# Patient Record
Sex: MALE | Race: WHITE | HISPANIC OR LATINO | Employment: OTHER | URBAN - METROPOLITAN AREA
[De-identification: names, ages, dates, MRNs, and addresses within clinical notes are randomized per-mention and may not be internally consistent; named-entity substitution may affect disease eponyms.]

---

## 2017-01-05 ENCOUNTER — ALLSCRIPTS OFFICE VISIT (OUTPATIENT)
Dept: OTHER | Facility: OTHER | Age: 52
End: 2017-01-05

## 2017-01-05 DIAGNOSIS — R53.83 OTHER FATIGUE: ICD-10-CM

## 2017-01-05 DIAGNOSIS — R10.9 ABDOMINAL PAIN: ICD-10-CM

## 2017-01-06 ENCOUNTER — HOSPITAL ENCOUNTER (EMERGENCY)
Facility: HOSPITAL | Age: 52
Discharge: HOME | End: 2017-01-06
Attending: EMERGENCY MEDICINE | Admitting: EMERGENCY MEDICINE
Payer: COMMERCIAL

## 2017-01-06 ENCOUNTER — APPOINTMENT (OUTPATIENT)
Dept: LAB | Facility: HOSPITAL | Age: 52
End: 2017-01-06
Attending: FAMILY MEDICINE
Payer: COMMERCIAL

## 2017-01-06 ENCOUNTER — GENERIC CONVERSION - ENCOUNTER (OUTPATIENT)
Dept: OTHER | Facility: OTHER | Age: 52
End: 2017-01-06

## 2017-01-06 VITALS
OXYGEN SATURATION: 94 % | DIASTOLIC BLOOD PRESSURE: 84 MMHG | TEMPERATURE: 98.6 F | RESPIRATION RATE: 18 BRPM | SYSTOLIC BLOOD PRESSURE: 139 MMHG | HEART RATE: 102 BPM

## 2017-01-06 DIAGNOSIS — N50.812 TESTICULAR PAIN, LEFT: Primary | ICD-10-CM

## 2017-01-06 DIAGNOSIS — R10.9 ABDOMINAL PAIN: ICD-10-CM

## 2017-01-06 DIAGNOSIS — R53.83 OTHER FATIGUE: ICD-10-CM

## 2017-01-06 LAB
ALBUMIN SERPL BCP-MCNC: 3.6 G/DL (ref 3.5–5)
ALP SERPL-CCNC: 67 U/L (ref 46–116)
ALT SERPL W P-5'-P-CCNC: 156 U/L (ref 12–78)
ANION GAP SERPL CALCULATED.3IONS-SCNC: 6 MMOL/L (ref 4–13)
AST SERPL W P-5'-P-CCNC: 114 U/L (ref 5–45)
BASOPHILS # BLD MANUAL: 0.07 THOUSAND/UL (ref 0–0.1)
BASOPHILS NFR MAR MANUAL: 1 % (ref 0–1)
BILIRUB SERPL-MCNC: 0.35 MG/DL (ref 0.2–1)
BUN SERPL-MCNC: 10 MG/DL (ref 5–25)
CALCIUM SERPL-MCNC: 8.6 MG/DL (ref 8.3–10.1)
CHLORIDE SERPL-SCNC: 104 MMOL/L (ref 100–108)
CO2 SERPL-SCNC: 29 MMOL/L (ref 21–32)
CREAT SERPL-MCNC: 0.92 MG/DL (ref 0.6–1.3)
EOSINOPHIL # BLD MANUAL: 0.15 THOUSAND/UL (ref 0–0.4)
EOSINOPHIL NFR BLD MANUAL: 2 % (ref 0–6)
ERYTHROCYTE [DISTWIDTH] IN BLOOD BY AUTOMATED COUNT: 12.5 % (ref 11.6–15.1)
GFR SERPL CREATININE-BSD FRML MDRD: >60 ML/MIN/1.73SQ M
GLUCOSE SERPL-MCNC: 96 MG/DL (ref 65–140)
HCT VFR BLD AUTO: 45 % (ref 36.5–49.3)
HGB BLD-MCNC: 15.6 G/DL (ref 12–17)
LIPASE SERPL-CCNC: 359 U/L (ref 73–393)
LYMPHOCYTES # BLD AUTO: 2.92 THOUSAND/UL (ref 0.6–4.47)
LYMPHOCYTES # BLD AUTO: 40 % (ref 14–44)
MCH RBC QN AUTO: 31.1 PG (ref 26.8–34.3)
MCHC RBC AUTO-ENTMCNC: 34.7 G/DL (ref 31.4–37.4)
MCV RBC AUTO: 90 FL (ref 82–98)
MONOCYTES # BLD AUTO: 0.44 THOUSAND/UL (ref 0–1.22)
MONOCYTES NFR BLD: 6 % (ref 4–12)
NEUTROPHILS # BLD MANUAL: 3.72 THOUSAND/UL (ref 1.85–7.62)
NEUTS SEG NFR BLD AUTO: 51 % (ref 43–75)
NRBC BLD AUTO-RTO: 0 /100 WBCS
PLATELET # BLD AUTO: 246 THOUSANDS/UL (ref 149–390)
PLATELET BLD QL SMEAR: ADEQUATE
PMV BLD AUTO: 10.5 FL (ref 8.9–12.7)
POTASSIUM SERPL-SCNC: 4 MMOL/L (ref 3.5–5.3)
PROT SERPL-MCNC: 8.6 G/DL (ref 6.4–8.2)
RBC # BLD AUTO: 5.01 MILLION/UL (ref 3.88–5.62)
RBC MORPH BLD: NORMAL
SODIUM SERPL-SCNC: 139 MMOL/L (ref 136–145)
WBC # BLD AUTO: 7.3 THOUSAND/UL (ref 4.31–10.16)

## 2017-01-06 PROCEDURE — 83690 ASSAY OF LIPASE: CPT

## 2017-01-06 PROCEDURE — 85007 BL SMEAR W/DIFF WBC COUNT: CPT

## 2017-01-06 PROCEDURE — 36415 COLL VENOUS BLD VENIPUNCTURE: CPT

## 2017-01-06 PROCEDURE — 80053 COMPREHEN METABOLIC PANEL: CPT

## 2017-01-06 PROCEDURE — 85027 COMPLETE CBC AUTOMATED: CPT

## 2017-01-06 PROCEDURE — 99284 EMERGENCY DEPT VISIT MOD MDM: CPT

## 2017-01-06 RX ORDER — NAPROXEN 500 MG/1
500 TABLET ORAL ONCE
Status: COMPLETED | OUTPATIENT
Start: 2017-01-06 | End: 2017-01-06

## 2017-01-06 RX ADMIN — NAPROXEN 500 MG: 500 TABLET ORAL at 14:36

## 2017-01-09 ENCOUNTER — LAB (OUTPATIENT)
Dept: LAB | Facility: CLINIC | Age: 52
End: 2017-01-09
Payer: COMMERCIAL

## 2017-01-09 DIAGNOSIS — R53.83 OTHER MALAISE AND FATIGUE: Primary | ICD-10-CM

## 2017-01-09 DIAGNOSIS — R53.81 OTHER MALAISE AND FATIGUE: Primary | ICD-10-CM

## 2017-01-09 DIAGNOSIS — R74.8 OTHER NONSPECIFIC ABNORMAL SERUM ENZYME LEVELS: ICD-10-CM

## 2017-01-09 LAB
ALBUMIN SERPL BCP-MCNC: 3.6 G/DL (ref 3.5–5)
ALP SERPL-CCNC: 70 U/L (ref 46–116)
ALT SERPL W P-5'-P-CCNC: 154 U/L (ref 12–78)
ANION GAP SERPL CALCULATED.3IONS-SCNC: 5 MMOL/L (ref 4–13)
AST SERPL W P-5'-P-CCNC: 101 U/L (ref 5–45)
BASOPHILS # BLD AUTO: 0.01 THOUSANDS/ΜL (ref 0–0.1)
BASOPHILS NFR BLD AUTO: 0 % (ref 0–1)
BILIRUB SERPL-MCNC: 0.35 MG/DL (ref 0.2–1)
BUN SERPL-MCNC: 10 MG/DL (ref 5–25)
CALCIUM SERPL-MCNC: 8.7 MG/DL (ref 8.3–10.1)
CHLORIDE SERPL-SCNC: 104 MMOL/L (ref 100–108)
CO2 SERPL-SCNC: 28 MMOL/L (ref 21–32)
CREAT SERPL-MCNC: 0.85 MG/DL (ref 0.6–1.3)
EOSINOPHIL # BLD AUTO: 0.13 THOUSAND/ΜL (ref 0–0.61)
EOSINOPHIL NFR BLD AUTO: 2 % (ref 0–6)
ERYTHROCYTE [DISTWIDTH] IN BLOOD BY AUTOMATED COUNT: 12.6 % (ref 11.6–15.1)
GFR SERPL CREATININE-BSD FRML MDRD: >60 ML/MIN/1.73SQ M
GLUCOSE SERPL-MCNC: 102 MG/DL (ref 65–140)
HCT VFR BLD AUTO: 46.3 % (ref 36.5–49.3)
HGB BLD-MCNC: 15.4 G/DL (ref 12–17)
LIPASE SERPL-CCNC: 321 U/L (ref 73–393)
LYMPHOCYTES # BLD AUTO: 2.7 THOUSANDS/ΜL (ref 0.6–4.47)
LYMPHOCYTES NFR BLD AUTO: 37 % (ref 14–44)
MCH RBC QN AUTO: 30.4 PG (ref 26.8–34.3)
MCHC RBC AUTO-ENTMCNC: 33.3 G/DL (ref 31.4–37.4)
MCV RBC AUTO: 91 FL (ref 82–98)
MONOCYTES # BLD AUTO: 0.71 THOUSAND/ΜL (ref 0.17–1.22)
MONOCYTES NFR BLD AUTO: 10 % (ref 4–12)
NEUTROPHILS # BLD AUTO: 3.71 THOUSANDS/ΜL (ref 1.85–7.62)
NEUTS SEG NFR BLD AUTO: 51 % (ref 43–75)
NRBC BLD AUTO-RTO: 0 /100 WBCS
PLATELET # BLD AUTO: 260 THOUSANDS/UL (ref 149–390)
PMV BLD AUTO: 10.7 FL (ref 8.9–12.7)
POTASSIUM SERPL-SCNC: 4.1 MMOL/L (ref 3.5–5.3)
PROT SERPL-MCNC: 8.6 G/DL (ref 6.4–8.2)
RBC # BLD AUTO: 5.07 MILLION/UL (ref 3.88–5.62)
SODIUM SERPL-SCNC: 137 MMOL/L (ref 136–145)
WBC # BLD AUTO: 7.28 THOUSAND/UL (ref 4.31–10.16)

## 2017-01-09 PROCEDURE — 85025 COMPLETE CBC W/AUTO DIFF WBC: CPT

## 2017-01-09 PROCEDURE — 80053 COMPREHEN METABOLIC PANEL: CPT

## 2017-01-09 PROCEDURE — 83690 ASSAY OF LIPASE: CPT

## 2017-01-09 PROCEDURE — 36415 COLL VENOUS BLD VENIPUNCTURE: CPT

## 2017-01-10 ENCOUNTER — GENERIC CONVERSION - ENCOUNTER (OUTPATIENT)
Dept: OTHER | Facility: OTHER | Age: 52
End: 2017-01-10

## 2017-01-21 ENCOUNTER — GENERIC CONVERSION - ENCOUNTER (OUTPATIENT)
Dept: OTHER | Facility: OTHER | Age: 52
End: 2017-01-21

## 2017-01-26 ENCOUNTER — ALLSCRIPTS OFFICE VISIT (OUTPATIENT)
Dept: OTHER | Facility: OTHER | Age: 52
End: 2017-01-26

## 2017-01-31 ENCOUNTER — GENERIC CONVERSION - ENCOUNTER (OUTPATIENT)
Dept: OTHER | Facility: OTHER | Age: 52
End: 2017-01-31

## 2017-02-10 RX ORDER — IBUPROFEN 600 MG/1
600 TABLET ORAL EVERY 6 HOURS PRN
COMMUNITY
End: 2021-08-12

## 2017-02-10 RX ORDER — SENNA AND DOCUSATE SODIUM 50; 8.6 MG/1; MG/1
2 TABLET, FILM COATED ORAL DAILY
COMMUNITY
End: 2022-07-10

## 2017-02-10 RX ORDER — MELATONIN 10 MG
10 CAPSULE ORAL
COMMUNITY
End: 2022-07-10

## 2017-02-12 ENCOUNTER — ANESTHESIA EVENT (OUTPATIENT)
Dept: GASTROENTEROLOGY | Facility: MEDICAL CENTER | Age: 52
End: 2017-02-12
Payer: COMMERCIAL

## 2017-02-13 ENCOUNTER — GENERIC CONVERSION - ENCOUNTER (OUTPATIENT)
Dept: GASTROENTEROLOGY | Facility: MEDICAL CENTER | Age: 52
End: 2017-02-13

## 2017-02-13 ENCOUNTER — HOSPITAL ENCOUNTER (OUTPATIENT)
Facility: MEDICAL CENTER | Age: 52
Setting detail: OUTPATIENT SURGERY
Discharge: HOME | End: 2017-02-13
Attending: INTERNAL MEDICINE | Admitting: INTERNAL MEDICINE
Payer: COMMERCIAL

## 2017-02-13 ENCOUNTER — ANESTHESIA (OUTPATIENT)
Dept: GASTROENTEROLOGY | Facility: MEDICAL CENTER | Age: 52
End: 2017-02-13
Payer: COMMERCIAL

## 2017-02-13 VITALS
BODY MASS INDEX: 27.32 KG/M2 | DIASTOLIC BLOOD PRESSURE: 64 MMHG | SYSTOLIC BLOOD PRESSURE: 138 MMHG | HEART RATE: 59 BPM | HEIGHT: 66 IN | WEIGHT: 170 LBS | OXYGEN SATURATION: 95 % | TEMPERATURE: 97 F | RESPIRATION RATE: 16 BRPM

## 2017-02-13 DIAGNOSIS — R10.9 ABDOMINAL PAIN: ICD-10-CM

## 2017-02-13 DIAGNOSIS — K62.5 HEMORRHAGE OF ANUS AND RECTUM: ICD-10-CM

## 2017-02-13 PROCEDURE — 88342 IMHCHEM/IMCYTCHM 1ST ANTB: CPT | Performed by: INTERNAL MEDICINE

## 2017-02-13 PROCEDURE — 88305 TISSUE EXAM BY PATHOLOGIST: CPT | Performed by: INTERNAL MEDICINE

## 2017-02-13 RX ORDER — PROPOFOL 10 MG/ML
INJECTION, EMULSION INTRAVENOUS AS NEEDED
Status: DISCONTINUED | OUTPATIENT
Start: 2017-02-13 | End: 2017-02-13 | Stop reason: SURG

## 2017-02-13 RX ORDER — SODIUM CHLORIDE 9 MG/ML
125 INJECTION, SOLUTION INTRAVENOUS CONTINUOUS
Status: DISCONTINUED | OUTPATIENT
Start: 2017-02-13 | End: 2017-02-13 | Stop reason: HOSPADM

## 2017-02-13 RX ADMIN — PROPOFOL 50 MG: 10 INJECTION, EMULSION INTRAVENOUS at 10:15

## 2017-02-13 RX ADMIN — PROPOFOL 125 MG: 10 INJECTION, EMULSION INTRAVENOUS at 10:02

## 2017-02-13 RX ADMIN — PROPOFOL 50 MG: 10 INJECTION, EMULSION INTRAVENOUS at 09:58

## 2017-02-13 RX ADMIN — PROPOFOL 100 MG: 10 INJECTION, EMULSION INTRAVENOUS at 09:47

## 2017-02-13 RX ADMIN — PROPOFOL 50 MG: 10 INJECTION, EMULSION INTRAVENOUS at 09:52

## 2017-02-13 RX ADMIN — LIDOCAINE HYDROCHLORIDE 50 MG: 20 INJECTION, SOLUTION INTRAVENOUS at 09:47

## 2017-02-13 RX ADMIN — SODIUM CHLORIDE: 0.9 INJECTION, SOLUTION INTRAVENOUS at 09:30

## 2017-02-13 RX ADMIN — PROPOFOL 50 MG: 10 INJECTION, EMULSION INTRAVENOUS at 10:04

## 2017-02-13 RX ADMIN — PROPOFOL 50 MG: 10 INJECTION, EMULSION INTRAVENOUS at 10:07

## 2017-02-13 RX ADMIN — PROPOFOL 50 MG: 10 INJECTION, EMULSION INTRAVENOUS at 09:55

## 2017-02-13 RX ADMIN — PROPOFOL 50 MG: 10 INJECTION, EMULSION INTRAVENOUS at 10:18

## 2017-02-13 RX ADMIN — PROPOFOL 50 MG: 10 INJECTION, EMULSION INTRAVENOUS at 10:10

## 2017-02-13 RX ADMIN — PROPOFOL 50 MG: 10 INJECTION, EMULSION INTRAVENOUS at 10:13

## 2017-02-13 RX ADMIN — PROPOFOL 25 MG: 10 INJECTION, EMULSION INTRAVENOUS at 09:50

## 2017-02-13 RX ADMIN — PROPOFOL 25 MG: 10 INJECTION, EMULSION INTRAVENOUS at 10:24

## 2017-02-15 ENCOUNTER — GENERIC CONVERSION - ENCOUNTER (OUTPATIENT)
Dept: OTHER | Facility: OTHER | Age: 52
End: 2017-02-15

## 2017-02-22 ENCOUNTER — HOSPITAL ENCOUNTER (OUTPATIENT)
Dept: RADIOLOGY | Facility: HOSPITAL | Age: 52
Discharge: HOME | End: 2017-02-22
Attending: FAMILY MEDICINE
Payer: COMMERCIAL

## 2017-02-22 ENCOUNTER — HOSPITAL ENCOUNTER (OUTPATIENT)
Dept: RADIOLOGY | Facility: HOSPITAL | Age: 52
Discharge: HOME | End: 2017-02-22
Attending: UROLOGY
Payer: COMMERCIAL

## 2017-02-22 DIAGNOSIS — R10.31 ABDOMINAL PAIN, RIGHT LOWER QUADRANT: ICD-10-CM

## 2017-02-22 DIAGNOSIS — Z86.19 PERSONAL HISTORY OF UNSPECIFIED INFECTIOUS AND PARASITIC DISEASE: ICD-10-CM

## 2017-02-22 DIAGNOSIS — R74.8 OTHER NONSPECIFIC ABNORMAL SERUM ENZYME LEVELS: ICD-10-CM

## 2017-02-22 DIAGNOSIS — R10.2 ADNEXAL TENDERNESS, RIGHT: ICD-10-CM

## 2017-02-22 PROCEDURE — 76700 US EXAM ABDOM COMPLETE: CPT

## 2017-02-25 ENCOUNTER — GENERIC CONVERSION - ENCOUNTER (OUTPATIENT)
Dept: OTHER | Facility: OTHER | Age: 52
End: 2017-02-25

## 2017-03-01 ENCOUNTER — TRANSCRIBE ORDERS (OUTPATIENT)
Dept: LAB | Facility: HOSPITAL | Age: 52
End: 2017-03-01

## 2017-03-01 ENCOUNTER — GENERIC CONVERSION - ENCOUNTER (OUTPATIENT)
Dept: OTHER | Facility: OTHER | Age: 52
End: 2017-03-01

## 2017-03-01 ENCOUNTER — ALLSCRIPTS OFFICE VISIT (OUTPATIENT)
Dept: OTHER | Facility: OTHER | Age: 52
End: 2017-03-01

## 2017-03-01 ENCOUNTER — APPOINTMENT (OUTPATIENT)
Dept: LAB | Facility: HOSPITAL | Age: 52
End: 2017-03-01
Payer: COMMERCIAL

## 2017-03-01 DIAGNOSIS — R94.5 NONSPECIFIC ABNORMAL RESULTS OF LIVER FUNCTION STUDY: Primary | ICD-10-CM

## 2017-03-01 DIAGNOSIS — R94.5 ABNORMAL RESULTS OF LIVER FUNCTION STUDIES: ICD-10-CM

## 2017-03-01 DIAGNOSIS — R94.5 NONSPECIFIC ABNORMAL RESULTS OF LIVER FUNCTION STUDY: ICD-10-CM

## 2017-03-01 DIAGNOSIS — R74.8 ABNORMAL LEVELS OF OTHER SERUM ENZYMES: ICD-10-CM

## 2017-03-01 DIAGNOSIS — A04.8 OTHER SPECIFIED BACTERIAL INTESTINAL INFECTIONS: ICD-10-CM

## 2017-03-01 LAB
ALBUMIN SERPL BCP-MCNC: 3.5 G/DL (ref 3.5–5)
ALP SERPL-CCNC: 69 U/L (ref 46–116)
ALT SERPL W P-5'-P-CCNC: 118 U/L (ref 12–78)
ANION GAP SERPL CALCULATED.3IONS-SCNC: 6 MMOL/L (ref 4–13)
AST SERPL W P-5'-P-CCNC: 125 U/L (ref 5–45)
BILIRUB SERPL-MCNC: 0.35 MG/DL (ref 0.2–1)
BUN SERPL-MCNC: 9 MG/DL (ref 5–25)
CALCIUM SERPL-MCNC: 8.7 MG/DL (ref 8.3–10.1)
CHLORIDE SERPL-SCNC: 105 MMOL/L (ref 100–108)
CO2 SERPL-SCNC: 27 MMOL/L (ref 21–32)
CREAT SERPL-MCNC: 0.9 MG/DL (ref 0.6–1.3)
ERYTHROCYTE [DISTWIDTH] IN BLOOD BY AUTOMATED COUNT: 12.9 % (ref 11.6–15.1)
ETHANOL SERPL-MCNC: <3 MG/DL (ref 0–3)
GFR SERPL CREATININE-BSD FRML MDRD: >60 ML/MIN/1.73SQ M
GLUCOSE SERPL-MCNC: 125 MG/DL (ref 65–140)
HCT VFR BLD AUTO: 47.2 % (ref 36.5–49.3)
HGB BLD-MCNC: 16.2 G/DL (ref 12–17)
INR PPP: 1 (ref 0.86–1.16)
MCH RBC QN AUTO: 31 PG (ref 26.8–34.3)
MCHC RBC AUTO-ENTMCNC: 34.3 G/DL (ref 31.4–37.4)
MCV RBC AUTO: 90 FL (ref 82–98)
PLATELET # BLD AUTO: 252 THOUSANDS/UL (ref 149–390)
PMV BLD AUTO: 10.2 FL (ref 8.9–12.7)
POTASSIUM SERPL-SCNC: 4.3 MMOL/L (ref 3.5–5.3)
PROT SERPL-MCNC: 8.4 G/DL (ref 6.4–8.2)
PROTHROMBIN TIME: 13.3 SECONDS (ref 12–14.3)
RBC # BLD AUTO: 5.22 MILLION/UL (ref 3.88–5.62)
SODIUM SERPL-SCNC: 138 MMOL/L (ref 136–145)
WBC # BLD AUTO: 8.18 THOUSAND/UL (ref 4.31–10.16)

## 2017-03-01 PROCEDURE — 83883 ASSAY NEPHELOMETRY NOT SPEC: CPT

## 2017-03-01 PROCEDURE — 85027 COMPLETE CBC AUTOMATED: CPT

## 2017-03-01 PROCEDURE — 87902 NFCT AGT GNTYP ALYS HEP C: CPT

## 2017-03-01 PROCEDURE — 83010 ASSAY OF HAPTOGLOBIN QUANT: CPT

## 2017-03-01 PROCEDURE — 82977 ASSAY OF GGT: CPT

## 2017-03-01 PROCEDURE — 86705 HEP B CORE ANTIBODY IGM: CPT

## 2017-03-01 PROCEDURE — 85610 PROTHROMBIN TIME: CPT

## 2017-03-01 PROCEDURE — 86803 HEPATITIS C AB TEST: CPT

## 2017-03-01 PROCEDURE — 80307 DRUG TEST PRSMV CHEM ANLYZR: CPT

## 2017-03-01 PROCEDURE — 87522 HEPATITIS C REVRS TRNSCRPJ: CPT

## 2017-03-01 PROCEDURE — 80053 COMPREHEN METABOLIC PANEL: CPT

## 2017-03-01 PROCEDURE — 86704 HEP B CORE ANTIBODY TOTAL: CPT

## 2017-03-01 PROCEDURE — 87340 HEPATITIS B SURFACE AG IA: CPT

## 2017-03-01 PROCEDURE — 80320 DRUG SCREEN QUANTALCOHOLS: CPT

## 2017-03-01 PROCEDURE — 82247 BILIRUBIN TOTAL: CPT

## 2017-03-01 PROCEDURE — 82172 ASSAY OF APOLIPOPROTEIN: CPT

## 2017-03-01 PROCEDURE — 84460 ALANINE AMINO (ALT) (SGPT): CPT

## 2017-03-01 PROCEDURE — 87389 HIV-1 AG W/HIV-1&-2 AB AG IA: CPT

## 2017-03-01 PROCEDURE — 36415 COLL VENOUS BLD VENIPUNCTURE: CPT

## 2017-03-02 LAB
AMPHETAMINES UR QL SCN: NEGATIVE NG/ML
BARBITURATES UR QL SCN: NEGATIVE NG/ML
BENZODIAZ UR QL SCN: NEGATIVE NG/ML
BZE UR QL SCN: NEGATIVE NG/ML
CANNABINOIDS UR QL SCN: NEGATIVE NG/ML
HBV CORE AB SER QL: REACTIVE
HBV CORE IGM SER QL: ABNORMAL
HBV SURFACE AG SER QL: ABNORMAL
HCV AB SER QL: ABNORMAL
METHADONE UR QL SCN: NEGATIVE NG/ML
OPIATES UR QL: NEGATIVE NG/ML
PCP UR QL: NEGATIVE NG/ML
PROPOXYPH UR QL: NEGATIVE NG/ML

## 2017-03-03 ENCOUNTER — GENERIC CONVERSION - ENCOUNTER (OUTPATIENT)
Dept: OTHER | Facility: OTHER | Age: 52
End: 2017-03-03

## 2017-03-03 LAB
A2 MACROGLOB SERPL-MCNC: 279 MG/DL (ref 110–276)
ALT SERPL W P-5'-P-CCNC: 123 IU/L (ref 0–55)
APO A-I SERPL-MCNC: 120 MG/DL (ref 101–178)
BILIRUB SERPL-MCNC: 0.2 MG/DL (ref 0–1.2)
COMMENT: ABNORMAL
FIBROSIS SCORING:: ABNORMAL
FIBROSIS STAGE SERPL QL: ABNORMAL
GGT SERPL-CCNC: 259 IU/L (ref 0–65)
HAPTOGLOB SERPL-MCNC: 167 MG/DL (ref 34–200)
HCV RNA SERPL NAA+PROBE-ACNC: NORMAL IU/ML
HCV RNA SERPL NAA+PROBE-LOG IU: 6.43 LOG10 IU/ML
HIV 1+2 AB+HIV1 P24 AG SERPL QL IA: NORMAL
INTERPRETATIONS: ABNORMAL
LIVER FIBR SCORE SERPL CALC.FIBROSURE: 0.46 (ref 0–0.21)
NECROINFLAMM ACTIVITY SCORING:: ABNORMAL
NECROINFLAMMATORY ACT GRADE SERPL QL: ABNORMAL
NECROINFLAMMATORY ACT SCORE SERPL: 0.7 (ref 0–0.17)
SERVICE CMNT-IMP: ABNORMAL
TEST INFORMATION: NORMAL

## 2017-03-06 ENCOUNTER — GENERIC CONVERSION - ENCOUNTER (OUTPATIENT)
Dept: OTHER | Facility: OTHER | Age: 52
End: 2017-03-06

## 2017-03-06 LAB
HCV GENTYP SERPL NAA+PROBE: NORMAL
HCV PLEASE NOTE: NORMAL

## 2017-03-08 ENCOUNTER — GENERIC CONVERSION - ENCOUNTER (OUTPATIENT)
Dept: OTHER | Facility: OTHER | Age: 52
End: 2017-03-08

## 2017-04-10 ENCOUNTER — GENERIC CONVERSION - ENCOUNTER (OUTPATIENT)
Dept: OTHER | Facility: OTHER | Age: 52
End: 2017-04-10

## 2017-04-10 DIAGNOSIS — B18.2 CHRONIC VIRAL HEPATITIS C (HCC): ICD-10-CM

## 2018-01-10 NOTE — RESULT NOTES
Verified Results  (1) TISSUE EXAM [id number] 09:53AM [facility]     Test Name Result Flag Reference   LAB AP CASE REPORT (Report)     Surgical Pathology Report             Case: [id number]                   Authorizing Provider: Alycia Natarajan MD      Collected:      02/13/2017 0953        Ordering Location:   [facility]    Received:      02/13/2017 [address] Endoscopy                            Pathologist:      Gaurav Gaines MD                             Specimens:  A) - Duodenum, Duodenum biopsy (cold forceps)                             B) - Stomach, Gastritis (cold biopsy)                                 C) - Polyp, Colorectal, Cecal polyp (cold snare)                            D) - Polyp, Colorectal, Sigmoid polyp (cold forceps)                          E) - Polyp, Colorectal, Rectal polyp x 2 (cold forcelp and cold snare)   LAB AP FINAL DIAGNOSIS (Report)     A  Duodenum, biopsy:  - Benign duodenal mucosa  - No villous atrophy, intraepithelial lymphocytosis or crypt hyperplasia;   negative for features of malabsorptive enteropathy   - Negative for chronic or active duodenitis, dysplasia or malignancy  B  Stomach, biopsy:  - Helicobacter pylori gastritis with acute and chronic inflammation and   reactive changes  - Immunohistochemical stain for Helicobacter pylori is positive,   supporting the diagnosis  - Negative for dysplasia or carcinoma  - Small fragment of benign-appearing duodenal mucosal contaminant   present  C  Colon, cecal polyp, biopsy:  - Tubular adenoma, negative for high-grade dysplasia  D  Colon, sigmoid polyp, biopsy:  - Tubular adenoma, negative for high-grade dysplasia  E  Colon, rectal polyps, biopsies:  - 1 portion of tubular adenoma, negative for high-grade dysplasia  - 1 portion of hyperplastic polyp      Electronically signed by Gaurav Gaines MD on 2/14/2017 at 1:28 PM   LAB AP SURGICAL ADDITIONAL INFORMATION (Report) These tests were developed and their performance characteristics   determined by [facility] or [facility]  They may not be cleared or approved by the [place]  Food and   Drug Administration  The FDA has determined that such clearance or   approval is not necessary  These tests are used for clinical purposes  They should not be regarded as investigational or for research  This   laboratory has been approved by [place] [address], designated as a high-complexity   laboratory and is qualified to perform these tests  Interpretation performed at , [address]    LAB AP GROSS DESCRIPTION (Report)     A  The specimen is received in formalin, labeled with the patient's name   and hospital number, and is designated duodenum biopsy  The specimen   consists of 2 tan-pink soft tissue fragments measuring 0 3 cm and 0 4 cm   in greatest dimension  Entirely submitted  One cassette  B  The specimen is received in formalin, labeled with the patient's name   and hospital number, and is designated stomach biopsy  The specimen   consists of 2 tan-pink soft tissue fragments measuring less than 0 1 cm   and 0 3 cm in greatest dimension  Entirely submitted  One cassette  C  The specimen is received in formalin, labeled with the patient's name   and hospital number, and is designated cecal polyp  The specimen   consists of 4 tan-pink soft tissue fragments measuring 0 1 cm, 0 2 cm, 0 2   cm, and 0 4 cm in greatest dimension  Entirely submitted  One cassette  D  The specimen is received in formalin, labeled with the patient's name   and hospital number, and is designated sigmoid polyp  The specimen   consists of one tan-pink soft tissue fragment measuring 0 4 cm in greatest   dimension  Entirely submitted  One cassette  E  The specimen is received in formalin, labeled with the patient's name   and hospital number, and is designated rectal polyp ? ?2   The specimen   consists of 2 tan-pink soft tissue fragments measuring 0 3 cm and 0 4 cm   in greatest dimension  Entirely submitted  One cassette  Note: The estimated total formalin fixation time based upon information   provided by the submitting clinician and the standard processing schedule   is 18 25 hours    MAS   LAB AP CLINICAL INFORMATION R/o celiac disease     R/o celiac disease

## 2018-01-11 NOTE — MISCELLANEOUS
Provider Comments  Provider Comments:   PT NO SHOWED TODAYS APT      Signatures   Electronically signed by : Gary Arreaga, ; Dany 15 2016  3:44PM EST                       (Author)    Electronically signed by : Mansoor Paulson DO; Dany 15 2016  4:07PM EST                       (Author)    Electronically signed by : Padmini Fitch DO; Jun 24 2016  9:24AM EST                       (Author)

## 2018-01-11 NOTE — MISCELLANEOUS
History of Present Illness  TCM Communication [facility]: The patient is being contacted for follow-up after hospitalization  Hospital records were reviewed  He was hospitalized at [facility]  The date of admission: 06/18/2016, date of discharge: 06/22/2016  Diagnosis: Major Depressive Disorder, PTSD, Opiod dependence in remission  Communication performed and completed by Glo Fisher LPN   HPI: 0/22/57- 5:87MB- Tried calling patient  left message to call back  6/24/16 717pm left message for pt to call back   no call back from pt unable to complete CHUCHO process1        1 Amended By: Wendy Stephens; Jun 27 2016 8:27 AM EST    Active Problems   1  Depression (311) (F32 9)  2  Elevated liver enzymes (790 5) (R74 8)  3  Encounter for screening colonoscopy (V76 51) (Z12 11)  4  Homeless (V60 0) (Z59 0)  5  Pain of left shoulder region (719 41) (M25 512)    Past Medical History   1  History of Cancer of unknown origin (199 1) (C80 1)  2  History of colonic polyps (V12 72) (Z86 010)  3  History of hyperlipidemia (V12 29) (Z86 39)  4  History of Saliva abnormality (792 4) (R85 9)    Surgical History   1  History of Colonoscopy  2  History of ENT Surgical Result - Neck Mass Right  3  History of Eye Surgery    Family History  Mother   1  Family history of diabetes mellitus (V18 0) (Z83 3)  Brother   2  Family history of hypertension (V17 49) (Z82 49)    Social History    · Current every day smoker (305 1) (F17 200)   · Homeless (V60 0) (Z59 0)   · No alcohol use   · No drug use    Current Meds  1  Citalopram Hydrobromide 20 MG Oral Tablet; TAKE 1 TABLET DAILY AS DIRECTED; Therapy: 76BBD2505 to (Evaluate:14Jun2016); Last Rx:16Mar2016 Ordered    Allergies   1  No Known Environmental Allergies    Message   Recorded as Task   Date: 06/22/2016 12:43 PM, Created By: System   Task Name: Hospital [facility]   Assigned To: [facility] [place] triage,Team   Regarding Patient: Geoff Borjas, Status:  In Progress   Comment:   System - 22 Jun 2016 12:43 PM    Patient discharged from hospital   Patient Name: Lv Mccoy  Patient YOB: 1965  Discharge Date: 6/22/2016  Facility: [facility] - 23 Jun 2016 8:24 AM    Marguerite Lu - 23 Jun 2016 8:24 AM    TASK REASSIGNED: Previously Assigned To Ravi Hernandez - 23 Jun 2016 7:03 PM    TASK IN PROGRESS     Signatures   Electronically signed by : ALLI Hurtado ; Jun 26 2016  8:41PM EST                       (Author)    Electronically signed by : ALLI Hurtado ; Jun 27 2016 12:02PM EST                       (Author)

## 2018-01-11 NOTE — MISCELLANEOUS
Provider Comments  Provider Comments:   pt was a no show today      Signatures   Electronically signed by : Ivanna Gallegos, ; Feb 25 2016  5:29PM EST                       (Author)    Electronically signed by : Mohit Masters DO; Feb 25 2016  5:29PM EST                       (Author)    Electronically signed by : ALLI Johnson ; Mar  4 2016 12:11PM EST                       (Author)

## 2018-01-11 NOTE — RESULT NOTES
Verified Results  (1) PT WITH INR [id number] 10:13AM [facility] Order Number: [id number]     Test Name Result Flag Reference   INR 1 00  0 86-1 16   PT 13 3 seconds  12 0-14 3

## 2018-01-11 NOTE — MISCELLANEOUS
Reason For Visit  Reason For Visit Free Text Note Form: Pt  in need of outpatient mental health support  Case Management Documentation [facility]:   Information obtained from the patient and  present  Other needs and concerns include psych  Patient's financial status govt  support program  He is also dealing with additional issues such as lack of support, education/knowledge level, cultural, language/communication barrier, homelessness and chronic/terminal disease  Patient is participating in [place] and [address] [place] [address] programs  Action Plan: supportive counseling/advocacy and information provided  plan reviewed  Progress Note  Met with pt  and his friend who is acting as an  for pt  today  Pt  reports he is living at [facility]  He reports that he is interested in obtaining an ICM in the community as he is having difficulty managing his life including his medical needs as he has follow medical appointments to arrange  Call to [facility] and spoke with Kassidy Palmer  She reports pt  has an ICM through [email or web address]  She reports that he has filed a complaint against his ICM and therefore the services are "on hold"  She reports that pt  needs to contact [facility] and inform them if he would like to transfer to another ICM within the agency or if he would like to transfer to another provider such as [facility] or [facility]  Kassidy Palmer reports pt  can contact her at anytime and has provided her number along with numbers for the above ICM programs which have been provided to pt  today with instruction on how to proceed per Blank's recommendation  Pt  verbalizes understanding of information  Pt  has also been provided with information about [place] speaking support services in [place], [place]  and  has reviewed with him process for contacting MD offices to schedule follow up medical appointments and obtain x-rays   Pt  and  verbalize understanding of information  They deny further needs at this time  Supportive counseling provided  Will continue to be available to provide support  Active Problems   1  Allergic rhinitis (477 9) (J30 9)  2  Chronic pain (338 29) (G89 29)  3  Depression (311) (F32 9)  4  Elevated liver enzymes (790 5) (R74 8)  5  Encounter for screening colonoscopy (V76 51) (Z12 11)  6  History of testicular mass excision (V15 29) (Z98 89)  7  Homeless (V60 0) (Z59 0)  8  Influenza vaccine needed (V04 81) (Z23)  9  Insomnia (780 52) (G47 00)  10  Numbness and tingling of right upper extremity (782 0) (R20 0,R20 2)  11  Pain of left shoulder region (719 41) (M25 512)  12  PTSD (post-traumatic stress disorder) (309 81) (F43 10)    Current Meds  1  Citalopram Hydrobromide 20 MG Oral Tablet; TAKE 1 TABLET DAILY AS DIRECTED; Therapy: 75AQV6365 to (Evaluate:14Jun2016); Last Rx:16Mar2016 Ordered  2  Citalopram Hydrobromide 40 MG Oral Tablet; TAKE 1 TABLET DAILY; Therapy: (Recorded:31Mzn8049) to Recorded  3  Fluticasone Propionate 50 MCG/ACT Nasal Suspension; 1 SPRAY INTO EACH NOSTRIL   DAILY; Last Rx:10Fqw8216 Ordered  4  Ibuprofen 600 MG Oral Tablet; take 1 tablet by mouth every 6 hours as needed for mild   pain; Therapy: (Recorded:65Rel1131) to Recorded  5  Melatonin 10 MG Oral Capsule; TAKE 1 CAPSULE Bedtime; Therapy: 65ANB7958 to (Evaluate:74Pat6656); Last Rx:05Hya8898 Ordered  6  OxyCODONE HCl - 5 MG Oral Tablet; Take 1 tablet every 8 hours by mouth as needed for   pain; Therapy: (Recorded:86Cew7468) to Recorded  7  Pantoprazole Sodium 40 MG Oral Tablet Delayed Release; take 1 tablet by mouth daily in   the early morning; Therapy: (Recorded:63Bnv2705) to Recorded  8  Prazosin HCl - 1 MG Oral Capsule; take 3 capsules (3mg total) by mouth daily at   bedtime; Therapy: (Recorded:16Nbq8965) to Recorded  9  Senna 8 6-50 MG TABS; take 2 tablets by mouth daily; Therapy: (Recorded:50Ypb4878) to Recorded  10   SEROquel 100 MG Oral Tablet (QUEtiapine Fumarate); take 1 5 tablets (150mg total) by    mouth daily at bed time; Therapy: (Recorded:73Fwr0503) to Recorded    Allergies   1   No Known Environmental Allergies    Signatures   Electronically signed by : ISABELLA Olmstead; Jul 15 2016 12:33PM EST                       (Author)    Electronically signed by : ISABELLA Olmstead; Jul 15 2016 12:34PM EST                       (Author)

## 2018-01-12 NOTE — MISCELLANEOUS
Provider Comments  Provider Comments:   Dear Robin Blanchardo,    You missed your appointment today with Dr Camilo Poster at [address] [place] [address]  Please contact   our office to re-schedule your appointment      Thank You,      Signatures   Electronically signed by : Jamal Reyes, ; Sep  1 2016  4:18PM EST                       (Author)

## 2018-01-12 NOTE — MISCELLANEOUS
History of Present Illness  TCM Communication [facility]: The patient is being contacted for follow-up after hospitalization  Communication performed and completed by   HPI: Tried reaching patient for [facility] communication  Spoke with family member, was told that he was still in the hospital       Active Problems    1  Depression (311) (F32 9)   2  Elevated liver enzymes (790 5) (R74 8)   3  Encounter for screening colonoscopy (V76 51) (Z12 11)   4  Homeless (V60 0) (Z59 0)   5  Pain of left shoulder region (719 41) (M25 512)    Past Medical History    1  History of Cancer of unknown origin (199 1) (C80 1)   2  History of colonic polyps (V12 72) (Z86 010)   3  History of hyperlipidemia (V12 29) (Z86 39)   4  History of Saliva abnormality (792 4) (R85 9)    Surgical History    1  History of Colonoscopy   2  History of ENT Surgical Result - Neck Mass Right   3  History of Eye Surgery    Family History  Mother    1  Family history of diabetes mellitus (V18 0) (Z83 3)  Brother    2  Family history of hypertension (V17 49) (Z82 49)    Social History    · Current every day smoker (305 1) (F17 200)   · Homeless (V60 0) (Z59 0)   · No alcohol use   · No drug use    Current Meds   1  Citalopram Hydrobromide 20 MG Oral Tablet; TAKE 1 TABLET DAILY AS DIRECTED; Therapy: 09FFP4897 to (Evaluate:14Jun2016); Last Rx:16Mar2016 Ordered    Allergies    1  No Known Environmental Allergies    Message   Recorded as Task   Date: 05/24/2016 01:29 PM, Created By: Cirilo Malave   Task Name: Follow Up   Assigned To: [facility] [place] triage,Team   Regarding Patient: Chiquis Flores, Status: In Progress   Comment:    Shelby Jaeger - 24 May 2016 1:29 PM     TASK CREATED  Patient was admitted from 5/16/16 to 5/23/16 for numerous complaints: suicidal ideations (without plan), testicular mass, abdominal pain, and migraines   Patient was worked up for his testicular mass including biopsy (benign), abdominal pain, and migraines including head CT (which was normal)  He was found to have elevated LFTs and hep B and C  He was transferred to inpatient psychiatry in [facility] once he was medically stable  Please arrange follow up within 1-2 weeks of discharge from inpatient pyschiatry once released  Patient is Yemeni speaking only, and preferred to speak with a Yemeni speaking physician        Thank you,  Leonel Anders - 24 May 2016 1:49 PM     TASK REASSIGNED: Previously Assigned To [facility]   Rubia Paniagua - 02 Jun 2016 9:19 AM     TASK REASSIGNED: Previously Assigned To Leroy Jasmine - 03 Jun 2016 4:08 PM     TASK IN PROGRESS     Future Appointments    Date/Time Provider Specialty Site   06/15/2016 02:50 PM [facility], [facility] [address] [facility]     Signatures   Electronically signed by : Tiffany Mullins DO; Dany  3 2016  7:35PM EST                       (Author)    Electronically signed by : ALLI Milner ; Jun 6 2016 11:44AM EST                       (Author)

## 2018-01-12 NOTE — MISCELLANEOUS
Signatures   Electronically signed by : Leila Kline MD; Oct  4 2016  5:33PM EST                       (Author)

## 2018-01-12 NOTE — MISCELLANEOUS
April 10, 2017    Dear Madyson Whitehead,    My name is Wendy Fleming and I am the nurse who will be managing your care of Hepatitis C treatment  Enclosed are the lab scripts for additional blood  work required by [employer] for approval of you Hep C treatment  Please get these completed as promptly as possible that  I may submit for treatment to your insurance  You may bring these scripts to any lab, however, we prefer that you get  them completed at a [facility]'s lab as they will go directly into our system that way  Some of these labs get sent to a specialty pharmacy, [facility], so ensure that these labels are printed out correctly upon registration  If these labs are missed, you  will unfortunately have to go back to the lab to have them drawn again, as they are required for approval       I am also including a Hepatitis C - Care Management Agreement which is required by [facility]  Please sign it and return  it in the envelope provided      Please call our office once you have had your blood tests completed, so I may look for your results and submit to your insurance company for approval     Thank you, and please do not hesitate to contact me with  any questions at [phone] (Monday - Friday 8:00am-4:30pm)    Sincerely,    Bambi Mathew RN        Electronically signed by:Lilo [facility]   Apr 10 2017  5:58PM EST Author

## 2018-01-12 NOTE — MISCELLANEOUS
Provider Comments  Provider Comments:   Dear Tamika Butler,    You have missed your appointment with Dr Shahram Landrum on 05/16/2016  Please call our office at [phone] to reschedule      Thank you,  [facility]      Signatures   Electronically signed by : Sarah Higgins, ; May 16 2016  4:01PM EST                       (Author)    Electronically signed by : Sulma Meza DO; May 16 2016  4:04PM EST                       (Author)

## 2018-01-13 VITALS
SYSTOLIC BLOOD PRESSURE: 122 MMHG | TEMPERATURE: 98 F | RESPIRATION RATE: 16 BRPM | BODY MASS INDEX: 27.54 KG/M2 | DIASTOLIC BLOOD PRESSURE: 90 MMHG | HEIGHT: 66 IN | WEIGHT: 171.38 LBS | HEART RATE: 82 BPM

## 2018-01-13 VITALS
DIASTOLIC BLOOD PRESSURE: 78 MMHG | HEART RATE: 104 BPM | TEMPERATURE: 94.7 F | BODY MASS INDEX: 27.66 KG/M2 | SYSTOLIC BLOOD PRESSURE: 124 MMHG | OXYGEN SATURATION: 96 % | WEIGHT: 171.38 LBS

## 2018-01-13 NOTE — RESULT NOTES
Verified Results  (1) LIPID PANEL, FASTING [id number] 01:35PM Cal Munoz Order Number: [id number]      Triglyceride:         Normal              <150 mg/dl       Borderline High    150-199 mg/dl       High               200-499 mg/dl       Very High          >499 mg/dl  Cholesterol:         Desirable        <200 mg/dl      Borderline High  200-239 mg/dl      High             >239 mg/dl  HDL Cholesterol:        High    >59 mg/dL      Low     <41 mg/dL  LDL CALCULATED:    This screening LDL is a calculated result  It does not have the accuracy of the Direct Measured LDL in the monitoring of patients with hyperlipidemia and/or statin therapy  Direct Measure LDL (YMD459) must be ordered separately in these patients       Test Name Result Flag Reference   CHOLESTEROL 156 mg/dL     HDL,DIRECT 45 mg/dL  40-60   LDL CHOLESTEROL CALCULATED 79 mg/dL  0-100   TRIGLYCERIDES 162 mg/dL H <=150     (1) CBC/PLT/DIFF [id number] 01:35PM Torin Andrews    Order Number: [id number]     Order Number: [id number]     Test Name Result Flag Reference   WBC COUNT 10 03 Thousand/uL  4 31-10 16   RBC COUNT 4 95 Million/uL  3 88-5 62   HEMOGLOBIN 15 8 g/dL  12 0-17 0   HEMATOCRIT 45 8 %  36 5-49 3   MCV 93 fL  82-98   MCH 31 9 pg  26 8-34 3   MCHC 34 5 g/dL  31 4-37 4   RDW 12 8 %  11 6-15 1   MPV 9 9 fL  8 9-12 7   PLATELET COUNT 580 Thousands/uL  149-390   nRBC AUTOMATED 0 /100 WBCs     NEUTROPHILS RELATIVE PERCENT 64 %  43-75   LYMPHOCYTES RELATIVE PERCENT 28 %  14-44   MONOCYTES RELATIVE PERCENT 7 %  4-12   EOSINOPHILS RELATIVE PERCENT 1 %  0-6   BASOPHILS RELATIVE PERCENT 0 %  0-1   NEUTROPHILS ABSOLUTE COUNT 6 34 Thousands/µL  1 85-7 62   LYMPHOCYTES ABSOLUTE COUNT 2 85 Thousands/µL  0 60-4 47   MONOCYTES ABSOLUTE COUNT 0 68 Thousand/µL  0 17-1 22   EOSINOPHILS ABSOLUTE COUNT 0 11 Thousand/µL  0 00-0 61   BASOPHILS ABSOLUTE COUNT 0 02 Thousands/µL  0 00-0 10     (1) COMPREHENSIVE METABOLIC PANEL [id number] 01: [address] [place] [address] Order Number: [id number]      National Kidney Disease Education Program recommendations are as follows:  GFR calculation is accurate only with a steady state creatinine  Chronic Kidney disease less than 60 ml/min/1 73 sq  meters  Kidney failure less than 15 ml/min/1 73 sq  meters  Test Name Result Flag Reference   GLUCOSE,RANDM 93 mg/dL     If the patient is fasting, the ADA then defines impaired fasting glucose as > 100 mg/dL and diabetes as > or equal to 123 mg/dL     SODIUM 139 mmol/L  136-145   POTASSIUM 4 6 mmol/L  3 5-5 3   CHLORIDE 103 mmol/L  100-108   CARBON DIOXIDE 30 mmol/L  21-32   ANION GAP (CALC) 6 mmol/L  4-13   BLOOD UREA NITROGEN 12 mg/dL  5-25   CREATININE 0 86 mg/dL  0 60-1 30   Standardized to IDMS reference method   CALCIUM 8 6 mg/dL  8 3-10 1   BILI, TOTAL 0 45 mg/dL  0 20-1 00   ALK PHOSPHATAS 53 U/L     ALT (SGPT) 106 U/L H 12-78   AST(SGOT) 61 U/L H 5-45   ALBUMIN 3 8 g/dL  3 5-5 0   TOTAL PROTEIN 7 8 g/dL  6 4-8 2   eGFR Non-African American      >60 0 ml/min/1 73sq m

## 2018-01-13 NOTE — RESULT NOTES
Verified Results  [address] [facility] [id number] 09:41AM Chevy Jeffrey     Test Name Result Flag Reference   US SCROTUM AND TESTICLES (Report)     SCROTAL ULTRASOUND     INDICATION: Right testicular pain  COMPARISON: 9/30/2016  TECHNIQUE:  Ultrasound the scrotal contents was performed with a high frequency linear transducer utilizing volumetric sweep imaging as well as standard still image techniques  Imaging performed in longitudinal and transverse orientation  Color and    spectral Doppler evaluation also performed bilaterally  FINDINGS:     TESTES:    Testes are symmetric and normal in size  RIGHT testis = 5 0 x 2 5 x 3 1 cm    Normal contour with homogeneous smooth echotexture  No intratesticular mass lesion or calcifications  LEFT testis = 5 0 x 2 5 x 3 2 cm   Normal contour with homogeneous smooth echotexture  No intratesticular mass lesion or calcifications  Doppler flow within both testes is present and symmetric  EPIDIDYMIDES:    Normal Size  Doppler ultrasound demonstrates normal blood flow  Small epididymal cysts are again seen  HYDROCELE: No significant fluid present  VARICOCELE: None present  SCROTUM: Scrotal thickness and appearance within normal limits  No evidence for extratesticular mass or hernia demonstrated  IMPRESSION:      No significant abnormality identified  Workstation performed: [id number]     Signed by:    Mary Ann Driscoll MD   12/28/16

## 2018-01-14 VITALS
BODY MASS INDEX: 28.28 KG/M2 | DIASTOLIC BLOOD PRESSURE: 70 MMHG | TEMPERATURE: 97.2 F | HEIGHT: 66 IN | WEIGHT: 176 LBS | HEART RATE: 80 BPM | SYSTOLIC BLOOD PRESSURE: 124 MMHG | RESPIRATION RATE: 16 BRPM

## 2018-01-15 NOTE — MISCELLANEOUS
Reason For Visit  Reason For Visit Free Text Note Form: Received request to investigate why pts  insurance will not cover office visit with Dr Kasia Knight, Urologist  Call to Dr Austin Yi office and they are not in network with pts  [place] insurance and therefore treatment will not be covered  Search conducted of local Urologists in network with [place] and [address] [facility] [address], Urology is in network  Update provided to MD  will continue to be available to provide support  Active Problems    1  Abdominal pain (789 00) (R10 9)   2  Allergic rhinitis (477 9) (J30 9)   3  Chronic constipation (564 00) (K59 09)   4  Chronic pain (338 29) (G89 29)   5  Chronic post-operative pain (338 28) (G89 28)   6  Depression (311) (F32 9)   7  Elevated liver enzymes (790 5) (R74 8)   8  Encounter for screening colonoscopy (V76 51) (Z12 11)   9  Encounter for smoking cessation counseling (V65 42,305 1) (Z71 6,Z72 0)   10  Fatigue (780 79) (R53 83)   11  History of testicular mass excision (V15 29) (Z98 890,Z87 438)   12  Homeless (V60 0) (Z59 0)   13  Influenza vaccine needed (V04 81) (Z23)   14  Insomnia (780 52) (G47 00)   15  Internal inguinal hernia (550 90) (K40 90)   16  Loss of appetite (783 0) (R63 0)   17  Numbness and tingling of right upper extremity (782 0) (R20 0,R20 2)   18  Pain of left shoulder region (719 41) (M25 512)   19  Palpitations (785 1) (R00 2)   20  PTSD (post-traumatic stress disorder) (309 81) (F43 10)   21  Rectal bleeding (569 3) (K62 5)   22  Scrotal pain (608 9) (N50 82)   23  Shortness of breath (786 05) (R06 02)   24  Trouble swallowing (787 20) (R13 10)    Current Meds   1  Citalopram Hydrobromide 40 MG Oral Tablet; TAKE 1 TABLET DAILY; Therapy: (Recorded:84Uot5613) to Recorded   2  Colace 100 MG Oral Capsule; TAKE 1 CAPSULE TWICE DAILY; Therapy: 66VUE1027 to (Brandon Antuenz)  Requested for: 68TFC0998; Last   Rx:37Fhi7461 Ordered   3   Ensure Oral Liquid; DRINK ONE TO TWO CANS DAILY; Therapy: 47RPD9494 to (Last Rx:05Jan2017)  Requested for: 14FPS6176 Ordered   4  Flonase Allergy Relief 50 MCG/ACT Nasal Suspension (Fluticasone Propionate); Therapy: 46MJL5763 to Recorded   5  Fluticasone Propionate 50 MCG/ACT Nasal Suspension; 1 SPRAY INTO EACH NOSTRIL   DAILY  Requested for: 11DKC1436; Last Rx:05Jan2017 Ordered   6  Ibuprofen 600 MG Oral Tablet; take 1 tablet by mouth every 6 hours as needed for mild   pain; Therapy: (Recorded:13Wrx3274) to Recorded   7  Meclizine HCl - 25 MG Oral Tablet; Therapy: (Recorded:46Exc5787) to Recorded   8  Naproxen 500 MG Oral Tablet; TAKE 1 TABLET EVERY 12 HOURS WITH FOOD AS   NEEDED; Therapy: 01ZFT5591 to (Marine Cypher)  Requested for: 34PEI4624; Last   Rx:14Nov2016 Ordered   9  Nicotine 21 MG/24HR Transdermal Patch 24 Hour; APPLY 1 PATCH Daily; Therapy: 22UEG2988 to (Last Rx:14Nov2016)  Requested for: 33GHX3158 Ordered   10  Pantoprazole Sodium 40 MG Oral Tablet Delayed Release; take 1 tablet by mouth daily in    the early morning; Therapy: (Recorded:84Wkl4296) to Recorded   11  Prazosin HCl - 2 MG Oral Capsule; Therapy: (Recorded:16Wfz5614) to Recorded   12  QUEtiapine Fumarate 100 MG Oral Tablet; Therapy: (Recorded:20Atz1732) to Recorded   13  Senna 8 6 MG Oral Tablet; Take 1 tablet twice daily; Therapy: 90SYE0746 to (Ana Passe)  Requested for: 22AGR6335; Last    Rx:09Dec2016 Ordered   14  Sertraline HCl - 50 MG Oral Tablet; Take 1 tablet daily; Therapy: 82PEP7947 to (Evaluate:08Jan2017) Recorded   15  TraMADol HCl - 50 MG Oral Tablet; TAKE 1 TABLET EVERY 4 TO 6 HOURS AS NEEDED; Therapy: 01KEJ2618 to (Evaluate:13Jan2017); Last Rx:14Nov2016 Ordered   16  TraZODone HCl - 100 MG Oral Tablet; TAKE 1 TABLET Bedtime; Therapy: 80SHQ6324 to (Evaluate:35Vgk1484) Recorded    Allergies    1   No Known Environmental Allergies    Signatures   Electronically signed by : ISABELLA Lockhart; Jan 6 2017  2:52PM EST                       (Author)

## 2018-01-15 NOTE — RESULT NOTES
Verified Results  (1) HIV AG/AB Miriam Hernandezdeepak MOREJON 05OGB7996 10:13AM [place]    Order Number: [id number]     Test Name Result Flag Reference   HIV 1/2 AND P24 Non-Reactive  Non-Reactive   This test detects HIV 1, HIV2 and p24 Antigen

## 2018-01-15 NOTE — MISCELLANEOUS
Reason For Visit  Reason For Visit Free Text Note Form: Received request to contact pt  or his , regarding obtaining Ensure and addressing transportation concerns  Call to [facility] [phone]  Spoke with Claude Hameed,  and explained that pt  can obtain Ensure from pharmacy such as [facility]  Reviewed transportation concerns for pt  She feels pt  is capable of using public transportation and is aware of [place] [address] service for Disabled people, however she reports pt  does not have a disability at this time  She reports their agency can also assist with transportation  She will review information discussed with , Lisa Newsome  Will continue to be available to provide support  Active Problems    1  Abdominal pain (789 00) (R10 9)   2  Acute right lower quadrant pain (789 03,338 19) (R10 31)   3  Allergic rhinitis (477 9) (J30 9)   4  Chronic constipation (564 00) (K59 09)   5  Chronic pain (338 29) (G89 29)   6  Chronic post-operative pain (338 28) (G89 28)   7  Depression (311) (F32 9)   8  Elevated liver enzymes (790 5) (R74 8)   9  Encounter for screening colonoscopy (V76 51) (Z12 11)   10  Encounter for smoking cessation counseling (V65 42,305 1) (Z71 6,Z72 0)   11  Fatigue (780 79) (R53 83)   12  History of hepatitis (V12 09) (Z86 19)   13  History of testicular mass excision (V15 29) (Z98 890,Z87 438)   14  Homeless (V60 0) (Z59 0)   15  Influenza vaccine needed (V04 81) (Z23)   16  Insomnia (780 52) (G47 00)   17  Internal inguinal hernia (550 90) (K40 90)   18  Loss of appetite (783 0) (R63 0)   19  Numbness and tingling of right upper extremity (782 0) (R20 0,R20 2)   20  Pain of left shoulder region (719 41) (M25 512)   21  Palpitations (785 1) (R00 2)   22  PTSD (post-traumatic stress disorder) (309 81) (F43 10)   23  Rectal bleeding (569 3) (K62 5)   24  Scrotal pain (608 9) (N50 82)   25   Shortness of breath (786 05) (R06 02) 26  Trouble swallowing (787 20) (R13 10)    Current Meds   1  Citalopram Hydrobromide 40 MG Oral Tablet; TAKE 1 TABLET DAILY; Therapy: (Recorded:37Ljg4418) to Recorded   2  Colace 100 MG Oral Capsule; TAKE 1 CAPSULE TWICE DAILY; Therapy: 33FPQ7732 to (Maru Granger)  Requested for: 38SEN8576; Last   Rx:09Dec2016 Ordered   3  Ensure Oral Liquid; DRINK ONE TO TWO CANS DAILY; Therapy: 69WIU6241 to (Last Rx:05Jan2017)  Requested for: 97WHT7395 Ordered   4  Flonase Allergy Relief 50 MCG/ACT Nasal Suspension (Fluticasone Propionate); Therapy: 70BJO7657 to Recorded   5  Fluticasone Propionate 50 MCG/ACT Nasal Suspension; 1 SPRAY INTO EACH NOSTRIL   DAILY  Requested for: 63TJW7728; Last Rx:05Jan2017 Ordered   6  Ibuprofen 600 MG Oral Tablet; take 1 tablet by mouth every 6 hours as needed for mild   pain; Therapy: (Recorded:88Jpk7265) to Recorded   7  Meclizine HCl - 25 MG Oral Tablet; Therapy: (Recorded:09Dec2016) to Recorded   8  Naproxen 500 MG Oral Tablet; TAKE 1 TABLET EVERY 12 HOURS WITH FOOD AS   NEEDED; Therapy: 21FMW8931 to (Kimmie Cal)  Requested for: 83BIX5175; Last   Rx:14Nov2016 Ordered   9  Nicotine 21 MG/24HR Transdermal Patch 24 Hour; APPLY 1 PATCH Daily; Therapy: 63TPG9689 to (Last Rx:14Nov2016)  Requested for: 44QEQ8797 Ordered   10  Pantoprazole Sodium 40 MG Oral Tablet Delayed Release; take 1 tablet by mouth daily in    the early morning; Therapy: (Recorded:97Egv7879) to Recorded   11  Prazosin HCl - 2 MG Oral Capsule; Therapy: (Recorded:26Rdf6538) to Recorded   12  QUEtiapine Fumarate 100 MG Oral Tablet; Therapy: (Recorded:16Cky0182) to Recorded   13  Senna 8 6 MG Oral Tablet; Take 1 tablet twice daily; Therapy: 32IZY7666 to (Maru Granger)  Requested for: 19TDA0719; Last    Rx:09Dec2016 Ordered   14  Sertraline HCl - 50 MG Oral Tablet; Take 1 tablet daily; Therapy: 69HKA2634 to (Evaluate:48Pug3633) Recorded   15   TraMADol HCl - 50 MG Oral Tablet; TAKE 1 TABLET EVERY 4 TO 6 HOURS AS NEEDED; Therapy: 93WDY4907 to (Evaluate:13Jan2017); Last Rx:14Nov2016 Ordered   16  TraZODone HCl - 100 MG Oral Tablet; TAKE 1 TABLET Bedtime; Therapy: 32CHL7141 to (Evaluate:38Tqk8666) Recorded    Allergies    1   No Known Environmental Allergies    Signatures   Electronically signed by : ISABELLA Rojas; Jan 31 2017 10:25AM EST                       (Author)

## 2018-01-15 NOTE — MISCELLANEOUS
Provider Comments  Provider Comments:   pt was a no show todeay      Signatures   Electronically signed by : Momo Motley, ; Apr 13 2016  3:58PM EST                       (Author)    Electronically signed by : Dee Burks; Apr 14 2016  7:10AM EST                       (Author)    Electronically signed by : Anahi Menjivar DO;  Apr 15 2016  1:29PM EST                       (Author)

## 2018-01-16 NOTE — RESULT NOTES
Verified Results  * US ABDOMEN COMPLETE 97Ebq5947 12:31PM Chichirayne Huynh     Test Name Result Flag Reference   US ABDOMEN COMPLETE (Report)     ABDOMEN ULTRASOUND, COMPLETE     INDICATION: Upper abdominal pain  Abnormal LFTs  History of infectious parasitic disease  COMPARISON: None  TECHNIQUE:  Real-time ultrasound of the abdomen was performed with a curvilinear transducer with both volumetric sweeps and still imaging techniques  FINDINGS:     PANCREAS: Visualized portions of the pancreas are within normal limits  AORTA AND IVC: Visualized portions are normal for patient age  LIVER:   Size: Mildly enlarged  The liver measures 17 cm in the midclavicular line  Contour: Surface contour is subtly irregular suggesting possibility of micronodular changes  Parenchyma: Echogenicity is diffusely increased, with mild coarsened pattern  While this could represent a component of steatosis, correlate with clinical findings for underlying early cirrhotic changes  No evidence of suspicious mass  The main portal vein is patent and hepatopetal       BILIARY:   The gallbladder is normal in caliber  No wall thickening or pericholecystic fluid  No stones or sludge identified  Sonographic Lindle Edelson sign is negative  No intrahepatic biliary dilatation  CBD measures 5 mm  No choledocholithiasis  KIDNEY:    Right kidney measures 10 4 x 4 4 cm  Within normal limits  Left kidney measures 10 2 x 5 6 cm  Within normal limits  SPLEEN:    Measures 10 3 x 10 7 x 4 6 cm  Within normal limits  ASCITES: None  IMPRESSION:     No acute abnormality evident  Mild hepatomegaly and mild abnormal echogenicity, echotexture and surface contour suggesting possibility of underlying fibrotic liver disease         Workstation performed: [id number]     Signed by:   Liang Crandall MD   2/23/17

## 2018-01-17 NOTE — RESULT NOTES
Verified Results  (1) HCV FIBROSURE 10RGO1583 10:13AM Marbin Points     Test Name Result Flag Reference   Fibrosis Score 0 46 H 0 00 - 0 21   Fibrosis Stage F1-F2     Necroinflammat Activity Score 0 70 H 0 00 - 0 17   Necroinflammat Activity Grade A3-Severe activity     Alpha 2-Macroglobulins, Qn 279 mg/dL H 110 - 276   ALT (SGPT) P5P 123 IU/L H 0 - 55   Interpretations: Comment     Quantitative results of 6 biochemical tests are analyzed using  a computational algorithm to provide a quantitative surrogate  marker (0 0-1 0) for liver fibrosis (METAVIR F0-F4) and for  necroinflammatory activity (METAVIR A0-A3)  Fibrosis Scoring: Comment     <0 21 = Stage F0 - No fibrosis  0 21 - 0 27 = Stage F0 - F1  0 27 - 0 31 = Stage F1 - Portal fibrosis  0 31 - 0 48 = Stage F1 - F2  0 48 - 0 58 = Stage F2 - Bridging fibrosis with few septa  0 58 - 0 72 = Stage F3 - Bridging fibrosis with many septa  0 72 - 0 74 = Stage F3 - F4        >0 74 = Stage F4 - Cirrhosis   Necroinflamm Activity Scoring: Comment     <0 17 = Grade A0 - No Activity  0 17 - 0 29 = Grade A0 - A1  0 29 - 0 36 = Grade A1 - Minimal activity  0 36 - 0 52 = Grade A1 - A2  0 52 - 0 60 = Grade A2 - Moderate activity  0 60 - 0 62 = Grade A2 - A3        >0 62 = Grade A3 - Severe activity   Limitations: Comment     The negative predictive value of a Fibrotest score <0 31 (absence of  clinically significant fibrosis) was 85% when compared to liver biopsy  in 1,270 HCV infected patients with a 38% prevalence of significant  liver fibrosis (F2, 3 or 4)  The positive predictive value of a Fibro-  test score >0 48 (F2, 3, 4) was 61% in that same patient cohort  HCV  FibroSURE is not recommended in patients with Carmen Stephani Disease, acute  hemolysis (e g  HCV ribavirin therapy mediated hemolysis) acute hepa-  titis of the liver, extra-hepatic cholestasis, transplant patients,  and/or renal insufficiency patients    Any of these clinical situations  may lead to inaccurate quantitative predictions of fibrosis and  necroinflammatory activity in the liver  Comment: Comment     This test was developed and its performance characteristics determined  by [facility]   It has not been cleared or approved by the Food and Drug  Administration  The FDA has determined that such clearance or  approval is not necessary  For questions regarding this report please contact customer service  at [phone]  Bilirubin, Total 0 2 mg/dL  0 0 - 1 2    IU/L H 0 - 65   Performed at:  [address]  [id number]  : Ulices Welch MD, Phone:  [phone]   HAPTOGLOBIN 167 mg/dL  34 - 200   APOA 120 mg/dL  101 - 178     (1) HEP C RNA PCR, QUANTITATIVE 81MRP5409 10:13AM Liset Flores     Test Name Result Flag Reference   HCV PCR QUANTITATIVE [id number] IU/mL     HCV QUANTITATIVE LOG 6 428 log10 IU/mL     Performed at:  [facility] [address]  [id number]  : Davian Dwyer MD, Phone:  [phone]   TEST INFORMATION Comment     The quantitative range of this assay is 15 IU/mL to 100 million IU/mL

## 2018-01-17 NOTE — RESULT NOTES
Verified Results  (1) HEPATITIS C GENOTYPING 59HCD7237 10:13AM Mar[facility] Points     Test Name Result Flag Reference   HEPATITIS C GENOTYPING 1a     HEP C GENOTYPE NOTE Comment     This test was developed and its performance characteristics determined  by [facility]   It has not been cleared or approved by the [place]  Food and  Drug Administration  The FDA has determined that such clearance or approval is not  necessary  This test is used for clinical purposes  It should not be  regarded as investigational or for research    Performed at:  [address]  [id number]  : Zack Peña MD, Phone:  [phone]

## 2018-01-17 NOTE — MISCELLANEOUS
Reason For Visit  Reason For Visit Free Text Note Form: Received request to locate Urology and GI Providers for pt  who accept his [employer]  Call placed to pt  however he is not available  Letter mailed to pt  today including contact information for [facility], [facility] and  GI Specialists as they are both in network with his [employer] contact information included with letter  Letter scanned to EMR  Will continue to be available to provide support  Active Problems    1  Abdominal pain (789 00) (R10 9)   2  Allergic rhinitis (477 9) (J30 9)   3  Chronic constipation (564 00) (K59 09)   4  Chronic pain (338 29) (G89 29)   5  Chronic post-operative pain (338 28) (G89 28)   6  Depression (311) (F32 9)   7  Elevated liver enzymes (790 5) (R74 8)   8  Encounter for screening colonoscopy (V76 51) (Z12 11)   9  Encounter for smoking cessation counseling (V65 42,305 1) (Z71 6,Z72 0)   10  Fatigue (780 79) (R53 83)   11  History of testicular mass excision (V15 29) (Z98 890,Z87 438)   12  Homeless (V60 0) (Z59 0)   13  Influenza vaccine needed (V04 81) (Z23)   14  Insomnia (780 52) (G47 00)   15  Internal inguinal hernia (550 90) (K40 90)   16  Loss of appetite (783 0) (R63 0)   17  Numbness and tingling of right upper extremity (782 0) (R20 0,R20 2)   18  Pain of left shoulder region (719 41) (M25 512)   19  Palpitations (785 1) (R00 2)   20  PTSD (post-traumatic stress disorder) (309 81) (F43 10)   21  Rectal bleeding (569 3) (K62 5)   22  Scrotal pain (608 9) (N50 82)   23  Shortness of breath (786 05) (R06 02)   24  Trouble swallowing (787 20) (R13 10)    Current Meds   1  Citalopram Hydrobromide 40 MG Oral Tablet; TAKE 1 TABLET DAILY; Therapy: (Recorded:04Qia7413) to Recorded   2  Colace 100 MG Oral Capsule; TAKE 1 CAPSULE TWICE DAILY; Therapy: 91CXB7814 to (Sheri Prince)  Requested for: 94MYG8073; Last   Rx:61Oew3280 Ordered   3   Ensure Oral Liquid; DRINK ONE TO TWO CANS DAILY; Therapy: 15IJS6133 to (Last Rx:05Jan2017)  Requested for: 95NAL3965 Ordered   4  Flonase Allergy Relief 50 MCG/ACT Nasal Suspension (Fluticasone Propionate); Therapy: 18YAW7633 to Recorded   5  Fluticasone Propionate 50 MCG/ACT Nasal Suspension; 1 SPRAY INTO EACH NOSTRIL   DAILY  Requested for: 78KPY0737; Last Rx:05Jan2017 Ordered   6  Ibuprofen 600 MG Oral Tablet; take 1 tablet by mouth every 6 hours as needed for mild   pain; Therapy: (Recorded:53Qkt2767) to Recorded   7  Meclizine HCl - 25 MG Oral Tablet; Therapy: (Recorded:83Rne3225) to Recorded   8  Naproxen 500 MG Oral Tablet; TAKE 1 TABLET EVERY 12 HOURS WITH FOOD AS   NEEDED; Therapy: 23PWY5347 to (Kary Estimable)  Requested for: 17JWK1954; Last   Rx:14Nov2016 Ordered   9  Nicotine 21 MG/24HR Transdermal Patch 24 Hour; APPLY 1 PATCH Daily; Therapy: 33JNN6978 to (Last Rx:14Nov2016)  Requested for: 76CDS4740 Ordered   10  Pantoprazole Sodium 40 MG Oral Tablet Delayed Release; take 1 tablet by mouth daily in    the early morning; Therapy: (Recorded:05Rel2380) to Recorded   11  Prazosin HCl - 2 MG Oral Capsule; Therapy: (Recorded:82Zmh7470) to Recorded   12  QUEtiapine Fumarate 100 MG Oral Tablet; Therapy: (Recorded:25Cmy0024) to Recorded   13  Senna 8 6 MG Oral Tablet; Take 1 tablet twice daily; Therapy: 60KMO2422 to (Dorsey Junes)  Requested for: 29JKO6561; Last    Rx:09Dec2016 Ordered   14  Sertraline HCl - 50 MG Oral Tablet; Take 1 tablet daily; Therapy: 01MTT5202 to (Evaluate:08Jan2017) Recorded   15  TraMADol HCl - 50 MG Oral Tablet; TAKE 1 TABLET EVERY 4 TO 6 HOURS AS NEEDED; Therapy: 15NXC5691 to (Evaluate:13Jan2017); Last Rx:14Nov2016 Ordered   16  TraZODone HCl - 100 MG Oral Tablet; TAKE 1 TABLET Bedtime; Therapy: 60OEV3047 to (Evaluate:91Rcs1498) Recorded    Allergies    1   No Known Environmental Allergies    Signatures   Electronically signed by : ISABELLA Cisneros; Sánchez 10 2017  1:19PM EST                       (Author)

## 2018-01-17 NOTE — MISCELLANEOUS
Provider Comments  Provider Comments:   PT WAS A NO SHOW TODAY      Signatures   Electronically signed by : Katelyn Valdes, ; Apr 13 2016  3:11PM EST                       (Author)    Electronically signed by : Harper Gunter DO; Apr 14 2016  7:10AM EST                       (Author)    Electronically signed by : Driss Morgan DO;  Apr 15 2016  1:29PM EST                       (Author)

## 2018-03-24 ENCOUNTER — HOSPITAL ENCOUNTER (EMERGENCY)
Facility: HOSPITAL | Age: 53
Discharge: HOME | End: 2018-03-24
Attending: EMERGENCY MEDICINE | Admitting: EMERGENCY MEDICINE
Payer: COMMERCIAL

## 2018-03-24 ENCOUNTER — APPOINTMENT (EMERGENCY)
Dept: RADIOLOGY | Facility: HOSPITAL | Age: 53
End: 2018-03-24
Payer: COMMERCIAL

## 2018-03-24 VITALS
HEIGHT: 66 IN | OXYGEN SATURATION: 100 % | WEIGHT: 165.6 LBS | RESPIRATION RATE: 18 BRPM | HEART RATE: 81 BPM | BODY MASS INDEX: 26.61 KG/M2 | TEMPERATURE: 97.2 F

## 2018-03-24 DIAGNOSIS — R07.9 LEFT SIDED CHEST PAIN: Primary | ICD-10-CM

## 2018-03-24 LAB
ALBUMIN SERPL BCP-MCNC: 3.8 G/DL (ref 3.5–5)
ALP SERPL-CCNC: 87 U/L (ref 46–116)
ALT SERPL W P-5'-P-CCNC: 34 U/L (ref 12–78)
ANION GAP SERPL CALCULATED.3IONS-SCNC: 4 MMOL/L (ref 4–13)
AST SERPL W P-5'-P-CCNC: 34 U/L (ref 5–45)
BACTERIA UR QL AUTO: NORMAL /HPF
BASOPHILS # BLD AUTO: 0.02 THOUSANDS/ΜL (ref 0–0.1)
BASOPHILS NFR BLD AUTO: 0 % (ref 0–1)
BILIRUB SERPL-MCNC: 0.24 MG/DL (ref 0.2–1)
BILIRUB UR QL STRIP: ABNORMAL
BUN SERPL-MCNC: 7 MG/DL (ref 5–25)
CALCIUM SERPL-MCNC: 8.3 MG/DL (ref 8.3–10.1)
CHLORIDE SERPL-SCNC: 105 MMOL/L (ref 100–108)
CLARITY UR: CLEAR
CO2 SERPL-SCNC: 30 MMOL/L (ref 21–32)
COLOR UR: YELLOW
CREAT SERPL-MCNC: 0.98 MG/DL (ref 0.6–1.3)
EOSINOPHIL # BLD AUTO: 0.12 THOUSAND/ΜL (ref 0–0.61)
EOSINOPHIL NFR BLD AUTO: 1 % (ref 0–6)
ERYTHROCYTE [DISTWIDTH] IN BLOOD BY AUTOMATED COUNT: 13.8 % (ref 11.6–15.1)
GFR SERPL CREATININE-BSD FRML MDRD: 88 ML/MIN/1.73SQ M
GLUCOSE SERPL-MCNC: 60 MG/DL (ref 65–140)
GLUCOSE UR STRIP-MCNC: NEGATIVE MG/DL
HCT VFR BLD AUTO: 40.5 % (ref 36.5–49.3)
HGB BLD-MCNC: 13.7 G/DL (ref 12–17)
HGB UR QL STRIP.AUTO: NEGATIVE
HYALINE CASTS #/AREA URNS LPF: NORMAL /LPF
KETONES UR STRIP-MCNC: NEGATIVE MG/DL
LEUKOCYTE ESTERASE UR QL STRIP: NEGATIVE
LIPASE SERPL-CCNC: 284 U/L (ref 73–393)
LYMPHOCYTES # BLD AUTO: 2.62 THOUSANDS/ΜL (ref 0.6–4.47)
LYMPHOCYTES NFR BLD AUTO: 31 % (ref 14–44)
MCH RBC QN AUTO: 29.9 PG (ref 26.8–34.3)
MCHC RBC AUTO-ENTMCNC: 33.8 G/DL (ref 31.4–37.4)
MCV RBC AUTO: 88 FL (ref 82–98)
MONOCYTES # BLD AUTO: 0.5 THOUSAND/ΜL (ref 0.17–1.22)
MONOCYTES NFR BLD AUTO: 6 % (ref 4–12)
NEUTROPHILS # BLD AUTO: 5.13 THOUSANDS/ΜL (ref 1.85–7.62)
NEUTS SEG NFR BLD AUTO: 62 % (ref 43–75)
NITRITE UR QL STRIP: NEGATIVE
NON-SQ EPI CELLS URNS QL MICRO: NORMAL /HPF
NRBC BLD AUTO-RTO: 0 /100 WBCS
PH UR STRIP.AUTO: 6.5 [PH] (ref 4.5–8)
PLATELET # BLD AUTO: 158 THOUSANDS/UL (ref 149–390)
PMV BLD AUTO: 10.7 FL (ref 8.9–12.7)
POTASSIUM SERPL-SCNC: 3.4 MMOL/L (ref 3.5–5.3)
PROT SERPL-MCNC: 8.5 G/DL (ref 6.4–8.2)
PROT UR STRIP-MCNC: ABNORMAL MG/DL
RBC # BLD AUTO: 4.58 MILLION/UL (ref 3.88–5.62)
RBC #/AREA URNS AUTO: NORMAL /HPF
SODIUM SERPL-SCNC: 139 MMOL/L (ref 136–145)
SP GR UR STRIP.AUTO: 1.02 (ref 1–1.03)
TROPONIN I SERPL-MCNC: <0.02 NG/ML
UROBILINOGEN UR QL STRIP.AUTO: 1 E.U./DL
WBC # BLD AUTO: 8.41 THOUSAND/UL (ref 4.31–10.16)
WBC #/AREA URNS AUTO: NORMAL /HPF

## 2018-03-24 PROCEDURE — 99285 EMERGENCY DEPT VISIT HI MDM: CPT

## 2018-03-24 PROCEDURE — 81001 URINALYSIS AUTO W/SCOPE: CPT

## 2018-03-24 PROCEDURE — 71046 X-RAY EXAM CHEST 2 VIEWS: CPT

## 2018-03-24 PROCEDURE — 84484 ASSAY OF TROPONIN QUANT: CPT | Performed by: EMERGENCY MEDICINE

## 2018-03-24 PROCEDURE — 83690 ASSAY OF LIPASE: CPT | Performed by: EMERGENCY MEDICINE

## 2018-03-24 PROCEDURE — 85025 COMPLETE CBC W/AUTO DIFF WBC: CPT | Performed by: EMERGENCY MEDICINE

## 2018-03-24 PROCEDURE — 36415 COLL VENOUS BLD VENIPUNCTURE: CPT | Performed by: EMERGENCY MEDICINE

## 2018-03-24 PROCEDURE — 96374 THER/PROPH/DIAG INJ IV PUSH: CPT

## 2018-03-24 PROCEDURE — 93005 ELECTROCARDIOGRAM TRACING: CPT | Performed by: EMERGENCY MEDICINE

## 2018-03-24 PROCEDURE — 80053 COMPREHEN METABOLIC PANEL: CPT | Performed by: EMERGENCY MEDICINE

## 2018-03-24 RX ORDER — METHOCARBAMOL 500 MG/1
1000 TABLET, FILM COATED ORAL ONCE
Status: COMPLETED | OUTPATIENT
Start: 2018-03-24 | End: 2018-03-24

## 2018-03-24 RX ORDER — LIDOCAINE 50 MG/G
1 PATCH TOPICAL ONCE
Status: DISCONTINUED | OUTPATIENT
Start: 2018-03-24 | End: 2018-03-24 | Stop reason: HOSPADM

## 2018-03-24 RX ORDER — KETOROLAC TROMETHAMINE 30 MG/ML
15 INJECTION, SOLUTION INTRAMUSCULAR; INTRAVENOUS ONCE
Status: COMPLETED | OUTPATIENT
Start: 2018-03-24 | End: 2018-03-24

## 2018-03-24 RX ADMIN — KETOROLAC TROMETHAMINE 15 MG: 30 INJECTION, SOLUTION INTRAMUSCULAR at 19:13

## 2018-03-24 RX ADMIN — LIDOCAINE 1 PATCH: 50 PATCH TOPICAL at 20:26

## 2018-03-24 RX ADMIN — METHOCARBAMOL 1000 MG: 500 TABLET ORAL at 20:27

## 2018-03-24 NOTE — ED PROCEDURE NOTE
Procedure  ECG 12 Lead Documentation  Date/Time: 3/24/2018 7:30 PM  Performed by: Fabian Santoro  Authorized by: Mimi Nunes     Indications / Diagnosis:  Chest pain  ECG reviewed by me, the ED Provider: yes    Patient location:  ED and bedside  Previous ECG:     Previous ECG:  Compared to current    Comparison ECG info:  Chest pain    Similarity:  Changes noted    Comparison to cardiac monitor: Yes    Interpretation:     Interpretation: normal    Rate:     ECG rate:  69    ECG rate assessment: normal    Rhythm:     Rhythm: sinus rhythm    Ectopy:     Ectopy: none    QRS:     QRS axis:  Normal    QRS intervals:  Normal  Conduction:     Conduction: normal    ST segments:     ST segments:  Normal  T waves:     T waves: normal                       Laurita Bird MD  03/24/18 1932

## 2018-03-24 NOTE — ED ATTENDING ATTESTATION
Vivi Bucio MD, saw and evaluated the patient  I have discussed the patient with the resident/non-physician practitioner and agree with the resident's/non-physician practitioner's findings, Plan of Care, and MDM as documented in the resident's/non-physician practitioner's note, except where noted  All available labs and Radiology studies were reviewed  At this point I agree with the current assessment done in the Emergency Department  I have conducted an independent evaluation of this patient a history and physical is as follows:      Critical Care Time  CritCare Time    Procedures     47 yo male with chest pain started at 11am, woke up with pain, no activity when it started  Pain is worse with movement, deep breaths  No hx of same  Nausea, no vomiting, no diaphoresis, no sob   pmh depression, ptsd  Vss, afebrile, lungs cta, rrr, abdomen soft nontender, reproducible chest tenderness with palpation  Cardiac workup,  msk pain  Pain meds, noncardiac cp

## 2018-03-24 NOTE — ED PROVIDER NOTES
History  Chief Complaint   Patient presents with    Chest Pain     pt c/o CP that started today  47yo male pmhx HTN, seizures, liver disease, testicular cancer (s/p resection), depression presents for evaluation of chest pain  Patient says he woke up feeling fine and then left-sided chest pain began around 11:00 a m  patient denies having pain like this before  He says pain has worsened throughout the day, exacerbated by movement, palpating the area and deep inhalation  Patient notes he does lift heavy objects at work but denies lifting anything last 3 days  Denies fever, chills, nausea, vomiting, shortness of breath, diarrhea, or dysuria  Denies recent travel or surgery  Denies history of DVT or PE  Smokes 1/2 ppd  Denies ETOH or other recreational drug use            Prior to Admission Medications   Prescriptions Last Dose Informant Patient Reported? Taking? CITALOPRAM HYDROBROMIDE PO   Yes No   Sig: Take 20 mg by mouth daily   Melatonin 10 MG CAPS   Yes No   Sig: Take 10 mg by mouth daily at bedtime   OxyCODONE HCl 5 MG TABA   Yes No   Sig: Take 5 mg by mouth every 8 (eight) hours as needed   PRAZOSIN HCL PO   Yes No   Sig: Take 1 mg by mouth 3 (three) times a day   QUEtiapine (SEROquel) 100 mg tablet   No No   Sig: Take 1 tablet (100 mg total) by mouth daily at bedtime Indications: MDD w/ psychotic features  citalopram (CeleXA) 40 mg tablet   No No   Sig: Take 1 tablet (40 mg total) by mouth daily Indications: Depression  At [facility]   Patient taking differently: Take 20 mg by mouth daily At 9AM    fluticasone (FLONASE) 50 mcg/act nasal spray   No No   Si spray into each nostril daily Indications: Hayfever   At 9AM   ibuprofen (MOTRIN) 600 mg tablet   Yes No   Sig: Take 600 mg by mouth every 6 (six) hours as needed for mild pain   naproxen (NAPROSYN) 500 mg tablet   No No   Sig: Take 1 tablet by mouth 2 (two) times a day as needed for mild pain   pantoprazole (PROTONIX) 40 mg tablet   No No   Sig: Take 1 tablet (40 mg total) by mouth daily in the early morning Indications: Gastroesophageal Reflux Disease  prazosin (MINIPRESS) 2 mg capsule   No No   Sig: Take 1 capsule (2 mg total) by mouth daily at bedtime Indications: Nightmares and flashbacks  senna-docusate sodium (SENOKOT-S) 8 6-50 mg per tablet   Yes No   Sig: Take 2 tablets by mouth daily   traMADol (ULTRAM) 50 mg tablet   No No   Sig: Take 1 tablet (50 mg total) by mouth every 12 (twelve) hours as needed for moderate pain Indications: Moderate to Moderately Severe Pain  Facility-Administered Medications: None       Past Medical History:   Diagnosis Date    Abdominal pain     Allergic rhinitis     Cancer (HCC)     Chronic pain     Colon polyps     Depression     Fatigue     Head injury     Homeless     Hypertension     Insomnia     Liver disease     Loss of appetite     Numbness and tingling of right arm     Palpitations     Psychiatric disorder     bipolar    PTSD (post-traumatic stress disorder)     Seizures (HCC)     Shortness of breath     Substance abuse     Swallowing difficulty        Past Surgical History:   Procedure Laterality Date    ABDOMINAL SURGERY      COLONOSCOPY      EYE SURGERY      NECK SURGERY      ORCHIECTOMY Right 5/19/2016    Procedure: ORCHIECTOMY INGUINAL biopsy of testicular mass, ;  Surgeon: Baltazar Yen MD;  Location:  MAIN OR;  Service:    Darshan Sargent [place] COLONOSCOPY FLX DX W/COLLJ SPEC WHEN PFRMD N/A 2/13/2017    Procedure: EGD AND COLONOSCOPY;  Surgeon: Pineda Cueva MD;  Location: [facility] GI LAB; Service: Gastroenterology    [place] ESOPHAGOGASTRODUODENOSCOPY TRANSORAL DIAGNOSTIC N/A 11/16/2016    Procedure: EGD AND COLONOSCOPY;  Surgeon: Pineda Cueva MD;  Location:  GI LAB; Service: Gastroenterology       Family History   Problem Relation Age of Onset    Diabetes Mother     Hypertension Brother      I have reviewed and agree with the history as documented      Social History   Substance Use Topics    Smoking status: Current Every Day Smoker     Packs/day: 1 00     Years: 30 00    Smokeless tobacco: Never Used    Alcohol use No        Review of Systems   Constitutional: Negative for chills, diaphoresis, fatigue and fever  HENT: Negative for congestion, rhinorrhea and sore throat  Eyes: Negative for photophobia and visual disturbance  Respiratory: Negative for cough, chest tightness and shortness of breath  Cardiovascular: Positive for chest pain  Negative for palpitations and leg swelling  Gastrointestinal: Negative for abdominal pain, blood in stool, constipation, diarrhea, nausea and vomiting  Genitourinary: Negative for dysuria, frequency and hematuria  Musculoskeletal: Negative for back pain, gait problem, myalgias, neck pain and neck stiffness  Skin: Negative for pallor and rash  Neurological: Negative for weakness, light-headedness, numbness and headaches  Hematological: Negative for adenopathy  Does not bruise/bleed easily  All other systems reviewed and are negative  Physical Exam  ED Triage Vitals [03/24/18 1836]   Temperature Pulse Respirations BP SpO2   (!) 97 2 °F (36 2 °C) 81 18 -- 100 %      Temp Source Heart Rate Source Patient Position - Orthostatic VS BP Location FiO2 (%)   Tympanic Monitor Sitting Left arm --      Pain Score       8           Orthostatic Vital Signs  Vitals:    03/24/18 1836   Pulse: 81   Patient Position - Orthostatic VS: Sitting       Physical Exam   Constitutional: He is oriented to person, place, and time  He appears well-developed and well-nourished  No distress  Patient alert and oriented, appears well and non-toxic, in no acute distress    HENT:   Head: Normocephalic and atraumatic  Eyes: Conjunctivae and EOM are normal  Pupils are equal, round, and reactive to light  Neck: Normal range of motion  Neck supple  Cardiovascular: Normal rate, regular rhythm, normal heart sounds and intact distal pulses      Pulmonary/Chest: Effort normal and breath sounds normal  No respiratory distress  He has no wheezes  He has no rales  Abdominal: Soft  Bowel sounds are normal  He exhibits no distension  There is tenderness  There is no rebound and no guarding  Mild epigastric and suprapubic tenderness on palpation   Musculoskeletal: Normal range of motion  Left sided chest pain reproducible on palpation, particularly mid-axillary ribs 4-8   Lymphadenopathy:     He has no cervical adenopathy  Neurological: He is alert and oriented to person, place, and time  No facial asymmetry noted, CN 2-12 intact, full ROM of upper and lower extremities, muscle strength 5/5 throughout, DTRs normal, sensation intact throughout, negative finger to nose/Masoud, negative Romberg's, negative Babinski, no gait disturbance noted   Skin: Skin is warm and dry  Capillary refill takes less than 2 seconds  No rash noted  He is not diaphoretic  No erythema  No pallor  Psychiatric: He has a normal mood and affect  His behavior is normal  Judgment and thought content normal    Nursing note and vitals reviewed        ED Medications  Medications   ketorolac (TORADOL) injection 15 mg (15 mg Intravenous Given 3/24/18 1913)   methocarbamol (ROBAXIN) tablet 1,000 mg (1,000 mg Oral Given 3/24/18 2027)       Diagnostic Studies  Results Reviewed     Procedure Component Value Units Date/Time    Lipase [[id number]]  (Normal) Collected:  03/24/18 1913    Lab Status:  Final result Specimen:  Blood from Arm, Right Updated:  03/24/18 2055     Lipase 284 u/L     Urine Microscopic [[id number]]  (Normal) Collected:  03/24/18 2013    Lab Status:  Final result Specimen:  Urine from Urine, Clean Catch Updated:  03/24/18 2033     RBC, UA None Seen /hpf      WBC, UA None Seen /hpf      Epithelial Cells None Seen /hpf      Bacteria, UA None Seen /hpf      Hyaline Casts, UA None Seen /lpf     ED Urine Macroscopic [[id number]]  (Abnormal) Collected:  03/24/18 2013    Lab Status:  Final result Specimen:  Urine Updated:  03/24/18 2012     Color, UA Yellow     Clarity, UA Clear     pH, UA 6 5     Leukocytes, UA Negative     Nitrite, UA Negative     Protein, UA Trace (A) mg/dl      Glucose, UA Negative mg/dl      Ketones, UA Negative mg/dl      Urobilinogen, UA 1 0 E U /dl      Bilirubin, UA Interference- unable to analyze (A)     Blood, UA Negative     Specific Gravity, UA 1 025    Narrative:       CLINITEK RESULT    Troponin I [[id number]]  (Normal) Collected:  03/24/18 1913    Lab Status:  Final result Specimen:  Blood from Arm, Right Updated:  03/24/18 1942     Troponin I <0 02 ng/mL     Narrative:         [employer] Chemistry analyzer 99% cutoff is > 0 04 ng/mL in network labs    o cTnI 99% cutoff is useful only when applied to patients in the clinical setting of myocardial ischemia  o cTnI 99% cutoff should be interpreted in the context of clinical history, ECG findings and possibly cardiac imaging to establish correct diagnosis  o cTnI 99% cutoff may be suggestive but clearly not indicative of a coronary event without the clinical setting of myocardial ischemia  Comprehensive metabolic panel [[id number]]  (Abnormal) Collected:  03/24/18 1913    Lab Status:  Final result Specimen:  Blood from Arm, Right Updated:  03/24/18 1941     Sodium 139 mmol/L      Potassium 3 4 (L) mmol/L      Chloride 105 mmol/L      CO2 30 mmol/L      Anion Gap 4 mmol/L      BUN 7 mg/dL      Creatinine 0 98 mg/dL      Glucose 60 (L) mg/dL      Calcium 8 3 mg/dL      AST 34 U/L      ALT 34 U/L      Alkaline Phosphatase 87 U/L      Total Protein 8 5 (H) g/dL      Albumin 3 8 g/dL      Total Bilirubin 0 24 mg/dL      eGFR 88 ml/min/1 73sq m     Narrative:         National Kidney Disease Education Program recommendations are as follows:  GFR calculation is accurate only with a steady state creatinine  Chronic Kidney disease less than 60 ml/min/1 73 sq  meters  Kidney failure less than 15 ml/min/1 73 sq  meters      CBC and differential [[id number]]  (Normal) Collected:  03/24/18 1913    Lab Status:  Final result Specimen:  Blood from Arm, Right Updated:  03/24/18 1931     WBC 8 41 Thousand/uL      RBC 4 58 Million/uL      Hemoglobin 13 7 g/dL      Hematocrit 40 5 %      MCV 88 fL      MCH 29 9 pg      MCHC 33 8 g/dL      RDW 13 8 %      MPV 10 7 fL      Platelets 200 Thousands/uL      nRBC 0 /100 WBCs      Neutrophils Relative 62 %      Lymphocytes Relative 31 %      Monocytes Relative 6 %      Eosinophils Relative 1 %      Basophils Relative 0 %      Neutrophils Absolute 5 13 Thousands/µL      Lymphocytes Absolute 2 62 Thousands/µL      Monocytes Absolute 0 50 Thousand/µL      Eosinophils Absolute 0 12 Thousand/µL      Basophils Absolute 0 02 Thousands/µL                  X-ray chest 2 views    (Results Pending)         Procedures  Procedures      Phone Consults  ED Phone Contact    ED Course  ED Course                PERC Rule for PE    Flowsheet Row Most Recent Value   PERC Rule for PE   Age >=50  1 Filed at: 03/24/2018 1924   HR >=100  0 Filed at: 03/24/2018 1924   O2 Sat on room air < 95%  0 Filed at: 03/24/2018 1924   History of PE or DVT  0 Filed at: 03/24/2018 1924   Recent trauma or surgery  0 Filed at: 03/24/2018 1924   Hemoptysis  0 Filed at: 03/24/2018 1924   Exogenous estrogen  0 Filed at: 03/24/2018 1924   Unilateral leg swelling  0 Filed at: 03/24/2018 1924   PERC Rule for PE Results  1 Filed at: 03/24/2018 1924                      MDM  Number of Diagnoses or Management Options  Left sided chest pain:   Diagnosis management comments: Impression: 47yo male presents for evaluation of chest pain  Ddx: ACS, musculoskeletal pain  Plan: toradol, cardiac, cbc, cmp, lipase, CXR    On reassessment, patient noted moderate improvement of left-sided chest pain after Lidoderm patch and Robaxin was given  Patient discharged with PCP follow-up  The patient was instructed to follow up as documented   Strict return precautions were discussed with the patient and the patient was instructed to return to the emergency department immediately if symptoms worsen  The patient/patient family member acknowledged and were in agreement with plan  CritCare Time    Disposition  Final diagnoses:   Left sided chest pain     Time reflects when diagnosis was documented in both MDM as applicable and the Disposition within this note     Time User Action Codes Description Comment    3/24/2018  8:22 PM Elissa Haas Add [R07 9] Left sided chest pain       ED Disposition     ED Disposition Condition Comment    Discharge  Parminder Pavel discharge to home/self care  Condition at discharge: Good        Follow-up Information     Follow up With Specialties Details Why [address], DO Family Medicine Go in 3 days As needed, If symptoms worsen [address] [place] [address] [facility]  [phone]          Discharge Medication List as of 3/24/2018  9:30 PM      CONTINUE these medications which have NOT CHANGED    Details   !! citalopram (CeleXA) 40 mg tablet Take 1 tablet (40 mg total) by mouth daily Indications: Depression  At [address] [place] [address], Starting 6/22/2016, Until Discontinued, Print      !! CITALOPRAM HYDROBROMIDE PO Take 20 mg by mouth daily, Until Discontinued, Historical Med      fluticasone (FLONASE) 50 mcg/act nasal spray 1 spray into each nostril daily Indications: Hayfever   At [address] [place] [address], Starting 5/31/2016, Until Discontinued, Print      ibuprofen (MOTRIN) 600 mg tablet Take 600 mg by mouth every 6 (six) hours as needed for mild pain, Until Discontinued, Historical Med      Melatonin 10 MG CAPS Take 10 mg by mouth daily at bedtime, Until Discontinued, Historical Med      naproxen (NAPROSYN) 500 mg tablet Take 1 tablet by mouth 2 (two) times a day as needed for mild pain, Starting 10/1/2016, Until Discontinued, Print      OxyCODONE HCl 5 MG TABA Take 5 mg by mouth every 8 (eight) hours as needed, Until Discontinued, Historical Med      pantoprazole (PROTONIX) 40 mg tablet Take 1 tablet (40 mg total) by mouth daily in the early morning Indications: Gastroesophageal Reflux Disease , Starting 6/22/2016, Until Discontinued, Print      !! prazosin (MINIPRESS) 2 mg capsule Take 1 capsule (2 mg total) by mouth daily at bedtime Indications: Nightmares and flashbacks  , Starting 6/22/2016, Until Discontinued, Print      !! PRAZOSIN HCL PO Take 1 mg by mouth 3 (three) times a day, Until Discontinued, Historical Med      QUEtiapine (SEROquel) 100 mg tablet Take 1 tablet (100 mg total) by mouth daily at bedtime Indications: MDD w/ psychotic features  , Starting 6/22/2016, Until Discontinued, Print      senna-docusate sodium (SENOKOT-S) 8 6-50 mg per tablet Take 2 tablets by mouth daily, Until Discontinued, Historical Med      traMADol (ULTRAM) 50 mg tablet Take 1 tablet (50 mg total) by mouth every 12 (twelve) hours as needed for moderate pain Indications: Moderate to Moderately Severe Pain , Starting 6/22/2016, Until Discontinued, Print       !! - Potential duplicate medications found  Please discuss with provider  No discharge procedures on file  ED Provider  Attending physically available and evaluated Lazaro Wright  I managed the patient along with the ED Attending      Electronically Signed by         Jane Mata MD  03/25/18 3901

## 2018-03-25 LAB
ATRIAL RATE: 69 BPM
P AXIS: 56 DEGREES
PR INTERVAL: 150 MS
QRS AXIS: 15 DEGREES
QRSD INTERVAL: 84 MS
QT INTERVAL: 374 MS
QTC INTERVAL: 400 MS
T WAVE AXIS: 37 DEGREES
VENTRICULAR RATE: 69 BPM

## 2018-03-25 PROCEDURE — 93010 ELECTROCARDIOGRAM REPORT: CPT | Performed by: INTERNAL MEDICINE

## 2018-03-25 NOTE — DISCHARGE INSTRUCTIONS
Dolor de pecho   LO QUE NECESITA SABER:   El dolor en el pecho puede ser provocado por rosalia variedad de condiciones, algunas no tan serias y otras que son de peligro mortal  [employer] ser un síntoma de un problema digestivo, luca la acidez o rosalia úlcera estomacal  Un ataque de ansiedad o rosalia emoción rodolfo, luca el enojo, también pueden provocar dolor de [place]  Rosalia infección, inflamación o fractura en un hueso o cartílago en el pecho podría provocar dolor o molestia  En ocasiones el dolor torácico o la presión en el pecho pueden ser el resultado de rodrick circulación de la david al corazón (angina)  El dolor de pecho también puede ser causado por trastornos potencialmente mortales luca un ataque al corazón o un coágulo de [employer]  INSTRUCCIONES SOBRE EL LINDEN HOSPITALARIA:   Llame al 911 si presenta:   · Usted tiene alguno de los siguientes signos de un ataque cardíaco:      ¨ Estornudos, presión, o dolor en barnard pecho que dura mas de 5 minutos o regresa  ¨ Malestar o dolor en barnard espalda, risa, mandíbula, abdomen, o brazo     ¨ Dificultad para respirar    ¨ Náuseas o vómito    ¨ Siente un desvanecimiento o tiene sudores fríos especialmente en el pecho o dificultad para respirar  Regrese a la ronna de emergencias si:   · La inflamación en barnard pecho empeora, aun con tratamiento  · Usted tose o vomita david  · Amalia heces son negras o tienen david  · Usted no puede dejar de vomitar o le duele al tragar  Pregúntele a barnard Natalie Embs vitaminas y minerales son adecuados para usted  · Usted tiene preguntas o inquietudes acerca de barnard condición o cuidado  Medicamentos:   · Medicamentos,  pueden administrarse para tratar la causa del dolor de pecho  Por ejemplo, analgésicos, medicamentos para la ansiedad o medicamentos para aumentar el flujo de david al corazón       · No tome ciertos medicamentos sin antes preguntarle a barnard médico   Estos incluyen HIMA, suplementos vitamínicos o a base de hierbas u hormonas (estrógeno o progestágeno)  · [place] keri medicamentos luca se le haya indicado  Consulte con barnard médico si usted miguelina que barnard medicamento no le está ayudando o si presenta efectos secundarios  Infórmele si es alérgico a cualquier medicamento  Mantenga rosalia lista actualizada de los [place], las vitaminas y los productos herbales que brian  Incluya los siguientes datos de los medicamentos: cantidad, frecuencia y motivo de administración  Traiga con usted la lista o los envases de la píldoras a keri citas de seguimiento  Lleve la lista de los medicamentos con usted en billy de rosalia emergencia  Programe rosalia frank con barnard médico dentro de 67 horas o luca se le indique:  Es posible que deba regresar para hacerse más pruebas para encontrar la causa del dolor de [place]  Es probable que lo refieran a un especialista, luca un cardiólogo o un gastroenterólogo  Anote keri preguntas para que se acuerde de hacerlas kira keri visitas  Consejos para vivir saludable:  Los siguientes son consejos generales de matilde  Si barnard dolor de pecho es causado por un problema cardíaco, barnard médico le dará consejos específicos a seguir  · No fume  La nicotina y otros químicos contenidos en los cigarrillos y cigarros pueden causar daño a keri pulmones y el corazón  Pida información a barnard médico si usted actualmente fuma y necesita ayuda para dejar de fumar  Los cigarrillos electrónicos o tabaco sin humo todavía contienen nicotina  Consulte con barnard médico antes de [employer]  · Consuma rosalia variedad de alimentos saludables y bajos en grasas  Los alimentos saludables incluyen frutas, verduras, pan integral, productos lácteos bajos en grasa, frijoles, abena magras y pescado  Pida más información acerca de rosalia dieta saludable para el corazón  · Pregunte acerca de la [place]  Barnard médico le dirá cuáles actividades limitar y cuáles evitar   Pregunte cuándo [employer], regresar a barnard trabajo y [employer]  Pida más información acerca de un plan de ejercicio adecuado para usted  · Mantenga un peso saludable  Consulte con [place] médico cuánto debería pesar  [place] que lo ayude a crear un plan para bajar de peso si tiene sobrepeso  · Póngase la vacuna de la gripe y la neumonía  Todos los adultos deberían recibir la vacuna de la influenza (gripe)  Vacúnese cada año rice pronto luca la vacuna esté disponible  La vacuna neumocócica se le aplica a adultos de 72 o más años de Reji  La vacuna se aplica cada 5 años para prevenir enfermedades neumocócicas, luca la neumonía  © 2017 [address] Information is for End User's use only and may not be sold, redistributed or otherwise used for commercial purposes  All illustrations and images included in CareNotes® are the copyrighted property of A D A M , Inc  or [employer]  Esta información es sólo para uso en educación  [place] intención no es darle un consejo médico sobre enfermedades o tratamientos  Colsulte con whitmore [employer] farmacéutico antes de seguir cualquier régimen médico para saber si es seguro y efectivo para usted

## 2020-05-04 ENCOUNTER — HOSPITAL ENCOUNTER (EMERGENCY)
Facility: HOSPITAL | Age: 55
Discharge: HOME | End: 2020-05-04
Attending: EMERGENCY MEDICINE | Admitting: EMERGENCY MEDICINE
Payer: COMMERCIAL

## 2020-05-04 ENCOUNTER — APPOINTMENT (EMERGENCY)
Dept: RADIOLOGY | Facility: HOSPITAL | Age: 55
End: 2020-05-04
Payer: COMMERCIAL

## 2020-05-04 VITALS
TEMPERATURE: 98.8 F | BODY MASS INDEX: 26.52 KG/M2 | OXYGEN SATURATION: 95 % | SYSTOLIC BLOOD PRESSURE: 134 MMHG | WEIGHT: 165 LBS | HEIGHT: 66 IN | HEART RATE: 88 BPM | DIASTOLIC BLOOD PRESSURE: 69 MMHG | RESPIRATION RATE: 18 BRPM

## 2020-05-04 DIAGNOSIS — M79.645 THUMB PAIN, LEFT: Primary | ICD-10-CM

## 2020-05-04 PROCEDURE — 73130 X-RAY EXAM OF HAND: CPT

## 2020-05-04 PROCEDURE — 99284 EMERGENCY DEPT VISIT MOD MDM: CPT | Performed by: PHYSICIAN ASSISTANT

## 2020-05-04 PROCEDURE — 99283 EMERGENCY DEPT VISIT LOW MDM: CPT

## 2020-05-04 RX ORDER — TRAMADOL HYDROCHLORIDE 50 MG/1
50 TABLET ORAL ONCE
Status: COMPLETED | OUTPATIENT
Start: 2020-05-04 | End: 2020-05-04

## 2020-05-04 RX ORDER — TRAMADOL HYDROCHLORIDE 50 MG/1
50 TABLET ORAL EVERY 6 HOURS PRN
Qty: 12 TABLET | Refills: 0 | Status: SHIPPED | OUTPATIENT
Start: 2020-05-04 | End: 2020-05-07

## 2020-05-04 RX ADMIN — TRAMADOL HYDROCHLORIDE 50 MG: 50 TABLET, FILM COATED ORAL at 12:58

## 2020-06-26 ENCOUNTER — HOSPITAL ENCOUNTER (OUTPATIENT)
Dept: RADIOLOGY | Facility: HOSPITAL | Age: 55
Discharge: HOME | End: 2020-06-26
Attending: ORTHOPAEDIC SURGERY
Payer: COMMERCIAL

## 2020-06-26 ENCOUNTER — OFFICE VISIT (OUTPATIENT)
Dept: OBGYN CLINIC | Facility: HOSPITAL | Age: 55
End: 2020-06-26
Payer: COMMERCIAL

## 2020-06-26 VITALS
BODY MASS INDEX: 26.03 KG/M2 | WEIGHT: 162 LBS | SYSTOLIC BLOOD PRESSURE: 109 MMHG | HEART RATE: 65 BPM | DIASTOLIC BLOOD PRESSURE: 72 MMHG | HEIGHT: 66 IN

## 2020-06-26 DIAGNOSIS — T14.8XXA FRACTURE: ICD-10-CM

## 2020-06-26 DIAGNOSIS — M65.312 TRIGGER THUMB OF LEFT HAND: Primary | ICD-10-CM

## 2020-06-26 PROCEDURE — 20550 NJX 1 TENDON SHEATH/LIGAMENT: CPT | Performed by: PHYSICIAN ASSISTANT

## 2020-06-26 PROCEDURE — 73140 X-RAY EXAM OF FINGER(S): CPT

## 2020-06-26 PROCEDURE — 99203 OFFICE O/P NEW LOW 30 MIN: CPT | Performed by: PHYSICIAN ASSISTANT

## 2020-06-26 RX ADMIN — BETAMETHASONE SODIUM PHOSPHATE AND BETAMETHASONE ACETATE 3 MG: 3; 3 INJECTION, SUSPENSION INTRA-ARTICULAR; INTRALESIONAL; INTRAMUSCULAR; SOFT TISSUE at 10:48

## 2020-06-26 RX ADMIN — LIDOCAINE HYDROCHLORIDE 0.5 ML: 10 INJECTION, SOLUTION INFILTRATION; PERINEURAL at 10:48

## 2020-06-29 RX ORDER — BETAMETHASONE SODIUM PHOSPHATE AND BETAMETHASONE ACETATE 3; 3 MG/ML; MG/ML
3 INJECTION, SUSPENSION INTRA-ARTICULAR; INTRALESIONAL; INTRAMUSCULAR; SOFT TISSUE
Status: COMPLETED | OUTPATIENT
Start: 2020-06-26 | End: 2020-06-26

## 2020-06-29 RX ORDER — LIDOCAINE HYDROCHLORIDE 10 MG/ML
0.5 INJECTION, SOLUTION INFILTRATION; PERINEURAL
Status: COMPLETED | OUTPATIENT
Start: 2020-06-26 | End: 2020-06-26

## 2020-08-07 ENCOUNTER — OFFICE VISIT (OUTPATIENT)
Dept: OBGYN CLINIC | Facility: HOSPITAL | Age: 55
End: 2020-08-07
Payer: COMMERCIAL

## 2020-08-07 VITALS
WEIGHT: 154 LBS | SYSTOLIC BLOOD PRESSURE: 137 MMHG | HEIGHT: 66 IN | BODY MASS INDEX: 24.75 KG/M2 | HEART RATE: 52 BPM | DIASTOLIC BLOOD PRESSURE: 86 MMHG

## 2020-08-07 DIAGNOSIS — M65.312 TRIGGER THUMB OF LEFT HAND: Primary | ICD-10-CM

## 2020-08-07 PROCEDURE — 99213 OFFICE O/P EST LOW 20 MIN: CPT | Performed by: ORTHOPAEDIC SURGERY

## 2020-08-07 PROCEDURE — 20550 NJX 1 TENDON SHEATH/LIGAMENT: CPT | Performed by: ORTHOPAEDIC SURGERY

## 2020-08-07 RX ORDER — LIDOCAINE HYDROCHLORIDE 20 MG/ML
1 INJECTION, SOLUTION EPIDURAL; INFILTRATION; INTRACAUDAL; PERINEURAL
Status: COMPLETED | OUTPATIENT
Start: 2020-08-07 | End: 2020-08-07

## 2020-08-07 RX ORDER — BETAMETHASONE SODIUM PHOSPHATE AND BETAMETHASONE ACETATE 3; 3 MG/ML; MG/ML
6 INJECTION, SUSPENSION INTRA-ARTICULAR; INTRALESIONAL; INTRAMUSCULAR; SOFT TISSUE
Status: COMPLETED | OUTPATIENT
Start: 2020-08-07 | End: 2020-08-07

## 2020-08-07 RX ADMIN — LIDOCAINE HYDROCHLORIDE 1 ML: 20 INJECTION, SOLUTION EPIDURAL; INFILTRATION; INTRACAUDAL; PERINEURAL at 10:25

## 2020-08-07 RX ADMIN — BETAMETHASONE SODIUM PHOSPHATE AND BETAMETHASONE ACETATE 6 MG: 3; 3 INJECTION, SUSPENSION INTRA-ARTICULAR; INTRALESIONAL; INTRAMUSCULAR; SOFT TISSUE at 10:25

## 2020-08-07 NOTE — LETTER
August 7, 2020     Patient: Coralyn Closs   YOB: 1965   Date of Visit: 8/7/2020       To Whom it May Concern:    Coralyn Closs is under my professional care  He was seen in my office on 8/7/2020  Please excuse from work today  If you have any questions or concerns, please don't hesitate to call           Sincerely,          Shai Kramer MD        CC: No Recipients

## 2020-08-07 NOTE — PROGRESS NOTES
ASSESSMENT/PLAN:    Assessment:   Left trigger thumb    Plan:   Patient provided with a 2nd CS injection today  Activities as tolerated    Follow Up:  6  week(s)    To Do Next Visit:  recheck    General Discussions:     Trigger FInger: The anatomy and physiology of trigger finger was discussed with the patient today in the office  Edema and increased contact pressure within the flexor tendons at the A1 pulley can cause pain, crepitation, and limitation of function  Treatment options include resting MP blocking splints to decrease edema, oral anti-inflammatory medications, home or formal therapy exercises, up to 2 steroid injections within the tendon sheath, or surgical release  While majority of patients do respond to conservative treatment, up to 20% may require surgical release        _____________________________________________________  CHIEF COMPLAINT:  Chief Complaint   Patient presents with    Left Thumb - Follow-up         SUBJECTIVE:  Martir Newsome is a 47y o  year old male who presents for follow up regarding Trigger Finger  left  thumb  Since last visit, Martir Newsome has tried steroid injections with only partial relief  Today there is Pain  Moderate  Intermittant  Dull and Aching, Catching and Locking to the left thumb        PAST MEDICAL HISTORY:  Past Medical History:   Diagnosis Date    Abdominal pain     Allergic rhinitis     Cancer (HCC)     Chronic pain     Colon polyps     Depression     Fatigue     Head injury     Homeless     Hypertension     Insomnia     Liver disease     Loss of appetite     Numbness and tingling of right arm     Palpitations     Psychiatric disorder     bipolar    PTSD (post-traumatic stress disorder)     Seizures (HCC)     Shortness of breath     Substance abuse ([address] )     Swallowing difficulty        PAST SURGICAL HISTORY:  Past Surgical History:   Procedure Laterality Date    ABDOMINAL SURGERY      COLONOSCOPY      EYE SURGERY      NECK SURGERY  ORCHIECTOMY Right 5/19/2016    Procedure: ORCHIECTOMY INGUINAL biopsy of testicular mass, ;  Surgeon: Melanie Ruiz MD;  Location:  MAIN OR;  Service:    [place] COLONOSCOPY FLX DX W/COLLJ SPEC WHEN PFRMD N/A 2/13/2017    Procedure: EGD AND COLONOSCOPY;  Surgeon: Loren Davis MD;  Location: [facility] GI LAB; Service: Gastroenterology    [place] ESOPHAGOGASTRODUODENOSCOPY TRANSORAL DIAGNOSTIC N/A 11/16/2016    Procedure: EGD AND COLONOSCOPY;  Surgeon: Loren Davis MD;  Location:  GI LAB; Service: Gastroenterology       FAMILY HISTORY:  Family History   Problem Relation Age of Onset    Diabetes Mother     Hypertension Brother        SOCIAL HISTORY:  Social History     Tobacco Use    Smoking status: Current Every Day Smoker     Packs/day: 1 00     Years: 30 00     Pack years: 30 00    Smokeless tobacco: Never Used   Substance Use Topics    Alcohol use: No    Drug use: No     Comment: Remote Heroine abuse       MEDICATIONS:    Current Outpatient Medications:     citalopram (CeleXA) 40 mg tablet, Take 1 tablet (40 mg total) by mouth daily Indications: Depression  At [address] [place] [address] (Patient taking differently: Take 20 mg by mouth daily At 9AM ), Disp: 30 tablet, Rfl: 1    CITALOPRAM HYDROBROMIDE PO, Take 20 mg by mouth daily, Disp: , Rfl:     fluticasone (FLONASE) 50 mcg/act nasal spray, 1 spray into each nostril daily Indications: Hayfever   At 9AM, Disp: 1 Bottle, Rfl: 0    ibuprofen (MOTRIN) 600 mg tablet, Take 600 mg by mouth every 6 (six) hours as needed for mild pain, Disp: , Rfl:     Melatonin 10 MG CAPS, Take 10 mg by mouth daily at bedtime, Disp: , Rfl:     naproxen (NAPROSYN) 500 mg tablet, Take 1 tablet by mouth 2 (two) times a day as needed for mild pain, Disp: 14 tablet, Rfl: 0    pantoprazole (PROTONIX) 40 mg tablet, Take 1 tablet (40 mg total) by mouth daily in the early morning Indications: Gastroesophageal Reflux Disease , Disp: 30 tablet, Rfl: 1    prazosin (MINIPRESS) 2 mg capsule, Take 1 capsule (2 mg total) by mouth daily at bedtime Indications: Nightmares and flashbacks  , Disp: 30 capsule, Rfl: 1    PRAZOSIN HCL PO, Take 1 mg by mouth 3 (three) times a day, Disp: , Rfl:     QUEtiapine (SEROquel) 100 mg tablet, Take 1 tablet (100 mg total) by mouth daily at bedtime Indications: MDD w/ psychotic features  , Disp: 30 tablet, Rfl: 1    senna-docusate sodium (SENOKOT-S) 8 6-50 mg per tablet, Take 2 tablets by mouth daily, Disp: , Rfl:     diclofenac sodium (VOLTAREN) 1 %, Apply 2 g topically 4 (four) times a day for 7 days, Disp: 100 g, Rfl: 0    ALLERGIES:  No Known Allergies    REVIEW OF SYSTEMS:  Pertinent items are noted in HPI  A comprehensive review of systems was negative  LABS:  HgA1c:   Lab Results   Component Value Date    HGBA1C 6 0 (H) 02/18/2020     BMP:   Lab Results   Component Value Date    CALCIUM 8 3 03/24/2018    K 3 4 (L) 03/24/2018    CO2 30 03/24/2018     03/24/2018    BUN 7 03/24/2018    CREATININE 0 98 03/24/2018           _____________________________________________________    PHYSICAL EXAMINATION:  /86   Pulse (!) 52   Ht 5' 6" (1 676 m)   Wt 69 9 kg (154 lb)   BMI 24 86 kg/m²   General: well developed and well nourished, alert, oriented times 3 and appears comfortable  Psychiatric: Normal  HEENT: Trachea Midline, No torticollis  Cardiovascular: No discernable arrhythmia  Pulmonary: No wheezing or stridor  Skin: No masses, erythema, lacerations, fluctation, ulcerations  Neurovascular: Sensation Intact to the Median, Ulnar, Radial Nerve, Motor Intact to the Median, Ulnar, Radial Nerve and Pulses Intact    MUSCULOSKELETAL EXAMINATION:  left thumb:  Positive palpable nodule over the A1 pulley  Positive tenderness to palpation over A1 pulley  Positive catching   Positive clicking       _____________________________________________________  STUDIES REVIEWED:  No Studies to review      PROCEDURES PERFORMED:  Hand/upper extremity injection  Date/Time: 8/7/2020 10:25 AM  Consent given by: patient  Site marked: site marked  Timeout: Immediately prior to procedure a time out was called to verify the correct patient, procedure, equipment, support staff and site/side marked as required   Supporting Documentation  Indications: pain   Procedure Details  Condition:trigger finger Location: thumb -   Needle size: 25 G  Ultrasound guidance: no  Approach: volar  Medications administered: 6 mg betamethasone acetate-betamethasone sodium phosphate 6 (3-3) mg/mL; 1 mL lidocaine (PF) 2 %    Patient tolerance: patient tolerated the procedure well with no immediate complications  Dressing:  Sterile dressing applied                Scribe Attestation    I,:   Timo Montes am acting as a scribe while in the presence of the attending physician :        I,:   Elena Darnell MD personally performed the services described in this documentation    as scribed in my presence :

## 2020-09-11 ENCOUNTER — OFFICE VISIT (OUTPATIENT)
Dept: OBGYN CLINIC | Facility: HOSPITAL | Age: 55
End: 2020-09-11
Payer: COMMERCIAL

## 2020-09-11 VITALS
SYSTOLIC BLOOD PRESSURE: 130 MMHG | HEART RATE: 64 BPM | HEIGHT: 66 IN | BODY MASS INDEX: 24.59 KG/M2 | DIASTOLIC BLOOD PRESSURE: 82 MMHG | WEIGHT: 153 LBS

## 2020-09-11 DIAGNOSIS — M65.312 TRIGGER THUMB OF LEFT HAND: Primary | ICD-10-CM

## 2020-09-11 PROCEDURE — 99213 OFFICE O/P EST LOW 20 MIN: CPT | Performed by: ORTHOPAEDIC SURGERY

## 2020-09-11 NOTE — PATIENT INSTRUCTIONS
¿Qué es? La tenosinovitis estenosante, comúnmente conocida luca dedo (o pulgar) en resorte o en gatillo, afecta las poleas y tendones de la mano que flexionan los dedos  Los tendones trabajan luca largas cuerdas que [employer] músculos del antebrazo con los huesos de los dedos y del pulgar  En los dedos, las poleas son [place] serie de anillos que reynaldo un túnel a través del cual se desliza el tendón, en forma similar a las guías de rosalia caña de pescar, a través de las cuales debe pasar la línea (o el tendón)  Estas poleas mantienen el tendón roddy cerca del [place]  Tanto los tendones luca el túnel tienen un revestimiento que permite el fácil deslizamiento del tendón a través de las poleas (ingrid la [place])  El dedo/pulgar en resorte se presenta cuando la polea en la base del dedo se hincha demasiado y constriñe el tendón, que por ello no puede moverse libremente dentro de la polea  A veces en el tendón se forma un nódulo (nudo), o rosalia hinchazón de barnard recubrimiento  Debido al aumento de la resistencia al deslizamiento del tendón a través de la polea, se puede sentir dolor, un chasquido o un atascamiento del dedo o el pulgar (ingrid la [place] 2)  Cuando el tendón se atasca, se produce mayor inflamación e hinchazón  [place] origina un círculo ashleigh de atascamiento, inflamación e hinchazón  En ocasiones el dedo [place], y es difícil enderezarlo o flexionarlo  ¿Cuál es la causa? Las causas de esta condición no siempre pueden definirse con claridad  Algunos dedos en resorte están relacionados con afecciones tales luca la artritis reumatoide, la gota o la diabetes  Ocasionalmente un trauma localizado en la ugarte o la base del dedo puede ser un factor, maira en la mayoría de los casos no hay rosalia causa claramente definida  Señales y síntomas  El dedo/pulgar en resorte puede comenzar con rosalia molestia en la base del dedo o el pulgar, en el punto de unión con la ugarte   Esta figueroa es a menudo muy sensible a la presión localizada, y a menudo puede hallarse en brody un nódulo  Cuando el dedo comienza a trabarse o quedar en resorte, el paciente puede pensar que el problema está en el nudillo intermedio del dedo o el nudillo distal del pulgar, porque el tendón que se atasca es el que mueve estas articulaciones  Sole Perez meta del tratamiento del dedo/pulgar en resorte consiste en eliminar el atascamiento o traba y permitir un movimiento completo del dedo o el pulgar sin molestias  La hinchazón alrededor del tendón flexor y la vaina del tendón debe reducirse para que el tendón pueda deslizarse suavemente  A veces puede ayudar el uso de rosalia férula o un medicamento anti-inflamatorio por vía oral  El tratamiento puede incluir también la modificación de las actividades para reducir la hinchazón  A menudo, rosalia inyección de esteroides en la figueroa que rodea al tendón y la polea resulta efectiva para simin alivio al dedo/pulgar en resorte  Si las formas de [place] no quirúrgicas no [employer], puede  resultar recomendable la [place]  La misma se realiza en consultorios externos, usualmente con rosalia sencilla anestesia local  La meta de la cirugía es abrir la polea en la base del dedo, para que el tendon pueda deslizarse más libremente  El movimiento activo del dedo por lo general comienza inmediatamente después de la [place]  El uso normal de la mano usualmente puede recobrarse cuando [employer]  Algunos pacientes pueden experimentar sensibilidad, molestias e hinchazón alrededor de la figueroa [employer]  Ocasionalmente se requiere terapia física en la mano luego de la cirugía para rosalia mejor recuperación de barnard uso  © 2012 American Society for [employer] of the Hand  www handcare  org

## 2020-09-11 NOTE — PROGRESS NOTES
ASSESSMENT/PLAN:    Assessment:   Left trigger thumb-now resolved  (patient has had 2 injections)    Plan:   Resume activities as tolerated    Follow Up:  PRN      General Discussions:     Trigger FInger: The anatomy and physiology of trigger finger was discussed with the patient today in the office  Edema and increased contact pressure within the flexor tendons at the A1 pulley can cause pain, crepitation, and limitation of function  Treatment options include resting MP blocking splints to decrease edema, oral anti-inflammatory medications, home or formal therapy exercises, up to 2 steroid injections within the tendon sheath, or surgical release  While majority of patients do respond to conservative treatment, up to 20% may require surgical release      _____________________________________________________  CHIEF COMPLAINT:  Chief Complaint   Patient presents with    Left Thumb - Follow-up, Locking         SUBJECTIVE:  Martir Newsome is a 47y o  year old male who presents for follow up regarding Trigger Finger  left  thumb  Since last visit, Martir Newsome has tried steroid injections with relief  Today there is No Complaints to the left thumb          PAST MEDICAL HISTORY:  Past Medical History:   Diagnosis Date    Abdominal pain     Allergic rhinitis     Cancer (HCC)     Chronic pain     Colon polyps     Depression     Fatigue     Head injury     Homeless     Hypertension     Insomnia     Liver disease     Loss of appetite     Numbness and tingling of right arm     Palpitations     Psychiatric disorder     bipolar    PTSD (post-traumatic stress disorder)     Seizures (HCC)     Shortness of breath     Substance abuse (HCC)     Swallowing difficulty        PAST SURGICAL HISTORY:  Past Surgical History:   Procedure Laterality Date    ABDOMINAL SURGERY      COLONOSCOPY      EYE SURGERY      NECK SURGERY      ORCHIECTOMY Right 5/19/2016    Procedure: ORCHIECTOMY INGUINAL biopsy of testicular mass, ;  Surgeon: Jennifer Goddard MD;  Location:  MAIN OR;  Service:     [place] COLONOSCOPY FLX DX W/COLLJ SPEC WHEN PFRMD N/A 2/13/2017    Procedure: EGD AND COLONOSCOPY;  Surgeon: Toño Quezada MD;  Location: [facility] GI LAB; Service: Gastroenterology    [place] ESOPHAGOGASTRODUODENOSCOPY TRANSORAL DIAGNOSTIC N/A 11/16/2016    Procedure: EGD AND COLONOSCOPY;  Surgeon: Toño Quezada MD;  Location:  GI LAB; Service: Gastroenterology       FAMILY HISTORY:  Family History   Problem Relation Age of Onset    Diabetes Mother     Hypertension Brother        SOCIAL HISTORY:  Social History     Tobacco Use    Smoking status: Current Every Day Smoker     Packs/day: 1 00     Years: 30 00     Pack years: 30 00    Smokeless tobacco: Never Used   Substance Use Topics    Alcohol use: No    Drug use: No     Comment: Remote Heroine abuse       MEDICATIONS:    Current Outpatient Medications:     citalopram (CeleXA) 40 mg tablet, Take 1 tablet (40 mg total) by mouth daily Indications: Depression  At [address] [place] [address] (Patient taking differently: Take 20 mg by mouth daily At 9AM ), Disp: 30 tablet, Rfl: 1    CITALOPRAM HYDROBROMIDE PO, Take 20 mg by mouth daily, Disp: , Rfl:     fluticasone (FLONASE) 50 mcg/act nasal spray, 1 spray into each nostril daily Indications: Hayfever  At 9AM, Disp: 1 Bottle, Rfl: 0    Melatonin 10 MG CAPS, Take 10 mg by mouth daily at bedtime, Disp: , Rfl:     pantoprazole (PROTONIX) 40 mg tablet, Take 1 tablet (40 mg total) by mouth daily in the early morning Indications: Gastroesophageal Reflux Disease , Disp: 30 tablet, Rfl: 1    prazosin (MINIPRESS) 2 mg capsule, Take 1 capsule (2 mg total) by mouth daily at bedtime Indications: Nightmares and flashbacks  , Disp: 30 capsule, Rfl: 1    PRAZOSIN HCL PO, Take 1 mg by mouth 3 (three) times a day, Disp: , Rfl:     QUEtiapine (SEROquel) 100 mg tablet, Take 1 tablet (100 mg total) by mouth daily at bedtime Indications: MDD w/ psychotic features  , Disp: 30 tablet, Rfl: 1    senna-docusate sodium (SENOKOT-S) 8 6-50 mg per tablet, Take 2 tablets by mouth daily, Disp: , Rfl:     diclofenac sodium (VOLTAREN) 1 %, Apply 2 g topically 4 (four) times a day for 7 days, Disp: 100 g, Rfl: 0    ibuprofen (MOTRIN) 600 mg tablet, Take 600 mg by mouth every 6 (six) hours as needed for mild pain, Disp: , Rfl:     naproxen (NAPROSYN) 500 mg tablet, Take 1 tablet by mouth 2 (two) times a day as needed for mild pain (Patient not taking: Reported on 9/11/2020), Disp: 14 tablet, Rfl: 0    ALLERGIES:  No Known Allergies    REVIEW OF SYSTEMS:  Pertinent items are noted in HPI  A comprehensive review of systems was negative  LABS:  HgA1c:   Lab Results   Component Value Date    HGBA1C 6 0 (H) 02/18/2020     BMP:   Lab Results   Component Value Date    CALCIUM 8 3 03/24/2018    K 3 4 (L) 03/24/2018    CO2 30 03/24/2018     03/24/2018    BUN 7 03/24/2018    CREATININE 0 98 03/24/2018           _____________________________________________________    PHYSICAL EXAMINATION:  /82   Pulse 64   Ht 5' 6" (1 676 m)   Wt 69 4 kg (153 lb)   BMI 24 69 kg/m²   General: well developed and well nourished, alert, oriented times 3 and appears comfortable  Psychiatric: Normal  HEENT: Trachea Midline, No torticollis  Cardiovascular: No discernable arrhythmia  Pulmonary: No wheezing or stridor  Skin: No masses, erythema, lacerations, fluctation, ulcerations  Neurovascular: Sensation Intact to the Median, Ulnar, Radial Nerve, Motor Intact to the Median, Ulnar, Radial Nerve and Pulses Intact    MUSCULOSKELETAL EXAMINATION:  Left thumb:  Positive palpable nodule over the A1 pulley  Negative tenderness to palpation over A1 pulley  Negative catching  Negative clicking  Full composite fist formation, no extensor tendon subluxation    No other areas of triggering     _____________________________________________________  STUDIES REVIEWED:  No Studies to review      PROCEDURES PERFORMED:  Procedures  No Procedures performed today      Scribe Attestation    I,:   Teodora Nails am acting as a scribe while in the presence of the attending physician :        I,:   Shai Kramer MD personally performed the services described in this documentation    as scribed in my presence :

## 2020-09-28 ENCOUNTER — APPOINTMENT (EMERGENCY)
Dept: RADIOLOGY | Facility: HOSPITAL | Age: 55
End: 2020-09-28
Payer: COMMERCIAL

## 2020-09-28 ENCOUNTER — HOSPITAL ENCOUNTER (EMERGENCY)
Facility: HOSPITAL | Age: 55
Discharge: HOME | End: 2020-09-28
Attending: EMERGENCY MEDICINE
Payer: COMMERCIAL

## 2020-09-28 VITALS
SYSTOLIC BLOOD PRESSURE: 113 MMHG | RESPIRATION RATE: 18 BRPM | TEMPERATURE: 98.8 F | DIASTOLIC BLOOD PRESSURE: 56 MMHG | OXYGEN SATURATION: 100 % | HEART RATE: 63 BPM

## 2020-09-28 DIAGNOSIS — S39.012A STRAIN OF LUMBAR REGION, INITIAL ENCOUNTER: Primary | ICD-10-CM

## 2020-09-28 PROCEDURE — 72100 X-RAY EXAM L-S SPINE 2/3 VWS: CPT

## 2020-09-28 PROCEDURE — 99284 EMERGENCY DEPT VISIT MOD MDM: CPT | Performed by: PHYSICIAN ASSISTANT

## 2020-09-28 PROCEDURE — 99283 EMERGENCY DEPT VISIT LOW MDM: CPT

## 2020-09-28 PROCEDURE — 96372 THER/PROPH/DIAG INJ SC/IM: CPT

## 2020-09-28 RX ORDER — ACETAMINOPHEN 325 MG/1
650 TABLET ORAL ONCE
Status: COMPLETED | OUTPATIENT
Start: 2020-09-28 | End: 2020-09-28

## 2020-09-28 RX ORDER — ACETAMINOPHEN 325 MG/1
650 TABLET ORAL EVERY 6 HOURS PRN
Qty: 24 TABLET | Refills: 0 | Status: SHIPPED | OUTPATIENT
Start: 2020-09-28 | End: 2020-10-01

## 2020-09-28 RX ORDER — KETOROLAC TROMETHAMINE 30 MG/ML
15 INJECTION, SOLUTION INTRAMUSCULAR; INTRAVENOUS ONCE
Status: COMPLETED | OUTPATIENT
Start: 2020-09-28 | End: 2020-09-28

## 2020-09-28 RX ORDER — LIDOCAINE 50 MG/G
1 PATCH TOPICAL ONCE
Status: DISCONTINUED | OUTPATIENT
Start: 2020-09-28 | End: 2020-09-28 | Stop reason: HOSPADM

## 2020-09-28 RX ORDER — IBUPROFEN 400 MG/1
400 TABLET ORAL EVERY 6 HOURS PRN
Qty: 12 TABLET | Refills: 0 | Status: SHIPPED | OUTPATIENT
Start: 2020-09-28 | End: 2021-08-12

## 2020-09-28 RX ORDER — METHOCARBAMOL 500 MG/1
500 TABLET, FILM COATED ORAL ONCE
Status: COMPLETED | OUTPATIENT
Start: 2020-09-28 | End: 2020-09-28

## 2020-09-28 RX ORDER — METHOCARBAMOL 500 MG/1
500 TABLET, FILM COATED ORAL 4 TIMES DAILY
Qty: 20 TABLET | Refills: 0 | Status: SHIPPED | OUTPATIENT
Start: 2020-09-28 | End: 2021-08-12

## 2020-09-28 RX ADMIN — KETOROLAC TROMETHAMINE 15 MG: 30 INJECTION, SOLUTION INTRAMUSCULAR at 21:14

## 2020-09-28 RX ADMIN — METHOCARBAMOL 500 MG: 500 TABLET ORAL at 21:12

## 2020-09-28 RX ADMIN — LIDOCAINE 5% 1 PATCH: 700 PATCH TOPICAL at 21:16

## 2020-09-28 RX ADMIN — ACETAMINOPHEN 650 MG: 325 TABLET, FILM COATED ORAL at 21:13

## 2020-09-30 ENCOUNTER — TELEPHONE (OUTPATIENT)
Dept: PHYSICAL THERAPY | Facility: OTHER | Age: 55
End: 2020-09-30

## 2020-09-30 NOTE — TELEPHONE ENCOUNTER
Voice mail/message left requesting patient to return call to [facility] program including our hours of business and phone number  Kindly asked to LM with Full Name,  and Reminded CB will come from a non- number as the nurses are working remotely/off-site      Referral deferred for f/u attempt per protocol

## 2020-10-21 ENCOUNTER — TELEPHONE (OUTPATIENT)
Dept: PHYSICAL THERAPY | Facility: OTHER | Age: 55
End: 2020-10-21

## 2020-11-29 ENCOUNTER — HOSPITAL ENCOUNTER (EMERGENCY)
Facility: HOSPITAL | Age: 55
Discharge: HOME | End: 2020-11-29
Attending: EMERGENCY MEDICINE | Admitting: EMERGENCY MEDICINE
Payer: COMMERCIAL

## 2020-11-29 VITALS
DIASTOLIC BLOOD PRESSURE: 113 MMHG | RESPIRATION RATE: 20 BRPM | TEMPERATURE: 97.4 F | HEART RATE: 76 BPM | BODY MASS INDEX: 24.21 KG/M2 | OXYGEN SATURATION: 95 % | WEIGHT: 150 LBS | SYSTOLIC BLOOD PRESSURE: 200 MMHG

## 2020-11-29 DIAGNOSIS — T31.0 BURN (ANY DEGREE) INVOLVING LESS THAN 10% OF BODY SURFACE: Primary | ICD-10-CM

## 2020-11-29 LAB
ALBUMIN SERPL BCP-MCNC: 4.4 G/DL (ref 3.5–5)
ALP SERPL-CCNC: 82 U/L (ref 46–116)
ALT SERPL W P-5'-P-CCNC: 24 U/L (ref 12–78)
ANION GAP SERPL CALCULATED.3IONS-SCNC: 6 MMOL/L (ref 4–13)
AST SERPL W P-5'-P-CCNC: 18 U/L (ref 5–45)
ATRIAL RATE: 77 BPM
BASOPHILS # BLD AUTO: 0.04 THOUSANDS/ΜL (ref 0–0.1)
BASOPHILS NFR BLD AUTO: 0 % (ref 0–1)
BILIRUB SERPL-MCNC: 0.43 MG/DL (ref 0.2–1)
BUN SERPL-MCNC: 12 MG/DL (ref 5–25)
CALCIUM SERPL-MCNC: 9.1 MG/DL (ref 8.3–10.1)
CHLORIDE SERPL-SCNC: 104 MMOL/L (ref 100–108)
CO2 SERPL-SCNC: 27 MMOL/L (ref 21–32)
CREAT SERPL-MCNC: 1.46 MG/DL (ref 0.6–1.3)
EOSINOPHIL # BLD AUTO: 0.04 THOUSAND/ΜL (ref 0–0.61)
EOSINOPHIL NFR BLD AUTO: 0 % (ref 0–6)
ERYTHROCYTE [DISTWIDTH] IN BLOOD BY AUTOMATED COUNT: 12.2 % (ref 11.6–15.1)
GFR SERPL CREATININE-BSD FRML MDRD: 53 ML/MIN/1.73SQ M
GLUCOSE SERPL-MCNC: 125 MG/DL (ref 65–140)
HCT VFR BLD AUTO: 46 % (ref 36.5–49.3)
HGB BLD-MCNC: 15.2 G/DL (ref 12–17)
IMM GRANULOCYTES # BLD AUTO: 0.05 THOUSAND/UL (ref 0–0.2)
IMM GRANULOCYTES NFR BLD AUTO: 0 % (ref 0–2)
LYMPHOCYTES # BLD AUTO: 2.02 THOUSANDS/ΜL (ref 0.6–4.47)
LYMPHOCYTES NFR BLD AUTO: 15 % (ref 14–44)
MCH RBC QN AUTO: 29.9 PG (ref 26.8–34.3)
MCHC RBC AUTO-ENTMCNC: 33 G/DL (ref 31.4–37.4)
MCV RBC AUTO: 91 FL (ref 82–98)
MONOCYTES # BLD AUTO: 0.67 THOUSAND/ΜL (ref 0.17–1.22)
MONOCYTES NFR BLD AUTO: 5 % (ref 4–12)
NEUTROPHILS # BLD AUTO: 10.96 THOUSANDS/ΜL (ref 1.85–7.62)
NEUTS SEG NFR BLD AUTO: 80 % (ref 43–75)
NRBC BLD AUTO-RTO: 0 /100 WBCS
P AXIS: 75 DEGREES
PLATELET # BLD AUTO: 275 THOUSANDS/UL (ref 149–390)
PMV BLD AUTO: 9.8 FL (ref 8.9–12.7)
POTASSIUM SERPL-SCNC: 3.8 MMOL/L (ref 3.5–5.3)
PR INTERVAL: 156 MS
PROT SERPL-MCNC: 8.9 G/DL (ref 6.4–8.2)
QRS AXIS: 32 DEGREES
QRSD INTERVAL: 74 MS
QT INTERVAL: 344 MS
QTC INTERVAL: 389 MS
RBC # BLD AUTO: 5.08 MILLION/UL (ref 3.88–5.62)
SODIUM SERPL-SCNC: 137 MMOL/L (ref 136–145)
T WAVE AXIS: 56 DEGREES
TROPONIN I SERPL-MCNC: <0.02 NG/ML
VENTRICULAR RATE: 77 BPM
WBC # BLD AUTO: 13.78 THOUSAND/UL (ref 4.31–10.16)

## 2020-11-29 PROCEDURE — 36415 COLL VENOUS BLD VENIPUNCTURE: CPT | Performed by: EMERGENCY MEDICINE

## 2020-11-29 PROCEDURE — 93010 ELECTROCARDIOGRAM REPORT: CPT | Performed by: INTERNAL MEDICINE

## 2020-11-29 PROCEDURE — 85025 COMPLETE CBC W/AUTO DIFF WBC: CPT | Performed by: EMERGENCY MEDICINE

## 2020-11-29 PROCEDURE — 93005 ELECTROCARDIOGRAM TRACING: CPT

## 2020-11-29 PROCEDURE — 96374 THER/PROPH/DIAG INJ IV PUSH: CPT

## 2020-11-29 PROCEDURE — 84484 ASSAY OF TROPONIN QUANT: CPT | Performed by: EMERGENCY MEDICINE

## 2020-11-29 PROCEDURE — 16020 DRESS/DEBRID P-THICK BURN S: CPT | Performed by: EMERGENCY MEDICINE

## 2020-11-29 PROCEDURE — 80053 COMPREHEN METABOLIC PANEL: CPT | Performed by: EMERGENCY MEDICINE

## 2020-11-29 PROCEDURE — 96376 TX/PRO/DX INJ SAME DRUG ADON: CPT

## 2020-11-29 PROCEDURE — 99284 EMERGENCY DEPT VISIT MOD MDM: CPT

## 2020-11-29 PROCEDURE — 99285 EMERGENCY DEPT VISIT HI MDM: CPT | Performed by: EMERGENCY MEDICINE

## 2020-11-29 RX ORDER — FENTANYL CITRATE 50 UG/ML
INJECTION, SOLUTION INTRAMUSCULAR; INTRAVENOUS
Status: COMPLETED
Start: 2020-11-29 | End: 2020-11-29

## 2020-11-29 RX ORDER — OXYCODONE HYDROCHLORIDE AND ACETAMINOPHEN 5; 325 MG/1; MG/1
1 TABLET ORAL EVERY 4 HOURS PRN
Qty: 20 TABLET | Refills: 0 | Status: SHIPPED | OUTPATIENT
Start: 2020-11-29 | End: 2021-08-12

## 2020-11-29 RX ORDER — FENTANYL CITRATE 50 UG/ML
50 INJECTION, SOLUTION INTRAMUSCULAR; INTRAVENOUS ONCE
Status: COMPLETED | OUTPATIENT
Start: 2020-11-29 | End: 2020-11-29

## 2020-11-29 RX ORDER — GINSENG 100 MG
1 CAPSULE ORAL ONCE
Status: COMPLETED | OUTPATIENT
Start: 2020-11-29 | End: 2020-11-29

## 2020-11-29 RX ORDER — GINSENG 100 MG
1 CAPSULE ORAL 2 TIMES DAILY
Qty: 28 G | Refills: 0 | Status: SHIPPED | OUTPATIENT
Start: 2020-11-29 | End: 2021-08-12

## 2020-11-29 RX ADMIN — BACITRACIN 1 LARGE APPLICATION: 500 OINTMENT TOPICAL at 15:04

## 2020-11-29 RX ADMIN — FENTANYL CITRATE 50 MCG: 50 INJECTION, SOLUTION INTRAMUSCULAR; INTRAVENOUS at 13:15

## 2020-11-29 RX ADMIN — FENTANYL CITRATE 50 MCG: 50 INJECTION INTRAMUSCULAR; INTRAVENOUS at 13:15

## 2020-11-29 RX ADMIN — FENTANYL CITRATE 50 MCG: 50 INJECTION INTRAMUSCULAR; INTRAVENOUS at 14:59

## 2021-05-25 ENCOUNTER — APPOINTMENT (OUTPATIENT)
Dept: LAB | Facility: HOSPITAL | Age: 56
End: 2021-05-25
Payer: COMMERCIAL

## 2021-05-25 ENCOUNTER — TRANSCRIBE ORDERS (OUTPATIENT)
Dept: LAB | Facility: HOSPITAL | Age: 56
End: 2021-05-25

## 2021-05-25 DIAGNOSIS — Z79.899 ENCOUNTER FOR LONG-TERM (CURRENT) USE OF OTHER MEDICATIONS: ICD-10-CM

## 2021-05-25 DIAGNOSIS — Z79.899 ENCOUNTER FOR LONG-TERM (CURRENT) USE OF OTHER MEDICATIONS: Primary | ICD-10-CM

## 2021-05-25 LAB
25(OH)D3 SERPL-MCNC: 24.9 NG/ML (ref 30–100)
ALBUMIN SERPL BCP-MCNC: 4.3 G/DL (ref 3.5–5)
ALP SERPL-CCNC: 70 U/L (ref 46–116)
ALT SERPL W P-5'-P-CCNC: 22 U/L (ref 12–78)
ANION GAP SERPL CALCULATED.3IONS-SCNC: 3 MMOL/L (ref 4–13)
AST SERPL W P-5'-P-CCNC: 22 U/L (ref 5–45)
BASOPHILS # BLD AUTO: 0.02 THOUSANDS/ΜL (ref 0–0.1)
BASOPHILS NFR BLD AUTO: 0 % (ref 0–1)
BILIRUB SERPL-MCNC: 0.36 MG/DL (ref 0.2–1)
BUN SERPL-MCNC: 13 MG/DL (ref 5–25)
CALCIUM SERPL-MCNC: 9.6 MG/DL (ref 8.3–10.1)
CHLORIDE SERPL-SCNC: 104 MMOL/L (ref 100–108)
CHOLEST SERPL-MCNC: 185 MG/DL (ref 50–200)
CO2 SERPL-SCNC: 29 MMOL/L (ref 21–32)
CREAT SERPL-MCNC: 1.14 MG/DL (ref 0.6–1.3)
EOSINOPHIL # BLD AUTO: 0.07 THOUSAND/ΜL (ref 0–0.61)
EOSINOPHIL NFR BLD AUTO: 1 % (ref 0–6)
ERYTHROCYTE [DISTWIDTH] IN BLOOD BY AUTOMATED COUNT: 13.1 % (ref 11.6–15.1)
EST. AVERAGE GLUCOSE BLD GHB EST-MCNC: 117 MG/DL
GFR SERPL CREATININE-BSD FRML MDRD: 72 ML/MIN/1.73SQ M
GLUCOSE P FAST SERPL-MCNC: 116 MG/DL (ref 65–99)
HBA1C MFR BLD: 5.7 %
HCT VFR BLD AUTO: 43 % (ref 36.5–49.3)
HDLC SERPL-MCNC: 41 MG/DL
HGB BLD-MCNC: 14.1 G/DL (ref 12–17)
IMM GRANULOCYTES # BLD AUTO: 0.02 THOUSAND/UL (ref 0–0.2)
IMM GRANULOCYTES NFR BLD AUTO: 0 % (ref 0–2)
LDLC SERPL CALC-MCNC: 113 MG/DL (ref 0–100)
LYMPHOCYTES # BLD AUTO: 2.5 THOUSANDS/ΜL (ref 0.6–4.47)
LYMPHOCYTES NFR BLD AUTO: 36 % (ref 14–44)
MCH RBC QN AUTO: 30.5 PG (ref 26.8–34.3)
MCHC RBC AUTO-ENTMCNC: 32.8 G/DL (ref 31.4–37.4)
MCV RBC AUTO: 93 FL (ref 82–98)
MONOCYTES # BLD AUTO: 0.51 THOUSAND/ΜL (ref 0.17–1.22)
MONOCYTES NFR BLD AUTO: 7 % (ref 4–12)
NEUTROPHILS # BLD AUTO: 3.77 THOUSANDS/ΜL (ref 1.85–7.62)
NEUTS SEG NFR BLD AUTO: 56 % (ref 43–75)
NONHDLC SERPL-MCNC: 144 MG/DL
NRBC BLD AUTO-RTO: 0 /100 WBCS
PLATELET # BLD AUTO: 240 THOUSANDS/UL (ref 149–390)
PMV BLD AUTO: 10.9 FL (ref 8.9–12.7)
POTASSIUM SERPL-SCNC: 4.6 MMOL/L (ref 3.5–5.3)
PROLACTIN SERPL-MCNC: 8.2 NG/ML (ref 2.5–17.4)
PROT SERPL-MCNC: 8.4 G/DL (ref 6.4–8.2)
RBC # BLD AUTO: 4.62 MILLION/UL (ref 3.88–5.62)
SODIUM SERPL-SCNC: 136 MMOL/L (ref 136–145)
T3 SERPL-MCNC: 1.4 NG/ML (ref 0.6–1.8)
T4 FREE SERPL-MCNC: 0.87 NG/DL (ref 0.76–1.46)
TRIGL SERPL-MCNC: 155 MG/DL
TSH SERPL DL<=0.05 MIU/L-ACNC: 2.18 UIU/ML (ref 0.36–3.74)
WBC # BLD AUTO: 6.89 THOUSAND/UL (ref 4.31–10.16)

## 2021-05-25 PROCEDURE — 83036 HEMOGLOBIN GLYCOSYLATED A1C: CPT

## 2021-05-25 PROCEDURE — 36415 COLL VENOUS BLD VENIPUNCTURE: CPT

## 2021-05-25 PROCEDURE — 84480 ASSAY TRIIODOTHYRONINE (T3): CPT

## 2021-05-25 PROCEDURE — 80053 COMPREHEN METABOLIC PANEL: CPT

## 2021-05-25 PROCEDURE — 84439 ASSAY OF FREE THYROXINE: CPT

## 2021-05-25 PROCEDURE — 80061 LIPID PANEL: CPT

## 2021-05-25 PROCEDURE — 84146 ASSAY OF PROLACTIN: CPT

## 2021-05-25 PROCEDURE — 82306 VITAMIN D 25 HYDROXY: CPT

## 2021-05-25 PROCEDURE — 85025 COMPLETE CBC W/AUTO DIFF WBC: CPT

## 2021-05-25 PROCEDURE — 84443 ASSAY THYROID STIM HORMONE: CPT

## 2021-06-01 ENCOUNTER — HOSPITAL ENCOUNTER (EMERGENCY)
Facility: HOSPITAL | Age: 56
Discharge: HOME | End: 2021-06-01
Attending: EMERGENCY MEDICINE | Admitting: EMERGENCY MEDICINE
Payer: COMMERCIAL

## 2021-06-01 ENCOUNTER — APPOINTMENT (EMERGENCY)
Dept: RADIOLOGY | Facility: HOSPITAL | Age: 56
End: 2021-06-01
Payer: COMMERCIAL

## 2021-06-01 VITALS
SYSTOLIC BLOOD PRESSURE: 148 MMHG | OXYGEN SATURATION: 100 % | HEART RATE: 67 BPM | TEMPERATURE: 97.9 F | DIASTOLIC BLOOD PRESSURE: 65 MMHG | RESPIRATION RATE: 18 BRPM

## 2021-06-01 DIAGNOSIS — L08.9 WOUND INFECTION: ICD-10-CM

## 2021-06-01 DIAGNOSIS — Z51.89 VISIT FOR WOUND CHECK: Primary | ICD-10-CM

## 2021-06-01 DIAGNOSIS — T14.8XXA WOUND INFECTION: ICD-10-CM

## 2021-06-01 PROCEDURE — 73140 X-RAY EXAM OF FINGER(S): CPT

## 2021-06-01 PROCEDURE — 96372 THER/PROPH/DIAG INJ SC/IM: CPT

## 2021-06-01 PROCEDURE — 99283 EMERGENCY DEPT VISIT LOW MDM: CPT

## 2021-06-01 PROCEDURE — 99284 EMERGENCY DEPT VISIT MOD MDM: CPT | Performed by: PHYSICIAN ASSISTANT

## 2021-06-01 RX ORDER — KETOROLAC TROMETHAMINE 30 MG/ML
15 INJECTION, SOLUTION INTRAMUSCULAR; INTRAVENOUS ONCE
Status: COMPLETED | OUTPATIENT
Start: 2021-06-01 | End: 2021-06-01

## 2021-06-01 RX ORDER — CEPHALEXIN 500 MG/1
500 CAPSULE ORAL EVERY 6 HOURS SCHEDULED
Qty: 20 CAPSULE | Refills: 0 | Status: SHIPPED | OUTPATIENT
Start: 2021-06-01 | End: 2021-06-06

## 2021-06-01 RX ADMIN — KETOROLAC TROMETHAMINE 15 MG: 30 INJECTION, SOLUTION INTRAMUSCULAR at 17:51

## 2021-06-01 NOTE — Clinical Note
Geo Rodrigues was seen and treated in our emergency department on 6/1/2021  Diagnosis:     Ruth Ann Bueno  may return to work on return date  He may return on this date: 06/02/2021         If you have any questions or concerns, please don't hesitate to call        Jaspal Altamirano PA-C    ______________________________           _______________          _______________  Hospital Representative                              Date                                Time

## 2021-06-01 NOTE — ED PROVIDER NOTES
History  Chief Complaint   Patient presents with    Wound Check     pt burned left pointer finger 3 weeks ago  concerned about pain where the scab has formed     Patient is a 54-year-old male presenting to ED for evaluation of a wound on the left 2nd digit  Patient says he burned his finger about 3 weeks ago does not recall what he burned it with  Patient has been having increased pain at the site that does not seem to be improving  He reports a scab to the area with associated swelling  He denies fevers/chills, erythema or purulent drainage  He was not seen at the time of the burn and has not taken anything for the pain  Prior to Admission Medications   Prescriptions Last Dose Informant Patient Reported? Taking? CITALOPRAM HYDROBROMIDE PO   Yes No   Sig: Take 20 mg by mouth daily   Melatonin 10 MG CAPS   Yes No   Sig: Take 10 mg by mouth daily at bedtime   PRAZOSIN HCL PO   Yes No   Sig: Take 1 mg by mouth 3 (three) times a day   QUEtiapine (SEROquel) 100 mg tablet   No No   Sig: Take 1 tablet (100 mg total) by mouth daily at bedtime Indications: MDD w/ psychotic features  bacitracin topical ointment 500 units/g topical ointment   No No   Sig: Apply 1 large application topically 2 (two) times a day   citalopram (CeleXA) 40 mg tablet   No No   Sig: Take 1 tablet (40 mg total) by mouth daily Indications: Depression  At [facility]   Patient taking differently: Take 20 mg by mouth daily At 9AM    diclofenac sodium (VOLTAREN) 1 %   No No   Sig: Apply 2 g topically 4 (four) times a day for 7 days   fluticasone (FLONASE) 50 mcg/act nasal spray   No No   Si spray into each nostril daily Indications: Hayfever   At 9AM   ibuprofen (MOTRIN) 400 mg tablet   No No   Sig: Take 1 tablet (400 mg total) by mouth every 6 (six) hours as needed for mild pain for up to 3 days   ibuprofen (MOTRIN) 600 mg tablet   Yes No   Sig: Take 600 mg by mouth every 6 (six) hours as needed for mild pain   methocarbamol (ROBAXIN) 500 mg tablet   No No   Sig: Take 1 tablet (500 mg total) by mouth 4 (four) times a day for 5 days   naproxen (NAPROSYN) 500 mg tablet   No No   Sig: Take 1 tablet by mouth 2 (two) times a day as needed for mild pain   oxyCODONE-acetaminophen (PERCOCET) 5-325 mg per tablet   No No   Sig: Take 1 tablet by mouth every 4 (four) hours as needed for moderate painMax Daily Amount: 6 tablets   pantoprazole (PROTONIX) 40 mg tablet   No No   Sig: Take 1 tablet (40 mg total) by mouth daily in the early morning Indications: Gastroesophageal Reflux Disease  prazosin (MINIPRESS) 2 mg capsule   No No   Sig: Take 1 capsule (2 mg total) by mouth daily at bedtime Indications: Nightmares and flashbacks  senna-docusate sodium (SENOKOT-S) 8 6-50 mg per tablet   Yes No   Sig: Take 2 tablets by mouth daily      Facility-Administered Medications: None       Past Medical History:   Diagnosis Date    Abdominal pain     Allergic rhinitis     Cancer (HCC)     Chronic pain     Colon polyps     Depression     Fatigue     Head injury     Homeless     Hypertension     Insomnia     Liver disease     Loss of appetite     Numbness and tingling of right arm     Palpitations     Psychiatric disorder     bipolar    PTSD (post-traumatic stress disorder)     Seizures (HCC)     Shortness of breath     Substance abuse ([address] )     Swallowing difficulty        Past Surgical History:   Procedure Laterality Date    ABDOMINAL SURGERY      COLONOSCOPY      EYE SURGERY      NECK SURGERY      ORCHIECTOMY Right 5/19/2016    Procedure: ORCHIECTOMY INGUINAL biopsy of testicular mass, ;  Surgeon: Aline Ford MD;  Location: [facility];  Service:    [facility] [place] COLONOSCOPY FLX DX W/COLLJ SPEC WHEN PFRMD N/A 2/13/2017    Procedure: EGD AND COLONOSCOPY;  Surgeon: Chelita Washington MD;  Location: [facility];   Service: Gastroenterology    [place] ESOPHAGOGASTRODUODENOSCOPY TRANSORAL DIAGNOSTIC N/A 11/16/2016    Procedure: EGD AND COLONOSCOPY;  Surgeon: Marlan Oppenheim Helen Mcneil MD;  Location: [facility]; Service: Gastroenterology       Family History   Problem Relation Age of Onset    Diabetes Mother     Hypertension Brother      I have reviewed and agree with the history as documented  E-Cigarette/Vaping    E-Cigarette Use Never User      E-Cigarette/Vaping Substances     Social History     Tobacco Use    Smoking status: Current Every Day Smoker     Packs/day: 1 00     Years: 30 00     Pack years: 30 00     Types: Cigarettes    Smokeless tobacco: Current User   Substance Use Topics    Alcohol use: No    Drug use: No     Comment: Remote Heroine abuse       Review of Systems   Constitutional: Negative for chills, diaphoresis, fatigue and fever  HENT: Negative for congestion, ear pain, mouth sores, rhinorrhea, sinus pain, sore throat and trouble swallowing  Eyes: Negative for photophobia and visual disturbance  Respiratory: Negative for cough, chest tightness, shortness of breath and wheezing  Cardiovascular: Negative for chest pain, palpitations and leg swelling  Gastrointestinal: Negative for abdominal pain, blood in stool, constipation, diarrhea, nausea and vomiting  Genitourinary: Negative for dysuria, flank pain, frequency, hematuria and urgency  Musculoskeletal: Positive for arthralgias (left 2nd digit wound)  Negative for back pain, gait problem, joint swelling, myalgias and neck pain  Skin: Negative for color change, pallor and rash  Neurological: Negative for dizziness, syncope, speech difficulty, weakness, light-headedness, numbness and headaches  Psychiatric/Behavioral: Negative for confusion and sleep disturbance  All other systems reviewed and are negative  Physical Exam  Physical Exam  Vitals signs and nursing note reviewed  Constitutional:       General: He is awake  He is not in acute distress  Appearance: Normal appearance  He is well-developed  He is not ill-appearing or diaphoretic     HENT:      Head: Normocephalic and atraumatic  Right Ear: External ear normal       Left Ear: External ear normal       Nose: Nose normal       Mouth/Throat:      Lips: Pink  Mouth: Mucous membranes are moist    Eyes:      General: Lids are normal  No scleral icterus  Conjunctiva/sclera: Conjunctivae normal       Pupils: Pupils are equal, round, and reactive to light  Neck:      Musculoskeletal: Full passive range of motion without pain, normal range of motion and neck supple  Normal range of motion  No injury or pain with movement  Cardiovascular:      Rate and Rhythm: Normal rate and regular rhythm  Pulses: Normal pulses  Radial pulses are 2+ on the right side and 2+ on the left side  Heart sounds: Normal heart sounds, S1 normal and S2 normal    Pulmonary:      Effort: Pulmonary effort is normal  No accessory muscle usage  Breath sounds: Normal breath sounds  No stridor  No decreased breath sounds, wheezing, rhonchi or rales  Abdominal:      General: Abdomen is flat  Bowel sounds are normal  There is no distension  Palpations: Abdomen is soft  Tenderness: There is no abdominal tenderness  There is no right CVA tenderness, left CVA tenderness, guarding or rebound  Musculoskeletal:        Hands:       Right lower leg: No edema  Left lower leg: No edema  Lymphadenopathy:      Cervical: No cervical adenopathy  Skin:     General: Skin is warm and dry  Capillary Refill: Capillary refill takes less than 2 seconds  Coloration: Skin is not cyanotic, jaundiced or pale  Neurological:      Mental Status: He is alert and oriented to person, place, and time  GCS: GCS eye subscore is 4  GCS verbal subscore is 5  GCS motor subscore is 6  Cranial Nerves: No dysarthria or facial asymmetry        Gait: Gait normal    Psychiatric:         Attention and Perception: Attention normal          Mood and Affect: Mood normal          Speech: Speech normal          Behavior: Behavior normal  Behavior is cooperative  Vital Signs  ED Triage Vitals [06/01/21 1643]   Temperature Pulse Respirations Blood Pressure SpO2   97 9 °F (36 6 °C) 67 18 148/65 100 %      Temp Source Heart Rate Source Patient Position - Orthostatic VS BP Location FiO2 (%)   Oral Monitor Sitting Right arm --      Pain Score       --           Vitals:    06/01/21 1643   BP: 148/65   Pulse: 67   Patient Position - Orthostatic VS: Sitting         Visual Acuity      ED Medications  Medications   ketorolac (TORADOL) injection 15 mg (15 mg Intramuscular Given 6/1/21 4921)       Diagnostic Studies  Results Reviewed     None                 XR finger second digit-index LEFT    (Results Pending)              Procedures  Procedures         ED Course                                           MDM  Number of Diagnoses or Management Options  Visit for wound check:   Wound infection:   Diagnosis management comments: Patient is a 49-year-old male presenting to ED for evaluation of a wound on the left 2nd digit  X-ray showed no acute abnormalities  Pain improved with Toradol  Will send Keflex to patient's pharmacy as there appears to be a mild infection of the wound  Advised prompt follow-up with PCP and instructed patient to return to ED for worsening symptoms, fevers/chills or no improvement on antibiotics  The management plan was discussed in detail with the patient at bedside and all questions were answered  Prior to discharge, verbal and written instructions provided  ED return precautions discussed in detail  The patient verbalized understanding of our discussion and plan of care, and agrees to return to the Emergency Department for concerns and progression of illness         Amount and/or Complexity of Data Reviewed  Tests in the radiology section of CPT®: ordered and reviewed    Patient Progress  Patient progress: stable      Disposition  Final diagnoses:   Visit for wound check   Wound infection     Time reflects when diagnosis was documented in both MDM as applicable and the Disposition within this note     Time User Action Codes Description Comment    6/1/2021  6:30 PM Doreabola Lucas Add [Z51 89] Visit for wound check     6/1/2021  6:30 PM Britney Simmons Add Will Garcia  8XXA,  L08 9] Wound infection     6/1/2021  6:32 PM Britney Simmons Add [E87 6] Hypokalemia     6/1/2021  6:32 PM Dorean Muhlenberg Remove [E87 6] Hypokalemia       ED Disposition     ED Disposition Condition Date/Time Comment    Discharge Stable Tue Jun 1, 2021  6:32 PM Elonda Harness discharge to home/self care  Follow-up Information     Follow up With Specialties Details Why Contact Info Additional [address] [facility], [address] [place] [address], Nurse Practitioner Schedule an appointment as soon as possible for a visit   [phone] [facility] Emergency Department Emergency Medicine  If symptoms worsen [address] Emergency Department, [address], [place], [address] [facility], [address]          Discharge Medication List as of 6/1/2021  6:33 PM      START taking these medications    Details   cephalexin (KEFLEX) 500 mg capsule Take 1 capsule (500 mg total) by mouth every 6 (six) hours for 5 days, Starting Tue 6/1/2021, Until Sun 6/6/2021, Normal         CONTINUE these medications which have NOT CHANGED    Details   bacitracin topical ointment 500 units/g topical ointment Apply 1 large application topically 2 (two) times a day, Starting Sun 11/29/2020, Print      !! citalopram (CeleXA) 40 mg tablet Take 1 tablet (40 mg total) by mouth daily Indications: Depression   At [address] [place] [address], Starting 6/22/2016, Until Discontinued, Print      !! CITALOPRAM HYDROBROMIDE PO Take 20 mg by mouth daily, Until Discontinued, Historical Med      diclofenac sodium (VOLTAREN) 1 % Apply 2 g topically 4 (four) times a day for 7 days, Starting Mon 5/4/2020, Until Fri 6/26/2020, Normal      fluticasone (FLONASE) 50 mcg/act nasal spray 1 spray into each nostril daily Indications: Hayfever  At [facility], Starting 5/31/2016, Until Discontinued, Print      ibuprofen (MOTRIN) 600 mg tablet Take 600 mg by mouth every 6 (six) hours as needed for mild pain, Until Discontinued, Historical Med      Melatonin 10 MG CAPS Take 10 mg by mouth daily at bedtime, Until Discontinued, Historical Med      methocarbamol (ROBAXIN) 500 mg tablet Take 1 tablet (500 mg total) by mouth 4 (four) times a day for 5 days, Starting Mon 9/28/2020, Until Sat 10/3/2020, Normal      naproxen (NAPROSYN) 500 mg tablet Take 1 tablet by mouth 2 (two) times a day as needed for mild pain, Starting 10/1/2016, Until Discontinued, Print      oxyCODONE-acetaminophen (PERCOCET) 5-325 mg per tablet Take 1 tablet by mouth every 4 (four) hours as needed for moderate painMax Daily Amount: 6 tablets, Starting Sun 11/29/2020, Print      pantoprazole (PROTONIX) 40 mg tablet Take 1 tablet (40 mg total) by mouth daily in the early morning Indications: Gastroesophageal Reflux Disease , Starting 6/22/2016, Until Discontinued, Print      !! prazosin (MINIPRESS) 2 mg capsule Take 1 capsule (2 mg total) by mouth daily at bedtime Indications: Nightmares and flashbacks  , Starting 6/22/2016, Until Discontinued, Print      !! PRAZOSIN HCL PO Take 1 mg by mouth 3 (three) times a day, Until Discontinued, Historical Med      QUEtiapine (SEROquel) 100 mg tablet Take 1 tablet (100 mg total) by mouth daily at bedtime Indications: MDD w/ psychotic features  , Starting 6/22/2016, Until Discontinued, Print      senna-docusate sodium (SENOKOT-S) 8 6-50 mg per tablet Take 2 tablets by mouth daily, Until Discontinued, Historical Med       !! - Potential duplicate medications found  Please discuss with provider  No discharge procedures on file      PDMP Review     None          ED Provider  Electronically Signed by           Carley Armas PA-C  06/02/21 0015

## 2021-07-02 ENCOUNTER — HOSPITAL ENCOUNTER (EMERGENCY)
Facility: HOSPITAL | Age: 56
Discharge: HOME | End: 2021-07-02
Attending: EMERGENCY MEDICINE | Admitting: EMERGENCY MEDICINE
Payer: COMMERCIAL

## 2021-07-02 ENCOUNTER — APPOINTMENT (EMERGENCY)
Dept: RADIOLOGY | Facility: HOSPITAL | Age: 56
End: 2021-07-02
Payer: COMMERCIAL

## 2021-07-02 VITALS
RESPIRATION RATE: 19 BRPM | DIASTOLIC BLOOD PRESSURE: 78 MMHG | TEMPERATURE: 98.9 F | OXYGEN SATURATION: 96 % | HEART RATE: 74 BPM | SYSTOLIC BLOOD PRESSURE: 141 MMHG

## 2021-07-02 DIAGNOSIS — R21 RASH OF FINGER: ICD-10-CM

## 2021-07-02 DIAGNOSIS — L03.011 CELLULITIS OF RIGHT INDEX FINGER: Primary | ICD-10-CM

## 2021-07-02 PROCEDURE — 99284 EMERGENCY DEPT VISIT MOD MDM: CPT | Performed by: PHYSICIAN ASSISTANT

## 2021-07-02 PROCEDURE — 99283 EMERGENCY DEPT VISIT LOW MDM: CPT

## 2021-07-02 PROCEDURE — 73130 X-RAY EXAM OF HAND: CPT

## 2021-07-02 PROCEDURE — 96372 THER/PROPH/DIAG INJ SC/IM: CPT

## 2021-07-02 RX ORDER — CLINDAMYCIN HYDROCHLORIDE 300 MG/1
300 CAPSULE ORAL EVERY 8 HOURS SCHEDULED
Qty: 21 CAPSULE | Refills: 0 | Status: SHIPPED | OUTPATIENT
Start: 2021-07-02 | End: 2021-07-09

## 2021-07-02 RX ORDER — GINSENG 100 MG
1 CAPSULE ORAL ONCE
Status: COMPLETED | OUTPATIENT
Start: 2021-07-02 | End: 2021-07-02

## 2021-07-02 RX ORDER — KETOROLAC TROMETHAMINE 30 MG/ML
15 INJECTION, SOLUTION INTRAMUSCULAR; INTRAVENOUS ONCE
Status: COMPLETED | OUTPATIENT
Start: 2021-07-02 | End: 2021-07-02

## 2021-07-02 RX ORDER — KETOROLAC TROMETHAMINE 30 MG/ML
15 INJECTION, SOLUTION INTRAMUSCULAR; INTRAVENOUS ONCE
Status: DISCONTINUED | OUTPATIENT
Start: 2021-07-02 | End: 2021-07-02

## 2021-07-02 RX ADMIN — KETOROLAC TROMETHAMINE 15 MG: 30 INJECTION, SOLUTION INTRAMUSCULAR at 15:39

## 2021-07-02 RX ADMIN — BACITRACIN ZINC 1 SMALL APPLICATION: 500 OINTMENT TOPICAL at 15:37

## 2021-07-02 NOTE — ED PROVIDER NOTES
History  Chief Complaint   Patient presents with    Finger Swelling     Pt arrives c/o R hand 2nd digit pain and swelling, states it began as itching on that and another finger on his L hand, has since become painful  Open areas noted as well as swelling  Denies injury  Denies fever     Lefty Bacon is a 54 y o  male who presents to the ED with complaints of pain, swelling and discoloration of the right 2nd digit  Patient describes a burning tingling sensation of his fingertips  Patient states he had similar symptoms on the left hand approximately 1 month ago and was prescribed Keflex which he took without improvement of symptoms  Patient admits to itching  Patient is a current everyday smoker  Denies injury, numbness, weakness, drainage, rash, limited ROM, chest pain, SOB, fever, chills  Patient states he has been applying leftover burn" cream to the area for pain  Last TDaP 10/11/2017 per chart review  History provided by:  Patient      Prior to Admission Medications   Prescriptions Last Dose Informant Patient Reported? Taking? CITALOPRAM HYDROBROMIDE PO   Yes No   Sig: Take 20 mg by mouth daily   Melatonin 10 MG CAPS   Yes No   Sig: Take 10 mg by mouth daily at bedtime   PRAZOSIN HCL PO   Yes No   Sig: Take 1 mg by mouth 3 (three) times a day   QUEtiapine (SEROquel) 100 mg tablet   No No   Sig: Take 1 tablet (100 mg total) by mouth daily at bedtime Indications: MDD w/ psychotic features  bacitracin topical ointment 500 units/g topical ointment   No No   Sig: Apply 1 large application topically 2 (two) times a day   citalopram (CeleXA) 40 mg tablet   No No   Sig: Take 1 tablet (40 mg total) by mouth daily Indications: Depression   At    Patient taking differently: Take 20 mg by mouth daily At 9AM    diclofenac sodium (VOLTAREN) 1 %   No No   Sig: Apply 2 g topically 4 (four) times a day for 7 days   fluticasone (FLONASE) 50 mcg/act nasal spray   No No   Si spray into each nostril daily Indications: Hayfever  At 9AM   ibuprofen (MOTRIN) 400 mg tablet   No No   Sig: Take 1 tablet (400 mg total) by mouth every 6 (six) hours as needed for mild pain for up to 3 days   ibuprofen (MOTRIN) 600 mg tablet   Yes No   Sig: Take 600 mg by mouth every 6 (six) hours as needed for mild pain   methocarbamol (ROBAXIN) 500 mg tablet   No No   Sig: Take 1 tablet (500 mg total) by mouth 4 (four) times a day for 5 days   naproxen (NAPROSYN) 500 mg tablet   No No   Sig: Take 1 tablet by mouth 2 (two) times a day as needed for mild pain   oxyCODONE-acetaminophen (PERCOCET) 5-325 mg per tablet   No No   Sig: Take 1 tablet by mouth every 4 (four) hours as needed for moderate painMax Daily Amount: 6 tablets   pantoprazole (PROTONIX) 40 mg tablet   No No   Sig: Take 1 tablet (40 mg total) by mouth daily in the early morning Indications: Gastroesophageal Reflux Disease  prazosin (MINIPRESS) 2 mg capsule   No No   Sig: Take 1 capsule (2 mg total) by mouth daily at bedtime Indications: Nightmares and flashbacks     senna-docusate sodium (SENOKOT-S) 8 6-50 mg per tablet   Yes No   Sig: Take 2 tablets by mouth daily      Facility-Administered Medications: None       Past Medical History:   Diagnosis Date    Abdominal pain     Allergic rhinitis     Cancer ([facility] [address] )     Chronic pain     Colon polyps     Depression     Fatigue     Head injury     Homeless     Hypertension     Insomnia     Liver disease     Loss of appetite     Numbness and tingling of right arm     Palpitations     Psychiatric disorder     bipolar    PTSD (post-traumatic stress disorder)     Seizures (HCC)     Shortness of breath     Substance abuse ([facility] [address] )     Swallowing difficulty        Past Surgical History:   Procedure Laterality Date    ABDOMINAL SURGERY      COLONOSCOPY      EYE SURGERY      NECK SURGERY      ORCHIECTOMY Right 5/19/2016    Procedure: ORCHIECTOMY INGUINAL biopsy of testicular mass, ;  Surgeon: Chandan Pandey MD; Location:  MAIN OR;  Service:     [place] COLONOSCOPY FLX DX W/COLLJ SPEC WHEN PFRMD N/A 2/13/2017    Procedure: EGD AND COLONOSCOPY;  Surgeon: Adrianna Villanueva MD;  Location: [facility] GI LAB; Service: Gastroenterology    [place] ESOPHAGOGASTRODUODENOSCOPY TRANSORAL DIAGNOSTIC N/A 11/16/2016    Procedure: EGD AND COLONOSCOPY;  Surgeon: Adrianna Villanueva MD;  Location:  GI LAB; Service: Gastroenterology       Family History   Problem Relation Age of Onset    Diabetes Mother     Hypertension Brother      I have reviewed and agree with the history as documented  E-Cigarette/Vaping    E-Cigarette Use Never User      E-Cigarette/Vaping Substances     Social History     Tobacco Use    Smoking status: Current Every Day Smoker     Packs/day: 1 00     Years: 30 00     Pack years: 30 00     Types: Cigarettes    Smokeless tobacco: Current User   Vaping Use    Vaping Use: Never used   Substance Use Topics    Alcohol use: No    Drug use: No     Comment: Remote Heroine abuse       Review of Systems   Constitutional: Negative for appetite change, chills, fever and unexpected weight change  HENT: Negative for congestion, drooling, ear pain, rhinorrhea, sore throat, trouble swallowing and voice change  Eyes: Negative for pain, discharge, redness and visual disturbance  Respiratory: Negative for cough, shortness of breath, wheezing and stridor  Cardiovascular: Negative for chest pain, palpitations and leg swelling  Gastrointestinal: Negative for abdominal pain, blood in stool, constipation, diarrhea, nausea and vomiting  Genitourinary: Negative for dysuria, flank pain, frequency, hematuria and urgency  Musculoskeletal: Positive for arthralgias  Negative for gait problem, joint swelling, neck pain and neck stiffness  Skin: Positive for color change and wound  Negative for rash  Neurological: Negative for dizziness, seizures, light-headedness and headaches         Physical Exam  Physical Exam  Vitals and nursing note reviewed  Constitutional:       Appearance: He is well-developed  HENT:      Head: Normocephalic and atraumatic  Nose: Nose normal    Eyes:      Conjunctiva/sclera: Conjunctivae normal       Pupils: Pupils are equal, round, and reactive to light  Cardiovascular:      Rate and Rhythm: Normal rate and regular rhythm  Pulmonary:      Effort: Pulmonary effort is normal       Breath sounds: Normal breath sounds  Musculoskeletal:         General: Normal range of motion  Cervical back: Normal range of motion and neck supple  Comments: Right 2nd digit with excoriated lesions to the distal aspect, mild erythema to the lateral aspect of the nailbed  No fluctuance  No drainage  Left 2nd digit with similar skin breakdown without erythema or drainage  Full range of motion with flexion extension and opposition  NVI  Skin:     General: Skin is warm and dry  Capillary Refill: Capillary refill takes less than 2 seconds  Neurological:      Mental Status: He is alert and oriented to person, place, and time  Vital Signs  ED Triage Vitals [07/02/21 1425]   Temperature Pulse Respirations Blood Pressure SpO2   98 9 °F (37 2 °C) 74 19 141/78 96 %      Temp src Heart Rate Source Patient Position - Orthostatic VS BP Location FiO2 (%)   -- -- -- -- --      Pain Score       4           Vitals:    07/02/21 1425   BP: 141/78   Pulse: 74         Visual Acuity      ED Medications  Medications   bacitracin topical ointment 1 small application (1 small application Topical Given 7/2/21 1537)   ketorolac (TORADOL) injection 15 mg (15 mg Intramuscular Given 7/2/21 1539)       Diagnostic Studies  Results Reviewed     None                 XR hand 3+ views RIGHT    (Results Pending)              Procedures  Procedures         ED Course  ED Course as of Jul 02 1606   Fri Jul 02, 2021   1600 Educated patient regarding diagnosis and management  Advised patient to follow up with PCP   Advised patient to RTER for persistent or worsening symptoms  MDM  Number of Diagnoses or Management Options  Cellulitis of right index finger: new and requires workup  Rash of finger: new and requires workup  Diagnosis management comments: This is the patient's 2nd emergency room visit for similar symptoms however patient reports that he was previously evaluated for his left 2nd digit  Patient does have what looks like excoriated lesions on the distal aspect of his right 2nd digit and left 2nd digit  He does have what looks like a minor cellulitis of his right 2nd digit  Patient is agreeable to trial antibiotic at this time  Patient was instructed on proper wound care  Patient was notified that there may be some aspect of fungal infection versus chronic osteomyelitis of the finger  Patient was instructed to discontinue smoking as I believe this may be worsening his symptoms and putting him at risk for recurrent infections  Patient was instructed to call the hand surgeon as soon as possible for follow-up  I provided patient with strict RTER precautions  I advised patient follow-up with PCP in 24-48 hours  Patient verbalized understanding          Amount and/or Complexity of Data Reviewed  Tests in the radiology section of CPT®: ordered and reviewed  Review and summarize past medical records: yes    Patient Progress  Patient progress: stable      Disposition  Final diagnoses:   Cellulitis of right index finger   Rash of finger - Left 2nd Digit and Right 2nd Digit     Time reflects when diagnosis was documented in both MDM as applicable and the Disposition within this note     Time User Action Codes Description Comment    7/2/2021  3:30 PM Sharad Keyes Add [A64 158] Cellulitis of right index finger     7/2/2021  3:30 PM Nydia Tinoco Add [R21] Rash of finger     7/2/2021  3:30 PM Kellen Tinoco Modify [R21] Rash of finger Left 2nd Digit and Right 2nd Digit      ED Disposition     ED Disposition Condition Date/Time Comment    Discharge Stable Fri Jul 2, 2021  3:58 PM Lubna Mcelroy discharge to home/self care  Follow-up Information     Follow up With Specialties Details Why Contact Info Additional [address] Emergency Department Emergency Medicine Go to  If symptoms worsen [address] Emergency Department, [place], [place], [address], 0[address] [facility], Nurse Practitioner Schedule an appointment as soon as possible for a visit   [address] [place] [address]  [phone]       Maryann Ramos MD Orthopedic Surgery, Hand Surgery Schedule an appointment as soon as possible for a visit   [facility]  49  [address] [facility]             Patient's Medications   Discharge Prescriptions    CLINDAMYCIN (CLEOCIN) 300 MG CAPSULE    Take 1 capsule (300 mg total) by mouth every 8 (eight) hours for 7 days       Start Date: 7/2/2021  End Date: 7/9/2021       Order Dose: 300 mg       Quantity: 21 capsule    Refills: 0     No discharge procedures on file      PDMP Review     None          ED Provider  Electronically Signed by           Chelle Jorge PA-C  07/02/21 3670

## 2021-07-02 NOTE — DISCHARGE INSTRUCTIONS
Celulitis   LO QUE NECESITA SABER:   La celulitis es rosalia infección en la piel causada por bacteria  La celulitis podría desaparecer por sí bong o puede que necesite tratamiento  Barnard médico puede dibujar un círculo alrededor de los bordes externos de barnard celulitis  Si la celulitis se extiende, barnard médico verá que se salió del círculo  INSTRUCCIONES SOBRE [facility]ITAL[facility]:   Llame al 911 si:  · Tiene dolor en el pecho o dificultad repentina para respirar  Regrese a la ronna de emergencias si:  · Barnard herida se engrandece o tiene más dolor  · Usted siente sonidos crepitantes bajo la piel al tocarla  · Usted tiene puntos o protuberancias color juani en barnard piel o nota david debajo barnard piel  · Usted tiene rosalia nueva inflamación o dolor en keri piernas  · [employer] enrojecidas, cálidas e inflamadas se [address]  · Usted ve líneas choe saliendo del área infectada  Comuníquese con barnard médico si:  · Tiene fiebre  · Barnard fiebre o dolor no desaparecen o empeoran  · El área no se reduce después de 2 días de uso de antibióticos  · Barnard piel se escama o se pela  · Usted tiene preguntas o inquietudes acerca de barnard condición o cuidado  Medicamentos:  · Los antibióticos sirven para tratar la infección bacterial     · Los [place], luca el ibuprofeno, ayudan a disminuir la inflamación, el dolor y la [place]  Los HIMA pueden causar sangrado estomacal o problemas renales en ciertas personas  Si usted esta tomando un anticoágulante,  siempre  pregunte si los AINEs son seguros para usted  Siempre mirta la etiqueta de chase medicamento y [place] instrucciones  No administre chase medicamento a niños menores de 6 meses de ginger sin antes obtener la autorización de barnard médico      · Acetaminofén larisa el dolor y baja la fiebre  Está disponible sin receta médica  Pregunte la cantidad y la frecuencia con que debe tomarlos  [address]   Mirta las etiquetas de todos los demás medicamentos que esté usando para saber si también contienen acetaminofén, o pregunte a barnard médico o farmacéutico  El acetaminofén puede causar daño en el hígado cuando no se brian de forma correcta  No use más de 4 gramos (4000 miligramos) en total de acetaminofeno en un día  · [place] keri medicamentos luca se le haya indicado  Consulte con barnard médico si usted miguelina que barnard medicamento no le está ayudando o si presenta efectos secundarios  Infórmele si es alérgico a cualquier medicamento  Mantenga rosalia lista actualizada de los [facility], las vitaminas y los productos herbales que brian  Incluya los siguientes datos de los medicamentos: cantidad, frecuencia y motivo de administración  Traiga con usted la lista o los envases de las píldoras a keri citas de seguimiento  Lleve la lista de los medicamentos con usted en billy de rosalia emergencia  Cuidados personales:  · Eleve el área por encima del nivel de barnard corazón  con la frecuencia posible  [place] va a disminuir inflamación y el dolor  Coloque el área sobre almohadas o sábanas para tratar de mantenerla elevada cómodamente  · Limpie la figueroa diariamente hasta que se forme rosalia costra sobre la herida  Chepe Cur[place] y agua  Séquela con palmaditas  Use apósitos luca se le haya indicado  · Coloque paños húmedos fríos o calientes en la figueroa luca se le haya indicado  Use paños limpios y agua limpia  Déjelos en el área hasta que el paño llegue a temperatura ambiente  Seque el área con palmaditas con un paño limpio y seco  Scherrie Ling ayudar a disminuir el dolor  Evite la celulitis:  · No se rasque picaduras de insectos o áreas lesionadas  Rascarse estas áreas aumenta barnard riesgo de tener celulitis  · No comparta los artículos de [address] personal, luca toallas, ropa, o navajas de afeitar  · Limpie los equipos de ejercicio con un detergente desinfectante antes y después de [place]  · Lávese las mingo frecuentemente  Utilice agua y [place]   [email or web address] usar el baño, cambiarle el pañal a un alexey o estornudar  Lávese las mingo antes de comer o preparar alimentos  Use rosalia loción para evitar que la piel se reseque o se agriete  · Use medias de compresión luca se le indique  Es posible que le recomienden usar las medias si tiene edema periférico  Las medias mejoran el flujo sanguíneo y [address]  · Trate el pie de atleta  [place] puede ayudar a prevenir la propagación de rosalia infección bacteriana en la piel  Acuda a keri consultas de control con barnard médico dentro de 3 días o según le indicaron: Barnard médico comprobará si la celulitis está mejorando  Es posible que necesite un medicamento diferente  Anote keri preguntas para que se acuerde de hacerlas kira keri visitas  © Copyright [address] Information is for End User's use only and may not be sold, redistributed or otherwise used for commercial purposes  All illustrations and images included in CareNotes® are the copyrighted property of [employer]  or [address] [place] [address] es sólo para uso en educación  Barnard intención no es darle un consejo médico sobre enfermedades o tratamientos  Colsulte con barnard Catherne Antony farmacéutico antes de seguir cualquier régimen médico para saber si es seguro y efectivo para usted

## 2021-07-07 ENCOUNTER — TELEPHONE (OUTPATIENT)
Dept: OBGYN CLINIC | Facility: HOSPITAL | Age: 56
End: 2021-07-07

## 2021-07-07 NOTE — TELEPHONE ENCOUNTER
Sent email to JULIO CÉSAR jovel    Patient was seen in ED 7/2 and needs to follow up right away for r hand index finger     Kevin office would be best      Jud Cheadle CB # [phone]

## 2021-07-10 ENCOUNTER — OFFICE VISIT (OUTPATIENT)
Dept: OBGYN CLINIC | Facility: HOSPITAL | Age: 56
End: 2021-07-10
Payer: COMMERCIAL

## 2021-07-10 VITALS
HEIGHT: 66 IN | DIASTOLIC BLOOD PRESSURE: 86 MMHG | HEART RATE: 61 BPM | SYSTOLIC BLOOD PRESSURE: 152 MMHG | WEIGHT: 142.2 LBS | BODY MASS INDEX: 22.85 KG/M2

## 2021-07-10 DIAGNOSIS — L03.90 CELLULITIS, UNSPECIFIED CELLULITIS SITE: Primary | ICD-10-CM

## 2021-07-10 PROCEDURE — 99213 OFFICE O/P EST LOW 20 MIN: CPT | Performed by: PHYSICIAN ASSISTANT

## 2021-07-10 RX ORDER — NALOXONE HYDROCHLORIDE 4 MG/.1ML
SPRAY NASAL
Qty: 1 EACH | Refills: 1 | Status: SHIPPED | OUTPATIENT
Start: 2021-07-10

## 2021-07-10 RX ORDER — HYDROCODONE BITARTRATE AND ACETAMINOPHEN 5; 325 MG/1; MG/1
1 TABLET ORAL EVERY 6 HOURS PRN
Qty: 10 TABLET | Refills: 0 | Status: SHIPPED | OUTPATIENT
Start: 2021-07-10 | End: 2021-07-15

## 2021-07-10 RX ORDER — SULFAMETHOXAZOLE AND TRIMETHOPRIM 400; 80 MG/1; MG/1
2 TABLET ORAL EVERY 12 HOURS SCHEDULED
Qty: 28 TABLET | Refills: 0 | Status: SHIPPED | OUTPATIENT
Start: 2021-07-10 | End: 2021-07-17

## 2021-07-11 NOTE — PROGRESS NOTES
ASSESSMENT/PLAN:    Assessment:   right index finger wound    Plan:    wound care instructions provided for the patient in the office today   Patient continue with antibiotic therapy, new prescription given for Bactrim today  Follow Up:  1  week(s)    To Do Next Visit:    Repeat evaluation    _____________________________________________________  CHIEF COMPLAINT:  Chief Complaint   Patient presents with    Right Index Finger - Cellulitis         SUBJECTIVE:  Krystyna Pickering is a 54 y o  male who presents with  Wound of the right index finger  Patient was in the emergency room on 7/2/21 for a right index finger wound  And diagnosed with finger cellulitis  Patient was sent home on antibiotics, which he is taking regularly  Patient is having quite a bit of pain in the finger at this time  He denies any systemic signs of infection  He denies any other acute or associated complaints  PAST MEDICAL HISTORY:  Past Medical History:   Diagnosis Date    Abdominal pain     Allergic rhinitis     Cancer (HCC)     Chronic pain     Colon polyps     Depression     Fatigue     Head injury     Homeless     Hypertension     Insomnia     Liver disease     Loss of appetite     Numbness and tingling of right arm     Palpitations     Psychiatric disorder     bipolar    PTSD (post-traumatic stress disorder)     Seizures (HCC)     Shortness of breath     Substance abuse (HCC)     Swallowing difficulty        PAST SURGICAL HISTORY:  Past Surgical History:   Procedure Laterality Date    ABDOMINAL SURGERY      COLONOSCOPY      EYE SURGERY      NECK SURGERY      ORCHIECTOMY Right 5/19/2016    Procedure: ORCHIECTOMY INGUINAL biopsy of testicular mass, ;  Surgeon: Rl Carrizales MD;  Location: [facility] OR;  Service:    Navid Schmidt [place] COLONOSCOPY FLX DX W/COLLJ SPEC WHEN PFRMD N/A 2/13/2017    Procedure: EGD AND COLONOSCOPY;  Surgeon: Rafaela Baron MD;  Location: [facility] GI LAB;   Service: Gastroenterology    [place] ESOPHAGOGASTRODUODENOSCOPY TRANSORAL DIAGNOSTIC N/A 11/16/2016    Procedure: EGD AND COLONOSCOPY;  Surgeon: Raul Mccabe MD;  [facility]: [facility]; Service: Gastroenterology       FAMILY HISTORY:  Family History   Problem Relation Age of Onset    Diabetes Mother     Hypertension Brother        SOCIAL HISTORY:  Social History     Tobacco Use    Smoking status: Current Every Day Smoker     Packs/day: 1 00     Years: 30 00     Pack years: 30 00     Types: Cigarettes    Smokeless tobacco: Current User   Vaping Use    Vaping Use: Never used   Substance Use Topics    Alcohol use: No    Drug use: No     Comment: Remote Heroine abuse       MEDICATIONS:    Current Outpatient Medications:     bacitracin topical ointment 500 units/g topical ointment, Apply 1 large application topically 2 (two) times a day, Disp: 28 g, Rfl: 0    citalopram (CeleXA) 40 mg tablet, Take 1 tablet (40 mg total) by mouth daily Indications: Depression  At [address] [place] [address] (Patient taking differently: Take 20 mg by mouth daily At 9AM ), Disp: 30 tablet, Rfl: 1    CITALOPRAM HYDROBROMIDE PO, Take 20 mg by mouth daily, Disp: , Rfl:     diclofenac sodium (VOLTAREN) 1 %, Apply 2 g topically 4 (four) times a day for 7 days, Disp: 100 g, Rfl: 0    fluticasone (FLONASE) 50 mcg/act nasal spray, 1 spray into each nostril daily Indications: Hayfever   At 9AM, Disp: 1 Bottle, Rfl: 0    HYDROcodone-acetaminophen (NORCO) 5-325 mg per tablet, Take 1 tablet by mouth every 6 (six) hours as needed for pain for up to 5 daysMax Daily Amount: 4 tablets, Disp: 10 tablet, Rfl: 0    ibuprofen (MOTRIN) 400 mg tablet, Take 1 tablet (400 mg total) by mouth every 6 (six) hours as needed for mild pain for up to 3 days, Disp: 12 tablet, Rfl: 0    ibuprofen (MOTRIN) 600 mg tablet, Take 600 mg by mouth every 6 (six) hours as needed for mild pain, Disp: , Rfl:     Melatonin 10 MG CAPS, Take 10 mg by mouth daily at bedtime, Disp: , Rfl:     methocarbamol (ROBAXIN) 500 mg tablet, Take 1 tablet (500 mg total) by mouth 4 (four) times a day for 5 days, Disp: 20 tablet, Rfl: 0    naloxone (NARCAN) 4 mg/0 1 mL nasal spray, Administer 1 spray into a nostril  If no response after 2-3 minutes, give another dose in the other nostril using a new spray , Disp: 1 each, Rfl: 1    naproxen (NAPROSYN) 500 mg tablet, Take 1 tablet by mouth 2 (two) times a day as needed for mild pain, Disp: 14 tablet, Rfl: 0    oxyCODONE-acetaminophen (PERCOCET) 5-325 mg per tablet, Take 1 tablet by mouth every 4 (four) hours as needed for moderate painMax Daily Amount: 6 tablets, Disp: 20 tablet, Rfl: 0    pantoprazole (PROTONIX) 40 mg tablet, Take 1 tablet (40 mg total) by mouth daily in the early morning Indications: Gastroesophageal Reflux Disease , Disp: 30 tablet, Rfl: 1    prazosin (MINIPRESS) 2 mg capsule, Take 1 capsule (2 mg total) by mouth daily at bedtime Indications: Nightmares and flashbacks  , Disp: 30 capsule, Rfl: 1    PRAZOSIN HCL PO, Take 1 mg by mouth 3 (three) times a day, Disp: , Rfl:     QUEtiapine (SEROquel) 100 mg tablet, Take 1 tablet (100 mg total) by mouth daily at bedtime Indications: MDD w/ psychotic features  , Disp: 30 tablet, Rfl: 1    senna-docusate sodium (SENOKOT-S) 8 6-50 mg per tablet, Take 2 tablets by mouth daily, Disp: , Rfl:     sulfamethoxazole-trimethoprim (BACTRIM) 400-80 mg per tablet, Take 2 tablets by mouth every 12 (twelve) hours for 7 days, Disp: 28 tablet, Rfl: 0    ALLERGIES:  Allergies   Allergen Reactions    Oxycodone Swelling     Tongue swelling       REVIEW OF SYSTEMS:  Pertinent items are noted in HPI  A comprehensive review of systems was negative      LABS:  HgA1c:   Lab Results   Component Value Date    HGBA1C 5 7 (H) 05/25/2021     BMP:   Lab Results   Component Value Date    CALCIUM 9 6 05/25/2021    K 4 6 05/25/2021    CO2 29 05/25/2021     05/25/2021    BUN 13 05/25/2021    CREATININE 1 14 05/25/2021 _____________________________________________________  PHYSICAL EXAMINATION:  Vital signs: /86 Comment: advised to stay for 2nd read  Pulse 61   Ht 5' 6" (1 676 m) Comment: per patient  Wt 64 5 kg (142 lb 3 2 oz)   BMI 22 95 kg/m²   General: well developed and well nourished, alert, oriented times 3 and appears comfortable  Psychiatric: Normal  HEENT: Trachea Midline, No torticollis  Cardiovascular: No discernable arrhythmia  Pulmonary: No wheezing or stridor  Abdomen: No rebound or guarding  Extremities: No peripheral edema  Skin: No masses, erythema, lacerations, fluctation, ulcerations  Neurovascular: Sensation Intact to the Median, Ulnar, Radial Nerve, Motor Intact to the Median, Ulnar, Radial Nerve and Pulses Intact    MUSCULOSKELETAL EXAMINATION:  RIGHT SIDE:  Index finger     Patient has a healing wound on the distal phalanx of the index finger at the tip  There is no erythema  There is no palpable fluid collection  There is no drainage from the wound  Patient does not have any tenderness over the flexor tendons  Patient is able to flex and extend the finger without significant pain  Patient does not have any significant erythema  Patient does not have any significant swelling     _____________________________________________________  STUDIES REVIEWED:  Images were reviewed in PACS by Dr Anibal Noguera and demonstrate:   Cystic changes at the distal phalanx of the right index finger        PROCEDURES PERFORMED:  Procedures  No Procedures performed today

## 2021-07-16 ENCOUNTER — HOSPITAL ENCOUNTER (EMERGENCY)
Facility: HOSPITAL | Age: 56
Discharge: HOME | End: 2021-07-17
Attending: EMERGENCY MEDICINE
Payer: COMMERCIAL

## 2021-07-16 DIAGNOSIS — F11.10 HEROIN ABUSE (HCC): Primary | ICD-10-CM

## 2021-07-16 LAB
AMPHETAMINES SERPL QL SCN: NEGATIVE
BARBITURATES UR QL: NEGATIVE
BENZODIAZ UR QL: NEGATIVE
COCAINE UR QL: NEGATIVE
METHADONE UR QL: NEGATIVE
OPIATES UR QL SCN: POSITIVE
OXYCODONE+OXYMORPHONE UR QL SCN: NEGATIVE
PCP UR QL: NEGATIVE
SARS-COV-2 RNA RESP QL NAA+PROBE: NEGATIVE
THC UR QL: POSITIVE

## 2021-07-16 PROCEDURE — 99284 EMERGENCY DEPT VISIT MOD MDM: CPT | Performed by: EMERGENCY MEDICINE

## 2021-07-16 PROCEDURE — 80307 DRUG TEST PRSMV CHEM ANLYZR: CPT | Performed by: EMERGENCY MEDICINE

## 2021-07-16 PROCEDURE — 99284 EMERGENCY DEPT VISIT MOD MDM: CPT

## 2021-07-16 PROCEDURE — U0005 INFEC AGEN DETEC AMPLI PROBE: HCPCS | Performed by: EMERGENCY MEDICINE

## 2021-07-16 PROCEDURE — U0003 INFECTIOUS AGENT DETECTION BY NUCLEIC ACID (DNA OR RNA); SEVERE ACUTE RESPIRATORY SYNDROME CORONAVIRUS 2 (SARS-COV-2) (CORONAVIRUS DISEASE [COVID-19]), AMPLIFIED PROBE TECHNIQUE, MAKING USE OF HIGH THROUGHPUT TECHNOLOGIES AS DESCRIBED BY CMS-2020-01-R: HCPCS | Performed by: EMERGENCY MEDICINE

## 2021-07-16 RX ORDER — ONDANSETRON 4 MG/1
4 TABLET, ORALLY DISINTEGRATING ORAL EVERY 8 HOURS PRN
Status: DISCONTINUED | OUTPATIENT
Start: 2021-07-16 | End: 2021-07-17 | Stop reason: HOSPADM

## 2021-07-16 RX ADMIN — ONDANSETRON 4 MG: 4 TABLET, ORALLY DISINTEGRATING ORAL at 16:30

## 2021-07-16 NOTE — ED PROVIDER NOTES
History  Chief Complaint   Patient presents with    Detox Evaluation     pt presents ambulatory with request for heroin detox  last used yesterday      Bhavik Adams is an 54y o  year old male with PMHx significant for multiple psychiatric comorbidities, heroin abuse, who presents to the ED today for evaluation for substance abuse detox  Last used heroin yesterday  Denies SI, HI  Endorses wish to quit using substances because its interfering with his life  Denies other complaints at this time  Does not state how he uses substances or if he uses other substances  History provided by:  Medical records and patient   used: Yes    Detox Evaluation      Prior to Admission Medications   Prescriptions Last Dose Informant Patient Reported? Taking? CITALOPRAM HYDROBROMIDE PO   Yes No   Sig: Take 20 mg by mouth daily   HYDROcodone-acetaminophen (NORCO) 5-325 mg per tablet   No No   Sig: Take 1 tablet by mouth every 6 (six) hours as needed for pain for up to 5 daysMax Daily Amount: 4 tablets   Melatonin 10 MG CAPS   Yes No   Sig: Take 10 mg by mouth daily at bedtime   PRAZOSIN HCL PO   Yes No   Sig: Take 1 mg by mouth 3 (three) times a day   QUEtiapine (SEROquel) 100 mg tablet   No No   Sig: Take 1 tablet (100 mg total) by mouth daily at bedtime Indications: MDD w/ psychotic features  bacitracin topical ointment 500 units/g topical ointment   No No   Sig: Apply 1 large application topically 2 (two) times a day   citalopram (CeleXA) 40 mg tablet   No No   Sig: Take 1 tablet (40 mg total) by mouth daily Indications: Depression  At [facility]   Patient taking differently: Take 20 mg by mouth daily At 9AM    diclofenac sodium (VOLTAREN) 1 %   No No   Sig: Apply 2 g topically 4 (four) times a day for 7 days   fluticasone (FLONASE) 50 mcg/act nasal spray   No No   Si spray into each nostril daily Indications: Hayfever   At 9AM   ibuprofen (MOTRIN) 400 mg tablet   No No   Sig: Take 1 tablet (400 mg total) by mouth every 6 (six) hours as needed for mild pain for up to 3 days   ibuprofen (MOTRIN) 600 mg tablet   Yes No   Sig: Take 600 mg by mouth every 6 (six) hours as needed for mild pain   methocarbamol (ROBAXIN) 500 mg tablet   No No   Sig: Take 1 tablet (500 mg total) by mouth 4 (four) times a day for 5 days   naloxone (NARCAN) 4 mg/0 1 mL nasal spray   No No   Sig: Administer 1 spray into a nostril  If no response after 2-3 minutes, give another dose in the other nostril using a new spray  naproxen (NAPROSYN) 500 mg tablet   No No   Sig: Take 1 tablet by mouth 2 (two) times a day as needed for mild pain   oxyCODONE-acetaminophen (PERCOCET) 5-325 mg per tablet   No No   Sig: Take 1 tablet by mouth every 4 (four) hours as needed for moderate painMax Daily Amount: 6 tablets   pantoprazole (PROTONIX) 40 mg tablet   No No   Sig: Take 1 tablet (40 mg total) by mouth daily in the early morning Indications: Gastroesophageal Reflux Disease  prazosin (MINIPRESS) 2 mg capsule   No No   Sig: Take 1 capsule (2 mg total) by mouth daily at bedtime Indications: Nightmares and flashbacks     senna-docusate sodium (SENOKOT-S) 8 6-50 mg per tablet   Yes No   Sig: Take 2 tablets by mouth daily   sulfamethoxazole-trimethoprim (BACTRIM) 400-80 mg per tablet   No No   Sig: Take 2 tablets by mouth every 12 (twelve) hours for 7 days      Facility-Administered Medications: None       Past Medical History:   Diagnosis Date    Abdominal pain     Allergic rhinitis     Cancer ([facility] )     Chronic pain     Colon polyps     Depression     Fatigue     Head injury     Homeless     Hypertension     Insomnia     Liver disease     Loss of appetite     Numbness and tingling of right arm     Palpitations     Psychiatric disorder     bipolar    PTSD (post-traumatic stress disorder)     Seizures (HCC)     Shortness of breath     Substance abuse ([facility] )     Swallowing difficulty        Past Surgical History:   Procedure Laterality Date    ABDOMINAL SURGERY      COLONOSCOPY      EYE SURGERY      NECK SURGERY      ORCHIECTOMY Right 5/19/2016    Procedure: ORCHIECTOMY INGUINAL biopsy of testicular mass, ;  Surgeon: Vannesa Montoya MD;  Location:  MAIN OR;  Service:    [place] COLONOSCOPY FLX DX W/COLLJ SPEC WHEN PFRMD N/A 2/13/2017    Procedure: EGD AND COLONOSCOPY;  Surgeon: John Sharma MD;  Location: [facility] GI LAB; Service: Gastroenterology    [place] ESOPHAGOGASTRODUODENOSCOPY TRANSORAL DIAGNOSTIC N/A 11/16/2016    Procedure: EGD AND COLONOSCOPY;  Surgeon: John Sharma MD;  Location:  GI LAB; Service: Gastroenterology       Family History   Problem Relation Age of Onset    Diabetes Mother     Hypertension Brother      I have reviewed and agree with the history as documented  E-Cigarette/Vaping    E-Cigarette Use Never User      E-Cigarette/Vaping Substances     Social History     Tobacco Use    Smoking status: Current Every Day Smoker     Packs/day: 0 50     Years: 30 00     Pack years: 15 00     Types: Cigarettes    Smokeless tobacco: Current User   Vaping Use    Vaping Use: Never used   Substance Use Topics    Alcohol use: No    Drug use: Yes     Types: Heroin        Review of Systems   Reason unable to perform ROS: see above  Physical Exam  ED Triage Vitals [07/16/21 1519]   Temperature Pulse Respirations Blood Pressure SpO2   97 6 °F (36 4 °C) 92 18 136/99 99 %      Temp Source Heart Rate Source Patient Position - Orthostatic VS BP Location FiO2 (%)   Tympanic Monitor -- -- --      Pain Score       --             Orthostatic Vital Signs  Vitals:    07/16/21 1519   BP: 136/99   Pulse: 92       Physical Exam  Vitals and nursing note reviewed  Constitutional:       General: He is not in acute distress  Appearance: He is well-developed  He is not diaphoretic  HENT:      Head: Normocephalic and atraumatic  Eyes:      General: No scleral icterus       Conjunctiva/sclera: Conjunctivae normal    Neck: Vascular: No JVD  Trachea: No tracheal deviation  Cardiovascular:      Rate and Rhythm: Normal rate  Pulmonary:      Effort: Pulmonary effort is normal    Abdominal:      General: There is no distension  Musculoskeletal:         General: No deformity  Cervical back: Neck supple  Skin:     General: Skin is warm and dry  Neurological:      Mental Status: He is alert  Psychiatric:         Behavior: Behavior normal          ED Medications  Medications   ondansetron (ZOFRAN-ODT) dispersible tablet 4 mg (4 mg Oral Given 7/16/21 1630)       Diagnostic Studies  Results Reviewed     Procedure Component Value Units Date/Time    Novel Coronavirus [facility] [[id number]]  (Normal) Collected: 07/16/21 1944    Lab Status: Final result Specimen: Nares from Nose Updated: 07/16/21 2100     SARS-CoV-2 Negative    Narrative:           Rapid drug screen, urine [[id number]]  (Abnormal) Collected: 07/16/21 1944    Lab Status: Final result Specimen: Urine, Clean Catch Updated: 07/16/21 2034     Amph/Meth UR Negative     Barbiturate Ur Negative     Benzodiazepine Urine Negative     Cocaine Urine Negative     Methadone Urine Negative     Opiate Urine Positive     PCP Ur Negative     THC Urine Positive     Oxycodone Urine Negative    Narrative:      Presumptive report  If requested, specimen will be sent to reference lab for confirmation  FOR MEDICAL PURPOSES ONLY  IF CONFIRMATION NEEDED PLEASE CONTACT THE LAB WITHIN 5 DAYS  Drug Screen Cutoff Levels:  AMPHETAMINE/METHAMPHETAMINES  1000 ng/mL  BARBITURATES     200 ng/mL  BENZODIAZEPINES     200 ng/mL  COCAINE      300 ng/mL  METHADONE      300 ng/mL  OPIATES      300 ng/mL  PHENCYCLIDINE     25 ng/mL  THC       50 ng/mL  OXYCODONE      100 ng/mL                 No orders to display         Procedures  Procedures      ED Course  ED Course as of Jul 17 0053 Fri Jul 16, 2021   0169 Pt has had initial conversation with [facility]    Awaiting further placement information  Pt resting comfortably      2307 [facility] to call pt at home over weekend regarding placement                                          MDM  Number of Diagnoses or Management Options  Diagnosis management comments: Will consult crisis for [facility] referral   VS wnl and patient HD stable and without PE evidence of other organic pathology at this time  Will treat withdrawal symptoms PRN  Amount and/or Complexity of Data Reviewed  Review and summarize past medical records: yes  Discuss the patient with other providers: yes    Risk of Complications, Morbidity, and/or Mortality  Presenting problems: low  Diagnostic procedures: low  Management options: low    Patient Progress  Patient progress: stable      Disposition  Final diagnoses:   Heroin abuse ([address] )     Time reflects when diagnosis was documented in both MDM as applicable and the Disposition within this note     Time User Action Codes Description Comment    7/16/2021  8:44 PM Angie Pederson Add [F11 10] Heroin abuse [facility])       ED Disposition     ED Disposition Condition Date/Time Comment    Discharge Stable Fri Jul 16, 2021 11:07 PM Jessenia Heck discharge to home/self care  Results of completed tests discussed  Return to ER precautions given, verbal and written, and questions answered satisfactorily  Follow-up Information     Follow up With Specialties Details Why Contact Info    Tien Thompson, [address] Nurse Practitioner Call in 1 day To recheck symptoms and follow up on your ER visit [phone] [facility] [address] [phone]            Patient's Medications   Discharge Prescriptions    No medications on file     No discharge procedures on file  PDMP Review       Value Time User    PDMP Reviewed  Yes 7/10/2021 12:09 PM Ajay Birmingham PA-C           ED Provider  Attending physically available and evaluated Jessenia Lyme  I managed the patient along with the ED Attending      Electronically Signed by         Sonia Saul DO  07/17/21 9420

## 2021-07-16 NOTE — ED NOTES
Call received from Colt Velazco at [facility], stating she completed patient assessment and will begin to look for bed availability for detox treatment       BG Hollingsworth  07/16/21   [id number]

## 2021-07-17 VITALS
SYSTOLIC BLOOD PRESSURE: 128 MMHG | TEMPERATURE: 97.6 F | DIASTOLIC BLOOD PRESSURE: 95 MMHG | RESPIRATION RATE: 17 BRPM | OXYGEN SATURATION: 98 % | HEART RATE: 81 BPM

## 2021-07-17 NOTE — ED NOTES
Follow up phone call was placed to [facility] for update  Spoke with Jennifer Solorzano who noted they will continue to bed search today  Both [place] and [facility] with no available detox beds

## 2021-07-17 NOTE — ED NOTES
Call received from [address] at [facility] stating there are no available beds for patient tonight  One facility will not have a bed until Monday and the other is unsure when their next available bed will be       BG Moreno  07/16/21 2124

## 2021-07-17 NOTE — DISCHARGE INSTRUCTIONS
You were seen today in the Emergency Department for detoxification evaluation  Please follow up with your Primary Care Provider in the next 1-2 days to recheck your symptoms and to follow up on your visit to the Emergency Department today  Please return to the Emergency Department if you have thoughts of harming yourself or anyone else, fevers, chills, chest pain, shortness of breath, are unable to eat or drink, or have any other symptoms that concern you  Please look this over and let your nurse and/or me know if you have any further questions before you leave

## 2021-07-18 NOTE — ED ATTENDING ATTESTATION
7/16/2021  IGavin MD, saw and evaluated the patient  I have discussed the patient with the resident/non-physician practitioner and agree with the resident's/non-physician practitioner's findings, Plan of Care, and MDM as documented in the resident's/non-physician practitioner's note, except where noted  All available labs and Radiology studies were reviewed  I was present for key portions of any procedure(s) performed by the resident/non-physician practitioner and I was immediately available to provide assistance  At this point I agree with the current assessment done in the Emergency Department  I have conducted an independent evaluation of this patient a history and physical is as follows:    ED Course     Patient is a 51-year-old male requesting heroin detoxification    Vital signs reviewed patient offers no medical complaints will referred to [facility] for host placement    Critical Care Time  Procedures

## 2021-07-29 ENCOUNTER — OFFICE VISIT (OUTPATIENT)
Dept: OBGYN CLINIC | Facility: CLINIC | Age: 56
End: 2021-07-29
Payer: COMMERCIAL

## 2021-07-29 VITALS
RESPIRATION RATE: 17 BRPM | WEIGHT: 138.6 LBS | SYSTOLIC BLOOD PRESSURE: 116 MMHG | HEART RATE: 67 BPM | HEIGHT: 66 IN | BODY MASS INDEX: 22.28 KG/M2 | DIASTOLIC BLOOD PRESSURE: 73 MMHG

## 2021-07-29 DIAGNOSIS — L03.011 CELLULITIS OF FINGER OF RIGHT HAND: ICD-10-CM

## 2021-07-29 PROCEDURE — 99213 OFFICE O/P EST LOW 20 MIN: CPT | Performed by: SURGERY

## 2021-07-29 RX ORDER — SERTRALINE HYDROCHLORIDE 100 MG/1
1 TABLET, FILM COATED ORAL DAILY
COMMUNITY
Start: 2021-07-07

## 2021-07-29 RX ORDER — TRAZODONE HYDROCHLORIDE 100 MG/1
1 TABLET ORAL
COMMUNITY
Start: 2016-10-28

## 2021-07-29 RX ORDER — LISINOPRIL 5 MG/1
1 TABLET ORAL DAILY
COMMUNITY
Start: 2021-07-07

## 2021-07-29 NOTE — PROGRESS NOTES
Michelle OCHOA  Attending, Orthopaedic Surgery  Hand, Wrist, and Elbow Surgery  [facility]      ORTHOPAEDIC HAND, WRIST, AND ELBOW OFFICE  VISIT       ASSESSMENT/PLAN:      53 yo male with right index finger wound   Plan to continue abx until completed  Will initiate betadine paints BID with regular hand washing, avoid soaking  No need for bandages at this time  Work aggressively on finger ROM  Follow up in one week for repeat evaluation  The patient verbalized understanding of exam findings and treatment plan  We engaged in the shared decision-making process and treatment options were discussed at length with the patient  Surgical and conservative management discussed today along with risks and benefits  Diagnoses and all orders for this visit:    Cellulitis of finger of right hand  -     Ambulatory referral to Hand Surgery    Other orders  -     lisinopril (ZESTRIL) 5 mg tablet; Take 1 tablet by mouth daily  -     sertraline (ZOLOFT) 100 mg tablet; Take 1 tablet by mouth daily  -     traZODone (DESYREL) 100 mg tablet; Take 1 tablet by mouth daily at bedtime        Follow Up:  No follow-ups on file  To Do Next Visit:  Re-evaluation of current issue      ____________________________________________________________________________________________________________________________________________      CHIEF COMPLAINT:  Chief Complaint   Patient presents with    Right Index Finger - Pain       SUBJECTIVE:  Khalida Reed is a 54y o  year old RHD male seen in consultation requested by [facility] who presents for evaluation of a right index finger wound  He has been on bactrim for the past week, no fevers or chills  He notes pain in the finger as well as serous drainage from the wound but states the swelling and his motion are improving          Pain/symptom timing:  Worse during the day when active  Pain/symptom context:  Worse with activites and work  Pain/symptom modifying factors:  Rest makes better, activities make worse  Pain/symptom associated signs/symptoms: none    Prior treatment   · NSAIDsYes   · Injections No   · Bracing/Orthotics No    Physical Therapy No     I have personally reviewed all the relevant PMH, PSH, SH, FH, Medications and allergies      PAST MEDICAL HISTORY:  Past Medical History:   Diagnosis Date    Abdominal pain     Allergic rhinitis     Cancer (HCC)     Chronic pain     Colon polyps     Depression     Fatigue     Head injury     Homeless     Hypertension     Insomnia     Liver disease     Loss of appetite     Numbness and tingling of right arm     Palpitations     Psychiatric disorder     bipolar    PTSD (post-traumatic stress disorder)     Seizures (HCC)     Shortness of breath     Substance abuse ([address] )     Swallowing difficulty        PAST SURGICAL HISTORY:  Past Surgical History:   Procedure Laterality Date    ABDOMINAL SURGERY      COLONOSCOPY      EYE SURGERY      NECK SURGERY      ORCHIECTOMY Right 5/19/2016    Procedure: ORCHIECTOMY INGUINAL biopsy of testicular mass, ;  Surgeon: Garrison Sanders MD;  Location: [facility] OR;  Service:    [facility] [place] COLONOSCOPY FLX DX W/COLLJ SPEC WHEN PFRMD N/A 2/13/2017    Procedure: EGD AND COLONOSCOPY;  Surgeon: Azam Waggoner MD;  Location: [facility] GI LAB; Service: Gastroenterology    [place] ESOPHAGOGASTRODUODENOSCOPY TRANSORAL DIAGNOSTIC N/A 11/16/2016    Procedure: EGD AND COLONOSCOPY;  Surgeon: Azam Waggoner MD;  Location:  GI LAB;   Service: Gastroenterology       FAMILY HISTORY:  Family History   Problem Relation Age of Onset    Diabetes Mother     Hypertension Brother        SOCIAL HISTORY:  Social History     Tobacco Use    Smoking status: Current Every Day Smoker     Packs/day: 0 50     Years: 30 00     Pack years: 15 00     Types: Cigarettes    Smokeless tobacco: Current User   Vaping Use    Vaping Use: Never used   Substance Use Topics    Alcohol use: No    Drug use: Yes     Types: Heroin       MEDICATIONS:    Current Outpatient Medications:     bacitracin topical ointment 500 units/g topical ointment, Apply 1 large application topically 2 (two) times a day, Disp: 28 g, Rfl: 0    citalopram (CeleXA) 40 mg tablet, Take 1 tablet (40 mg total) by mouth daily Indications: Depression  At [address] [place] [address] (Patient taking differently: Take 20 mg by mouth daily At 9AM ), Disp: 30 tablet, Rfl: 1    fluticasone (FLONASE) 50 mcg/act nasal spray, 1 spray into each nostril daily Indications: Hayfever  At 9AM, Disp: 1 Bottle, Rfl: 0    ibuprofen (MOTRIN) 600 mg tablet, Take 600 mg by mouth every 6 (six) hours as needed for mild pain, Disp: , Rfl:     lisinopril (ZESTRIL) 5 mg tablet, Take 1 tablet by mouth daily, Disp: , Rfl:     Melatonin 10 MG CAPS, Take 10 mg by mouth daily at bedtime, Disp: , Rfl:     naloxone (NARCAN) 4 mg/0 1 mL nasal spray, Administer 1 spray into a nostril  If no response after 2-3 minutes, give another dose in the other nostril using a new spray , Disp: 1 each, Rfl: 1    pantoprazole (PROTONIX) 40 mg tablet, Take 1 tablet (40 mg total) by mouth daily in the early morning Indications: Gastroesophageal Reflux Disease , Disp: 30 tablet, Rfl: 1    prazosin (MINIPRESS) 2 mg capsule, Take 1 capsule (2 mg total) by mouth daily at bedtime Indications: Nightmares and flashbacks  , Disp: 30 capsule, Rfl: 1    QUEtiapine (SEROquel) 100 mg tablet, Take 1 tablet (100 mg total) by mouth daily at bedtime Indications: MDD w/ psychotic features  , Disp: 30 tablet, Rfl: 1    sertraline (ZOLOFT) 100 mg tablet, Take 1 tablet by mouth daily, Disp: , Rfl:     traZODone (DESYREL) 100 mg tablet, Take 1 tablet by mouth daily at bedtime, Disp: , Rfl:     CITALOPRAM HYDROBROMIDE PO, Take 20 mg by mouth daily (Patient not taking: Reported on 7/29/2021), Disp: , Rfl:     diclofenac sodium (VOLTAREN) 1 %, Apply 2 g topically 4 (four) times a day for 7 days (Patient not taking: Reported on 7/29/2021), Disp: 100 g, Rfl: 0    ibuprofen (MOTRIN) 400 mg tablet, Take 1 tablet (400 mg total) by mouth every 6 (six) hours as needed for mild pain for up to 3 days (Patient not taking: Reported on 7/29/2021), Disp: 12 tablet, Rfl: 0    methocarbamol (ROBAXIN) 500 mg tablet, Take 1 tablet (500 mg total) by mouth 4 (four) times a day for 5 days (Patient not taking: Reported on 7/29/2021), Disp: 20 tablet, Rfl: 0    naproxen (NAPROSYN) 500 mg tablet, Take 1 tablet by mouth 2 (two) times a day as needed for mild pain (Patient not taking: Reported on 7/29/2021), Disp: 14 tablet, Rfl: 0    oxyCODONE-acetaminophen (PERCOCET) 5-325 mg per tablet, Take 1 tablet by mouth every 4 (four) hours as needed for moderate painMax Daily Amount: 6 tablets (Patient not taking: Reported on 7/29/2021), Disp: 20 tablet, Rfl: 0    PRAZOSIN HCL PO, Take 1 mg by mouth 3 (three) times a day (Patient not taking: Reported on 7/29/2021), Disp: , Rfl:     senna-docusate sodium (SENOKOT-S) 8 6-50 mg per tablet, Take 2 tablets by mouth daily (Patient not taking: Reported on 7/29/2021), Disp: , Rfl:     ALLERGIES:  Allergies   Allergen Reactions    Oxycodone Swelling     Tongue swelling           REVIEW OF SYSTEMS:  Review of Systems   Constitutional: Negative for chills, fatigue, fever and unexpected weight change  HENT: Negative for hearing loss, nosebleeds and sore throat  Eyes: Negative for pain, redness and visual disturbance  Respiratory: Negative for cough, shortness of breath and wheezing  Cardiovascular: Negative for chest pain, palpitations and leg swelling  Gastrointestinal: Negative for abdominal pain, nausea and vomiting  Endocrine: Negative for polydipsia and polyuria  Genitourinary: Negative for difficulty urinating and hematuria  Musculoskeletal: Positive for arthralgias  Negative for joint swelling and myalgias  Skin: Positive for wound  Negative for rash  Neurological: Negative for dizziness, numbness and headaches  Psychiatric/Behavioral: Negative for decreased concentration, dysphoric mood and suicidal ideas  The patient is not nervous/anxious  VITALS:  Vitals:    07/29/21 1434   BP: 116/73   Pulse: 67   Resp: 17       LABS:  HgA1c:   Lab Results   Component Value Date    HGBA1C 5 7 (H) 05/25/2021     BMP:   Lab Results   Component Value Date    CALCIUM 9 6 05/25/2021    K 4 6 05/25/2021    CO2 29 05/25/2021     05/25/2021    BUN 13 05/25/2021    CREATININE 1 14 05/25/2021       _____________________________________________________  PHYSICAL EXAMINATION:  General: well developed and well nourished, alert, oriented times 3 and appears comfortable  Psychiatric: Normal  HEENT: Normocephalic, Atraumatic Trachea Midline, No torticollis  Pulmonary: No audible wheezing or respiratory distress   Abdomen/GI: Non tender, non distended   Cardiovascular: No pitting edema, 2+ radial pulse   Skin: No masses, erythema, lacerations, fluctation, ulcerations  Neurovascular: Sensation Intact to the Median, Ulnar, Radial Nerve, Motor Intact to the Median, Ulnar, Radial Nerve and Pulses Intact  Musculoskeletal: Normal, except as noted in detailed exam and in HPI        MUSCULOSKELETAL EXAMINATION:  Right index finger:   Open wound at the distal tip of the finger  No erythema  Serous drainage from the wound noted  ROM slightly stiff  Tender to palpation     ___________________________________________________  STUDIES REVIEWED:  Xrays of right hand demonstrate no osseous abnormality in the index finger at the location of the wound      PROCEDURES PERFORMED:  Procedures  No Procedures performed today    _____________________________________________________      Scribe Attestation    I,:  Greer Floyd PA-C am acting as a scribe while in the presence of the attending physician :       I,:  Kate Rivas MD personally performed the services described in this documentation    as scribed in my presence :

## 2021-08-05 ENCOUNTER — OFFICE VISIT (OUTPATIENT)
Dept: OBGYN CLINIC | Facility: CLINIC | Age: 56
End: 2021-08-05
Payer: COMMERCIAL

## 2021-08-05 VITALS
WEIGHT: 145 LBS | RESPIRATION RATE: 17 BRPM | DIASTOLIC BLOOD PRESSURE: 65 MMHG | HEART RATE: 60 BPM | BODY MASS INDEX: 23.3 KG/M2 | HEIGHT: 66 IN | SYSTOLIC BLOOD PRESSURE: 103 MMHG

## 2021-08-05 DIAGNOSIS — L03.011 CELLULITIS OF FINGER OF RIGHT HAND: Primary | ICD-10-CM

## 2021-08-05 PROCEDURE — 99213 OFFICE O/P EST LOW 20 MIN: CPT | Performed by: SURGERY

## 2021-08-05 NOTE — PROGRESS NOTES
ASSESSMENT/PLAN:      54 y o  male with a right index finger wound  Wound is healing well without signs of infection  May d/c the Betadine paints  Advised to perform scar massage  Activities to tolerance, no restrictions  Follow up in the office as needed if symptoms worsen or fail to improve  The patient verbalized understanding of exam findings and treatment plan  We engaged in the shared decision-making process and treatment options were discussed at length with the patient  Surgical and conservative management discussed today along with risks and benefits  Diagnoses and all orders for this visit:    Cellulitis of finger of right hand      Follow Up:  Return if symptoms worsen or fail to improve  To Do Next Visit:  Re-evaluation of current issue    ____________________________________________________________________________________________________________________________________________      CHIEF COMPLAINT:  Chief Complaint   Patient presents with    Right Hand - Follow-up    Right Index Finger - Follow-up       SUBJECTIVE:  Rima Roa is a 54y o  year old male who presents to the office today for a follow up regarding a right index finger wound  Justo Ro has been performing Betadine paints BID  Justo Silverman completed the oral ABX  He notes the appearance of the wound has improved and drainage has resolved  Justo Silverman was accompanied by female who provided Latvian interpretation/translation  I have personally reviewed all the relevant PMH, PSH, SH, FH, Medications and allergies       PAST MEDICAL HISTORY:  Past Medical History:   Diagnosis Date    Abdominal pain     Allergic rhinitis     Cancer (HCC)     Chronic pain     Colon polyps     Depression     Fatigue     Head injury     Homeless     Hypertension     Insomnia     Liver disease     Loss of appetite     Numbness and tingling of right arm     Palpitations     Psychiatric disorder     bipolar    PTSD (post-traumatic stress disorder)     Seizures (HCC)     Shortness of breath     Substance abuse ([facility] [address] )     Swallowing difficulty        PAST SURGICAL HISTORY:  Past Surgical History:   Procedure Laterality Date    ABDOMINAL SURGERY      COLONOSCOPY      EYE SURGERY      NECK SURGERY      ORCHIECTOMY Right 5/19/2016    Procedure: ORCHIECTOMY INGUINAL biopsy of testicular mass, ;  Surgeon: Ann Junior MD;  Location: [facility] OR;  Service:    [facility] [place] COLONOSCOPY FLX DX W/COLLJ SPEC WHEN PFRMD N/A 2/13/2017    Procedure: EGD AND COLONOSCOPY;  Surgeon: Rashida Medina MD;  Location: [facility] GI LAB; Service: Gastroenterology    WI ESOPHAGOGASTRODUODENOSCOPY TRANSORAL DIAGNOSTIC N/A 11/16/2016    Procedure: EGD AND COLONOSCOPY;  Surgeon: Rashida Medina MD;  Location:  GI LAB; Service: Gastroenterology       FAMILY HISTORY:  Family History   Problem Relation Age of Onset    Diabetes Mother     Hypertension Brother        SOCIAL HISTORY:  Social History     Tobacco Use    Smoking status: Current Every Day Smoker     Packs/day: 0 50     Years: 30 00     Pack years: 15 00     Types: Cigarettes    Smokeless tobacco: Current User   Vaping Use    Vaping Use: Never used   Substance Use Topics    Alcohol use: No    Drug use: Yes     Types: Heroin       MEDICATIONS:    Current Outpatient Medications:     citalopram (CeleXA) 40 mg tablet, Take 1 tablet (40 mg total) by mouth daily Indications: Depression  At [address] [place] [address] (Patient taking differently: Take 20 mg by mouth daily At 9AM ), Disp: 30 tablet, Rfl: 1    fluticasone (FLONASE) 50 mcg/act nasal spray, 1 spray into each nostril daily Indications: Hayfever   At 9AM, Disp: 1 Bottle, Rfl: 0    ibuprofen (MOTRIN) 600 mg tablet, Take 600 mg by mouth every 6 (six) hours as needed for mild pain, Disp: , Rfl:     lisinopril (ZESTRIL) 5 mg tablet, Take 1 tablet by mouth daily, Disp: , Rfl:     Melatonin 10 MG CAPS, Take 10 mg by mouth daily at bedtime, Disp: , Rfl:     naloxone (NARCAN) 4 mg/0 1 mL nasal spray, Administer 1 spray into a nostril  If no response after 2-3 minutes, give another dose in the other nostril using a new spray , Disp: 1 each, Rfl: 1    pantoprazole (PROTONIX) 40 mg tablet, Take 1 tablet (40 mg total) by mouth daily in the early morning Indications: Gastroesophageal Reflux Disease , Disp: 30 tablet, Rfl: 1    prazosin (MINIPRESS) 2 mg capsule, Take 1 capsule (2 mg total) by mouth daily at bedtime Indications: Nightmares and flashbacks  , Disp: 30 capsule, Rfl: 1    QUEtiapine (SEROquel) 100 mg tablet, Take 1 tablet (100 mg total) by mouth daily at bedtime Indications: MDD w/ psychotic features  , Disp: 30 tablet, Rfl: 1    sertraline (ZOLOFT) 100 mg tablet, Take 1 tablet by mouth daily, Disp: , Rfl:     traZODone (DESYREL) 100 mg tablet, Take 1 tablet by mouth daily at bedtime, Disp: , Rfl:     bacitracin topical ointment 500 units/g topical ointment, Apply 1 large application topically 2 (two) times a day (Patient not taking: Reported on 8/5/2021), Disp: 28 g, Rfl: 0    CITALOPRAM HYDROBROMIDE PO, Take 20 mg by mouth daily (Patient not taking: Reported on 7/29/2021), Disp: , Rfl:     diclofenac sodium (VOLTAREN) 1 %, Apply 2 g topically 4 (four) times a day for 7 days (Patient not taking: Reported on 7/29/2021), Disp: 100 g, Rfl: 0    ibuprofen (MOTRIN) 400 mg tablet, Take 1 tablet (400 mg total) by mouth every 6 (six) hours as needed for mild pain for up to 3 days (Patient not taking: Reported on 7/29/2021), Disp: 12 tablet, Rfl: 0    methocarbamol (ROBAXIN) 500 mg tablet, Take 1 tablet (500 mg total) by mouth 4 (four) times a day for 5 days (Patient not taking: Reported on 7/29/2021), Disp: 20 tablet, Rfl: 0    naproxen (NAPROSYN) 500 mg tablet, Take 1 tablet by mouth 2 (two) times a day as needed for mild pain (Patient not taking: Reported on 7/29/2021), Disp: 14 tablet, Rfl: 0    oxyCODONE-acetaminophen (PERCOCET) 5-325 mg per tablet, Take 1 tablet by mouth every 4 (four) hours as needed for moderate painMax Daily Amount: 6 tablets (Patient not taking: Reported on 7/29/2021), Disp: 20 tablet, Rfl: 0    PRAZOSIN HCL PO, Take 1 mg by mouth 3 (three) times a day (Patient not taking: Reported on 7/29/2021), Disp: , Rfl:     senna-docusate sodium (SENOKOT-S) 8 6-50 mg per tablet, Take 2 tablets by mouth daily (Patient not taking: Reported on 7/29/2021), Disp: , Rfl:     ALLERGIES:  Allergies   Allergen Reactions    Oxycodone Swelling     Tongue swelling       REVIEW OF SYSTEMS:  Review of Systems   Constitutional: Negative for chills, fever and unexpected weight change  HENT: Negative for hearing loss, nosebleeds and sore throat  Eyes: Negative for pain, redness and visual disturbance  Respiratory: Negative for cough, shortness of breath and wheezing  Cardiovascular: Negative for chest pain, palpitations and leg swelling  Gastrointestinal: Negative for abdominal pain, nausea and vomiting  Endocrine: Negative for polydipsia and polyuria  Genitourinary: Negative for difficulty urinating and hematuria  Musculoskeletal: Negative for arthralgias, joint swelling and myalgias  Skin: Positive for wound  Negative for rash  Neurological: Negative for dizziness, numbness and headaches  Psychiatric/Behavioral: Negative for decreased concentration, dysphoric mood and suicidal ideas  The patient is not nervous/anxious          VITALS:  Vitals:    08/05/21 1327   BP: 103/65   Pulse: 60   Resp: 17       LABS:  HgA1c:   Lab Results   Component Value Date    HGBA1C 5 7 (H) 05/25/2021     BMP:   Lab Results   Component Value Date    CALCIUM 9 6 05/25/2021    K 4 6 05/25/2021    CO2 29 05/25/2021     05/25/2021    BUN 13 05/25/2021    CREATININE 1 14 05/25/2021       _____________________________________________________  PHYSICAL EXAMINATION:  General: well developed and well nourished, alert, oriented times 3 and appears comfortable  Psychiatric: Normal  HEENT: Normocephalic, Atraumatic Trachea Midline, No torticollis  Pulmonary: No audible wheezing or respiratory distress   Cardiovascular: No pitting edema, 2+ radial pulse   Abdominal/GI: abdomen non tender, non distended   Skin: No Masses, No Erythema, No Fluctuation, No Ulcerations  Neurovascular: Sensation Intact to the Median, Ulnar, Radial Nerve, Motor Intact to the Median, Ulnar, Radial Nerve and Pulses Intact  Musculoskeletal: Normal, except as noted in detailed exam and in HPI        MUSCULOSKELETAL EXAMINATION:    Right index finger:     No erythema, ecchymosis or edema   Wound to tip healing by secondary intention  Granulation tissue notes    No concern for infection at this time   Full DIP, PIP and MCP ROM   Brisk capillary refill     ___________________________________________________  STUDIES REVIEWED:  No new imaging to review           PROCEDURES PERFORMED:  Procedures  No Procedures performed today    _____________________________________________________      Azul Vernon    I,:  Jayson Aldana am acting as a scribe while in the presence of the attending physician :       I,:  Chi Camarena MD personally performed the services described in this documentation    as scribed in my presence :

## 2021-08-12 ENCOUNTER — HOSPITAL ENCOUNTER (EMERGENCY)
Facility: HOSPITAL | Age: 56
Discharge: HOME | End: 2021-08-12
Attending: EMERGENCY MEDICINE | Admitting: EMERGENCY MEDICINE
Payer: COMMERCIAL

## 2021-08-12 VITALS
SYSTOLIC BLOOD PRESSURE: 121 MMHG | BODY MASS INDEX: 23.14 KG/M2 | TEMPERATURE: 98 F | WEIGHT: 144 LBS | HEIGHT: 66 IN | DIASTOLIC BLOOD PRESSURE: 77 MMHG | HEART RATE: 88 BPM | OXYGEN SATURATION: 98 % | RESPIRATION RATE: 16 BRPM

## 2021-08-12 DIAGNOSIS — F11.20 HEROIN ADDICTION (HCC): Primary | ICD-10-CM

## 2021-08-12 PROCEDURE — 99284 EMERGENCY DEPT VISIT MOD MDM: CPT | Performed by: EMERGENCY MEDICINE

## 2021-08-12 PROCEDURE — 99284 EMERGENCY DEPT VISIT MOD MDM: CPT

## 2021-08-12 RX ORDER — HYDROXYZINE HYDROCHLORIDE 25 MG/1
25 TABLET, FILM COATED ORAL ONCE
Status: COMPLETED | OUTPATIENT
Start: 2021-08-12 | End: 2021-08-12

## 2021-08-12 RX ADMIN — HYDROXYZINE HYDROCHLORIDE 25 MG: 25 TABLET, FILM COATED ORAL at 19:48

## 2021-08-12 NOTE — ED NOTES
Domi Patience at bedside doing assessment at bedside     [facility], [address] [place] [address]  08/12/21 5205

## 2021-08-12 NOTE — ED NOTES
Contacted Kavita Moscoso,  from [facility]  Will be here to evaluate in 30 minutes        Priya Francis RN  08/12/21 2483

## 2021-08-12 NOTE — ED NOTES
Aki from [facility] completed assessment/intake  Reports that [facility] is reviewing case  CATCH to reach out to ED if accepted  Patient awake, alert and in no acute distress  Provided with food, snacks and drink  Eating without difficulty        Niall Robin RN  08/12/21 8086

## 2021-08-13 NOTE — ED NOTES
Lyft ride set up for patient  Ambulatory to waiting room to await ride with steady gait  Offers no further complaints        Jaspal Diaz RN  08/12/21 4671

## 2021-08-13 NOTE — ED PROVIDER NOTES
History  Chief Complaint   Patient presents with    Drug / Alcohol Assessment     was told to come here from [facility] for heroin     54 yr male Czech speaking -- brought in by [address]  speaking friend-- pt has been snorting heroin for awhile- no idu- last used 4 days ago- is here to go to rehab-- pt c/o midl nausea/ dairrhea- no other comps-- no si/plan/ intent-- note- catch representative is currently here to see pt       History provided by:  Patient and friend   used: Yes    Drug / Alcohol Assessment  Severity:  Mild  Onset quality:  Gradual  Duration:  4 days      Prior to Admission Medications   Prescriptions Last Dose Informant Patient Reported? Taking? CITALOPRAM HYDROBROMIDE PO Not Taking at Unknown time Self Yes No   Sig: Take 20 mg by mouth daily   Patient not taking: Reported on 2021   Melatonin 10 MG CAPS Not Taking at Unknown time Self Yes No   Sig: Take 10 mg by mouth daily at bedtime   Patient not taking: Reported on 2021   PRAZOSIN HCL PO Not Taking at Unknown time Self Yes No   Sig: Take 1 mg by mouth 3 (three) times a day   Patient not taking: Reported on 2021   QUEtiapine (SEROquel) 100 mg tablet Not Taking at Unknown time Self No No   Sig: Take 1 tablet (100 mg total) by mouth daily at bedtime Indications: MDD w/ psychotic features  Patient not taking: Reported on 2021   citalopram (CeleXA) 40 mg tablet Not Taking at Unknown time Self No No   Sig: Take 1 tablet (40 mg total) by mouth daily Indications: Depression  At [address]   Patient not taking: Reported on 2021   fluticasone (FLONASE) 50 mcg/act nasal spray  Self No No   Si spray into each nostril daily Indications: Hayfever   At 9AM   lisinopril (ZESTRIL) 5 mg tablet Not Taking at Unknown time Self Yes No   Sig: Take 1 tablet by mouth daily   Patient not taking: Reported on 2021   naloxone (NARCAN) 4 mg/0 1 mL nasal spray Not Taking at Unknown time Self No No   Sig: Administer 1 spray into a nostril  If no response after 2-3 minutes, give another dose in the other nostril using a new spray  Patient not taking: Reported on 8/12/2021   pantoprazole (PROTONIX) 40 mg tablet Not Taking at Unknown time Self No No   Sig: Take 1 tablet (40 mg total) by mouth daily in the early morning Indications: Gastroesophageal Reflux Disease  Patient not taking: Reported on 8/12/2021   prazosin (MINIPRESS) 2 mg capsule Not Taking at Unknown time Self No No   Sig: Take 1 capsule (2 mg total) by mouth daily at bedtime Indications: Nightmares and flashbacks     Patient not taking: Reported on 8/12/2021   senna-docusate sodium (SENOKOT-S) 8 6-50 mg per tablet Not Taking at Unknown time Self Yes No   Sig: Take 2 tablets by mouth daily   Patient not taking: Reported on 7/29/2021   sertraline (ZOLOFT) 100 mg tablet Not Taking at Unknown time Self Yes No   Sig: Take 1 tablet by mouth daily   Patient not taking: Reported on 8/12/2021   traZODone (DESYREL) 100 mg tablet Not Taking at Unknown time Self Yes No   Sig: Take 1 tablet by mouth daily at bedtime   Patient not taking: Reported on 8/12/2021      Facility-Administered Medications: None       Past Medical History:   Diagnosis Date    Abdominal pain     Allergic rhinitis     [facility] [address] [facility]     Chronic pain     Colon polyps     Depression     Fatigue     Head injury     Homeless     Hypertension     Insomnia     Liver disease     Loss of appetite     Numbness and tingling of right arm     Palpitations     Psychiatric disorder     bipolar    PTSD (post-traumatic stress disorder)     Seizures (HCC)     Shortness of breath     Substance abuse ([facility] [address] )     Swallowing difficulty        Past Surgical History:   Procedure Laterality Date    ABDOMINAL SURGERY      COLONOSCOPY      EYE SURGERY      NECK SURGERY      ORCHIECTOMY Right 5/19/2016    Procedure: ORCHIECTOMY INGUINAL biopsy of testicular mass, ;  Surgeon: Chandan Pandey MD;  [facility]; Service:     [place] COLONOSCOPY FLX DX W/COLLJ SPEC WHEN PFRMD N/A 2/13/2017    Procedure: EGD AND COLONOSCOPY;  Surgeon: Harshad Roach MD;  Location: [facility] GI LAB; Service: Gastroenterology    [place] ESOPHAGOGASTRODUODENOSCOPY TRANSORAL DIAGNOSTIC N/A 11/16/2016    Procedure: EGD AND COLONOSCOPY;  Surgeon: Harshad Roach MD;  Location:  GI LAB; Service: Gastroenterology       Family History   Problem Relation Age of Onset    Diabetes Mother     Hypertension Brother      I have reviewed and agree with the history as documented  E-Cigarette/Vaping    E-Cigarette Use Never User      E-Cigarette/Vaping Substances     Social History     Tobacco Use    Smoking status: Current Every Day Smoker     Packs/day: 0 50     Years: 30 00     Pack years: 15 00     Types: Cigarettes    Smokeless tobacco: Current User   Vaping Use    Vaping Use: Never used   Substance Use Topics    Alcohol use: No    Drug use: Yes     Types: Heroin       Review of Systems   Constitutional: Negative  HENT: Negative  Eyes: Negative  Respiratory: Negative  Cardiovascular: Negative  Gastrointestinal: Negative  Endocrine: Negative  Genitourinary: Negative  Musculoskeletal: Negative  Skin: Negative  Allergic/Immunologic: Negative  Neurological: Negative  Hematological: Negative  Psychiatric/Behavioral: Negative  Physical Exam  Physical Exam  Vitals and nursing note reviewed  Constitutional:       General: He is not in acute distress  Appearance: Normal appearance  He is not ill-appearing, toxic-appearing or diaphoretic  Comments: avss-- pulse ox 98 % on ra- interpretation is normal- no intervention in nad no overt signs of withdrawal   HENT:      Head: Normocephalic and atraumatic  Nose: Nose normal       Mouth/Throat:      Mouth: Mucous membranes are moist    Eyes:      General: No scleral icterus  Right eye: No discharge  Left eye: No discharge        Extraocular Movements: Extraocular movements intact  Conjunctiva/sclera: Conjunctivae normal       Pupils: Pupils are equal, round, and reactive to light  Comments: Mm pink   Neck:      Vascular: No carotid bruit  Comments: No pmt c/t/l/s spine   Cardiovascular:      Rate and Rhythm: Normal rate and regular rhythm  Pulses: Normal pulses  Heart sounds: Normal heart sounds  No murmur heard  No friction rub  No gallop  Pulmonary:      Effort: Pulmonary effort is normal  No respiratory distress  Breath sounds: Normal breath sounds  No stridor  No wheezing, rhonchi or rales  Chest:      Chest wall: No tenderness  Abdominal:      General: Bowel sounds are normal  There is no distension  Palpations: Abdomen is soft  There is no mass  Tenderness: There is no abdominal tenderness  There is no right CVA tenderness, left CVA tenderness, guarding or rebound  Hernia: No hernia is present  Musculoskeletal:         General: No swelling, tenderness, deformity or signs of injury  Normal range of motion  Cervical back: Normal range of motion and neck supple  No rigidity or tenderness  Right lower leg: No edema  Left lower leg: No edema  Lymphadenopathy:      Cervical: No cervical adenopathy  Skin:     General: Skin is warm  Capillary Refill: Capillary refill takes less than 2 seconds  Coloration: Skin is not jaundiced or pale  Findings: No bruising, erythema, lesion or rash  Neurological:      General: No focal deficit present  Mental Status: He is alert and oriented to person, place, and time  Mental status is at baseline  Cranial Nerves: No cranial nerve deficit  Sensory: No sensory deficit  Motor: No weakness        Coordination: Coordination normal       Gait: Gait normal       Comments: Normal non focal neuro exam    Psychiatric:         Mood and Affect: Mood normal          Behavior: Behavior normal          Vital Signs  ED Triage Vitals   Temperature Pulse Respirations Blood Pressure SpO2   08/12/21 1658 08/12/21 1658 08/12/21 1658 08/12/21 1658 08/12/21 1658   98 6 °F (37 °C) 100 18 139/74 98 %      Temp Source Heart Rate Source Patient Position - Orthostatic VS BP Location FiO2 (%)   08/12/21 1658 08/12/21 1915 08/12/21 1915 08/12/21 1915 --   Oral Monitor Sitting Right arm       Pain Score       08/12/21 1915       2           Vitals:    08/12/21 1658 08/12/21 1915 08/12/21 2130   BP: 139/74 130/70 121/77   Pulse: 100 99 88   Patient Position - Orthostatic VS:  Sitting Lying         Visual Acuity      ED Medications  Medications   hydrOXYzine HCL (ATARAX) tablet 25 mg (25 mg Oral Given 8/12/21 1948)       Diagnostic Studies  Results Reviewed     None                 No orders to display              Procedures  Procedures         ED Course  ED Course as of Aug 12 2300   Thu Aug 12, 2021   2013 Er md note- pt seen by suresh in er- while try to place pt tonight- will call back  - pt and wife aware  of this      36 - er md note- pt to be placed tomorrow- over language iphone  pt will go [place] to [facility] who brought him here and suresh will contact him tomorrow about placement - pt understands this       1 - er md note- upon er d/c- pt sound asleep I n nad                                 SBIRT 20yo+      Most Recent Value   SBIRT (25 yo +)   In order to provide better care to our patients, we are screening all of our patients for alcohol and drug use  Would it be okay to ask you these screening questions?   Unable to answer at this time Filed at: 08/12/2021 1948                    MDM    Disposition  Final diagnoses:   Heroin addiction [facility])     Time reflects when diagnosis was documented in both MDM as applicable and the Disposition within this note     Time User Action Codes Description Comment    8/12/2021 10:30 PM Gwenn Cushing Add [F11 20] Heroin addiction [facility])       ED Disposition     ED Disposition Condition Date/Time Comment    Discharge Stable Thu Aug 12, 2021 [facility] S discharge to home/self care  Follow-up Information    None         Patient's Medications   Discharge Prescriptions    No medications on file     No discharge procedures on file      PDMP Review       Value Time User    PDMP Reviewed  Yes 7/10/2021 12:09 PM Imer Gonsales PA-C          ED Provider  Electronically Signed by           Amando Cabral MD  08/12/21 7143

## 2022-07-06 ENCOUNTER — HOSPITAL ENCOUNTER (INPATIENT)
Facility: HOSPITAL | Age: 57
LOS: 4 days | Discharge: HOME | DRG: 469 | End: 2022-07-10
Attending: EMERGENCY MEDICINE | Admitting: HOSPITALIST
Payer: MEDICARE

## 2022-07-06 ENCOUNTER — APPOINTMENT (EMERGENCY)
Dept: CT IMAGING | Facility: HOSPITAL | Age: 57
DRG: 469 | End: 2022-07-06
Payer: MEDICARE

## 2022-07-06 DIAGNOSIS — I10 PRIMARY HYPERTENSION: ICD-10-CM

## 2022-07-06 DIAGNOSIS — R00.1 SINUS BRADYCARDIA: ICD-10-CM

## 2022-07-06 DIAGNOSIS — F43.10 PTSD (POST-TRAUMATIC STRESS DISORDER): ICD-10-CM

## 2022-07-06 DIAGNOSIS — R79.89 ELEVATED SERUM CREATININE: ICD-10-CM

## 2022-07-06 DIAGNOSIS — N17.9 AKI (ACUTE KIDNEY INJURY) (HCC): Primary | ICD-10-CM

## 2022-07-06 DIAGNOSIS — D64.9 ANEMIA: ICD-10-CM

## 2022-07-06 DIAGNOSIS — R31.9 HEMATURIA: ICD-10-CM

## 2022-07-06 DIAGNOSIS — E11.9 TYPE 2 DIABETES MELLITUS WITHOUT COMPLICATION, WITHOUT LONG-TERM CURRENT USE OF INSULIN (HCC): ICD-10-CM

## 2022-07-06 DIAGNOSIS — R10.9 ABDOMINAL PAIN: ICD-10-CM

## 2022-07-06 LAB
ALBUMIN SERPL BCP-MCNC: 4.1 G/DL (ref 3.5–5)
ALP SERPL-CCNC: 61 U/L (ref 34–104)
ALT SERPL W P-5'-P-CCNC: 12 U/L (ref 7–52)
ANION GAP SERPL CALCULATED.3IONS-SCNC: 7 MMOL/L (ref 4–13)
AST SERPL W P-5'-P-CCNC: 21 U/L (ref 13–39)
BACTERIA UR QL AUTO: NORMAL /HPF
BASOPHILS # BLD AUTO: 0.02 THOUSANDS/ΜL (ref 0–0.1)
BASOPHILS NFR BLD AUTO: 0 % (ref 0–1)
BILIRUB SERPL-MCNC: 0.51 MG/DL (ref 0.2–1)
BILIRUB UR QL STRIP: NEGATIVE
BUN SERPL-MCNC: 35 MG/DL (ref 5–25)
CALCIUM SERPL-MCNC: 9.4 MG/DL (ref 8.4–10.2)
CHLORIDE SERPL-SCNC: 100 MMOL/L (ref 96–108)
CK MB SERPL-MCNC: 0.6 % (ref 0–2.5)
CK MB SERPL-MCNC: 1.6 NG/ML (ref 0.6–6.3)
CK SERPL-CCNC: 266 U/L (ref 39–308)
CLARITY UR: CLEAR
CO2 SERPL-SCNC: 25 MMOL/L (ref 21–32)
COLOR UR: YELLOW
CREAT SERPL-MCNC: 4.98 MG/DL (ref 0.6–1.3)
CREAT UR-MCNC: 199.1 MG/DL
EOSINOPHIL # BLD AUTO: 0.04 THOUSAND/ΜL (ref 0–0.61)
EOSINOPHIL NFR BLD AUTO: 0 % (ref 0–6)
ERYTHROCYTE [DISTWIDTH] IN BLOOD BY AUTOMATED COUNT: 13.1 % (ref 11.6–15.1)
GFR SERPL CREATININE-BSD FRML MDRD: 12 ML/MIN/1.73SQ M
GLUCOSE SERPL-MCNC: 116 MG/DL (ref 65–140)
GLUCOSE SERPL-MCNC: 89 MG/DL (ref 65–140)
GLUCOSE UR STRIP-MCNC: ABNORMAL MG/DL
HCT VFR BLD AUTO: 34.2 % (ref 36.5–49.3)
HGB BLD-MCNC: 11.1 G/DL (ref 12–17)
HGB UR QL STRIP.AUTO: ABNORMAL
IMM GRANULOCYTES # BLD AUTO: 0.06 THOUSAND/UL (ref 0–0.2)
IMM GRANULOCYTES NFR BLD AUTO: 1 % (ref 0–2)
KETONES UR STRIP-MCNC: NEGATIVE MG/DL
LEUKOCYTE ESTERASE UR QL STRIP: NEGATIVE
LIPASE SERPL-CCNC: 36 U/L (ref 11–82)
LYMPHOCYTES # BLD AUTO: 1.66 THOUSANDS/ΜL (ref 0.6–4.47)
LYMPHOCYTES NFR BLD AUTO: 17 % (ref 14–44)
MCH RBC QN AUTO: 30 PG (ref 26.8–34.3)
MCHC RBC AUTO-ENTMCNC: 32.5 G/DL (ref 31.4–37.4)
MCV RBC AUTO: 92 FL (ref 82–98)
MONOCYTES # BLD AUTO: 0.77 THOUSAND/ΜL (ref 0.17–1.22)
MONOCYTES NFR BLD AUTO: 8 % (ref 4–12)
NEUTROPHILS # BLD AUTO: 7.27 THOUSANDS/ΜL (ref 1.85–7.62)
NEUTS SEG NFR BLD AUTO: 74 % (ref 43–75)
NITRITE UR QL STRIP: NEGATIVE
NON-SQ EPI CELLS URNS QL MICRO: NORMAL /HPF
NRBC BLD AUTO-RTO: 0 /100 WBCS
PH UR STRIP.AUTO: 5.5 [PH]
PLATELET # BLD AUTO: 147 THOUSANDS/UL (ref 149–390)
PMV BLD AUTO: 9.4 FL (ref 8.9–12.7)
POTASSIUM SERPL-SCNC: 4.3 MMOL/L (ref 3.5–5.3)
PROT SERPL-MCNC: 7.2 G/DL (ref 6.4–8.4)
PROT UR STRIP-MCNC: ABNORMAL MG/DL
PROT UR-MCNC: 37 MG/DL
PROT/CREAT UR: 0.19 MG/G{CREAT} (ref 0–0.1)
RBC # BLD AUTO: 3.7 MILLION/UL (ref 3.88–5.62)
RBC #/AREA URNS AUTO: NORMAL /HPF
SODIUM SERPL-SCNC: 132 MMOL/L (ref 135–147)
SP GR UR STRIP.AUTO: 1.01 (ref 1–1.03)
UROBILINOGEN UR QL STRIP.AUTO: 0.2 E.U./DL
WBC # BLD AUTO: 9.82 THOUSAND/UL (ref 4.31–10.16)
WBC #/AREA URNS AUTO: NORMAL /HPF

## 2022-07-06 PROCEDURE — 96361 HYDRATE IV INFUSION ADD-ON: CPT

## 2022-07-06 PROCEDURE — 99223 1ST HOSP IP/OBS HIGH 75: CPT | Performed by: PHYSICIAN ASSISTANT

## 2022-07-06 PROCEDURE — 74176 CT ABD & PELVIS W/O CONTRAST: CPT

## 2022-07-06 PROCEDURE — 96360 HYDRATION IV INFUSION INIT: CPT

## 2022-07-06 PROCEDURE — 82550 ASSAY OF CK (CPK): CPT | Performed by: PHYSICIAN ASSISTANT

## 2022-07-06 PROCEDURE — 85025 COMPLETE CBC W/AUTO DIFF WBC: CPT | Performed by: EMERGENCY MEDICINE

## 2022-07-06 PROCEDURE — 36415 COLL VENOUS BLD VENIPUNCTURE: CPT

## 2022-07-06 PROCEDURE — 83690 ASSAY OF LIPASE: CPT | Performed by: EMERGENCY MEDICINE

## 2022-07-06 PROCEDURE — 99284 EMERGENCY DEPT VISIT MOD MDM: CPT | Performed by: PHYSICIAN ASSISTANT

## 2022-07-06 PROCEDURE — 80053 COMPREHEN METABOLIC PANEL: CPT | Performed by: EMERGENCY MEDICINE

## 2022-07-06 PROCEDURE — 82570 ASSAY OF URINE CREATININE: CPT | Performed by: PHYSICIAN ASSISTANT

## 2022-07-06 PROCEDURE — 84156 ASSAY OF PROTEIN URINE: CPT | Performed by: PHYSICIAN ASSISTANT

## 2022-07-06 PROCEDURE — 82553 CREATINE MB FRACTION: CPT | Performed by: PHYSICIAN ASSISTANT

## 2022-07-06 PROCEDURE — 88185 FLOWCYTOMETRY/TC ADD-ON: CPT

## 2022-07-06 PROCEDURE — 81001 URINALYSIS AUTO W/SCOPE: CPT | Performed by: PHYSICIAN ASSISTANT

## 2022-07-06 PROCEDURE — 82948 REAGENT STRIP/BLOOD GLUCOSE: CPT

## 2022-07-06 PROCEDURE — 99285 EMERGENCY DEPT VISIT HI MDM: CPT

## 2022-07-06 RX ORDER — QUETIAPINE FUMARATE 100 MG/1
100 TABLET, FILM COATED ORAL
Status: DISCONTINUED | OUTPATIENT
Start: 2022-07-06 | End: 2022-07-10 | Stop reason: HOSPADM

## 2022-07-06 RX ORDER — INSULIN LISPRO 100 [IU]/ML
1-5 INJECTION, SOLUTION INTRAVENOUS; SUBCUTANEOUS
Status: DISCONTINUED | OUTPATIENT
Start: 2022-07-06 | End: 2022-07-10 | Stop reason: HOSPADM

## 2022-07-06 RX ORDER — FLUTICASONE PROPIONATE 50 MCG
2 SPRAY, SUSPENSION (ML) NASAL DAILY
Status: DISCONTINUED | OUTPATIENT
Start: 2022-07-07 | End: 2022-07-10 | Stop reason: HOSPADM

## 2022-07-06 RX ORDER — ACETAMINOPHEN 325 MG/1
975 TABLET ORAL EVERY 8 HOURS
Status: DISCONTINUED | OUTPATIENT
Start: 2022-07-06 | End: 2022-07-09

## 2022-07-06 RX ORDER — SODIUM CHLORIDE, SODIUM LACTATE, POTASSIUM CHLORIDE, CALCIUM CHLORIDE 600; 310; 30; 20 MG/100ML; MG/100ML; MG/100ML; MG/100ML
100 INJECTION, SOLUTION INTRAVENOUS CONTINUOUS
Status: DISCONTINUED | OUTPATIENT
Start: 2022-07-06 | End: 2022-07-08

## 2022-07-06 RX ORDER — LANOLIN ALCOHOL/MO/W.PET/CERES
9 CREAM (GRAM) TOPICAL
Status: DISCONTINUED | OUTPATIENT
Start: 2022-07-06 | End: 2022-07-10 | Stop reason: HOSPADM

## 2022-07-06 RX ORDER — PRAZOSIN HYDROCHLORIDE 1 MG/1
2 CAPSULE ORAL
Status: DISCONTINUED | OUTPATIENT
Start: 2022-07-06 | End: 2022-07-08

## 2022-07-06 RX ORDER — NICOTINE 21 MG/24HR
1 PATCH, TRANSDERMAL 24 HOURS TRANSDERMAL DAILY
Status: DISCONTINUED | OUTPATIENT
Start: 2022-07-07 | End: 2022-07-10 | Stop reason: HOSPADM

## 2022-07-06 RX ORDER — ACETAMINOPHEN 325 MG/1
650 TABLET ORAL ONCE
Status: COMPLETED | OUTPATIENT
Start: 2022-07-06 | End: 2022-07-06

## 2022-07-06 RX ORDER — INSULIN LISPRO 100 [IU]/ML
1-5 INJECTION, SOLUTION INTRAVENOUS; SUBCUTANEOUS
Status: DISCONTINUED | OUTPATIENT
Start: 2022-07-07 | End: 2022-07-10 | Stop reason: HOSPADM

## 2022-07-06 RX ORDER — PANTOPRAZOLE SODIUM 40 MG/1
40 TABLET, DELAYED RELEASE ORAL
Status: DISCONTINUED | OUTPATIENT
Start: 2022-07-07 | End: 2022-07-10 | Stop reason: HOSPADM

## 2022-07-06 RX ORDER — ACETAMINOPHEN 325 MG/1
650 TABLET ORAL EVERY 6 HOURS PRN
Status: DISCONTINUED | OUTPATIENT
Start: 2022-07-06 | End: 2022-07-06

## 2022-07-06 RX ORDER — ONDANSETRON 2 MG/ML
4 INJECTION INTRAMUSCULAR; INTRAVENOUS EVERY 6 HOURS PRN
Status: DISCONTINUED | OUTPATIENT
Start: 2022-07-06 | End: 2022-07-10 | Stop reason: HOSPADM

## 2022-07-06 RX ADMIN — QUETIAPINE FUMARATE 100 MG: 100 TABLET ORAL at 22:49

## 2022-07-06 RX ADMIN — SODIUM CHLORIDE, POTASSIUM CHLORIDE, SODIUM LACTATE AND CALCIUM CHLORIDE 100 ML/HR: 600; 310; 30; 20 INJECTION, SOLUTION INTRAVENOUS at 22:50

## 2022-07-06 RX ADMIN — SODIUM CHLORIDE 1000 ML: 0.9 INJECTION, SOLUTION INTRAVENOUS at 17:21

## 2022-07-06 RX ADMIN — Medication 9 MG: at 22:49

## 2022-07-06 RX ADMIN — SODIUM CHLORIDE 1000 ML: 0.9 INJECTION, SOLUTION INTRAVENOUS at 18:53

## 2022-07-06 RX ADMIN — ACETAMINOPHEN 650 MG: 325 TABLET ORAL at 18:39

## 2022-07-06 RX ADMIN — PRAZOSIN HYDROCHLORIDE 2 MG: 1 CAPSULE ORAL at 22:50

## 2022-07-06 NOTE — ED PROVIDER NOTES
History  Chief Complaint   Patient presents with    Abdominal Pain     Pt c/o bilateral abd pain and bloody urine x 2 days  Denies pain with urination  Denies NVD  Denies fever  Denies SOB/CP  Patient is a 59-year-old male with a PMHx of depression, HTN, HLD and substance abuse, presenting to the ED for evaluation of abdominal pain and hematuria x2 days  Patient reports pain over both sides of his mid to lower abdomen over the past 2 days  He has also had dark urine that is red to brown in color  He denies any dysuria, flank pain or blood clots  He is having urinary frequency but is only urinating small amounts at a time  He denies any difficulty urinating or retention  He denies a history of kidney disease or kidney stones  He denies any fevers, chills, nausea, vomiting, chest pain, SOB, diarrhea or constipation  He denies any penile discharge or testicular pain/swelling  He states that he has been eating and drinking as usual but has a decreased appetite and feels weak  Patient denies any recent drug use and states he has been sober since being discharged from rehab for heroin abuse in 10/2021  Prior to Admission Medications   Prescriptions Last Dose Informant Patient Reported? Taking? CITALOPRAM HYDROBROMIDE PO  Self Yes No   Sig: Take 20 mg by mouth daily   Patient not taking: Reported on 7/29/2021   Melatonin 10 MG CAPS  Self Yes No   Sig: Take 10 mg by mouth daily at bedtime   Patient not taking: Reported on 8/12/2021   PRAZOSIN HCL PO  Self Yes No   Sig: Take 1 mg by mouth 3 (three) times a day   Patient not taking: Reported on 7/29/2021   QUEtiapine (SEROquel) 100 mg tablet  Self No No   Sig: Take 1 tablet (100 mg total) by mouth daily at bedtime Indications: MDD w/ psychotic features  Patient not taking: Reported on 8/12/2021   citalopram (CeleXA) 40 mg tablet  Self No No   Sig: Take 1 tablet (40 mg total) by mouth daily Indications: Depression   At [address]   Patient not taking: Reported on 2021   fluticasone (FLONASE) 50 mcg/act nasal spray  Self No No   Si spray into each nostril daily Indications: Hayfever  At 9AM   lisinopril (ZESTRIL) 5 mg tablet  Self Yes No   Sig: Take 1 tablet by mouth daily   Patient not taking: Reported on 2021   naloxone (NARCAN) 4 mg/0 1 mL nasal spray  Self No No   Sig: Administer 1 spray into a nostril  If no response after 2-3 minutes, give another dose in the other nostril using a new spray  Patient not taking: Reported on 2021   pantoprazole (PROTONIX) 40 mg tablet  Self No No   Sig: Take 1 tablet (40 mg total) by mouth daily in the early morning Indications: Gastroesophageal Reflux Disease  Patient not taking: Reported on 2021   prazosin (MINIPRESS) 2 mg capsule  Self No No   Sig: Take 1 capsule (2 mg total) by mouth daily at bedtime Indications: Nightmares and flashbacks     Patient not taking: Reported on 2021   senna-docusate sodium (SENOKOT-S) 8 6-50 mg per tablet  Self Yes No   Sig: Take 2 tablets by mouth daily   Patient not taking: Reported on 2021   sertraline (ZOLOFT) 100 mg tablet  Self Yes No   Sig: Take 1 tablet by mouth daily   Patient not taking: Reported on 2021   traZODone (DESYREL) 100 mg tablet  Self Yes No   Sig: Take 1 tablet by mouth daily at bedtime   Patient not taking: Reported on 2021      Facility-Administered Medications: None       Past Medical History:   Diagnosis Date    Abdominal pain     Allergic rhinitis     [facility]     Chronic pain     Colon polyps     Depression     Fatigue     Head injury     Homeless     Hypertension     Insomnia     Liver disease     Loss of appetite     Numbness and tingling of right arm     Palpitations     Psychiatric disorder     bipolar    PTSD (post-traumatic stress disorder)     Seizures (HCC)     Shortness of breath     [facility]     Swallowing difficulty        Past Surgical History:   Procedure Laterality Date    ABDOMINAL SURGERY      COLONOSCOPY      EYE SURGERY      NECK SURGERY      ORCHIECTOMY Right 5/19/2016    Procedure: ORCHIECTOMY INGUINAL biopsy of testicular mass, ;  Surgeon: Yvonne Roth MD;  Location:  MAIN OR;  Service:    [facility] [place] COLONOSCOPY FLX DX W/COLLJ SPEC WHEN PFRMD N/A 2/13/2017    Procedure: EGD AND COLONOSCOPY;  Surgeon: Ruthie Crandall MD;  Location: [facility]; Service: Gastroenterology    ND ESOPHAGOGASTRODUODENOSCOPY TRANSORAL DIAGNOSTIC N/A 11/16/2016    Procedure: EGD AND COLONOSCOPY;  Surgeon: Ruthie Crandall MD;  Location:  GI LAB; Service: Gastroenterology       Family History   Problem Relation Age of Onset    Diabetes Mother     Hypertension Brother      I have reviewed and agree with the history as documented  E-Cigarette/Vaping    E-Cigarette Use Never User      E-Cigarette/Vaping Substances     Social History     Tobacco Use    Smoking status: Current Every Day Smoker     Packs/day: 0 50     Years: 30 00     Pack years: 15 00     Types: Cigarettes    Smokeless tobacco: Current User   Vaping Use    Vaping Use: Never used   Substance Use Topics    Alcohol use: No    Drug use: Not Currently     Types: Heroin       Review of Systems   Constitutional: Negative for chills, diaphoresis, fatigue and fever  HENT: Negative for congestion, ear pain, mouth sores, rhinorrhea, sinus pain, sore throat and trouble swallowing  Eyes: Negative for photophobia and visual disturbance  Respiratory: Negative for cough, chest tightness, shortness of breath and wheezing  Cardiovascular: Negative for chest pain, palpitations and leg swelling  Gastrointestinal: Positive for abdominal pain  Negative for blood in stool, constipation, diarrhea, nausea and vomiting  Genitourinary: Positive for frequency, hematuria and urgency  Negative for difficulty urinating, dysuria, flank pain, penile discharge and testicular pain     Musculoskeletal: Negative for arthralgias, back pain, gait problem, joint swelling, myalgias and neck pain  Skin: Negative for color change, pallor and rash  Neurological: Negative for dizziness, syncope, speech difficulty, weakness, light-headedness, numbness and headaches  Psychiatric/Behavioral: Negative for confusion and sleep disturbance  All other systems reviewed and are negative  Physical Exam  Physical Exam  Vitals and nursing note reviewed  Constitutional:       General: He is awake  He is not in acute distress  Appearance: Normal appearance  He is well-developed  He is not ill-appearing or diaphoretic  HENT:      Head: Normocephalic and atraumatic  Right Ear: External ear normal       Left Ear: External ear normal       Nose: Nose normal       Mouth/Throat:      Lips: Pink  Mouth: Mucous membranes are moist    Eyes:      General: Lids are normal  No scleral icterus  Conjunctiva/sclera: Conjunctivae normal       Pupils: Pupils are equal, round, and reactive to light  Cardiovascular:      Rate and Rhythm: Normal rate and regular rhythm  Pulses: Normal pulses  Radial pulses are 2+ on the right side and 2+ on the left side  Heart sounds: Normal heart sounds, S1 normal and S2 normal    Pulmonary:      Effort: Pulmonary effort is normal  No accessory muscle usage  Breath sounds: Normal breath sounds  No stridor  No decreased breath sounds, wheezing, rhonchi or rales  Abdominal:      General: Abdomen is flat  Bowel sounds are normal  There is no distension  Palpations: Abdomen is soft  Tenderness: There is abdominal tenderness  There is no right CVA tenderness, left CVA tenderness, guarding or rebound  Comments: Tenderness over bilateral middle/lower quadrants  No rebound tenderness, guarding or rigidity  Urine specimen in room containing yellow-colored urine  Musculoskeletal:      Cervical back: Full passive range of motion without pain, normal range of motion and neck supple   No signs of trauma  No pain with movement  Normal range of motion  Right lower leg: No edema  Left lower leg: No edema  Lymphadenopathy:      Cervical: No cervical adenopathy  Skin:     General: Skin is warm and dry  Capillary Refill: Capillary refill takes less than 2 seconds  Coloration: Skin is not cyanotic, jaundiced or pale  Neurological:      Mental Status: He is alert and oriented to person, place, and time  GCS: GCS eye subscore is 4  GCS verbal subscore is 5  GCS motor subscore is 6  Cranial Nerves: No dysarthria or facial asymmetry  Gait: Gait normal    Psychiatric:         Attention and Perception: Attention normal          Mood and Affect: Mood normal          Speech: Speech normal          Behavior: Behavior normal  Behavior is cooperative  Vital Signs  ED Triage Vitals [07/06/22 1304]   Temperature Pulse Respirations Blood Pressure SpO2   97 8 °F (36 6 °C) 64 17 155/84 100 %      Temp Source Heart Rate Source Patient Position - Orthostatic VS BP Location FiO2 (%)   Oral Monitor -- Left arm --      Pain Score       --           Vitals:    07/06/22 1304   BP: 155/84   Pulse: 64         Visual Acuity      ED Medications  Medications   sodium chloride 0 9 % bolus 1,000 mL (1,000 mL Intravenous New Bag 7/6/22 1721)   acetaminophen (TYLENOL) tablet 650 mg (has no administration in time range)   sodium chloride 0 9 % bolus 1,000 mL (has no administration in time range)       Diagnostic Studies  Results Reviewed     Procedure Component Value Units Date/Time    UA w Reflex to Microscopic w Reflex to Culture [[id number]] Collected: 07/06/22 1722    Lab Status:  In process Specimen: Urine, Clean Catch Updated: 07/06/22 1726    CKMB [[id number]]  (Normal) Collected: 07/06/22 1308    Lab Status: Final result Specimen: Blood from Arm, Left Updated: 07/06/22 1721     CK-MB Index 0 6 %      CK-MB 1 6 ng/mL     CK Total with Reflex CKMB [[id number]]  (Normal) Collected: 07/06/22 1308    Lab Status: Final result Specimen: Blood from Arm, Left Updated: 07/06/22 1641     Total  U/L     Comprehensive metabolic panel [[id number]]  (Abnormal) Collected: 07/06/22 1308    Lab Status: Final result Specimen: Blood from Arm, Left Updated: 07/06/22 1348     Sodium 132 mmol/L      Potassium 4 3 mmol/L      Chloride 100 mmol/L      CO2 25 mmol/L      ANION GAP 7 mmol/L      BUN 35 mg/dL      Creatinine 4 98 mg/dL      Glucose 89 mg/dL      Calcium 9 4 mg/dL      AST 21 U/L      ALT 12 U/L      Alkaline Phosphatase 61 U/L      Total Protein 7 2 g/dL      Albumin 4 1 g/dL      Total Bilirubin 0 51 mg/dL      eGFR 12 ml/min/1 73sq m     Narrative:      National Kid[employer] Disease Foundation guidelines for Chronic Kidney Disease (CKD):     Stage 1 with normal or high GFR (GFR > 90 mL/min/1 73 square meters)    Stage 2 Mild CKD (GFR = 60-89 mL/min/1 73 square meters)    Stage 3A Moderate CKD (GFR = 45-59 mL/min/1 73 square meters)    Stage 3B Moderate CKD (GFR = 30-44 mL/min/1 73 square meters)    Stage 4 Severe CKD (GFR = 15-29 mL/min/1 73 square meters)    Stage 5 End Stage CKD (GFR <15 mL/min/1 73 square meters)  Note: GFR calculation is accurate only with a steady state creatinine    Lipase [[id number]]  (Normal) Collected: 07/06/22 1308    Lab Status: Final result Specimen: Blood from Arm, Left Updated: 07/06/22 1348     Lipase 36 u/L     CBC and differential [[id number]]  (Abnormal) Collected: 07/06/22 1308    Lab Status: Final result Specimen: Blood from Arm, Left Updated: 07/06/22 1322     WBC 9 82 Thousand/uL      RBC 3 70 Million/uL      Hemoglobin 11 1 g/dL      Hematocrit 34 2 %      MCV 92 fL      MCH 30 0 pg      MCHC 32 5 g/dL      RDW 13 1 %      MPV 9 4 fL      Platelets 284 Thousands/uL      nRBC 0 /100 WBCs      Neutrophils Relative 74 %      Immat GRANS % 1 %      Lymphocytes Relative 17 %      Monocytes Relative 8 %      Eosinophils Relative 0 %      Basophils Relative 0 % Neutrophils Absolute 7 27 Thousands/µL      Immature Grans Absolute 0 06 Thousand/uL      Lymphocytes Absolute 1 66 Thousands/µL      Monocytes Absolute 0 77 Thousand/µL      Eosinophils Absolute 0 04 Thousand/µL      Basophils Absolute 0 02 Thousands/µL                  CT abdomen pelvis wo contrast    (Results Pending)              Procedures  Procedures         ED Course  ED Course as of 07/06/22 1802 Wed Jul 06, 2022   1556 Creatinine(!): 4 98  0 95 on 4/26/22     1557 Hemoglobin(!): 11 1  12 7 on 4/26/22  MDM  Number of Diagnoses or Management Options  Abdominal pain  TEO (acute kidney injury) ([facility] )  Diagnosis management comments: Patient is a 40-year-old male with a PMHx of depression, HTN, HLD and substance abuse, presenting to the ED for evaluation of abdominal pain and hematuria x2 days  Labs notable for an acute TEO with a significantly elevated creatinine compared to baseline  CK/CKMB within normal limits  Patient signed out to Dr Colleen Suh pending results of CT a/p and disposition  Amount and/or Complexity of Data Reviewed  Clinical lab tests: ordered and reviewed  Tests in the radiology section of CPT®: ordered        Disposition  Final diagnoses:   TEO (acute kidney injury) ([facility] )   Abdominal pain     Time reflects when diagnosis was documented in both MDM as applicable and the Disposition within this note     Time User Action Codes Description Comment    7/6/2022  5:59 PM Britney Simmons Add [N17 9] TEO (acute kidney injury) ([facility] )     7/6/2022  5:59 PM Grant Robles Add [R10 9] Abdominal pain       ED Disposition     None      Follow-up Information    None         Patient's Medications   Discharge Prescriptions    No medications on file       No discharge procedures on file      PDMP Review       Value Time User    PDMP Reviewed  Yes 7/10/2021 12:09 PM Pineda Robin PA-C          ED Provider  Electronically Signed by Lane Pineda PA-C  07/06/22 1800

## 2022-07-06 NOTE — ED CARE HANDOFF
Emergency Department Sign Out Note        Sign out and transfer of care from [facility]  See Separate Emergency Department note  The patient, Dayan Cross, was evaluated by the previous provider for abdominal pain/hematuria  Workup Completed:  Labs    ED Course / Workup Pending (followup):  CT         70-year-old male presenting with abdominal pain and dark urine for 2 days  Lab work remarkable for TEO with significant creatinine elevation  CT shows fat containing inguinal hernia is and bladder wall thickening but UA unremarkable  Plan admission for further workup and management of TEO  Procedures  MDM        Disposition  Final diagnoses:   TEO (acute kidney injury) ([facility] )   Abdominal pain     Time reflects when diagnosis was documented in both MDM as applicable and the Disposition within this note     Time User Action Codes Description Comment    7/6/2022  5:59 PM Annia Duong Add [N17 9] TEO (acute kidney injury) ([facility] )     7/6/2022  5:59 PM Annia Duong Add [R10 9] Abdominal pain       ED Disposition     ED Disposition   Admit    Condition   Stable    Date/Time   Wed Jul 6, 2022  8:13 PM    Comment   Case was discussed with Ross Alvarez and the patient's admission status was agreed to be Admission Status: inpatient status to the service of Dr Tam Dacosta             Follow-up Information    None       Patient's Medications   Discharge Prescriptions    No medications on file     No discharge procedures on file         ED Provider  Electronically Signed by     Babs Hermosillo MD  07/06/22 2020

## 2022-07-07 PROBLEM — R00.1 BRADYCARDIA: Status: ACTIVE | Noted: 2022-07-07

## 2022-07-07 LAB
ALBUMIN SERPL BCP-MCNC: 3.2 G/DL (ref 3.5–5)
ALP SERPL-CCNC: 50 U/L (ref 34–104)
ALT SERPL W P-5'-P-CCNC: 9 U/L (ref 7–52)
ANION GAP SERPL CALCULATED.3IONS-SCNC: 7 MMOL/L (ref 4–13)
AST SERPL W P-5'-P-CCNC: 14 U/L (ref 13–39)
ATRIAL RATE: 42 BPM
ATRIAL RATE: 42 BPM
BILIRUB SERPL-MCNC: 0.24 MG/DL (ref 0.2–1)
BLD SMEAR INTERP: NORMAL
BUN SERPL-MCNC: 33 MG/DL (ref 5–25)
CALCIUM ALBUM COR SERPL-MCNC: 9 MG/DL (ref 8.3–10.1)
CALCIUM SERPL-MCNC: 8.4 MG/DL (ref 8.4–10.2)
CHLORIDE SERPL-SCNC: 106 MMOL/L (ref 96–108)
CK SERPL-CCNC: 129 U/L (ref 39–308)
CO2 SERPL-SCNC: 23 MMOL/L (ref 21–32)
CREAT SERPL-MCNC: 4.46 MG/DL (ref 0.6–1.3)
FIBRINOGEN PPP-MCNC: 390 MG/DL (ref 227–495)
GFR SERPL CREATININE-BSD FRML MDRD: 13 ML/MIN/1.73SQ M
GLUCOSE SERPL-MCNC: 110 MG/DL (ref 65–140)
GLUCOSE SERPL-MCNC: 116 MG/DL (ref 65–140)
GLUCOSE SERPL-MCNC: 116 MG/DL (ref 65–140)
GLUCOSE SERPL-MCNC: 118 MG/DL (ref 65–140)
GLUCOSE SERPL-MCNC: 128 MG/DL (ref 65–140)
HCT VFR BLD AUTO: 34.6 % (ref 36.5–49.3)
HGB BLD-MCNC: 10.8 G/DL (ref 12–17)
LDH SERPL-CCNC: 318 U/L (ref 140–271)
MAGNESIUM SERPL-MCNC: 2 MG/DL (ref 1.9–2.7)
P AXIS: 29 DEGREES
P AXIS: 31 DEGREES
PHOSPHATE SERPL-MCNC: 3.4 MG/DL (ref 2.7–4.5)
POTASSIUM SERPL-SCNC: 3.9 MMOL/L (ref 3.5–5.3)
PR INTERVAL: 156 MS
PR INTERVAL: 156 MS
PROT SERPL-MCNC: 5.8 G/DL (ref 6.4–8.4)
QRS AXIS: 11 DEGREES
QRS AXIS: 14 DEGREES
QRSD INTERVAL: 86 MS
QRSD INTERVAL: 88 MS
QT INTERVAL: 442 MS
QT INTERVAL: 448 MS
QTC INTERVAL: 369 MS
QTC INTERVAL: 374 MS
SODIUM SERPL-SCNC: 136 MMOL/L (ref 135–147)
T WAVE AXIS: 28 DEGREES
T WAVE AXIS: 28 DEGREES
TSH SERPL DL<=0.05 MIU/L-ACNC: 3.09 UIU/ML (ref 0.45–4.5)
VENTRICULAR RATE: 42 BPM
VENTRICULAR RATE: 42 BPM

## 2022-07-07 PROCEDURE — 80053 COMPREHEN METABOLIC PANEL: CPT | Performed by: PHYSICIAN ASSISTANT

## 2022-07-07 PROCEDURE — 85014 HEMATOCRIT: CPT | Performed by: INTERNAL MEDICINE

## 2022-07-07 PROCEDURE — 83735 ASSAY OF MAGNESIUM: CPT | Performed by: PHYSICIAN ASSISTANT

## 2022-07-07 PROCEDURE — 85384 FIBRINOGEN ACTIVITY: CPT | Performed by: PHYSICIAN ASSISTANT

## 2022-07-07 PROCEDURE — 93010 ELECTROCARDIOGRAM REPORT: CPT | Performed by: INTERNAL MEDICINE

## 2022-07-07 PROCEDURE — 84100 ASSAY OF PHOSPHORUS: CPT | Performed by: PHYSICIAN ASSISTANT

## 2022-07-07 PROCEDURE — 82948 REAGENT STRIP/BLOOD GLUCOSE: CPT

## 2022-07-07 PROCEDURE — 99222 1ST HOSP IP/OBS MODERATE 55: CPT | Performed by: UROLOGY

## 2022-07-07 PROCEDURE — 88112 CYTOPATH CELL ENHANCE TECH: CPT | Performed by: PATHOLOGY

## 2022-07-07 PROCEDURE — 99255 IP/OBS CONSLTJ NEW/EST HI 80: CPT | Performed by: INTERNAL MEDICINE

## 2022-07-07 PROCEDURE — 83615 LACTATE (LD) (LDH) ENZYME: CPT | Performed by: PHYSICIAN ASSISTANT

## 2022-07-07 PROCEDURE — 85018 HEMOGLOBIN: CPT | Performed by: INTERNAL MEDICINE

## 2022-07-07 PROCEDURE — 83010 ASSAY OF HAPTOGLOBIN QUANT: CPT | Performed by: INTERNAL MEDICINE

## 2022-07-07 PROCEDURE — 85397 CLOTTING FUNCT ACTIVITY: CPT | Performed by: INTERNAL MEDICINE

## 2022-07-07 PROCEDURE — 93005 ELECTROCARDIOGRAM TRACING: CPT

## 2022-07-07 PROCEDURE — 99233 SBSQ HOSP IP/OBS HIGH 50: CPT | Performed by: INTERNAL MEDICINE

## 2022-07-07 PROCEDURE — 82550 ASSAY OF CK (CPK): CPT | Performed by: INTERNAL MEDICINE

## 2022-07-07 PROCEDURE — 84443 ASSAY THYROID STIM HORMONE: CPT

## 2022-07-07 PROCEDURE — 80307 DRUG TEST PRSMV CHEM ANLYZR: CPT | Performed by: INTERNAL MEDICINE

## 2022-07-07 RX ORDER — LIDOCAINE 50 MG/G
1 PATCH TOPICAL DAILY
Status: DISCONTINUED | OUTPATIENT
Start: 2022-07-08 | End: 2022-07-10 | Stop reason: HOSPADM

## 2022-07-07 RX ADMIN — PANTOPRAZOLE SODIUM 40 MG: 40 TABLET, DELAYED RELEASE ORAL at 06:31

## 2022-07-07 RX ADMIN — SODIUM CHLORIDE, POTASSIUM CHLORIDE, SODIUM LACTATE AND CALCIUM CHLORIDE 100 ML/HR: 600; 310; 30; 20 INJECTION, SOLUTION INTRAVENOUS at 17:23

## 2022-07-07 RX ADMIN — ONDANSETRON 4 MG: 2 INJECTION INTRAMUSCULAR; INTRAVENOUS at 21:28

## 2022-07-07 RX ADMIN — Medication 9 MG: at 21:31

## 2022-07-07 RX ADMIN — FLUTICASONE PROPIONATE 2 SPRAY: 50 SPRAY, METERED NASAL at 08:58

## 2022-07-07 RX ADMIN — SODIUM CHLORIDE, POTASSIUM CHLORIDE, SODIUM LACTATE AND CALCIUM CHLORIDE 100 ML/HR: 600; 310; 30; 20 INJECTION, SOLUTION INTRAVENOUS at 06:34

## 2022-07-07 RX ADMIN — NICOTINE 1 PATCH: 21 PATCH, EXTENDED RELEASE TRANSDERMAL at 08:59

## 2022-07-07 RX ADMIN — SERTRALINE HYDROCHLORIDE 50 MG: 50 TABLET ORAL at 08:58

## 2022-07-07 RX ADMIN — PRAZOSIN HYDROCHLORIDE 2 MG: 1 CAPSULE ORAL at 21:44

## 2022-07-07 RX ADMIN — QUETIAPINE FUMARATE 100 MG: 100 TABLET ORAL at 21:31

## 2022-07-07 RX ADMIN — ACETAMINOPHEN 975 MG: 325 TABLET ORAL at 06:31

## 2022-07-07 RX ADMIN — ACETAMINOPHEN 975 MG: 325 TABLET ORAL at 16:31

## 2022-07-07 RX ADMIN — ACETAMINOPHEN 975 MG: 325 TABLET ORAL at 00:02

## 2022-07-07 NOTE — UTILIZATION REVIEW
Initial Clinical Review    Admission: Date/Time/Statement:   Admission Orders (From admission, onward)     Ordered        07/06/22 2020  INPATIENT ADMISSION  Once                      Orders Placed This Encounter   Procedures    INPATIENT ADMISSION     Standing Status:   Standing     Number of Occurrences:   1     Order Specific Question:   Level of Care     Answer:   Med Surg [16]     Order Specific Question:   Estimated length of stay     Answer:   More than 2 Midnights     Order Specific Question:   Certification     Answer:   I certify that inpatient services are medically necessary for this patient for a duration of greater than two midnights  See H&P and MD Progress Notes for additional information about the patient's course of treatment  ED Arrival Information     Expected   -    Arrival   7/6/2022 11:24    Acuity   Urgent            Means of arrival   Walk-In    Escorted by   [place]    Service   Hospitalist    Admission type   Urgent            Arrival complaint   abd pain           Chief Complaint   Patient presents with    Abdominal Pain     Pt c/o bilateral abd pain and bloody urine x 2 days  Denies pain with urination  Denies NVD  Denies fever  Denies SOB/CP  Initial Presentation: 64 y o  male  PMH of HTN, MDD w/ psychotic features, hx of opioid abuse (clean since Oct 2021) who presents with abdominal pain x2 days that wraps around to bilat flanks with associated dark red/brown urine with urinary frequency and voiding smaller amts than usual   Pt feels weak with decreased appetite  On exam, BP elevated, has abdominal tenderness (LUQ, LLQ),  right CVA tenderness  Labs-Hgb 11 1, sodium 132, creat 4 98 with last creat 04/2022 of 0 95  CT A/P w/ evidence of possible cystitis but no obvious UTI on UA  Pt given IVF, Tylenol in ED  Pt admitted as Inpatient with TEO, HTN   Plan - IVF,BMP, monitor PVR X1 UOP , consult nephrology, hold lisinopril, continue prazosin, PRN IV hydralazine SBP >180, monitor BP          Date:7/7   Day 2:  CK neg, UA-occult blood, trace protein; urine microscopy: unremarkable  Pain 7-8/10 sides of abdomen, bilat   Continued increased urinary frequency with a mild feeling of incomplete evacuation of urine  Urology consult-Pt continues with nausea despite antiemetics, urine clear yellow, no hematuria noted, reports incomplete bladder emptyingwith no sensation of complete bladder emptying  350 ml UOP  Positive CVA tenderness, negative suprapubic tenderness  Plan - Obtain UA for cytology, outpr gross hematuria workup with cysto and CT renal study when contrast readily available  If hematuria recurs, rack urine for serial eval  Trend renal function   Nephrology consult - TEO suspected to be prerenal azotemia   Renal function improving with volume resuscitation   Creat 4 46 today  Urine protein creatinine ratio 0 19   Continue to hold lisinopril, check urine toxicology   Pt reports dizziness upon standing, bradycardic   Hold Minipress  Continue IVF   Check ortho BP's  Anemia- acute, with thrombocytopenia-check stool OB ,check iron studies and hemolysis smear   Avoid hypotension      ED Triage Vitals   Temperature Pulse Respirations Blood Pressure SpO2   07/06/22 1304 07/06/22 1304 07/06/22 1304 07/06/22 1304 07/06/22 1304   97 8 °F (36 6 °C) 64 17 155/84 100 %      Temp Source Heart Rate Source Patient Position - Orthostatic VS BP Location FiO2 (%)   07/06/22 1304 07/06/22 1304 07/06/22 2119 07/06/22 1304 --   Oral Monitor Lying Left arm       Pain Score       07/06/22 1839       9          Wt Readings from Last 1 Encounters:   08/12/21 65 3 kg (144 lb)     Additional Vital Signs:   Date/Time Temp Pulse Resp BP MAP (mmHg) SpO2   07/07/22 07:16:27 -- 51 Abnormal  -- -- -- 98 %   07/07/22 07:15:26 -- -- -- 127/82 97 --   07/07/22 07:14:33 97 8 °F (36 6 °C) -- -- -- -- --   07/06/22 2127 -- -- -- 152/90 -- --   07/06/22 21:19:20 99 °F (37 2 °C) 52 Abnormal  16 178/93 Abnormal  121 98 % 07/06/22 21:18:55 -- -- -- 178/93 Abnormal  121 --   07/06/22 21:18:07 99 °F (37 2 °C) -- -- -- -- --   07/06/22 1830 -- 48 Abnormal  18 201/95 Abnormal  136 100 %       Pertinent Labs/Diagnostic Test Results:   CT abdomen pelvis wo contrast   Final Result by Fredis Danielson MD (07/06 1945)      Thick-walled urinary bladder, correlate for cystitis, otherwise no significant abnormality            Workstation performed: [id number]               Results from last 7 days   Lab Units 07/06/22  1308   WBC Thousand/uL 9 82   HEMOGLOBIN g/dL 11 1*   HEMATOCRIT % 34 2*   PLATELETS Thousands/uL 147*   NEUTROS ABS Thousands/µL 7 27         Results from last 7 days   Lab Units 07/07/22  0521 07/06/22  1308   SODIUM mmol/L 136 132*   POTASSIUM mmol/L 3 9 4 3   CHLORIDE mmol/L 106 100   CO2 mmol/L 23 25   ANION GAP mmol/L 7 7   BUN mg/dL 33* 35*   CREATININE mg/dL 4 46* 4 98*   EGFR ml/min/1 73sq m 13 12   CALCIUM mg/dL 8 4 9 4   MAGNESIUM mg/dL 2 0  --    PHOSPHORUS mg/dL 3 4  --      Results from last 7 days   Lab Units 07/07/22  0521 07/06/22  1308   AST U/L 14 21   ALT U/L 9 12   ALK PHOS U/L 50 61   TOTAL PROTEIN g/dL 5 8* 7 2   ALBUMIN g/dL 3 2* 4 1   TOTAL BILIRUBIN mg/dL 0 24 0 51     Results from last 7 days   Lab Units 07/07/22  0716 07/06/22  2244   POC GLUCOSE mg/dl 110 116     Results from last 7 days   Lab Units 07/07/22  0521 07/06/22  1308   GLUCOSE RANDOM mg/dL 116 89           Results from last 7 days   Lab Units 07/07/22  0521 07/06/22  1308   CK TOTAL U/L 129 266   CK MB INDEX %  --  0 6   CK MB ng/mL  --  1 6               Results from last 7 days   Lab Units 07/06/22  1308   LIPASE u/L 36                 Results from last 7 days   Lab Units 07/06/22  1722   CLARITY UA  Clear   COLOR UA  Yellow   SPEC GRAV UA  1 015   PH UA  5 5   GLUCOSE UA mg/dl 100 (1/10%)*   KETONES UA mg/dl Negative   BLOOD UA  Moderate*   PROTEIN UA mg/dl Trace*   NITRITE UA  Negative   BILIRUBIN UA  Negative   UROBILINOGEN UA E U /dl 0  2   LEUKOCYTES UA  Negative   WBC UA /hpf None Seen   RBC UA /hpf 0-1   BACTERIA UA /hpf Occasional   EPITHELIAL CELLS WET PREP /hpf None Seen   CREATININE UR mg/dL 199 1   PROTEIN UR mg/dL 37   PROT/CREAT RATIO UR  0 19*             ED Treatment:   Medication Administration from 07/06/2022 1123 to 07/06/2022 2116       Date/Time Order Dose Route Action     07/06/2022 1721 sodium chloride 0 9 % bolus 1,000 mL 1,000 mL Intravenous New Bag     07/06/2022 1839 acetaminophen (TYLENOL) tablet 650 mg 650 mg Oral Given     07/06/2022 1853 sodium chloride 0 9 % bolus 1,000 mL 1,000 mL Intravenous New Bag        Past Medical History:   Diagnosis Date    Abdominal pain     Allergic rhinitis     Cancer ([place] [address] )     Chronic pain     Colon polyps     Depression     Fatigue     Head injury     Homeless     Hypertension     Insomnia     Liver disease     Loss of appetite     Numbness and tingling of right arm     Palpitations     Psychiatric disorder     bipolar    PTSD (post-traumatic stress disorder)     Seizures (HCC)     Shortness of breath     Substance abuse (HCC)     Swallowing difficulty      Present on Admission:   Opioid dependence in remission ([place] [address] )   Recurrent major depressive disorder, in remission ([place] [address] )      Admitting Diagnosis: Abdominal pain [R10 9]  TEO (acute kidney injury) ([place] [address] ) [N17 9]  Age/Sex: 64 y o  male  Admission Orders:  Scheduled Medications:  acetaminophen, 975 mg, Oral, Q8H  fluticasone, 2 spray, Nasal, Daily  insulin lispro, 1-5 Units, Subcutaneous, TID AC  insulin lispro, 1-5 Units, Subcutaneous, HS  melatonin, 9 mg, Oral, HS  nicotine, 1 patch, Transdermal, Daily  pantoprazole, 40 mg, Oral, Early Morning  prazosin, 2 mg, Oral, HS  QUEtiapine, 100 mg, Oral, HS  sertraline, 50 mg, Oral, Daily      Continuous IV Infusions:  lactated ringers, 100 mL/hr, Intravenous, Continuous      PRN Meds:  ondansetron, 4 mg, Intravenous, Q6H PRN    OOB as stephanie   monitor for hypoglycemia  Monitor for urinary retention    IP CONSULT TO UROLOGY  IP CONSULT TO NEPHROLOGY    Network Utilization Review Department  ATTENTION: Please call with any questions or concerns to [phone] and carefully listen to the prompts so that you are directed to the right person  All voicemails are confidential   Ra Olu all requests for admission clinical reviews, approved or denied determinations and any other requests to dedicated fax number below belonging to the campus where the patient is receiving treatment   List of dedicated fax numbers for the Facilities:  [address] DENIALS (Administrative/Medical Necessity) [phone]   [address] (Maternity/NICU/Pediatrics) [phone]   [address]  [address] [facility] [place] [address] [id number] [address] Ayana Becerra 1481 [address] [facility] [facility] [phone]

## 2022-07-07 NOTE — H&P
[address] 1965, 64 y o  male MRN: [id number]  Unit/Bed#: W -01 Encounter: [id number]  Primary Care Provider: JOSIAH La   Date and time admitted to hospital: 7/6/2022  3:52 PM    * TEO (acute kidney injury) [facility])  Assessment & Plan  · New, Cr is 4 98 (last Cr from April 2022 0 95)  · Presents w/ abdominal pain, hematuria  · CT A/P w/ evidence of possible cystitis but no obvious UTI on UA  · Unclear etiology at this time, warrants further investigation  · IVF  · Avoid hypotension, nephrotoxins  · Measure PVR & urine output-- patient reports no issues w/ urinating, but only voiding small amounts  · Nephrology consult-- appreciate input    Hypertension  Assessment & Plan  · /95  · Home medications: lisinopril and prazosin  · Hold lisinopril  · Continue w/ prazosin  · Add hydralazine 10mg IV Q6 PRN SBP >180    DM (diabetes mellitus), type 2 ([facility] )  Assessment & Plan  Lab Results   Component Value Date    HGBA1C 5 8 (H) 04/26/2022       No results for input(s): POCGLU in the last 72 hours  Blood Sugar Average: Last 72 hrs:  · last hgb a1c in April 2022 5 8%  · Not maintained on oral DM medication and/or insulin  · SSI for correction w/ QID accuchecks  · Monitor closely for hypoglycemia w/ insulin use in patient w/ TEO    Opioid dependence in remission [facility])  Assessment & Plan  · Completed drug rehabilitation October 2021-- reports no drug use since then    Recurrent major depressive disorder, in remission ([facility] )  Assessment & Plan  · Mood is stable on home medications  · Continue w/ home medication regimen: celexa, citalopram, seroquel, zoloft, trazodone    VTE Pharmacologic Prophylaxis: VTE Score: 1 Low Risk (Score 0-2) - Encourage Ambulation  Code Status: Level 1 - Full Code   Discussion with family: Patient declined call to        Anticipated Length of Stay: Patient will be admitted on an inpatient basis with an anticipated length of stay of greater than 2 midnights secondary to tx/eval TEO  Total Time for Visit, including Counseling / Coordination of Care: 30 minutes Greater than 50% of this total time spent on direct patient counseling and coordination of care  Chief Complaint: abdominal pain, weakness    History of Present Illness:  Bere Zhu is a 64 y o  male with a PMH of HTN, MDD w/ psychotic features, hx of opioid abuse (clean since Oct 2021) who presents with abdominal pain x2 days  Patient reports gradual onset of bilateral abdominal pain which wraps around to his flank bilaterally  This has been associated w/ dark red-brown urine  Patient reports that he is urinating often, but has been urinating smaller amounts than normal  Patient denies any difficulty urinating and also denies any painful urination or blood clots  He denies any CP, SOB, N/V, diarrhea  He is eating/drinking as usual, but does admit to an overall decrease in his appetite and he feels weak  Patient denies recent illness, change in medications or new medication added to his regimen recently  Review of Systems:  Review of Systems   Constitutional: Negative  HENT: Negative  Eyes: Negative  Respiratory: Negative  Cardiovascular: Negative  Gastrointestinal: Positive for abdominal pain  Genitourinary: Positive for decreased urine volume, flank pain and hematuria  Skin: Negative  Neurological: Negative  Hematological: Negative  Psychiatric/Behavioral: Negative          Past Medical and Surgical History:   Past Medical History:   Diagnosis Date    Abdominal pain     Allergic rhinitis     Cancer (HCC)     Chronic pain     Colon polyps     Depression     Fatigue     Head injury     Homeless     Hypertension     Insomnia     Liver disease     Loss of appetite     Numbness and tingling of right arm     Palpitations     Psychiatric disorder     bipolar    PTSD (post-traumatic stress disorder)     Seizures ([facility] )     Shortness of breath     Substance abuse ([facility] )     Swallowing difficulty        Past Surgical History:   Procedure Laterality Date    ABDOMINAL SURGERY      COLONOSCOPY      EYE SURGERY      NECK SURGERY      ORCHIECTOMY Right 5/19/2016    Procedure: ORCHIECTOMY INGUINAL biopsy of testicular mass, ;  Surgeon: Yvonne Roth MD;  Location:  MAIN OR;  Service:    [facility] [place] COLONOSCOPY FLX DX W/COLLJ SPEC WHEN PFRMD N/A 2/13/2017    Procedure: EGD AND COLONOSCOPY;  Surgeon: Ruthie Crandall MD;  Location: [facility] GI LAB; Service: Gastroenterology    [place] ESOPHAGOGASTRODUODENOSCOPY TRANSORAL DIAGNOSTIC N/A 11/16/2016    Procedure: EGD AND COLONOSCOPY;  Surgeon: Ruthie Crandall MD;  Location:  GI LAB; Service: Gastroenterology       Meds/Allergies:  Prior to Admission medications    Medication Sig Start Date End Date Taking? Authorizing Provider   citalopram (CeleXA) 40 mg tablet Take 1 tablet (40 mg total) by mouth daily Indications: Depression  At [facility]  Patient not taking: Reported on 8/12/2021 6/22/16   Jama Cardona PA-C   CITALOPRAM HYDROBROMIDE PO Take 20 mg by mouth daily  Patient not taking: Reported on 7/29/2021    Historical Provider, MD   fluticasone (FLONASE) 50 mcg/act nasal spray 1 spray into each nostril daily Indications: Hayfever  At [facility] 5/31/16   Jama Cardona PA-C   lisinopril (ZESTRIL) 5 mg tablet Take 1 tablet by mouth daily  Patient not taking: Reported on 8/12/2021 7/7/21   Historical Provider, MD   Melatonin 10 MG CAPS Take 10 mg by mouth daily at bedtime  Patient not taking: Reported on 8/12/2021    Historical Provider, MD   naloxone (NARCAN) 4 mg/0 1 mL nasal spray Administer 1 spray into a nostril  If no response after 2-3 minutes, give another dose in the other nostril using a new spray    Patient not taking: Reported on 8/12/2021 7/10/21   Jorge Wray PA-C   pantoprazole (PROTONIX) 40 mg tablet Take 1 tablet (40 mg total) by mouth daily in the early morning Indications: Gastroesophageal Reflux Disease  Patient not taking: Reported on 8/12/2021 6/22/16   Elizabeth Zhu PA-C   prazosin (MINIPRESS) 2 mg capsule Take 1 capsule (2 mg total) by mouth daily at bedtime Indications: Nightmares and flashbacks  Patient not taking: Reported on 8/12/2021 6/22/16   Elizabeth Zhu PA-C   PRAZOSIN HCL PO Take 1 mg by mouth 3 (three) times a day  Patient not taking: Reported on 7/29/2021    Historical Provider, MD   QUEtiapine (SEROquel) 100 mg tablet Take 1 tablet (100 mg total) by mouth daily at bedtime Indications: MDD w/ psychotic features  Patient not taking: Reported on 8/12/2021 6/22/16   Elizabeth Zhu PA-C   senna-docusate sodium (SENOKOT-S) 8 6-50 mg per tablet Take 2 tablets by mouth daily  Patient not taking: Reported on 7/29/2021    Historical Provider, MD   sertraline (ZOLOFT) 100 mg tablet Take 1 tablet by mouth daily  Patient not taking: Reported on 8/12/2021 7/7/21   Historical Provider, MD   traZODone (DESYREL) 100 mg tablet Take 1 tablet by mouth daily at bedtime  Patient not taking: Reported on 8/12/2021 10/28/16   Historical Provider, MD GANT have reviewed home medications using recent Epic encounter  Allergies:    Allergies   Allergen Reactions    Oxycodone Swelling     Tongue swelling       Social History:  Marital Status:    Occupation:   Patient Pre-hospital Living Situation:   Patient Pre-hospital Level of Mobility: walks  Patient Pre-hospital Diet Restrictions: none  Substance Use History:   Social History     Substance and Sexual Activity   Alcohol Use No     Social History     Tobacco Use   Smoking Status Current Every Day Smoker    Packs/day: 0 50    Years: 30 00    Pack years: 15 00    Types: Cigarettes   Smokeless Tobacco Current User     Social History     Substance and Sexual Activity   Drug Use Not Currently    Types: Heroin       Family History:  Family History   Problem Relation Age of Onset    Diabetes Mother     Hypertension Brother        Physical Exam:     Vitals:   Blood Pressure: 152/90 (07/06/22 2127)  Pulse: (!) 52 (07/06/22 2119)  Temperature: 99 °F (37 2 °C) (07/06/22 2119)  Temp Source: Oral (07/06/22 2119)  Respirations: 16 (07/06/22 2119)  SpO2: 98 % (07/06/22 2119)    Physical Exam  Constitutional:       General: He is not in acute distress  Appearance: Normal appearance  He is not ill-appearing  HENT:      Head: Normocephalic and atraumatic  Mouth/Throat:      Mouth: Mucous membranes are moist    Eyes:      Pupils: Pupils are equal, round, and reactive to light  Cardiovascular:      Rate and Rhythm: Regular rhythm  Bradycardia present  Heart sounds: No murmur heard  No friction rub  No gallop  Pulmonary:      Effort: Pulmonary effort is normal       Breath sounds: Normal breath sounds  No wheezing or rales  Abdominal:      General: Abdomen is flat  Palpations: Abdomen is soft  Tenderness: There is abdominal tenderness (LUQ, LLQ)  There is right CVA tenderness  There is no left CVA tenderness  Musculoskeletal:      Right lower leg: No edema  Left lower leg: No edema  Skin:     General: Skin is warm and dry  Neurological:      General: No focal deficit present  Mental Status: He is alert and oriented to person, place, and time  Mental status is at baseline     Psychiatric:         Mood and Affect: Mood normal          Behavior: Behavior normal          Additional Data:     Lab Results:  Results from last 7 days   Lab Units 07/06/22  1308   WBC Thousand/uL 9 82   HEMOGLOBIN g/dL 11 1*   HEMATOCRIT % 34 2*   PLATELETS Thousands/uL 147*   NEUTROS PCT % 74   LYMPHS PCT % 17   MONOS PCT % 8   EOS PCT % 0     Results from last 7 days   Lab Units 07/06/22  1308   SODIUM mmol/L 132*   POTASSIUM mmol/L 4 3   CHLORIDE mmol/L 100   CO2 mmol/L 25   BUN mg/dL 35*   CREATININE mg/dL 4 98*   ANION GAP mmol/L 7   CALCIUM mg/dL 9 4   ALBUMIN g/dL 4 1   TOTAL BILIRUBIN mg/dL 0 51   ALK PHOS U/L 61   ALT U/L 12   AST U/L 21   GLUCOSE RANDOM mg/dL 89                       Imaging: Reviewed radiology reports from this admission including: abdominal/pelvic CT  CT abdomen pelvis wo contrast   Final Result by Brenda Marie MD (07/06 1945)      Thick-walled urinary bladder, correlate for cystitis, otherwise no significant abnormality            Workstation performed: [id number]             EKG and Other Studies Reviewed on Admission:   · EKG: No EKG obtained  ** Please Note: This note has been constructed using a voice recognition system   **

## 2022-07-07 NOTE — ASSESSMENT & PLAN NOTE
· /95 on admission  · Home medications: lisinopril 5 mg qd and prazosin 2 mg qd    Plan:  · Avoid hypotension  · Continue w/ prazosin 2 mg qd; hold lisinopril  · Add hydralazine 10mg IV Q6 PRN SBP >180

## 2022-07-07 NOTE — ASSESSMENT & PLAN NOTE
Lab Results   Component Value Date    HGBA1C 5 8 (H) 04/26/2022       Recent Labs     07/06/22  2244 07/07/22  0716 07/07/22  1147   POCGLU 116 110 118       Blood Sugar Average: Last 72 hrs:  · (P) 083 [id number] hgb a1c in April 2022 5 8%  · Not maintained on oral DM medication and/or insulin  · SSI for correction w/ QID accuchecks  · Monitor closely for hypoglycemia w/ insulin use in patient w/ TEO

## 2022-07-07 NOTE — ASSESSMENT & PLAN NOTE
· New, Cr is 4 98 (last Cr from April 2022 0 95)  · Presents w/ abdominal pain, hematuria  · CT A/P w/ evidence of possible cystitis but no obvious UTI on UA  · Unclear etiology at this time, warrants further investigation  · IVF  · Avoid hypotension, nephrotoxins  · Measure PVR & urine output-- patient reports no issues w/ urinating, but only voiding small amounts  · Nephrology consult-- appreciate input

## 2022-07-07 NOTE — PLAN OF CARE
Problem: Potential for Falls  Goal: Patient will remain free of falls  Description: INTERVENTIONS:  - Educate patient/family on patient safety including physical limitations  - Instruct patient to call for assistance with activity   - Consult OT/PT to assist with strengthening/mobility   - Keep Call bell within reach  - Keep bed low and locked with side rails adjusted as appropriate  - Keep care items and personal belongings within reach  - Initiate and maintain comfort rounds  - Make Fall Risk Sign visible to staff  - Offer Toileting every Hours, in advance of need  - Initiate/Maintainalarm  - Obtain necessary fall risk management equipment:   - Apply yellow socks and bracelet for high fall risk patients  - Consider moving patient to room near nurses station  Outcome: Progressing     Problem: GASTROINTESTINAL - ADULT  Goal: Minimal or absence of nausea and/or vomiting  Description: INTERVENTIONS:  - Administer IV fluids if ordered to ensure adequate hydration  - Maintain NPO status until nausea and vomiting are resolved  - Nasogastric tube if ordered  - Administer ordered antiemetic medications as needed  - Provide nonpharmacologic comfort measures as appropriate  - Advance diet as tolerated, if ordered  - Consider nutrition services referral to assist patient with adequate nutrition and appropriate food choices  Outcome: Progressing  Goal: Maintains or returns to baseline bowel function  Description: INTERVENTIONS:  - Assess bowel function  - Encourage oral fluids to ensure adequate hydration  - Administer IV fluids if ordered to ensure adequate hydration  - Administer ordered medications as needed  - Encourage mobilization and activity  - Consider nutritional services referral to assist patient with adequate nutrition and appropriate food choices  Outcome: Progressing  Goal: Maintains adequate nutritional intake  Description: INTERVENTIONS:  - Monitor percentage of each meal consumed  - Identify factors contributing to decreased intake, treat as appropriate  - Assist with meals as needed  - Monitor I&O, weight, and lab values if indicated  - Obtain nutrition services referral as needed  Outcome: Progressing  Goal: Establish and maintain optimal ostomy function  Description: INTERVENTIONS:  - Assess bowel function  - Encourage oral fluids to ensure adequate hydration  - Administer IV fluids if ordered to ensure adequate hydration   - Administer ordered medications as needed  - Encourage mobilization and activity  - Nutrition services referral to assist patient with appropriate food choices  - Assess stoma site  - Consider wound care consult   Outcome: Progressing  Goal: Oral mucous membranes remain intact  Description: INTERVENTIONS  - Assess oral mucosa and hygiene practices  - Implement preventative oral hygiene regimen  - Implement oral medicated treatments as ordered  - Initiate Nutrition services referral as needed  Outcome: Progressing     Problem: METABOLIC, FLUID AND ELECTROLYTES - ADULT  Goal: Electrolytes maintained within normal limits  Description: INTERVENTIONS:  - Monitor labs and assess patient for signs and symptoms of electrolyte imbalances  - Administer electrolyte replacement as ordered  - Monitor response to electrolyte replacements, including repeat lab results as appropriate  - Instruct patient on fluid and nutrition as appropriate  Outcome: Progressing  Goal: Fluid balance maintained  Description: INTERVENTIONS:  - Monitor labs   - Monitor I/O and WT  - Instruct patient on fluid and nutrition as appropriate  - Assess for signs & symptoms of volume excess or deficit  Outcome: Progressing  Goal: Glucose maintained within target range  Description: INTERVENTIONS:  - Monitor Blood Glucose as ordered  - Assess for signs and symptoms of hyperglycemia and hypoglycemia  - Administer ordered medications to maintain glucose within target range  - Assess nutritional intake and initiate nutrition service referral as needed  Outcome: Progressing

## 2022-07-07 NOTE — ASSESSMENT & PLAN NOTE
· Patient's heart rate has been in the 50s since admission  · Patient was found by nurse to have HR 30s while he was asleep  · EKG: sinus bradycardia  · Bradycardia most likley due to hemodynamic instability      Plan:  · Continue to monitor  · Hold Lisinopril

## 2022-07-07 NOTE — PLAN OF CARE
Problem: Potential for Falls  Goal: Patient will remain free of falls  Description: INTERVENTIONS:  - Educate patient/family on patient safety including physical limitations  - Instruct patient to call for assistance with activity   - Consult OT/PT to assist with strengthening/mobility   - Keep Call bell within reach  - Keep bed low and locked with side rails adjusted as appropriate  - Keep care items and personal belongings within reach  - Initiate and maintain comfort rounds  - Make Fall Risk Sign visible to staff  - Offer Toileting every  Hours, in advance of need  - Initiate/Maintain alarm  - Obtain necessary fall risk management equipment  - Apply yellow socks and bracelet for high fall risk patients  - Consider moving patient to room near nurses station  Outcome: Progressing     Problem: GASTROINTESTINAL - ADULT  Goal: Minimal or absence of nausea and/or vomiting  Description: INTERVENTIONS:  - Administer IV fluids if ordered to ensure adequate hydration  - Maintain NPO status until nausea and vomiting are resolved  - Nasogastric tube if ordered  - Administer ordered antiemetic medications as needed  - Provide nonpharmacologic comfort measures as appropriate  - Advance diet as tolerated, if ordered  - Consider nutrition services referral to assist patient with adequate nutrition and appropriate food choices  Outcome: Progressing  Goal: Maintains or returns to baseline bowel function  Description: INTERVENTIONS:  - Assess bowel function  - Encourage oral fluids to ensure adequate hydration  - Administer IV fluids if ordered to ensure adequate hydration  - Administer ordered medications as needed  - Encourage mobilization and activity  - Consider nutritional services referral to assist patient with adequate nutrition and appropriate food choices  Outcome: Progressing  Goal: Maintains adequate nutritional intake  Description: INTERVENTIONS:  - Monitor percentage of each meal consumed  - Identify factors contributing to decreased intake, treat as appropriate  - Assist with meals as needed  - Monitor I&O, weight, and lab values if indicated  - Obtain nutrition services referral as needed  Outcome: Progressing  Goal: Establish and maintain optimal ostomy function  Description: INTERVENTIONS:  - Assess bowel function  - Encourage oral fluids to ensure adequate hydration  - Administer IV fluids if ordered to ensure adequate hydration   - Administer ordered medications as needed  - Encourage mobilization and activity  - Nutrition services referral to assist patient with appropriate food choices  - Assess stoma site  - Consider wound care consult   Outcome: Progressing  Goal: Oral mucous membranes remain intact  Description: INTERVENTIONS  - Assess oral mucosa and hygiene practices  - Implement preventative oral hygiene regimen  - Implement oral medicated treatments as ordered  - Initiate Nutrition services referral as needed  Outcome: Progressing     Problem: METABOLIC, FLUID AND ELECTROLYTES - ADULT  Goal: Electrolytes maintained within normal limits  Description: INTERVENTIONS:  - Monitor labs and assess patient for signs and symptoms of electrolyte imbalances  - Administer electrolyte replacement as ordered  - Monitor response to electrolyte replacements, including repeat lab results as appropriate  - Instruct patient on fluid and nutrition as appropriate  Outcome: Progressing  Goal: Fluid balance maintained  Description: INTERVENTIONS:  - Monitor labs   - Monitor I/O and WT  - Instruct patient on fluid and nutrition as appropriate  - Assess for signs & symptoms of volume excess or deficit  Outcome: Progressing  Goal: Glucose maintained within target range  Description: INTERVENTIONS:  - Monitor Blood Glucose as ordered  - Assess for signs and symptoms of hyperglycemia and hypoglycemia  - Administer ordered medications to maintain glucose within target range  - Assess nutritional intake and initiate nutrition service referral as needed  Outcome: Progressing

## 2022-07-07 NOTE — CONSULTS
[address] 64 y o  male MRN: [id number]  Unit/Bed#: W -01 Encounter: [id number]    ASSESSMENT and PLAN:  Acute kidney injury (POA):  · Admitted 07/06:  Creatinine 4 98  · Given 2 L normal saline bolus and started on  mL/hour  · Blood pressure somewhat labile but no hypotension observed  · Bradycardic with heart rates 48-52  · Home medications:  Lisinopril, Protonix  · No NSAID use  · Reports severe dizziness and home upon standing with falling  · 7/7:  Creatinine improving, 4 46  · Baseline creatinine:  Creatinine 1 2 April 2022 with prior levels near 1 1 mg/dL  · Urinalysis:  Specific gravity 1 015  Trace protein, 0-1 RBCs, moderate blood  · CK level normal, 266  Repeat level 129  · Urine protein creatinine ratio 0 19  · Imaging:  Kidneys unremarkable  No hydronephrosis  · PVR 23 mL  · Etiology:    · Unclear etiology  · Intermittent bradycardia, possible hemodynamic perturbations  Concern for orthostasis reportedly dizzy and passing out at home  Hold minipress  · No evidence of obstructive uropathy  Thickened bladder wall on imaging but no evidence of cystitis on urinalysis  · No blood in urine  Urinalysis with moderate blood in 0-1 RBCs but CK level normal therefore no evidence of rhabdomyolysis  · Improvement with IV fluids/volume repletion supporting prerenal azotemia  · Plan:  · Hold lisinopril  · Ruffin urinalysis  No indication for biopsy since renal function improving  · No indication for HD  · Continue  mL/hour  · Check labs in the a m  · Closely monitor I&O  · No nephrotoxic agents  · Check orthostatic blood pressures    Dark urine:  · Urine negative for RBCs  · Imaging:   Thickened bladder wall  · Urology consult   · Cytology pending    Bradycardia:  · EKG with heart rate 42, Medication effect, ?vasovagal  · Blood pressure fluctuating- may be somewhat pain related  · Hold lisinopril and minipress    Back pain/abdominal pain:  · Imaging unrevealing  · Prior imaging shows degenerative changes cervical spine  May be related to degenerative changes  · Lumbar spine imaging August 2020 moderate spondylosis  · Lipase normal    Thrombocytopenia:  · Platelet count previously normal  · Current level 148  · Hepatic steatosis noted on CT imaging 2016    Anemia:  · Hemoglobin 11 1 on admission with prior levels normal   12 7 on most recent labs April 2022    Diabetes mellitus:  · Hemoglobin A1c:  Usually between 5 9-6 5%    Mild hyponatremia:  · Likely combination effect of volume depletion and medication effect  · Resolved, sodium currently 136    HISTORY OF PRESENT ILLNESS:  Requesting Physician: Tammi Wilson MD  Reason for Consult:  Acute kidney injury    Keisha Benitez is a 64 y o  male with a past medical history of hypertension, opioid abuse, MDD with psychotic features who was admitted to [facility] came after presenting with abdominal pain/lower back pain, weakness, dizziness upon standing, decreased appetite/decreased oral intake x 3 days  He reports dark urine in decreasing amounts  Reports being clean since going to rehab October 2021  In a similar presentation in 2016  CTA unrevealing  Patient was seen and examined  History taken with the assistance of nursing staff who is bilingual   He denies use of NSAIDs  He reports getting extremely dizzy upon standing and falling  He showed me a picture of his underwear on his phone which appears to have red colored drainage  Urine was dark brownish red colored again picture on his phone  Urine at bedside at this time is clear yellow  He continues to report lower abdominal pain which extends into the right left side  He has no history of kidney stones to his knowledge  Creatinine 4 98 on admission prompting nephrology consult       PAST MEDICAL HISTORY:  Past Medical History:   Diagnosis Date    Abdominal pain     Allergic rhinitis     Cancer (HCC)     Chronic pain     Colon polyps     Depression     Fatigue     Head injury     Homeless     Hypertension     Insomnia     Liver disease     Loss of appetite     Numbness and tingling of right arm     Palpitations     Psychiatric disorder     bipolar    PTSD (post-traumatic stress disorder)     Seizures (HCC)     Shortness of breath     Substance abuse (HCC)     Swallowing difficulty        PAST SURGICAL HISTORY:  Past Surgical History:   Procedure Laterality Date    ABDOMINAL SURGERY      COLONOSCOPY      EYE SURGERY      NECK SURGERY      ORCHIECTOMY Right 5/19/2016    Procedure: ORCHIECTOMY INGUINAL biopsy of testicular mass, ;  Surgeon: Yoko Pickard MD;  Location:  MAIN OR;  Service:    [facility] MA COLONOSCOPY FLX DX W/COLLJ SPEC WHEN PFRMD N/A 2/13/2017    Procedure: EGD AND COLONOSCOPY;  Surgeon: Dianne Lee MD;  Location: [facility]; Service: Gastroenterology    MA ESOPHAGOGASTRODUODENOSCOPY TRANSORAL DIAGNOSTIC N/A 11/16/2016    Procedure: EGD AND COLONOSCOPY;  Surgeon: Dianne Lee MD;  Location:  GI LAB;   Service: Gastroenterology       ALLERGIES:  Allergies   Allergen Reactions    Oxycodone Swelling     Tongue swelling       SOCIAL HISTORY:  Social History     Substance and Sexual Activity   Alcohol Use No     Social History     Substance and Sexual Activity   Drug Use Not Currently    Types: Heroin     Social History     Tobacco Use   Smoking Status Current Every Day Smoker    Packs/day: 0 50    Years: 30 00    Pack years: 15 00    Types: Cigarettes   Smokeless Tobacco Current User       FAMILY HISTORY:  Family History   Problem Relation Age of Onset    Diabetes Mother     Hypertension Brother        MEDICATIONS:    Current Facility-Administered Medications:     acetaminophen (TYLENOL) tablet 975 mg, 975 mg, Oral, Q8H, Kinjal Pearson PA-C, 975 mg at 07/07/22 0631    fluticasone (FLONASE) 50 mcg/act nasal spray 2 spray, 2 spray, Nasal, Daily, Genny Pearson PA-C, 2 spray at 07/07/22 5450    insulin lispro (HumaLOG) 100 units/mL subcutaneous injection 1-5 Units, 1-5 Units, Subcutaneous, TID AC **AND** Fingerstick Glucose (POCT), , , TID AC, Kinjal Pearson PA-C    insulin lispro (HumaLOG) 100 units/mL subcutaneous injection 1-5 Units, 1-5 Units, Subcutaneous, HS, Kinjal Pearson PA-C    lactated ringers infusion, 100 mL/hr, Intravenous, Continuous, Kinjal Pearson PA-C, Last Rate: 100 mL/hr at 07/07/22 0634, 100 mL/hr at 07/07/22 0634    melatonin tablet 9 mg, 9 mg, Oral, HS, Kinjal Pearson PA-C, 9 mg at 07/06/22 2249    nicotine (NICODERM CQ) 21 mg/24 hr TD 24 hr patch 1 patch, 1 patch, Transdermal, Daily, Bo Pearson PA-C, 1 patch at 07/07/22 0859    ondansetron (ZOFRAN) injection 4 mg, 4 mg, Intravenous, Q6H PRN, Bo Paerson PA-C    pantoprazole (PROTONIX) EC tablet 40 mg, 40 mg, Oral, Early Morning, Kinjal Pearson PA-C, 40 mg at 07/07/22 0631    prazosin (MINIPRESS) capsule 2 mg, 2 mg, Oral, HS, Kinjal Pearson PA-C, 2 mg at 07/06/22 2250    QUEtiapine (SEROquel) tablet 100 mg, 100 mg, Oral, HS, Kinjal Pearson PA-C, 100 mg at 07/06/22 2249    sertraline (ZOLOFT) tablet 50 mg, 50 mg, Oral, Daily, Bo Pearson PA-C, 50 mg at 07/07/22 5124    REVIEW OF SYSTEMS:  Constitutional:  Positive for fatigue  No fevers or chills  HENT: Negative for postnasal drip  Eyes: Negative for visual disturbance  Respiratory: Negative for cough, shortness of breath and wheezing  Cardiovascular: Negative for chest pain, palpitations and leg swelling  Gastrointestinal:  Positive for lower abdominal pain radiating to the sides  Genitourinary:  Reports hematuria, currently urine is clear yellow  Endocrine: Negative for polyuria  Musculoskeletal:  Positive for back pain  Skin: Negative for rash  Neurological: Negative for focal weakness    Positive for dizziness upon standing  Hematological:  Positive for bleeding- he noted on underwear at home  Psychiatric/Behavioral: Negative for confusion  All the systems were reviewed and were negative except as documented on the HPI  PHYSICAL EXAM:  Current Weight:    First Weight:    Vitals:    07/06/22 2127 07/07/22 0714 07/07/22 0715 07/07/22 0716   BP: 152/90  127/82    BP Location: Right arm      Pulse:    (!) 51   Resp:       Temp:  97 8 °F (36 6 °C)     TempSrc:       SpO2:    98%       Intake/Output Summary (Last 24 hours) at 7/7/2022 1128  Last data filed at 7/7/2022 0850  Gross per 24 hour   Intake 480 ml   Output 350 ml   Net 130 ml     Physical Exam  Constitutional:       General: He is not in acute distress  Appearance: Normal appearance  He is well-developed  He is not toxic-appearing or diaphoretic  HENT:      Head: Normocephalic and atraumatic  Mouth/Throat:      Mouth: Mucous membranes are moist       Pharynx: No oropharyngeal exudate  Eyes:      General: No scleral icterus  Right eye: No discharge  Left eye: No discharge  Extraocular Movements: Extraocular movements intact  Conjunctiva/sclera: Conjunctivae normal       Pupils: Pupils are equal, round, and reactive to light  Neck:      Vascular: No carotid bruit or JVD  Trachea: No tracheal deviation  Cardiovascular:      Rate and Rhythm: Normal rate and regular rhythm  Heart sounds: No murmur heard  No friction rub  No gallop  Pulmonary:      Effort: Pulmonary effort is normal       Breath sounds: Normal breath sounds  Abdominal:      General: Bowel sounds are normal  There is no distension  Palpations: Abdomen is soft  There is no mass  Tenderness: There is abdominal tenderness  There is no right CVA tenderness, left CVA tenderness, guarding or rebound  Musculoskeletal:         General: No swelling, tenderness or signs of injury  Normal range of motion  Cervical back: Normal range of motion and neck supple   No rigidity or tenderness  Right lower leg: No edema  Left lower leg: No edema  Skin:     General: Skin is warm and dry  Capillary Refill: Capillary refill takes less than 2 seconds  Coloration: Skin is not jaundiced or pale  Findings: No erythema or rash  Neurological:      General: No focal deficit present  Mental Status: He is alert and oriented to person, place, and time  Psychiatric:         Mood and Affect: Mood normal          Behavior: Behavior normal          Thought Content:  Thought content normal          Judgment: Judgment normal          Invasive Devices:      Lab Results:   Results from last 7 days   Lab Units 07/07/22  0521 07/06/22  1308   WBC Thousand/uL  --  9 82   HEMOGLOBIN g/dL  --  11 1*   HEMATOCRIT %  --  34 2*   PLATELETS Thousands/uL  --  147*   POTASSIUM mmol/L 3 9 4 3   CHLORIDE mmol/L 106 100   CO2 mmol/L 23 25   BUN mg/dL 33* 35*   CREATININE mg/dL 4 46* 4 98*   CALCIUM mg/dL 8 4 9 4   MAGNESIUM mg/dL 2 0  --    PHOSPHORUS mg/dL 3 4  --    ALK PHOS U/L 50 61   ALT U/L 9 12   AST U/L 14 21     Other Studies:

## 2022-07-07 NOTE — UTILIZATION REVIEW
Inpatient Admission Authorization Request   NOTIFICATION OF INPATIENT ADMISSION/INPATIENT AUTHORIZATION REQUEST   SERVICING FACILITY:   [facility]  Michael HODGES, [address] [place] [address]  Tax ID: [id number]  NPI: [id number]  Place of Service: Inpatient [address] [facility]  [address]  Place of Service Code: 24     ATTENDING PROVIDER:  Attending Name and NPI#: Earnest Edgar, Netta Ballard [[id number]]  Address: Michael WYNNE, [address] [place] [address]  Phone: [phone]     UTILIZATION REVIEW CONTACT:  [facility] Utilization   Network Utilization Review Department  Phone: [phone]  Fax: [phone]  Email: [email or web address]     PHYSICIAN ADVISORY SERVICES:  FOR [id number] REVIEW - MEDICAL NECESSITY DENIAL  Phone: [phone]  Fax: [phone]  Email: [email or web address]     TYPE OF REQUEST:  Inpatient Status     ADMISSION INFORMATION:  ADMISSION DATE/TIME: 7/6/22  8:20 PM  PATIENT DIAGNOSIS CODE/DESCRIPTION:  Abdominal pain [R10 9]  TEO (acute kidney injury) ([facility] [address] ) [N17 9]  DISCHARGE DATE/TIME: No discharge date for patient encounter  IMPORTANT INFORMATION:  Please contact [facility] directly with any questions or concerns regarding this request  Department voicemails are confidential     Send requests for admission clinical reviews, concurrent reviews, approvals, and administrative denials due to lack of clinical to fax [phone]

## 2022-07-07 NOTE — CONSULTS
UROLOGY CONSULTATION NOTE     Patient Identifiers: Leslee Dandy (MRN [id number])  Service Requesting Consultation: Internal Medicine  Service Providing Consultation:  Urology, Jenifer Barnhart, Gus Littlejohn    Date of Service: 7/7/2022  Inpatient consult to Urology  Consult performed by: JOSIAH Serrano  Consult ordered by: Erwin Yanes MD      Reason for Consultation:  Hematuria     ASSESSMENT/PLAN:     Gross hematuria  · Resolved  · CT abdomen pelvis performed 07/06/2022 reveals thick walled urinary bladder otherwise unremarkable  · Urinalysis with microscopy performed 07/06/2022 unremarkable  · Urine for cytology ordered  · Will need to be scheduled for outpatient gross hematuria workup with cystoscopy and CT renal study when contrast becomes readily available  · Should hematuria recur, rack urine for serial urine evaluation- to evaluate for hematuria resolution/progression/degree of hematuria  · Will task the office for follow up in outpatient setting for Gross hematuria workup- cystoscopy, CT renal study    Acute Kidney Injury   · Creatinine on admission-4 98  Most recent creatinine 07/07/2022 resulted 4 46, BUN 33  · Nephrology following  Appreciate input  · Avoid nephrotoxic agents  · IV fluids per Medicine  · Monitor urinary output  · Continue to trend renal function    At this point, there is no identifiable urologic cause for his current discomfort  Will await evaluation by Nephrology as well as continued medical workup by Internal Medicine  Discussed with Dr Graeme Boast     History of Present Illness:     Leslee Dandy is a 64 y o  old male presenting to the emergency department 07/06/2022 the complaints of bilateral abdominal pain and gross hematuria x 2 days  On admission to the hospital he denies dysuria, flank pain with the passage of clots with his urinary output  He did report urinary frequency voiding only small amounts at a time  He does report decreased appetite    Significant past medical history includes depression, hypertension, hyperlipidemia, substance abuse (heroin)  Past surgical history of orchiectomy and biopsy of testicular mass 05/23/2016 by Dr Thor Aase  Pathology negative for malignancy  Most recent PSA performed 04/26/2022 resulted 0 77  On evaluation this morning, patient reports bilateral flank pain for 3 days with associated nausea  He continues to have nausea despite antiemetics  Urine, as noted at bedside, this now clear yellow with no hematuria noted  He has no clots noted  He has reports of complete bladder emptying with urination with no sensation of complete bladder emptying  Patient reports 1 pack a day smoking for many years  He denies current use/abuse of illicit substances and alcohol  Past Medical, Past Surgical History:     Past Medical History:   Diagnosis Date    Abdominal pain     Allergic rhinitis     Cancer (HCC)     Chronic pain     Colon polyps     Depression     Fatigue     Head injury     Homeless     Hypertension     Insomnia     Liver disease     Loss of appetite     Numbness and tingling of right arm     Palpitations     Psychiatric disorder     bipolar    PTSD (post-traumatic stress disorder)     Seizures (HCC)     Shortness of breath     Substance abuse (HCC)     Swallowing difficulty    :    Past Surgical History:   Procedure Laterality Date    ABDOMINAL SURGERY      COLONOSCOPY      EYE SURGERY      NECK SURGERY      ORCHIECTOMY Right 5/19/2016    Procedure: ORCHIECTOMY INGUINAL biopsy of testicular mass, ;  Surgeon: Patricia Brock MD;  Location:  MAIN OR;  Service:    [facility] MO COLONOSCOPY FLX DX W/COLLJ SPEC WHEN PFRMD N/A 2/13/2017    Procedure: EGD AND COLONOSCOPY;  Surgeon: Kofi Bardales MD;  Location: [facility] GI LAB;   Service: Gastroenterology    MO ESOPHAGOGASTRODUODENOSCOPY TRANSORAL DIAGNOSTIC N/A 11/16/2016    Procedure: EGD AND COLONOSCOPY;  Surgeon: Kofi Bardales MD;  Location:  GI LAB; Service: Gastroenterology   :    Medications, Allergies:     Current Facility-Administered Medications   Medication Dose Route Frequency    acetaminophen (TYLENOL) tablet 975 mg  975 mg Oral Q8H    fluticasone (FLONASE) 50 mcg/act nasal spray 2 spray  2 spray Nasal Daily    insulin lispro (HumaLOG) 100 units/mL subcutaneous injection 1-5 Units  1-5 Units Subcutaneous TID AC    insulin lispro (HumaLOG) 100 units/mL subcutaneous injection 1-5 Units  1-5 Units Subcutaneous HS    lactated ringers infusion  100 mL/hr Intravenous Continuous    melatonin tablet 9 mg  9 mg Oral HS    nicotine (NICODERM CQ) 21 mg/24 hr TD 24 hr patch 1 patch  1 patch Transdermal Daily    ondansetron (ZOFRAN) injection 4 mg  4 mg Intravenous Q6H PRN    pantoprazole (PROTONIX) EC tablet 40 mg  40 mg Oral Early Morning    prazosin (MINIPRESS) capsule 2 mg  2 mg Oral HS    QUEtiapine (SEROquel) tablet 100 mg  100 mg Oral HS    sertraline (ZOLOFT) tablet 50 mg  50 mg Oral Daily       Allergies: Allergies   Allergen Reactions    Oxycodone Swelling     Tongue swelling   :    Social and Family History:   Social History:   Social History     Tobacco Use    Smoking status: Current Every Day Smoker     Packs/day: 0 50     Years: 30 00     Pack years: 15 00     Types: Cigarettes    Smokeless tobacco: Current User   Vaping Use    Vaping Use: Never used   Substance Use Topics    Alcohol use: No    Drug use: Not Currently     Types: Heroin     Social History     Tobacco Use   Smoking Status Current Every Day Smoker    Packs/day: 0 50    Years: 30 00    Pack years: 15 00    Types: Cigarettes   Smokeless Tobacco Current User       Family History:  Family History   Problem Relation Age of Onset    Diabetes Mother     Hypertension Brother    :     Review of Systems:     General: Fever, chills, or night sweats: negative  Cardiac: Negative for chest pain  Pulmonary: Negative for shortness of breath    Gastrointestinal: Abdominal pain negative  Nausea, vomiting, or diarrhea positive,  Genitourinary: See HPI above  Patient does have hematuria  All other systems queried were negative  Physical Exam:   General: Patient is pleasant and in NAD  Awake and alert  /82   Pulse (!) 51   Temp 97 8 °F (36 6 °C)   Resp 16   SpO2 98% Temp (24hrs), Av 4 °F (36 9 °C), Min:97 8 °F (36 6 °C), Max:99 °F (37 2 °C)  current; Temperature: 97 8 °F (36 6 °C)  I/O last 24 hours: In: 480 [P O :480]  Out: 350 [Urine:350]  Skin: warm, dry, intact  Cardiac: S1S2, HRR, Peripheral edema: negative  Pulmonary: Non-labored breathing  Abdomen: Soft, non-tender, non-distended  No surgical scars  No masses, tenderness, hernias noted  Musculoskeletal: AROM with no joint deformity or tenderness  Neurology: alert, oriented x3, affect appropriate, no focal neurological deficits, moves all extremities well and no involuntary movements  Genitourinary: Positive CVA tenderness, negative suprapubic tenderness  FERNANDEZ: None  Currently voiding clear yellow urine with no clots or hematuria noted    Labs:     Lab Results   Component Value Date    HGB 11 1 (L) 2022    HCT 34 2 (L) 2022    WBC 9 82 2022     (L) 2022       Lab Results   Component Value Date    K 3 9 2022     2022    CO2 23 2022    BUN 33 (H) 2022    CREATININE 4 46 (H) 2022    CALCIUM 8 4 2022       Imaging:   I personally reviewed the images and report of the following studies, and reviewed them with the patient:      CT ABDOMEN AND PELVIS WITHOUT IV CONTRAST     INDICATION:   bilateral mid-abdominal and flank pain, hematuria, TEO        COMPARISON: CT 16     TECHNIQUE:  CT examination of the abdomen and pelvis was performed without intravenous contrast  This examination was performed without intravenous contrast in the context of the critical nationwide Omnipaque shortage   Axial, sagittal, and coronal 2D   reformatted images were created from the source data and submitted for interpretation       Radiation dose length product (DLP) for this visit:  345 mGy-cm   This examination, like all CT scans performed in the [facility], was performed utilizing techniques to minimize radiation dose exposure, including the use of iterative   reconstruction and automated exposure control       Enteric contrast was not administered       FINDINGS:     ABDOMEN     LOWER CHEST:  No clinically significant abnormality identified in the visualized lower chest      LIVER/BILIARY TREE:  Unremarkable      GALLBLADDER:  No calcified gallstones  No pericholecystic inflammatory change      SPLEEN:  Unremarkable      PANCREAS:  Unremarkable      ADRENAL GLANDS:  Unremarkable      KIDNEYS/URETERS:  Unremarkable  No hydronephrosis      STOMACH AND BOWEL:  Unremarkable      APPENDIX:  A normal appendix was visualized      ABDOMINOPELVIC CAVITY:  No ascites  No pneumoperitoneum  No lymphadenopathy      VESSELS:  Unremarkable for patient's age      PELVIS     REPRODUCTIVE ORGANS:  Unremarkable for patient's age      URINARY BLADDER:  Decompressed, however wall is thick     ABDOMINAL WALL/INGUINAL REGIONS:  Fat-containing inguinal hernias     OSSEOUS STRUCTURES:  No acute fracture or destructive osseous lesion      IMPRESSION:     Thick-walled urinary bladder, correlate for cystitis, otherwise no significant abnormality       Thank you for allowing me to participate in this patients care  Please do not hesitate to call with any additional questions      JOSIAH Mcqueen

## 2022-07-07 NOTE — QUICK NOTE
Patient is a 61-year-old male who presented to the emergency department with bilateral low back pain, generalized weakness, decreased p o  intake, gross hematuria/dark urine reports  Patient has past medical history significant for substance abuse has not used since October 2021, hypertension, depression with psychotic features, DM type 2  Hematology consulted for anemia and thrombocytopenia  CBC and differential from admission yesterday WBC 9 82, RBC 3 70, hemoglobin 11 1, hematocrit 34 2, MCV 92, MPV 9 4, platelets 282,324 with differential WNL  Creatinine on admission 4 98 compared to previous levels between 0 98 and 1 46  This has improved with hydration to today being 4 46 with suspected prerenal azotemia per Nephrology  EGFR 72 on 05/25/2021, 94 on 04/26/22, today 13  No hypercalcemia, serum protein 5 8, T bili WNL  Vitals fairly stable, mildly bradycardic, however afebrile and O2 saturations are stable  Hypertensive on admission, since resolved  Per other provider's documentation patient AAOx3  Per chart review + in care everywhere, no evidence of recent heparin exposure  Official consult tomorrow as cbcdiff pending from today to better guide assessment  If cbcdiff results later today and platelet count continues to decrease significantly please contact on call hematology oncology provider [facility]  Ordered labs in addition to those ordered by [facility] including fibrinogen, LD, iron panel for now  Other labs pending include hemolysis smear, haptoglobin, adams13, occult blood, urine cytology, heparin-induced antibody  Addendum: Bladder wall thickening on CT abdomen pelvis wo contrast - urology following planning for outpatient cystoscopy as dark urine resolved this admission  No other lesions noted, no lymphadenopathy

## 2022-07-07 NOTE — ASSESSMENT & PLAN NOTE
Lab Results   Component Value Date    HGBA1C 5 8 (H) 04/26/2022       No results for input(s): POCGLU in the last 72 hours      Blood Sugar Average: Last 72 hrs:  · last hgb a1c in April 2022 5 8%  · Not maintained on oral DM medication and/or insulin  · SSI for correction w/ QID accuchecks  · Monitor closely for hypoglycemia w/ insulin use in patient w/ TEO

## 2022-07-07 NOTE — ASSESSMENT & PLAN NOTE
· /95  · Home medications: lisinopril and prazosin  · Hold lisinopril  · Continue w/ prazosin  · Add hydralazine 10mg IV Q6 PRN SBP >180

## 2022-07-07 NOTE — PROGRESS NOTES
[facility]  Progress Note Stevie Rowe 1965, 64 y o  male MRN: [id number]  Unit/Bed#:  W -01 Encounter: [id number]  Primary Care Provider: JOSIAH Santillan   Date and time admitted to hospital: 7/6/2022  3:52 PM    * TEO (acute kidney injury) [facility])  Assessment & Plan  · On admission: Cr is 4 98, BUN 35 (last Cr from April 2022 0 95)  · Presents w/ bilateral flank pain x 2 days, s/p 1 episode of dark urine; also increased urinary frequency w/ small volume urine output  · CK: negative  · UA: occult blood, trace protein; urine microscopy: unremarkable  · CT A/P w/ evidence of bladder wall thickening; no UTI on UA  · Unclear etiology at this time, warrants further investigation  · Urology and Neurology following    Plan:  · Follow-up urine cytology  · 100 ml/hr lactated ringers  · Acetaminophen 975 mg q8h  · Avoid hypotension, nephrotoxins  · Measure PVR & urine output  · Plan for outpatient hematuria workup with cystoscopy and CT renal study    Bradycardia  Assessment & Plan  · Patient's heart rate has been in the 50s since admission  · Patient was found by nurse to have HR 30s while he was asleep  · EKG: sinus bradycardia    Plan:  · TSH  · Continue to monitor    Hypertension  Assessment & Plan  · /95 on admission  · Home medications: lisinopril 5 mg qd and prazosin 2 mg qd    Plan:  · Avoid hypotension  · Continue w/ prazosin 2 mg qd; hold lisinopril  · Add hydralazine 10mg IV Q6 PRN SBP >180    DM (diabetes mellitus), type 2 (HCC)  Assessment & Plan  Lab Results   Component Value Date    HGBA1C 5 8 (H) 04/26/2022       Recent Labs     07/06/22  2244 07/07/22  0716 07/07/22  1147   POCGLU 116 110 118       Blood Sugar Average: Last 72 hrs:  · (P) 262 [id number] hgb a1c in April 2022 5 8%  · Not maintained on oral DM medication and/or insulin  · SSI for correction w/ QID accuchecks  · Monitor closely for hypoglycemia w/ insulin use in patient w/ TEO    Recurrent major depressive disorder, in remission [facility])  Assessment & Plan  · Mood is stable on home medications  · Continue w/ home medication regimen: celexa, citalopram, seroquel, zoloft, trazodone    Opioid dependence in remission [facility])  Assessment & Plan  · Completed drug rehabilitation 2021- reports no drug use since then (suboxone yesterday)  VTE Pharmacologic Prophylaxis: VTE Score: 1 Low Risk (Score 0-2) - Encourage Ambulation  Patient Centered Rounds: I performed bedside rounds with nursing staff today  Discussions with Specialists or Other Care Team Provider: Neurology, Urology    Education and Discussions with Family / Patient: Patient declined call to   Current Length of Stay: 1 day(s)  Current Patient Status: Inpatient   Discharge Plan: Anticipate discharge in 24-48 hrs to home  Code Status: Level 1 - Full Code    Subjective:   Patient continues to endorse 7-8/10 pain on the sides of his abdomen, bilaterally  He additionally endorses continued increased urinary frequency with a mild feeling of incomplete evacuation of urine  He denies any additional episodes of dark-colored urine following the one episode that occurred 2 days ago  He continues to deny nausea, vomiting, diarrhea, fevers, chills, SOB, and chest pain  He states that his appetite and PO intake lately have been fairly similar to his baseline  No recent traumas, infections, antibiotic use, medication changes, NSAID use, or recreational drug use  No history of kidney stones or autoimmune diseases,     Objective:     Vitals:   Temp (24hrs), Av 6 °F (37 °C), Min:97 8 °F (36 6 °C), Max:99 °F (37 2 °C)    Temp:  [97 8 °F (36 6 °C)-99 °F (37 2 °C)] 97 8 °F (36 6 °C)  HR:  [48-52] 51  Resp:  [16-18] 16  BP: (127-201)/(82-95) 127/82  SpO2:  [98 %-100 %] 98 %  There is no height or weight on file to calculate BMI  Input and Output Summary (last 24 hours):      Intake/Output Summary (Last 24 hours) at 7/7/2022 1320  Last data filed at 7/7/2022 0850  Gross per 24 hour   Intake 480 ml   Output 350 ml   Net 130 ml       Physical Exam:   Physical Exam  Vitals and nursing note reviewed  Constitutional:       Appearance: He is well-developed  Comments: Patient appears uncomfortable and somnolent during my encounter  His eyes are closed as he answers my questions  HENT:      Head: Normocephalic and atraumatic  Cardiovascular:      Rate and Rhythm: Normal rate and regular rhythm  Heart sounds: Normal heart sounds  No murmur heard  Pulmonary:      Effort: Pulmonary effort is normal  No respiratory distress  Breath sounds: Normal breath sounds  Abdominal:      General: Bowel sounds are normal       Palpations: Abdomen is soft  Tenderness: There is abdominal tenderness  Comments: Tenderness to palpation on the bilateral sides of the abdomen and bilateral flank  Skin:     General: Skin is warm and dry  Neurological:      Mental Status: He is alert  Urine at bedside appears yellow      Additional Data:     Labs:  Results from last 7 days   Lab Units 07/06/22  1308   WBC Thousand/uL 9 82   HEMOGLOBIN g/dL 11 1*   HEMATOCRIT % 34 2*   PLATELETS Thousands/uL 147*   NEUTROS PCT % 74   LYMPHS PCT % 17   MONOS PCT % 8   EOS PCT % 0     Results from last 7 days   Lab Units 07/07/22  0521   SODIUM mmol/L 136   POTASSIUM mmol/L 3 9   CHLORIDE mmol/L 106   CO2 mmol/L 23   BUN mg/dL 33*   CREATININE mg/dL 4 46*   ANION GAP mmol/L 7   CALCIUM mg/dL 8 4   ALBUMIN g/dL 3 2*   TOTAL BILIRUBIN mg/dL 0 24   ALK PHOS U/L 50   ALT U/L 9   AST U/L 14   GLUCOSE RANDOM mg/dL 116         Results from last 7 days   Lab Units 07/07/22  1147 07/07/22  0716 07/06/22  2244   POC GLUCOSE mg/dl 118 110 116               Lines/Drains:  Invasive Devices  Report    Peripheral Intravenous Line  Duration           Peripheral IV 07/06/22 Right Antecubital <1 day    Peripheral IV 07/06/22 Right Arm <1 day Imaging: Reviewed radiology reports from this admission including: abdominal/pelvic CT and Personally reviewed the following imaging: abdominal/pelvic CT    Recent Cultures (last 7 days):         Last 24 Hours Medication List:   Current Facility-Administered Medications   Medication Dose Route Frequency Provider Last Rate    acetaminophen  975 mg Oral Q8H Kinjal Pearson PA-C      fluticasone  2 spray Nasal Daily Rey Pearson PA-C      insulin lispro  1-5 Units Subcutaneous TID AC Kinjal Pearson PA-C      insulin lispro  1-5 Units Subcutaneous HS Kinjal Pearson PA-C      lactated ringers  100 mL/hr Intravenous Continuous Rey Pearson PA-C 100 mL/hr (07/07/22 0634)    melatonin  9 mg Oral HS Kinjal Pearson PA-C      nicotine  1 patch Transdermal Daily Rey Pearson PA-C      ondansetron  4 mg Intravenous Q6H PRN Rey Pearson PA-C      pantoprazole  40 mg Oral Early Morning Kinjal Pearson PA-C      prazosin  2 mg Oral HS Kinjal Pearson PA-C      QUEtiapine  100 mg Oral HS Kinjal Pearson PA-C      sertraline  50 mg Oral Daily Kinjal Pearson PA-C          Today, Patient Was Seen By: Jose Sweeney    **Please Note: This note may have been constructed using a voice recognition system  **

## 2022-07-07 NOTE — ASSESSMENT & PLAN NOTE
· Mood is stable on home medications  · Continue w/ home medication regimen: celexa, citalopram, seroquel, zoloft, trazodone

## 2022-07-07 NOTE — ASSESSMENT & PLAN NOTE
· On admission: Cr is 4 98, BUN 35 (last Cr from April 2022 0 95)  · Presents w/ bilateral flank pain x 2 days, s/p 1 episode of dark urine; also increased urinary frequency w/ small volume urine output  · CK: negative  · UA: occult blood, trace protein; urine microscopy: unremarkable  · CT A/P w/ evidence of bladder wall thickening; no UTI on UA  · Unclear etiology at this time, warrants further investigation  · Urology and Neurology following    Plan:  · Follow-up urine cytology  · 100 ml/hr lactated ringers  · Acetaminophen 975 mg q8h  · Avoid hypotension, nephrotoxins  · Measure PVR & urine output  · Plan for outpatient hematuria workup with cystoscopy and CT renal study

## 2022-07-08 PROBLEM — D64.9 ANEMIA: Status: ACTIVE | Noted: 2022-07-08

## 2022-07-08 PROBLEM — D69.6 THROMBOCYTOPENIA (HCC): Status: ACTIVE | Noted: 2022-07-08

## 2022-07-08 LAB
ALBUMIN SERPL BCP-MCNC: 3.1 G/DL (ref 3.5–5)
ALP SERPL-CCNC: 44 U/L (ref 34–104)
ALT SERPL W P-5'-P-CCNC: 8 U/L (ref 7–52)
ANION GAP SERPL CALCULATED.3IONS-SCNC: 5 MMOL/L (ref 4–13)
AST SERPL W P-5'-P-CCNC: 11 U/L (ref 13–39)
BASOPHILS # BLD AUTO: 0.01 THOUSANDS/ΜL (ref 0–0.1)
BASOPHILS NFR BLD AUTO: 0 % (ref 0–1)
BILIRUB SERPL-MCNC: 0.31 MG/DL (ref 0.2–1)
BUN SERPL-MCNC: 26 MG/DL (ref 5–25)
CALCIUM ALBUM COR SERPL-MCNC: 9 MG/DL (ref 8.3–10.1)
CALCIUM SERPL-MCNC: 8.3 MG/DL (ref 8.4–10.2)
CHLORIDE SERPL-SCNC: 109 MMOL/L (ref 96–108)
CO2 SERPL-SCNC: 24 MMOL/L (ref 21–32)
CREAT SERPL-MCNC: 3.53 MG/DL (ref 0.6–1.3)
EOSINOPHIL # BLD AUTO: 0.1 THOUSAND/ΜL (ref 0–0.61)
EOSINOPHIL NFR BLD AUTO: 1 % (ref 0–6)
ERYTHROCYTE [DISTWIDTH] IN BLOOD BY AUTOMATED COUNT: 13.2 % (ref 11.6–15.1)
FERRITIN SERPL-MCNC: 104 NG/ML (ref 8–388)
FOLATE SERPL-MCNC: 9.5 NG/ML (ref 3.1–17.5)
GFR SERPL CREATININE-BSD FRML MDRD: 18 ML/MIN/1.73SQ M
GLUCOSE SERPL-MCNC: 100 MG/DL (ref 65–140)
GLUCOSE SERPL-MCNC: 107 MG/DL (ref 65–140)
GLUCOSE SERPL-MCNC: 132 MG/DL (ref 65–140)
GLUCOSE SERPL-MCNC: 176 MG/DL (ref 65–140)
GLUCOSE SERPL-MCNC: 93 MG/DL (ref 65–140)
HAPTOGLOB SERPL-MCNC: 77 MG/DL (ref 29–370)
HCT VFR BLD AUTO: 30 % (ref 36.5–49.3)
HCT VFR BLD AUTO: 31 % (ref 36.5–49.3)
HGB BLD-MCNC: 10 G/DL (ref 12–17)
HGB BLD-MCNC: 10.7 G/DL (ref 12–17)
IMM GRANULOCYTES # BLD AUTO: 0.02 THOUSAND/UL (ref 0–0.2)
IMM GRANULOCYTES NFR BLD AUTO: 0 % (ref 0–2)
IRON SATN MFR SERPL: 14 % (ref 20–50)
IRON SERPL-MCNC: 31 UG/DL (ref 65–175)
LYMPHOCYTES # BLD AUTO: 1.96 THOUSANDS/ΜL (ref 0.6–4.47)
LYMPHOCYTES NFR BLD AUTO: 28 % (ref 14–44)
MCH RBC QN AUTO: 30.6 PG (ref 26.8–34.3)
MCHC RBC AUTO-ENTMCNC: 33.3 G/DL (ref 31.4–37.4)
MCV RBC AUTO: 92 FL (ref 82–98)
MONOCYTES # BLD AUTO: 0.51 THOUSAND/ΜL (ref 0.17–1.22)
MONOCYTES NFR BLD AUTO: 7 % (ref 4–12)
NEUTROPHILS # BLD AUTO: 4.4 THOUSANDS/ΜL (ref 1.85–7.62)
NEUTS SEG NFR BLD AUTO: 64 % (ref 43–75)
NRBC BLD AUTO-RTO: 0 /100 WBCS
PLATELET # BLD AUTO: 131 THOUSANDS/UL (ref 149–390)
PMV BLD AUTO: 9.9 FL (ref 8.9–12.7)
POTASSIUM SERPL-SCNC: 4.2 MMOL/L (ref 3.5–5.3)
PROT SERPL-MCNC: 5.7 G/DL (ref 6.4–8.4)
RBC # BLD AUTO: 3.27 MILLION/UL (ref 3.88–5.62)
SODIUM SERPL-SCNC: 138 MMOL/L (ref 135–147)
TIBC SERPL-MCNC: 225 UG/DL (ref 250–450)
VIT B12 SERPL-MCNC: 328 PG/ML (ref 100–900)
WBC # BLD AUTO: 7 THOUSAND/UL (ref 4.31–10.16)

## 2022-07-08 PROCEDURE — 99222 1ST HOSP IP/OBS MODERATE 55: CPT

## 2022-07-08 PROCEDURE — 82607 VITAMIN B-12: CPT

## 2022-07-08 PROCEDURE — 86022 PLATELET ANTIBODIES: CPT | Performed by: INTERNAL MEDICINE

## 2022-07-08 PROCEDURE — 83550 IRON BINDING TEST: CPT

## 2022-07-08 PROCEDURE — 84165 PROTEIN E-PHORESIS SERUM: CPT | Performed by: PATHOLOGY

## 2022-07-08 PROCEDURE — 85025 COMPLETE CBC W/AUTO DIFF WBC: CPT | Performed by: INTERNAL MEDICINE

## 2022-07-08 PROCEDURE — 82948 REAGENT STRIP/BLOOD GLUCOSE: CPT

## 2022-07-08 PROCEDURE — 83521 IG LIGHT CHAINS FREE EACH: CPT

## 2022-07-08 PROCEDURE — 85018 HEMOGLOBIN: CPT | Performed by: INTERNAL MEDICINE

## 2022-07-08 PROCEDURE — 99232 SBSQ HOSP IP/OBS MODERATE 35: CPT | Performed by: INTERNAL MEDICINE

## 2022-07-08 PROCEDURE — 84165 PROTEIN E-PHORESIS SERUM: CPT

## 2022-07-08 PROCEDURE — 88184 FLOWCYTOMETRY/ TC 1 MARKER: CPT

## 2022-07-08 PROCEDURE — 80053 COMPREHEN METABOLIC PANEL: CPT | Performed by: NURSE PRACTITIONER

## 2022-07-08 PROCEDURE — 83540 ASSAY OF IRON: CPT

## 2022-07-08 PROCEDURE — 82728 ASSAY OF FERRITIN: CPT

## 2022-07-08 PROCEDURE — 82746 ASSAY OF FOLIC ACID SERUM: CPT

## 2022-07-08 PROCEDURE — 99233 SBSQ HOSP IP/OBS HIGH 50: CPT | Performed by: INTERNAL MEDICINE

## 2022-07-08 PROCEDURE — 83918 ORGANIC ACIDS TOTAL QUANT: CPT

## 2022-07-08 PROCEDURE — 85014 HEMATOCRIT: CPT | Performed by: INTERNAL MEDICINE

## 2022-07-08 RX ORDER — AMLODIPINE BESYLATE 5 MG/1
5 TABLET ORAL DAILY
Status: DISCONTINUED | OUTPATIENT
Start: 2022-07-08 | End: 2022-07-08

## 2022-07-08 RX ORDER — AMLODIPINE BESYLATE 10 MG/1
10 TABLET ORAL DAILY
Status: DISCONTINUED | OUTPATIENT
Start: 2022-07-09 | End: 2022-07-10 | Stop reason: HOSPADM

## 2022-07-08 RX ORDER — SODIUM CHLORIDE, SODIUM LACTATE, POTASSIUM CHLORIDE, CALCIUM CHLORIDE 600; 310; 30; 20 MG/100ML; MG/100ML; MG/100ML; MG/100ML
75 INJECTION, SOLUTION INTRAVENOUS CONTINUOUS
Status: DISCONTINUED | OUTPATIENT
Start: 2022-07-08 | End: 2022-07-09

## 2022-07-08 RX ADMIN — SERTRALINE HYDROCHLORIDE 50 MG: 50 TABLET ORAL at 09:30

## 2022-07-08 RX ADMIN — PANTOPRAZOLE SODIUM 40 MG: 40 TABLET, DELAYED RELEASE ORAL at 05:35

## 2022-07-08 RX ADMIN — SODIUM CHLORIDE, POTASSIUM CHLORIDE, SODIUM LACTATE AND CALCIUM CHLORIDE 100 ML/HR: 600; 310; 30; 20 INJECTION, SOLUTION INTRAVENOUS at 02:00

## 2022-07-08 RX ADMIN — NICOTINE 1 PATCH: 21 PATCH, EXTENDED RELEASE TRANSDERMAL at 09:30

## 2022-07-08 RX ADMIN — LIDOCAINE 5% 1 PATCH: 700 PATCH TOPICAL at 09:30

## 2022-07-08 RX ADMIN — SODIUM CHLORIDE, SODIUM LACTATE, POTASSIUM CHLORIDE, AND CALCIUM CHLORIDE 75 ML/HR: .6; .31; .03; .02 INJECTION, SOLUTION INTRAVENOUS at 13:14

## 2022-07-08 RX ADMIN — ACETAMINOPHEN 975 MG: 325 TABLET ORAL at 09:30

## 2022-07-08 RX ADMIN — ACETAMINOPHEN 975 MG: 325 TABLET ORAL at 16:51

## 2022-07-08 RX ADMIN — INSULIN LISPRO 1 UNITS: 100 INJECTION, SOLUTION INTRAVENOUS; SUBCUTANEOUS at 09:41

## 2022-07-08 RX ADMIN — QUETIAPINE FUMARATE 100 MG: 100 TABLET ORAL at 21:42

## 2022-07-08 RX ADMIN — FLUTICASONE PROPIONATE 2 SPRAY: 50 SPRAY, METERED NASAL at 09:41

## 2022-07-08 RX ADMIN — Medication 9 MG: at 21:42

## 2022-07-08 RX ADMIN — AMLODIPINE BESYLATE 5 MG: 5 TABLET ORAL at 10:02

## 2022-07-08 NOTE — ASSESSMENT & PLAN NOTE
· On admission: Cr is 4 98, BUN 35 (last Cr from April 2022 0 95)  · Presents w/ bilateral flank pain x 2 days, s/p 1 episode of dark urine; also increased urinary frequency w/ small volume urine output  · CK: negative  · UA: occult blood, trace protein; urine microscopy: unremarkable  · CT A/P w/ evidence of bladder wall thickening; no UTI on UA  · Cr (3 53 now), BUN (26 now) improving since admission (likely 2/2 IV hydration); flank pain also improving  · Unclear etiology at this time, think most likely pre-renal azotemia given decreased PO intake for a few days PTA  · Urology and Nephrology following    Plan:  · Follow-up urine cytology  · Acetaminophen 975 mg q8h  · Avoid hypotension, nephrotoxins  · Measure PVR & urine output  · Plan for outpatient hematuria workup with cystoscopy and CT renal study  · Measure orthostatic blood pressures  · Taper lactated ringers from 100 to 75 cc/hr

## 2022-07-08 NOTE — CONSULTS
Medical Oncology/Hematology Consult Note  Patsy Rubin, male, 64 y  o , 1965,  W /W -01, [id number]     Assessment  1  Thrombocytopenia  2  Anemia  3  Hematuria  4  TEO  - Hemoglobin and platelets today remains stable  Patient has no bruising or bleeding   - Patient has no new medication exposures, insect/tick exposures  He overall is just not feeling well with decreased oral intake and abdominal pain  It appears that his TEO is responding to hydration   - Urology and nephrology following and appreciate their input   - Plan is for outpatient bladder wall thickening on CT and hematuria workup    - Patient is eager to be discharged but I explained that his blood work needs to improve more prior to discharge  He is okay with this plan  I did explain that if his blood counts to continue to decline there is a low threshold for bone marrow biopsy  I explained that further workup includes more blood work and I explained in detail the workup listed below  Plan:  - Restrepo 13 and fecal occult stool are pending  - Labs ordered today:  Leukemia lymphoma flow cytometry, folate, MMA, vitamin B12, SPEP, free light chain  - Continue with daily CBC with differential and CMP      I will discuss this case with Dr Ebonie Cannon, and any changes to the plan will be outlined in his attestation  Reason for consultation: anemia and thrombocytopenia    History of present illness:   Patsy Rubin 64 y o  male with a PMH of HTN, MDD w/ psychotic features, hx of opioid abuse (clean since Oct 2021) who presented to the ER on 07/06 with 2 days of abdominal pain, decreased oral intake and darkened urine  He states that the pain was gradual onset and wraps around to his flank area bilaterally  He states that this has improved since admission  He also notes that his urine was darker in color  He denies any chest pain, shortness a breath, nausea/vomiting, diarrhea  He denies any new medication use including antibiotics    He denies any insect/tick exposure or bites  He denies any recent illnesses  He states that he just feels weak but overall no other complaints or concerns  CBC on admission demonstrated WBC 9 82, RBC 3 7, hemoglobin 11 1, hematocrit 34 2, MCV 92, MPV 9 4, platelets [id number] with normal differential   Creatinine on admission was 4 98 and previously ranged from 0 98-1  46  Which has been improving with hydration  Hemolysis labs are negative, LD slightly elevated at 318  Restrepo 13 and OSMAR (no evidence of recent heparin exposure confirmed with patient) panel was ordered in the ER and is pending  Urinalysis was positive for moderate amounts of occult blood, fecal occult stool is pending  Today he is feeling slightly better with some mild fatigue is up ambulating in the room upon my assessment  Creatinine improved to 3 53 today, platelets are stable at 131,000 hundred thirty one thousand and hemoglobin did drop slightly to 10 the likely dilutional due to IV hydration  Review of Systems:   Review of Systems   Constitutional: Positive for appetite change and fatigue  Negative for activity change, chills, diaphoresis, fever and unexpected weight change  HENT: Negative for trouble swallowing and voice change  Eyes: Negative for photophobia and visual disturbance  Respiratory: Negative for cough, chest tightness, shortness of breath and wheezing  Cardiovascular: Negative for chest pain, palpitations and leg swelling  Gastrointestinal: Negative for abdominal distention, abdominal pain, blood in stool, constipation, diarrhea, nausea and vomiting  Endocrine: Negative for cold intolerance and heat intolerance  Genitourinary: Positive for decreased urine volume and flank pain  Negative for difficulty urinating, dysuria, penile pain, penile swelling and urgency  Musculoskeletal: Negative for arthralgias, back pain, gait problem, joint swelling and myalgias  Skin: Negative for pallor and rash  Neurological: Negative for dizziness, tremors, weakness, light-headedness, numbness and headaches  Hematological: Negative for adenopathy  Does not bruise/bleed easily  No petechial rash   Psychiatric/Behavioral: Negative for confusion and sleep disturbance  Past Medical History:   Diagnosis Date    Abdominal pain     Allergic rhinitis     Cancer (HCC)     Chronic pain     Colon polyps     Depression     Fatigue     Head injury     Homeless     Hypertension     Insomnia     Liver disease     Loss of appetite     Numbness and tingling of right arm     Palpitations     Psychiatric disorder     bipolar    PTSD (post-traumatic stress disorder)     Seizures (HCC)     Shortness of breath     Substance abuse (HCC)     Swallowing difficulty        Past Surgical History:   Procedure Laterality Date    ABDOMINAL SURGERY      COLONOSCOPY      EYE SURGERY      NECK SURGERY      ORCHIECTOMY Right 5/19/2016    Procedure: ORCHIECTOMY INGUINAL biopsy of testicular mass, ;  Surgeon: Maura Boyd MD;  Location:  MAIN OR;  Service:    Bennie Rowan VT COLONOSCOPY FLX DX W/COLLJ SPEC WHEN PFRMD N/A 2/13/2017    Procedure: EGD AND COLONOSCOPY;  Surgeon: Koko Pierce MD;  Location: [facility]; Service: Gastroenterology    VT ESOPHAGOGASTRODUODENOSCOPY TRANSORAL DIAGNOSTIC N/A 11/16/2016    Procedure: EGD AND COLONOSCOPY;  Surgeon: Koko Pierce MD;  Location:  GI LAB;   Service: Gastroenterology       Family History   Problem Relation Age of Onset    Diabetes Mother     Hypertension Brother        Social History     Socioeconomic History    Marital status:      Spouse name: None    Number of children: None    Years of education: None    Highest education level: None   Occupational History    None   Tobacco Use    Smoking status: Current Every Day Smoker     Packs/day: 0 50     Years: 30 00     Pack years: 15 00     Types: Cigarettes    Smokeless tobacco: Current User   Vaping Use    Vaping Use: Never used   Substance and Sexual Activity    Alcohol use: No    Drug use: Not Currently     Types: Heroin    Sexual activity: Not Currently   Other Topics Concern    None   Social History Narrative    None     Social Determinants of Health     Financial Resource Strain: Not on file   Food Insecurity: Not on file   Transportation Needs: Not on file   Physical Activity: Not on file   Stress: Not on file   Social Connections: Not on file   Intimate Partner Violence: Not on file   Housing Stability: Not on file         Current Facility-Administered Medications:     acetaminophen (TYLENOL) tablet 975 mg, 975 mg, Oral, Q8H, Kinjal Pearson PA-C, 975 mg at 07/08/22 0930    amLODIPine (NORVASC) tablet 5 mg, 5 mg, Oral, Daily, Dona Steele MD, 5 mg at 07/08/22 1002    fluticasone (FLONASE) 50 mcg/act nasal spray 2 spray, 2 spray, Nasal, Daily, Kinjal Pearson PA-C, 2 spray at 07/08/22 0941    insulin lispro (HumaLOG) 100 units/mL subcutaneous injection 1-5 Units, 1-5 Units, Subcutaneous, TID AC, 1 Units at 07/08/22 0941 **AND** Fingerstick Glucose (POCT), , , TID AC, Kinjal Pearson PA-C    insulin lispro (HumaLOG) 100 units/mL subcutaneous injection 1-5 Units, 1-5 Units, Subcutaneous, HS, Kinjal Pearson PA-C    lactated ringers infusion, 100 mL/hr, Intravenous, Continuous, Kinjal Pearson PA-C, Last Rate: 100 mL/hr at 07/08/22 0200, 100 mL/hr at 07/08/22 0200    lidocaine (LIDODERM) 5 % patch 1 patch, 1 patch, Topical, Daily, Kiran Briseno DO, 1 patch at 07/08/22 0930    melatonin tablet 9 mg, 9 mg, Oral, HS, Kinjal Pearson PA-C, 9 mg at 07/07/22 2131    nicotine (NICODERM CQ) 21 mg/24 hr TD 24 hr patch 1 patch, 1 patch, Transdermal, Daily, Larisa Pearson PA-C, 1 patch at 07/08/22 0930    ondansetron (ZOFRAN) injection 4 mg, 4 mg, Intravenous, Q6H PRN, Larisa Pearson PA-C, 4 mg at 07/07/22 5406   pantoprazole (PROTONIX) EC tablet 40 mg, 40 mg, Oral, Early Morning, Kinjal Crowell RASHAD Pearson-C, 40 mg at 07/08/22 0535    prazosin (MINIPRESS) capsule 2 mg, 2 mg, Oral, HS, Kinjal AndersenRASHAD Weber-C, 2 mg at 07/07/22 2144    QUEtiapine (SEROquel) tablet 100 mg, 100 mg, Oral, HS, Kinjal AndersenRASHAD Weber-C, 100 mg at 07/07/22 2131    sertraline (ZOLOFT) tablet 50 mg, 50 mg, Oral, Daily, Kenneth RASHAD Candelaria-C, 50 mg at 07/08/22 0930    Medications Prior to Admission   Medication    citalopram (CeleXA) 40 mg tablet    CITALOPRAM HYDROBROMIDE PO    fluticasone (FLONASE) 50 mcg/act nasal spray    lisinopril (ZESTRIL) 5 mg tablet    Melatonin 10 MG CAPS    naloxone (NARCAN) 4 mg/0 1 mL nasal spray    pantoprazole (PROTONIX) 40 mg tablet    prazosin (MINIPRESS) 2 mg capsule    PRAZOSIN HCL PO    QUEtiapine (SEROquel) 100 mg tablet    senna-docusate sodium (SENOKOT-S) 8 6-50 mg per tablet    sertraline (ZOLOFT) 100 mg tablet    traZODone (DESYREL) 100 mg tablet       Allergies   Allergen Reactions    Oxycodone Swelling     Tongue swelling         Physical Exam:    /92   Pulse (!) 47   Temp 98 9 °F (37 2 °C) (Oral)   Resp 18   SpO2 98%     Physical Exam  Constitutional:       General: He is not in acute distress  Appearance: Normal appearance  He is not ill-appearing  HENT:      Head: Normocephalic and atraumatic  Eyes:      Extraocular Movements: Extraocular movements intact  Conjunctiva/sclera: Conjunctivae normal    Cardiovascular:      Rate and Rhythm: Normal rate and regular rhythm  Pulses: Normal pulses  Heart sounds: Normal heart sounds  Pulmonary:      Effort: Pulmonary effort is normal  No respiratory distress  Breath sounds: Normal breath sounds  No wheezing  Abdominal:      General: Abdomen is flat  Bowel sounds are normal  There is no distension  Palpations: Abdomen is soft  Tenderness: There is no abdominal tenderness  Musculoskeletal:      Cervical back: Normal range of motion  No tenderness  Right lower leg: No edema  Left lower leg: No edema  Lymphadenopathy:      Cervical: No cervical adenopathy  Skin:     General: Skin is warm and dry  Capillary Refill: Capillary refill takes less than 2 seconds  Coloration: Skin is not jaundiced or pale  Findings: No bruising or lesion  Comments: No petechial rash   Neurological:      General: No focal deficit present  Mental Status: He is alert and oriented to person, place, and time  Mental status is at baseline  Motor: No weakness  Gait: Gait normal    Psychiatric:         Mood and Affect: Mood normal          Behavior: Behavior normal          Thought Content:  Thought content normal          Judgment: Judgment normal          Recent Results (from the past 48 hour(s))   CBC and differential    Collection Time: 07/06/22  1:08 PM   Result Value Ref Range    WBC 9 82 4 31 - 10 16 Thousand/uL    RBC 3 70 (L) 3 88 - 5 62 Million/uL    Hemoglobin 11 1 (L) 12 0 - 17 0 g/dL    Hematocrit 34 2 (L) 36 5 - 49 3 %    MCV 92 82 - 98 fL    MCH 30 0 26 8 - 34 3 pg    MCHC 32 5 31 4 - 37 4 g/dL    RDW 13 1 11 6 - 15 1 %    MPV 9 4 8 9 - 12 7 fL    Platelets 771 (L) 378 - 390 Thousands/uL    nRBC 0 /100 WBCs    Neutrophils Relative 74 43 - 75 %    Immat GRANS % 1 0 - 2 %    Lymphocytes Relative 17 14 - 44 %    Monocytes Relative 8 4 - 12 %    Eosinophils Relative 0 0 - 6 %    Basophils Relative 0 0 - 1 %    Neutrophils Absolute 7 27 1 85 - 7 62 Thousands/µL    Immature Grans Absolute 0 06 0 00 - 0 20 Thousand/uL    Lymphocytes Absolute 1 66 0 60 - 4 47 Thousands/µL    Monocytes Absolute 0 77 0 17 - 1 22 Thousand/µL    Eosinophils Absolute 0 04 0 00 - 0 61 Thousand/µL    Basophils Absolute 0 02 0 00 - 0 10 Thousands/µL   Comprehensive metabolic panel    Collection Time: 07/06/22  1:08 PM   Result Value Ref Range    Sodium 132 (L) 135 - 147 mmol/L Potassium 4 3 3 5 - 5 3 mmol/L    Chloride 100 96 - 108 mmol/L    CO2 25 21 - 32 mmol/L    ANION GAP 7 4 - 13 mmol/L    BUN 35 (H) 5 - 25 mg/dL    Creatinine 4 98 (H) 0 60 - 1 30 mg/dL    Glucose 89 65 - 140 mg/dL    Calcium 9 4 8 4 - 10 2 mg/dL    AST 21 13 - 39 U/L    ALT 12 7 - 52 U/L    Alkaline Phosphatase 61 34 - 104 U/L    Total Protein 7 2 6 4 - 8 4 g/dL    Albumin 4 1 3 5 - 5 0 g/dL    Total Bilirubin 0 51 0 20 - 1 00 mg/dL    eGFR 12 ml/min/1 73sq m   Lipase    Collection Time: 07/06/22  1:08 PM   Result Value Ref Range    Lipase 36 11 - 82 u/L   CK Total with Reflex CKMB    Collection Time: 07/06/22  1:08 PM   Result Value Ref Range    Total  39 - 308 U/L   CKMB    Collection Time: 07/06/22  1:08 PM   Result Value Ref Range    CK-MB Index 0 6 0 0 - 2 5 %    CK-MB 1 6 0 6 - 6 3 ng/mL   UA w Reflex to Microscopic w Reflex to Culture    Collection Time: 07/06/22  5:22 PM    Specimen: Urine, Clean Catch   Result Value Ref Range    Color, UA Yellow     Clarity, UA Clear     Specific [place], UA 1 015 1 003 - 1 030    pH, UA 5 5 4 5, 5 0, 5 5, 6 0, 6 5, 7 0, 7 5, 8 0    Leukocytes, UA Negative Negative    Nitrite, UA Negative Negative    Protein, UA Trace (A) Negative mg/dl    Glucose,  (1/10%) (A) Negative mg/dl    Ketones, UA Negative Negative mg/dl    Urobilinogen, UA 0 2 0 2, 1 0 E U /dl E U /dl    Bilirubin, UA Negative Negative    Occult Blood, UA Moderate (A) Negative   Urine Microscopic    Collection Time: 07/06/22  5:22 PM   Result Value Ref Range    RBC, UA 0-1 None Seen, 0-1, 1-2, 2-4, 0-5 /hpf    WBC, UA None Seen None Seen, 0-1, 1-2, 0-5, 2-4 /hpf    Epithelial Cells None Seen None Seen, Occasional /hpf    Bacteria, UA Occasional None Seen, Occasional /hpf   Protein / creatinine ratio, urine    Collection Time: 07/06/22  5:22 PM   Result Value Ref Range    Creatinine, Ur 199 1 mg/dL    Protein Urine Random 37 mg/dL    Prot/Creat Ratio, Ur 0 19 (H) 0 00 - 0 10   Fingerstick Glucose (POCT)    Collection Time: 07/06/22 10:44 PM   Result Value Ref Range    POC Glucose 116 65 - 140 mg/dl   Comprehensive metabolic panel    Collection Time: 07/07/22  5:21 AM   Result Value Ref Range    Sodium 136 135 - 147 mmol/L    Potassium 3 9 3 5 - 5 3 mmol/L    Chloride 106 96 - 108 mmol/L    CO2 23 21 - 32 mmol/L    ANION GAP 7 4 - 13 mmol/L    BUN 33 (H) 5 - 25 mg/dL    Creatinine 4 46 (H) 0 60 - 1 30 mg/dL    Glucose 116 65 - 140 mg/dL    Calcium 8 4 8 4 - 10 2 mg/dL    Corrected Calcium 9 0 8 3 - 10 1 mg/dL    AST 14 13 - 39 U/L    ALT 9 7 - 52 U/L    Alkaline Phosphatase 50 34 - 104 U/L    Total Protein 5 8 (L) 6 4 - 8 4 g/dL    Albumin 3 2 (L) 3 5 - 5 0 g/dL    Total Bilirubin 0 24 0 20 - 1 00 mg/dL    eGFR 13 ml/min/1 73sq m   Magnesium    Collection Time: 07/07/22  5:21 AM   Result Value Ref Range    Magnesium 2 0 1 9 - 2 7 mg/dL   Phosphorus    Collection Time: 07/07/22  5:21 AM   Result Value Ref Range    Phosphorus 3 4 2 7 - 4 5 mg/dL   CK (with reflex to MB)    Collection Time: 07/07/22  5:21 AM   Result Value Ref Range    Total  39 - 308 U/L   TSH, 3rd generation with Free T4 reflex    Collection Time: 07/07/22  5:21 AM   Result Value Ref Range    TSH 3RD GENERATON 3 087 0 450 - 4 500 uIU/mL   Fingerstick Glucose (POCT)    Collection Time: 07/07/22  7:16 AM   Result Value Ref Range    POC Glucose 110 65 - 140 mg/dl   ECG 12 lead    Collection Time: 07/07/22 10:46 AM   Result Value Ref Range    Ventricular Rate 42 BPM    Atrial Rate 42 BPM    ND Interval 156 ms    QRSD Interval 88 ms    QT Interval 442 ms    QTC Interval 369 ms    P Axis 31 degrees    QRS Axis 14 degrees    T Wave Axis 28 degrees   ECG 12 lead    Collection Time: 07/07/22 10:49 AM   Result Value Ref Range    Ventricular Rate 42 BPM    Atrial Rate 42 BPM    ND Interval 156 ms    QRSD Interval 86 ms    QT Interval 448 ms    QTC Interval 374 ms    P [place] 29 degrees    QRS Axis 11 degrees    T Wave Axis 28 degrees   Fingerstick Glucose (POCT)    Collection Time: 07/07/22 11:47 AM   Result Value Ref Range    POC Glucose 118 65 - 140 mg/dl   Haptoglobin    Collection Time: 07/07/22  3:17 PM   Result Value Ref Range    Haptoglobin 77 29 - 370 mg/dL   Hemolysis Smear    Collection Time: 07/07/22  3:17 PM   Result Value Ref Range    Hemolysis Smear No Schistocytes or Helmet Cells noted    Fingerstick Glucose (POCT)    Collection Time: 07/07/22  3:56 PM   Result Value Ref Range    POC Glucose 116 65 - 140 mg/dl   Hemoglobin and hematocrit, blood    Collection Time: 07/07/22  4:11 PM   Result Value Ref Range    Hemoglobin 10 8 (L) 12 0 - 17 0 g/dL    Hematocrit 34 6 (L) 36 5 - 49 3 %   Fibrinogen    Collection Time: 07/07/22  4:11 PM   Result Value Ref Range    Fibrinogen 390 227 - 495 mg/dL   LD,Blood    Collection Time: 07/07/22  4:11 PM   Result Value Ref Range     (H) 140 - 271 U/L   Fingerstick Glucose (POCT)    Collection Time: 07/07/22  8:57 PM   Result Value Ref Range    POC Glucose 128 65 - 140 mg/dl   Comprehensive metabolic panel    Collection Time: 07/08/22  5:07 AM   Result Value Ref Range    Sodium 138 135 - 147 mmol/L    Potassium 4 2 3 5 - 5 3 mmol/L    Chloride 109 (H) 96 - 108 mmol/L    CO2 24 21 - 32 mmol/L    ANION GAP 5 4 - 13 mmol/L    BUN 26 (H) 5 - 25 mg/dL    Creatinine 3 53 (H) 0 60 - 1 30 mg/dL    Glucose 93 65 - 140 mg/dL    Calcium 8 3 (L) 8 4 - 10 2 mg/dL    Corrected Calcium 9 0 8 3 - 10 1 mg/dL    AST 11 (L) 13 - 39 U/L    ALT 8 7 - 52 U/L    Alkaline Phosphatase 44 34 - 104 U/L    Total Protein 5 7 (L) 6 4 - 8 4 g/dL    Albumin 3 1 (L) 3 5 - 5 0 g/dL    Total Bilirubin 0 31 0 20 - 1 00 mg/dL    eGFR 18 ml/min/1 73sq m   CBC and differential    Collection Time: 07/08/22  5:07 AM   Result Value Ref Range    WBC 7 00 4 31 - 10 16 Thousand/uL    RBC 3 27 (L) 3 88 - 5 62 Million/uL    Hemoglobin 10 0 (L) 12 0 - 17 0 g/dL    Hematocrit 30 0 (L) 36 5 - 49 3 %    MCV 92 82 - 98 fL    MCH 30 6 26 8 - 34 3 pg    MCHC 33 3 31 4 - 37 4 g/dL    RDW 13 2 11 6 - 15 1 %    MPV 9 9 8 9 - 12 7 fL    Platelets 375 (L) 540 - 390 Thousands/uL    nRBC 0 /100 WBCs    Neutrophils Relative 64 43 - 75 %    Immat GRANS % 0 0 - 2 %    Lymphocytes Relative 28 14 - 44 %    Monocytes Relative 7 4 - 12 %    Eosinophils Relative 1 0 - 6 %    Basophils Relative 0 0 - 1 %    Neutrophils Absolute 4 40 1 85 - 7 62 Thousands/µL    Immature Grans Absolute 0 02 0 00 - 0 20 Thousand/uL    Lymphocytes Absolute 1 96 0 60 - 4 47 Thousands/µL    Monocytes Absolute 0 51 0 17 - 1 22 Thousand/µL    Eosinophils Absolute 0 10 0 00 - 0 61 Thousand/µL    Basophils Absolute 0 01 0 00 - 0 10 Thousands/µL   Fingerstick Glucose (POCT)    Collection Time: 07/08/22  7:33 AM   Result Value Ref Range    POC Glucose 176 (H) 65 - 140 mg/dl   Fingerstick Glucose (POCT)    Collection Time: 07/08/22 10:36 AM   Result Value Ref Range    POC Glucose 107 65 - 140 mg/dl       CT abdomen pelvis wo contrast    Result Date: 7/6/2022  Narrative: CT ABDOMEN AND PELVIS WITHOUT IV CONTRAST INDICATION:   bilateral mid-abdominal and flank pain, hematuria, TEO  COMPARISON: CT 5/16/16 TECHNIQUE:  CT examination of the abdomen and pelvis was performed without intravenous contrast  This examination was performed without intravenous contrast in the context of the critical nationwide Omnipaque shortage  Axial, sagittal, and coronal 2D reformatted images were created from the source data and submitted for interpretation  Radiation dose length product (DLP) for this visit:  345 mGy-cm   This examination, like all CT scans performed in the [facility], was performed utilizing techniques to minimize radiation dose exposure, including the use of iterative reconstruction and automated exposure control  Enteric contrast was not administered  FINDINGS: ABDOMEN LOWER CHEST:  No clinically significant abnormality identified in the visualized lower chest  LIVER/BILIARY TREE:  Unremarkable  GALLBLADDER:  No calcified gallstones  No pericholecystic inflammatory change  SPLEEN:  Unremarkable  PANCREAS:  Unremarkable  ADRENAL GLANDS:  Unremarkable  KIDNEYS/URETERS:  Unremarkable  No hydronephrosis  STOMACH AND BOWEL:  Unremarkable  APPENDIX:  A normal appendix was visualized  ABDOMINOPELVIC CAVITY:  No ascites  No pneumoperitoneum  No lymphadenopathy  VESSELS:  Unremarkable for patient's age  PELVIS REPRODUCTIVE ORGANS:  Unremarkable for patient's age  URINARY BLADDER:  Decompressed, however wall is thick ABDOMINAL WALL/INGUINAL REGIONS:  Fat-containing inguinal hernias OSSEOUS STRUCTURES:  No acute fracture or destructive osseous lesion  Impression: Thick-walled urinary bladder, correlate for cystitis, otherwise no significant abnormality Workstation performed: [id number]       Labs and pertinent reports reviewed  This note has been generated by voice recognition software system  Therefore, there may be spelling, grammar, and or syntax errors  Please contact if questions arise

## 2022-07-08 NOTE — ASSESSMENT & PLAN NOTE
· Patient slightly anemic (Hb 11 1) on admission with slight downtrend since arrival (now Hb 10 1)  · Hematology-oncology following  · Hemolysis smear negative (no schistocytes), elevated LDH (318), fibrinogen normal, haptoglobin normal    Plan:  · Follow-up: iron panel, stool occult blood

## 2022-07-08 NOTE — PLAN OF CARE
Problem: Potential for Falls  Goal: Patient will remain free of falls  Description: INTERVENTIONS:  - Educate patient/family on patient safety including physical limitations  - Instruct patient to call for assistance with activity   - Consult OT/PT to assist with strengthening/mobility   - Keep Call bell within reach  - Keep bed low and locked with side rails adjusted as appropriate  - Keep care items and personal belongings within reach  - Initiate and maintain comfort rounds  - Make Fall Risk Sign visible to staff  - Offer Toileting every  Hours, in advance of need  - Initiate/Maintain alarm  - Obtain necessary fall risk management equipmen  - Apply yellow socks and bracelet for high fall risk patients  - Consider moving patient to room near nurses station  7/8/2022 1438 by Deborah Milligan RN  Outcome: Progressing  7/8/2022 1429 by Deborah Milligan RN  Outcome: Progressing     Problem: GASTROINTESTINAL - ADULT  Goal: Minimal or absence of nausea and/or vomiting  Description: INTERVENTIONS:  - Administer IV fluids if ordered to ensure adequate hydration  - Maintain NPO status until nausand vomiting are resolved  - Nasogastric tube if ordered  - Administer ordered antiemetic medications as needed  - Provide nonpharmacologic comfort measures as appropriate  - Advance diet as tolerated, if ordered  - Consider nutrition services referral to assist patient with adequate nutrition and appropriate food choices  7/8/2022 1438 by Deborah Milligan RN  Outcome: Progressing  7/8/2022 1429 by Deborah Milligan RN  Outcome: Progressing  Goal: Maintains or returns to baseline bowel function  Description: INTERVENTIONS:  - Assess bowel function  - Encourage oral fluids to ensure adequate hydration  - Administer IV fluids if ordered to ensure adequate hydration  - Administer ordered medications as needed  - Encourage mobilization and activity  - Consider nutritional services referral to assist patient with adequate nutrition and appropriate food choices  7/8/2022 1438 by Wayne Kee RN  Outcome: Progressing  7/8/2022 1429 by Wayne Kee RN  Outcome: Progressing  Goal: Maintains adequate nutritional intake  Description: INTERVENTIONS:  - Monitor percentage of each meal consumed  - Identify factors contributing to decreased intake, treat as appropriate  - Assist with meals as needed  - Monitor I&O, weight, and lab values if indicated  - Obtain nutrition services referral as needed  7/8/2022 1438 by Wayne Kee RN  Outcome: Progressing  7/8/2022 1429 by Wayne Kee RN  Outcome: Progressing  Goal: Establish and maintain optimal ostomy function  Description: INTERVENTIONS:  - Assess bowel function  - Encourage oral fluids to ensure adequate hydration  - Administer IV fluids if ordered to ensure adequate hydration   - Administer ordered medications as needed  - Encourage mobilization and activity  - Nutrition services referral to assist patient with appropriate food choices  - Assess stoma site  - Consider wound care consult   7/8/2022 1438 by Wayne Kee RN  Outcome: Progressing  7/8/2022 1429 by Wayne Kee RN  Outcome: Progressing  Goal: Oral mucous membranes remain intact  Description: INTERVENTIONS  - Assess oral mucosa and hygiene practices  - Implement preventative oral hygiene regimen  - Implement oral medicated treatments as ordered  - Initiate Nutrition services referral as needed  7/8/2022 1438 by Wayne Kee RN  Outcome: Progressing  7/8/2022 1429 by Wayne Kee RN  Outcome: Progressing     Problem: METABOLIC, FLUID AND ELECTROLYTES - ADULT  Goal: Electrolytes maintained within normal limits  Description: INTERVENTIONS:  - Monitor labs and assess patient for signs and symptoms of electrolyte imbalances  - Administer electrolyte replacement as ordered  - Monitor response to electrolyte replacements, including repeat lab results as appropriate  - Instruct patient on fluid and nutrition as appropriate  7/8/2022 1438 by Wayne Kee RN  Outcome: Progressing  7/8/2022 1429 by Fariba Trujillo RN  Outcome: Progressing  Goal: Fluid balance maintained  Description: INTERVENTIONS:  - Monitor labs   - Monitor I/O and WT  - Instruct patient on fluid and nutrition as appropriate  - Assess for signs & symptoms of volume excess or deficit  7/8/2022 1438 by Fariba Trujillo RN  Outcome: Progressing  7/8/2022 1429 by Fariba Trujillo RN  Outcome: Progressing  Goal: Glucose maintained within target range  Description: INTERVENTIONS:  - Monitor Blood Glucose as ordered  - Assess for signs and symptoms of hyperglycemia and hypoglycemia  - Administer ordered medications to maintain glucose within target range  - Assess nutritional intake and initiate nutrition service referral as needed  7/8/2022 1438 by Fariba Trujillo RN  Outcome: Progressing  7/8/2022 1429 by Fariba Trujillo RN  Outcome: Progressing     Problem: Prexisting or High Potential for Compromised Skin Integrity  Goal: Skin integrity is maintained or improved  Description: INTERVENTIONS:  - Identify patients at risk for skin breakdown  - Assess and monitor skin integrity  - Assess and monitor nutrition and hydration status  - Monitor labs   - Assess for incontinence   - Turn and reposition patient  - Assist with mobility/ambulation  - Relieve pressure over bony prominences  - Avoid friction and shearing  - Provide appropriate hygiene as needed including keeping skin clean and dry  - Evaluate need for skin moisturizer/barrier cream  - Collaborate with interdisciplinary team   - Patient/family teaching  - Consider wound care consult   7/8/2022 1438 by Fariba Trujillo RN  Outcome: Progressing  7/8/2022 1429 by Fariba Trujillo RN  Outcome: Progressing     Problem: Nutrition/Hydration-ADULT  Goal: Nutrient/Hydration intake appropriate for improving, restoring or maintaining nutritional needs  Description: Monitor and assess patient's nutrition/hydration status for malnutrition   Collaborate with interdisciplinary team and initiate plan and interventions as ordered  Monitor patient's weight and dietary intake as ordered or per policy  Utilize nutrition screening tool and intervene as necessary   Determine patient's food preferences and provide high-protein, high-caloric foods as appropriate  INTERVENTIONS:  - Monitor oral intake, urinary output, labs, and treatment plans  - Assess nutrition and hydration status and recommend course of action  - Evaluate amount of meals eaten  - Assist patient with eating if necessary   - Allow adequate time for meals  - Recommend/ encourage appropriate diets, oral nutritional supplements, and vitamin/mineral supplements  - Order, calculate, and assess calorie counts as needed  - Recommend, monitor, and adjust tube feedings and TPN/PPN based on assessed needs  - Assess need for intravenous fluids  - Provide specific nutrition/hydration education as appropriate  - Include patient/family/caregiver in decisions related to nutrition  7/8/2022 1438 by Reji Mccormick RN  Outcome: Progressing  7/8/2022 1429 by Reji Mccormick, RN  Outcome: Progressing

## 2022-07-08 NOTE — ASSESSMENT & PLAN NOTE
Lab Results   Component Value Date    HGBA1C 5 8 (H) 04/26/2022       Recent Labs     07/07/22  1147 07/07/22  1556 07/07/22 2057 07/08/22  0733   POCGLU 118 116 128 176*       Blood Sugar Average: Last 72 hrs:  · (P) 936 [id number] hgb a1c in April 2022 5 8%  · Not maintained on oral DM medication and/or insulin  · SSI for correction w/ QID accuchecks  · Monitor closely for hypoglycemia w/ insulin use in patient w/ TEO

## 2022-07-08 NOTE — PROGRESS NOTES
[facility]  Progress Note Jaden  1965, 64 y o  male MRN: [id number]  Unit/Bed#:  W -01 Encounter: [id number]  Primary Care Provider: JOSIAH Gold   Date and time admitted to hospital: 7/6/2022  3:52 PM    * TEO (acute kidney injury) [facility])  Assessment & Plan  · On admission: Cr is 4 98, BUN 35 (last Cr from April 2022 0 95)  · Presents w/ bilateral flank pain x 2 days, s/p 1 episode of dark urine; also increased urinary frequency w/ small volume urine output  · CK: negative  · UA: occult blood, trace protein; urine microscopy: unremarkable  · CT A/P w/ evidence of bladder wall thickening; no UTI on UA  · Cr (3 53 now), BUN (26 now) improving since admission (likely 2/2 IV hydration); flank pain also improving  · Unclear etiology at this time, think most likely pre-renal azotemia given decreased PO intake for a few days PTA  · Urology and Nephrology following    Plan:  · Follow-up urine cytology  · Acetaminophen 975 mg q8h  · Avoid hypotension, nephrotoxins  · Measure PVR & urine output  · Plan for outpatient hematuria workup with cystoscopy and CT renal study  · Measure orthostatic blood pressures  · Taper lactated ringers from 100 to 75 cc/hr      Thrombocytopenia (HCC)  Assessment & Plan  · Patient slightly thrombocytopenic (Plt 147) on admission with slight downtrend since arrival (now Plt 131)  · Hematology-oncology following    Plan:  · Follow-up: ADAMS13, heparin-induced platelet antibody    Anemia  Assessment & Plan  · Patient slightly anemic (Hb 11 1) on admission with slight downtrend since arrival (now Hb 10 1)  · Hematology-oncology following  · Hemolysis smear negative (no schistocytes), elevated LDH (318), fibrinogen normal, haptoglobin normal    Plan:  · Follow-up: iron panel, stool occult blood    Bradycardia  Assessment & Plan  · Patient's heart rate has been in the 50s since admission  · Patient was found by nurse to have HR 30s while he was asleep  · EKG: sinus bradycardia  · Bradycardia most likley due to hemodynamic instability  Plan:  · Continue to monitor  · Hold Lisinopril    Hypertension  Assessment & Plan  · /95 on admission  · Home medications: lisinopril 5 mg qd and prazosin 2 mg qd    Plan:  · Avoid hypotension  · Continue w/ prazosin 2 mg qd; hold lisinopril  · Add hydralazine 10mg IV Q6 PRN SBP >180    DM (diabetes mellitus), type 2 [facility])  Assessment & Plan  Lab Results   Component Value Date    HGBA1C 5 8 (H) 04/26/2022       Recent Labs     07/07/22  1147 07/07/22  1556 07/07/22  2057 07/08/22  0733   POCGLU 118 116 128 176*       Blood Sugar Average: Last 72 hrs:  · (P) 544 [id number] hgb a1c in April 2022 5 8%  · Not maintained on oral DM medication and/or insulin  · SSI for correction w/ QID accuchecks  · Monitor closely for hypoglycemia w/ insulin use in patient w/ TEO    Recurrent major depressive disorder, in remission ([facility] [address] )  Assessment & Plan  · Mood is stable on home medications  · Continue w/ home medication regimen: seroquel, zoloft    Opioid dependence in remission [facility])  Assessment & Plan  · Completed drug rehabilitation October 2021- reports no drug use since then (suboxone 1 day PTA)    VTE Pharmacologic Prophylaxis: VTE Score: 1 Low Risk (Score 0-2) - Encourage Ambulation  Patient Centered Rounds: I performed bedside rounds with nursing staff today  Discussions with Specialists or Other Care Team Provider: Nephrology, Urology, Hematology-Oncology    Education and Discussions with Family / Patient: Patient declined call to   Current Length of Stay: 2 day(s)  Current Patient Status: Inpatient   Discharge Plan: Anticipate discharge in 24-48 hrs to home  Code Status: Level 1 - Full Code    Subjective:   Patient states that he feels improved today  He reports bilateral lower abdominal pain now 5/10, down from 7-8/10 yesterday   He continues to endorse increased frequency of urination with small volume output of yellow, non-bloody urine  He states that he had no appetite yesterday and consumed nothing aside from liquids, but he endorses a mildly increased appetite today  He additionally states that he had some nausea yesterday which resolved with zofran and has not returned since  He continues to deny any chest pain, SOB, diarrhea, or vomiting  Objective:     Vitals:   Temp (24hrs), Av 3 °F (37 4 °C), Min:98 9 °F (37 2 °C), Max:99 7 °F (37 6 °C)    Temp:  [98 9 °F (37 2 °C)-99 7 °F (37 6 °C)] 98 9 °F (37 2 °C)  HR:  [47-60] 47  Resp:  [18] 18  BP: (151-195)/(72-99) 167/92  SpO2:  [96 %-100 %] 98 %  There is no height or weight on file to calculate BMI  Input and Output Summary (last 24 hours): Intake/Output Summary (Last 24 hours) at 2022 1327  Last data filed at 2022 0943  Gross per 24 hour   Intake 985 ml   Output 1625 ml   Net -640 ml       Physical Exam:   Physical Exam  Vitals and nursing note reviewed  Constitutional:       Appearance: He is well-developed  HENT:      Head: Normocephalic and atraumatic  Eyes:      Conjunctiva/sclera: Conjunctivae normal    Cardiovascular:      Rate and Rhythm: Normal rate and regular rhythm  Pulses: Normal pulses  Heart sounds: No murmur heard  Pulmonary:      Effort: Pulmonary effort is normal  No respiratory distress  Breath sounds: Normal breath sounds  Abdominal:      General: Bowel sounds are normal       Palpations: Abdomen is soft  Tenderness: There is no abdominal tenderness  Comments: Mild tenderness to palpation in the lower abdomen bilaterally  No flank tenderness today  No upper abdominal tenderness  No guarding  Musculoskeletal:      Cervical back: Neck supple  Skin:     General: Skin is warm and dry  Neurological:      Mental Status: He is alert           Additional Data:     Labs:  Results from last 7 days   Lab Units 22  0507   WBC Thousand/uL 7 00   HEMOGLOBIN g/dL 10 0* HEMATOCRIT % 30 0*   PLATELETS Thousands/uL 131*   NEUTROS PCT % 64   LYMPHS PCT % 28   MONOS PCT % 7   EOS PCT % 1     Results from last 7 days   Lab Units 07/08/22  0507   SODIUM mmol/L 138   POTASSIUM mmol/L 4 2   CHLORIDE mmol/L 109*   CO2 mmol/L 24   BUN mg/dL 26*   CREATININE mg/dL 3 53*   ANION GAP mmol/L 5   CALCIUM mg/dL 8 3*   ALBUMIN g/dL 3 1*   TOTAL BILIRUBIN mg/dL 0 31   ALK PHOS U/L 44   ALT U/L 8   AST U/L 11*   GLUCOSE RANDOM mg/dL 93         Results from last 7 days   Lab Units 07/08/22  1036 07/08/22  0733 07/07/22  2057 07/07/22  1556 07/07/22  1147 07/07/22  0716 07/06/22  2244   POC GLUCOSE mg/dl 107 176* 128 116 118 110 116               Lines/Drains:  Invasive Devices  Report    Peripheral Intravenous Line  Duration           Peripheral IV 07/06/22 Right Arm 1 day                      Imaging: Reviewed radiology reports from this admission including: abdominal/pelvic CT and Personally reviewed the following imaging: abdominal/pelvic CT    Recent Cultures (last 7 days):         Last 24 Hours Medication List:   Current Facility-Administered Medications   Medication Dose Route Frequency Provider Last Rate    acetaminophen  975 mg Oral Q8H Kinjal Pearson PA-C      amLODIPine  5 mg Oral Daily Dona Whitten MD      fluticasone  2 spray Nasal Daily Kinjal Pearson PA-C      insulin lispro  1-5 Units Subcutaneous TID AC Kinjal Pearson PA-C      insulin lispro  1-5 Units Subcutaneous HS Kinjal Pearson PA-C      lactated ringers  75 mL/hr Intravenous Continuous JOSIAH Romo 75 mL/hr (07/08/22 1314)    lidocaine  1 patch Topical Daily Nixon Briseno DO      melatonin  9 mg Oral HS Kinjal turner PA-C      nicotine  1 patch Transdermal Daily Wayne Pearson PA-C      ondansetron  4 mg Intravenous Q6H PRN Wayne Pearson PA-C      pantoprazole  40 mg Oral Early Morning Ciriloritreasure An PA-C      prazosin  2 mg Oral HS Kinjal Pearson PA-C      QUEtiapine  100 mg Oral HS Kinjal Pearson PA-C      sertraline  50 mg Oral Daily Kinjal Pearson PA-C          Today, Patient Was Seen By: Chaz Sweeney    **Please Note: This note may have been constructed using a voice recognition system  **

## 2022-07-08 NOTE — ASSESSMENT & PLAN NOTE
· Patient slightly thrombocytopenic (Plt 147) on admission with slight downtrend since arrival (now Plt 131)  · Hematology-oncology following    Plan:  · Follow-up: FIDEL, heparin-induced platelet antibody

## 2022-07-08 NOTE — PROGRESS NOTES
[address] [facility] NOTE   Malvin Anon 64 y o  male MRN: [id number]  Unit/Bed#: W -21 Encounter: [id number]  Reason for Consult:  Acute kidney injury    ASSESSMENT and PLAN:  Acute kidney injury (POA):  · Admitted 07/06:  Creatinine 4 98  · Baseline creatinine:  Creatinine 1 2 April 2022 with prior levels near 1 1 mg/dL  · Etiology:  Unclear  · Presented with abdominal pain/back pain and subjective hematuria  No NSAID use  Dizziness and falling at home  Bradycardia  · No evidence of orthostasis  · Improving with volume repletion supporting pre renal  · Anemia, thrombocytopenia which appears to be acute  Workup in progress  · Urine output improving  · Workup:    · Urinalysis:  Trace protein, 0-1 RBCs, moderate blood  · CK within normal limits  · Urine protein creatinine ratio 0 19  · 7/8:  Creatinine down to 3 53  · Urinalysis:  Specific gravity 1 015  Trace protein, 0-1 RBCs, moderate blood  · Urine protein creatinine ratio 0 19  · Imaging:  Kidneys unremarkable  No hydronephrosis  · PVR 23 mL  · Current creatinine down to 3 53  · Urine output improved, near 2 L  · Plan:  · Await results of workup  · Continue IV fluids:  Taper rate to 75 mL/hour  · Check labs in the a m  · Avoid hypotension, nephrotoxic agents  · Hold lisinopril     Dark urine:  · Urine negative for RBCs  · Imaging: Thickened bladder wall  · Urology consult   · Urine Cytology pending     Bradycardia:  · EKG with heart rate 42, Medication effect, ?vasovagal  · Blood pressure fluctuating- may be somewhat pain related  · No evidence of orthostasis  · Hold lisinopril  · Recommend holding Minipress     Back pain/abdominal pain:  · Imaging unrevealing  · Prior imaging shows degenerative changes cervical spine  May be related to degenerative changes    · Lumbar spine imaging August 2020 moderate spondylosis  · Lipase normal  · Clinically improving     Thrombocytopenia:  · Hematology following  · Platelet count previously normal  · Current level 148--> 131  · Hepatic steatosis noted on CT imaging 2016  · Workup:  Hemolysis smear negative, No schistocytes  Elevated   Fibrinogen normal   Haptoglobin normal  · Pending:  Lauro 13, HI T,     Anemia:  · Hemoglobin 11 1 on admission with prior levels normal   12 7 on most recent labs April 2022  · Hemoglobin declining since admission, currently 10  May be somewhat dilutional   · Iron panel pending  · Check blood for occult blood  · Seen by Hematology     Diabetes mellitus:  · Hemoglobin A1c:  Usually between 5 9-6 5%     Mild hyponatremia:  · Likely combination effect of volume depletion and medication effect  · Resolved, sodium normal      SUBJECTIVE / 24H INTERVAL HISTORY:  No acute complaints  Much less pain  Feeling better  Urinating well  No overnight events    OBJECTIVE:  Current Weight:    Vitals:    07/08/22 1002 07/08/22 1003 07/08/22 1004 07/08/22 1004   BP: (!) 186/93  167/92    BP Location:       Pulse:  (!) 48  (!) 47   Resp:       Temp:       TempSrc:       SpO2:  98%  98%       Intake/Output Summary (Last 24 hours) at 7/8/2022 1146  Last data filed at 7/8/2022 0943  Gross per 24 hour   Intake 985 ml   Output 1900 ml   Net -915 ml     General: NAD, comfortably lying in bed  Skin: no rash  Eyes: anicteric sclera  ENT: moist mucous membrane  Neck: supple  No JVD  Chest: CTA b/l, no ronchii, no wheeze, no rubs, no rales  Normal effort  CVS: s1s2, no murmur, no gallop, no rub  Normal rate regular rhythm  Abdomen: soft, nontender, nl sounds  Nondistended  No CVA tenderness  Extremities: no edema LE b/l  No mottling or cyanosis  : no daugherty    Voiding clear yellow urine  Neuro: AAOX3  Psych:  Pleasant and cooperative  Medications:    Current Facility-Administered Medications:     acetaminophen (TYLENOL) tablet 975 mg, 975 mg, Oral, Q8H, Kinjal Pearson PA-C, 975 mg at 07/08/22 0930    amLODIPine (NORVASC) tablet 5 mg, 5 mg, Oral, Daily, Dona Robledo Nicki David MD, 5 mg at 07/08/22 1002    fluticasone (FLONASE) 50 mcg/act nasal spray 2 spray, 2 spray, Nasal, Daily, Larisa Pearson PA-C, 2 spray at 07/08/22 0941    insulin lispro (HumaLOG) 100 units/mL subcutaneous injection 1-5 Units, 1-5 Units, Subcutaneous, TID AC, 1 Units at 07/08/22 0941 **AND** Fingerstick Glucose (POCT), , , TID AC, Kinjal Pearson PA-C    insulin lispro (HumaLOG) 100 units/mL subcutaneous injection 1-5 Units, 1-5 Units, Subcutaneous, HS, Kinjal Pearson PA-C    lactated ringers infusion, 100 mL/hr, Intravenous, Continuous, Kinjal Pearson PA-C, Last Rate: 100 mL/hr at 07/08/22 0200, 100 mL/hr at 07/08/22 0200    lidocaine (LIDODERM) 5 % patch 1 patch, 1 patch, Topical, Daily, Kiran Briseno DO, 1 patch at 07/08/22 0930    melatonin tablet 9 mg, 9 mg, Oral, HS, Kinjal Pearson PA-C, 9 mg at 07/07/22 2131    nicotine (NICODERM CQ) 21 mg/24 hr TD 24 hr patch 1 patch, 1 patch, Transdermal, Daily, Larisa Peasron PA-C, 1 patch at 07/08/22 0930    ondansetron (ZOFRAN) injection 4 mg, 4 mg, Intravenous, Q6H PRN, Larisa Pearson PA-C, 4 mg at 07/07/22 2128    pantoprazole (PROTONIX) EC tablet 40 mg, 40 mg, Oral, Early Morning, Kinjal Pearson PA-C, 40 mg at 07/08/22 0535    prazosin (MINIPRESS) capsule 2 mg, 2 mg, Oral, HS, Kinjal Pearson PA-C, 2 mg at 07/07/22 2144    QUEtiapine (SEROquel) tablet 100 mg, 100 mg, Oral, HS, Kinjal Pearson PA-C, 100 mg at 07/07/22 2131    sertraline (ZOLOFT) tablet 50 mg, 50 mg, Oral, Daily, Larisa Puckett FREEDOM Pearson, 50 mg at 07/08/22 0930    Laboratory Results:  Results from last 7 days   Lab Units 07/08/22  0507 07/07/22  1611 07/07/22  0521 07/06/22  1308   WBC Thousand/uL 7 00  --   --  9 82   HEMOGLOBIN g/dL 10 0* 10 8*  --  11 1*   HEMATOCRIT % 30 0* 34 6*  --  34 2*   PLATELETS Thousands/uL 131*  --   --  147*   POTASSIUM mmol/L 4 2  --  3 9 4 3 CHLORIDE mmol/L 109*  --  106 100   CO2 mmol/L 24  --  23 25   BUN mg/dL 26*  --  33* 35*   CREATININE mg/dL 3 53*  --  4 46* 4 98*   CALCIUM mg/dL 8 3*  --  8 4 9 4   MAGNESIUM mg/dL  --   --  2 0  --    PHOSPHORUS mg/dL  --   --  3 4  --

## 2022-07-09 LAB
ALBUMIN SERPL BCP-MCNC: 3.2 G/DL (ref 3.5–5)
ALP SERPL-CCNC: 47 U/L (ref 34–104)
ALT SERPL W P-5'-P-CCNC: 7 U/L (ref 7–52)
ANION GAP SERPL CALCULATED.3IONS-SCNC: 5 MMOL/L (ref 4–13)
AST SERPL W P-5'-P-CCNC: 10 U/L (ref 13–39)
BASOPHILS # BLD AUTO: 0.02 THOUSANDS/ΜL (ref 0–0.1)
BASOPHILS NFR BLD AUTO: 0 % (ref 0–1)
BILIRUB SERPL-MCNC: 0.4 MG/DL (ref 0.2–1)
BUN SERPL-MCNC: 23 MG/DL (ref 5–25)
CALCIUM ALBUM COR SERPL-MCNC: 9.6 MG/DL (ref 8.3–10.1)
CALCIUM SERPL-MCNC: 9 MG/DL (ref 8.4–10.2)
CHLORIDE SERPL-SCNC: 108 MMOL/L (ref 96–108)
CO2 SERPL-SCNC: 27 MMOL/L (ref 21–32)
CREAT SERPL-MCNC: 2.57 MG/DL (ref 0.6–1.3)
EOSINOPHIL # BLD AUTO: 0.13 THOUSAND/ΜL (ref 0–0.61)
EOSINOPHIL NFR BLD AUTO: 2 % (ref 0–6)
ERYTHROCYTE [DISTWIDTH] IN BLOOD BY AUTOMATED COUNT: 13.2 % (ref 11.6–15.1)
GFR SERPL CREATININE-BSD FRML MDRD: 26 ML/MIN/1.73SQ M
GLUCOSE SERPL-MCNC: 109 MG/DL (ref 65–140)
GLUCOSE SERPL-MCNC: 127 MG/DL (ref 65–140)
GLUCOSE SERPL-MCNC: 139 MG/DL (ref 65–140)
GLUCOSE SERPL-MCNC: 261 MG/DL (ref 65–140)
GLUCOSE SERPL-MCNC: 79 MG/DL (ref 65–140)
GLUCOSE SERPL-MCNC: 94 MG/DL (ref 65–140)
HCT VFR BLD AUTO: 31.9 % (ref 36.5–49.3)
HCT VFR BLD AUTO: 32.6 % (ref 36.5–49.3)
HCT VFR BLD AUTO: 33.5 % (ref 36.5–49.3)
HGB BLD-MCNC: 10.5 G/DL (ref 12–17)
HGB BLD-MCNC: 10.8 G/DL (ref 12–17)
HGB BLD-MCNC: 11.1 G/DL (ref 12–17)
IGA SERPL-MCNC: 149 MG/DL (ref 70–400)
IGG SERPL-MCNC: 1160 MG/DL (ref 700–1600)
IGM SERPL-MCNC: 141 MG/DL (ref 40–230)
IMM GRANULOCYTES # BLD AUTO: 0.04 THOUSAND/UL (ref 0–0.2)
IMM GRANULOCYTES NFR BLD AUTO: 1 % (ref 0–2)
KAPPA LC FREE SER-MCNC: 27.4 MG/L (ref 3.3–19.4)
KAPPA LC FREE/LAMBDA FREE SER: 1.26 {RATIO} (ref 0.26–1.65)
LAMBDA LC FREE SERPL-MCNC: 21.8 MG/L (ref 5.7–26.3)
LYMPHOCYTES # BLD AUTO: 1.53 THOUSANDS/ΜL (ref 0.6–4.47)
LYMPHOCYTES NFR BLD AUTO: 19 % (ref 14–44)
MCH RBC QN AUTO: 29.8 PG (ref 26.8–34.3)
MCHC RBC AUTO-ENTMCNC: 32.9 G/DL (ref 31.4–37.4)
MCV RBC AUTO: 91 FL (ref 82–98)
MONOCYTES # BLD AUTO: 0.59 THOUSAND/ΜL (ref 0.17–1.22)
MONOCYTES NFR BLD AUTO: 7 % (ref 4–12)
NEUTROPHILS # BLD AUTO: 5.76 THOUSANDS/ΜL (ref 1.85–7.62)
NEUTS SEG NFR BLD AUTO: 71 % (ref 43–75)
NRBC BLD AUTO-RTO: 0 /100 WBCS
PF4 HEPARIN CMPLX AB SER-ACNC: 0.17 OD (ref 0–0.4)
PLATELET # BLD AUTO: 156 THOUSANDS/UL (ref 149–390)
PMV BLD AUTO: 9.4 FL (ref 8.9–12.7)
POTASSIUM SERPL-SCNC: 4.7 MMOL/L (ref 3.5–5.3)
PROT SERPL-MCNC: 6 G/DL (ref 6.4–8.4)
RBC # BLD AUTO: 3.52 MILLION/UL (ref 3.88–5.62)
SODIUM SERPL-SCNC: 140 MMOL/L (ref 135–147)
WBC # BLD AUTO: 8.07 THOUSAND/UL (ref 4.31–10.16)

## 2022-07-09 PROCEDURE — 99232 SBSQ HOSP IP/OBS MODERATE 35: CPT | Performed by: INTERNAL MEDICINE

## 2022-07-09 PROCEDURE — 85025 COMPLETE CBC W/AUTO DIFF WBC: CPT

## 2022-07-09 PROCEDURE — 82784 ASSAY IGA/IGD/IGG/IGM EACH: CPT

## 2022-07-09 PROCEDURE — 82948 REAGENT STRIP/BLOOD GLUCOSE: CPT

## 2022-07-09 PROCEDURE — 85014 HEMATOCRIT: CPT | Performed by: INTERNAL MEDICINE

## 2022-07-09 PROCEDURE — 80053 COMPREHEN METABOLIC PANEL: CPT | Performed by: NURSE PRACTITIONER

## 2022-07-09 PROCEDURE — 85018 HEMOGLOBIN: CPT | Performed by: INTERNAL MEDICINE

## 2022-07-09 RX ORDER — HYDRALAZINE HYDROCHLORIDE 20 MG/ML
10 INJECTION INTRAMUSCULAR; INTRAVENOUS EVERY 6 HOURS PRN
Status: DISCONTINUED | OUTPATIENT
Start: 2022-07-09 | End: 2022-07-10 | Stop reason: HOSPADM

## 2022-07-09 RX ORDER — ACETAMINOPHEN 325 MG/1
975 TABLET ORAL EVERY 8 HOURS PRN
Status: CANCELLED | OUTPATIENT
Start: 2022-07-09

## 2022-07-09 RX ORDER — ACETAMINOPHEN 325 MG/1
975 TABLET ORAL EVERY 8 HOURS PRN
Status: DISCONTINUED | OUTPATIENT
Start: 2022-07-09 | End: 2022-07-10 | Stop reason: HOSPADM

## 2022-07-09 RX ADMIN — SODIUM CHLORIDE, SODIUM LACTATE, POTASSIUM CHLORIDE, AND CALCIUM CHLORIDE 75 ML/HR: .6; .31; .03; .02 INJECTION, SOLUTION INTRAVENOUS at 00:57

## 2022-07-09 RX ADMIN — ACETAMINOPHEN 975 MG: 325 TABLET ORAL at 09:55

## 2022-07-09 RX ADMIN — HYDRALAZINE HYDROCHLORIDE 10 MG: 20 INJECTION INTRAMUSCULAR; INTRAVENOUS at 11:39

## 2022-07-09 RX ADMIN — NICOTINE 1 PATCH: 21 PATCH, EXTENDED RELEASE TRANSDERMAL at 09:58

## 2022-07-09 RX ADMIN — Medication 9 MG: at 21:19

## 2022-07-09 RX ADMIN — ACETAMINOPHEN 975 MG: 325 TABLET ORAL at 18:48

## 2022-07-09 RX ADMIN — SERTRALINE HYDROCHLORIDE 50 MG: 50 TABLET ORAL at 09:56

## 2022-07-09 RX ADMIN — PANTOPRAZOLE SODIUM 40 MG: 40 TABLET, DELAYED RELEASE ORAL at 05:15

## 2022-07-09 RX ADMIN — AMLODIPINE BESYLATE 10 MG: 10 TABLET ORAL at 09:54

## 2022-07-09 RX ADMIN — QUETIAPINE FUMARATE 100 MG: 100 TABLET ORAL at 21:19

## 2022-07-09 NOTE — ASSESSMENT & PLAN NOTE
· On admission: Cr is 4 98, BUN 35 (last Cr from April 2022 0 95)  ·   · Presents w/ bilateral flank pain x 2 days, s/p 1 episode of dark urine; also increased urinary frequency w/ small volume urine output  · CK: negative  · UA: occult blood, trace protein; urine microscopy: unremarkable  · CT A/P w/ evidence of bladder wall thickening; no UTI on UA  · Cr (2 57 now), BUN (23 now) improving since admission (likely 2/2 IV hydration); flank pain also improving  · Unclear etiology at this time, think most likely pre-renal azotemia given decreased PO intake for a few days PTA  · Urology and Nephrology following    Plan:  · Acetaminophen 975 mg prn  · Avoid hypotension, nephrotoxins  · Measure PVR & urine output  · Plan for outpatient hematuria workup with cystoscopy and CT renal study  · Measure orthostatic blood pressures  · Fluids stopped by Nephro, encourage patient to increased PO intake

## 2022-07-09 NOTE — ASSESSMENT & PLAN NOTE
Lab Results   Component Value Date    HGBA1C 5 8 (H) 04/26/2022       Recent Labs     07/08/22  2131 07/09/22  0806 07/09/22  1115 07/09/22  1142   POCGLU 132 127 261* 109       Blood Sugar Average: Last 72 hrs:  · (P) 133 [id number] hgb a1c in April 2022 5 8%  · Not maintained on oral DM medication and/or insulin  · SSI for correction w/ QID accuchecks  · Monitor closely for hypoglycemia w/ insulin use in patient w/ TEO

## 2022-07-09 NOTE — PLAN OF CARE
Problem: Potential for Falls  Goal: Patient will remain free of falls  Description: INTERVENTIONS:  - Educate patient/family on patient safety including physical limitations  - Instruct patient to call for assistance with activity   - Consult OT/PT to assist with strengthening/mobility   - Keep Call bell within reach  - Keep bed low and locked with side rails adjusted as appropriate  - Keep care items and personal belongings within reach  - Initiate and maintain comfort rounds  - Make Fall Risk Sign visible to staff  - Offer Toileting every  Hours, in advance of need  - Initiate/Maintain alarm  - Obtain necessary fall risk management equipmen  - Apply yellow socks and bracelet for high fall risk patients  - Consider moving patient to room near nurses station  Outcome: Progressing

## 2022-07-09 NOTE — PROGRESS NOTES
NEPHROLOGY PROGRESS NOTE   Antonina Finch 64 y o  male MRN: [id number]  Unit/Bed#: W -01 Encounter: [id number]  Reason for Consult: TEO      SUMMARY:    77-year-old male with history of opioid abuse, hypertension, depression with psychotic features who presented to [facility] for abdominal pain along with low back pain generalized weakness decreased oral intake while also reporting some gross hematuria/dark urine  ASSESSMENT and PLAN:    Acute kidney injury--improving    --admission creatinine 4 98 mg/dL    --suspected to be prerenal azotemia fluctuations in the blood pressure    --urinalysis is bland     There was moderate blood but 0 RBCs  CPK level was normal    --urine protein creatinine ratio 0 1    --no indications for serologies    --renal function improving with volume resuscitation    --continue to hold lisinopril    --creatinine down to 2 5 mg/dL   --can discontinue fluids after completion of the current Liter       Anemia    --acute, baseline hemoglobin is normal   --hemoglobin has now stabilized, platelet count is improved  --follow-up stool occult   --gross hematuria resolved    --iron 31, ferritin 104, iron saturation 14, TIBC 225  --Glover 9 5, B12 328  --fibrinogen 390   --haptoglobin is normal   --LDH is elevated   --hemoglobin continues to drop with the rate of drop as started slow   --[id number] pending  --hemolysis smear showed no schistocytes   --leukemia, lymphoma flow cytometry ordered  --Hematology on board       Bradycardia    --heart rate has improved currently asymptomatic      Hypertension   --off minipress   --amlodipine increased to 10 mg recently        SUBJECTIVE / INTERVAL HISTORY:    Feeling much better today      OBJECTIVE:  Current Weight:    Vitals:    07/08/22 1731 07/08/22 2133 07/08/22 2134 07/08/22 2134   BP: 170/84 147/90     BP Location: Left arm      Pulse: 63   57   Resp: 18      Temp: 98 1 °F (36 7 °C)  (!) 97 4 °F (36 3 °C)    TempSrc: Oral SpO2: 100%   97%       Intake/Output Summary (Last 24 hours) at 7/9/2022 0801  Last data filed at 7/9/2022 0500  Gross per 24 hour   Intake 1073 75 ml   Output 1750 ml   Net -676 25 ml       Review of Systems:    12 point ROS has been reviewed  Physical Exam  Vitals and nursing note reviewed  Constitutional:       General: He is not in acute distress  Appearance: He is well-developed  HENT:      Head: Normocephalic and atraumatic  Eyes:      General: No scleral icterus  Conjunctiva/sclera: Conjunctivae normal       Pupils: Pupils are equal, round, and reactive to light  Cardiovascular:      Rate and Rhythm: Normal rate and regular rhythm  Heart sounds: S1 normal and S2 normal  No murmur heard  No friction rub  No gallop  Pulmonary:      Effort: Pulmonary effort is normal  No respiratory distress  Breath sounds: Normal breath sounds  No wheezing or rales  Abdominal:      General: Bowel sounds are normal       Palpations: Abdomen is soft  Tenderness: There is no abdominal tenderness  There is no rebound  Musculoskeletal:         General: Normal range of motion  Cervical back: Normal range of motion and neck supple  Skin:     Findings: No rash  Neurological:      Mental Status: He is alert and oriented to person, place, and time     Psychiatric:         Behavior: Behavior normal          Medications:    Current Facility-Administered Medications:     acetaminophen (TYLENOL) tablet 975 mg, 975 mg, Oral, Q8H, Kinjal Pearson PA-C, 975 mg at 07/08/22 1651    amLODIPine (NORVASC) tablet 10 mg, 10 mg, Oral, Daily, Martin Harley MD    fluticasone (FLONASE) 50 mcg/act nasal spray 2 spray, 2 spray, Nasal, Daily, Kinjal Pearson PA-C, 2 spray at 07/08/22 0941    insulin lispro (HumaLOG) 100 units/mL subcutaneous injection 1-5 Units, 1-5 Units, Subcutaneous, TID AC, 1 Units at 07/08/22 0941 **AND** Fingerstick Glucose (POCT), , , TID AC, Abram Champagne FREEDOM Pearson    insulin lispro (HumaLOG) 100 units/mL subcutaneous injection 1-5 Units, 1-5 Units, Subcutaneous, HS, Kinjal Pearson PA-C    lidocaine (LIDODERM) 5 % patch 1 patch, 1 patch, Topical, Daily, Carlisle Munanancy ChristopherDO laurie, 1 patch at 07/08/22 0930    melatonin tablet 9 mg, 9 mg, Oral, HS, Kinjal Pearson PA-C, 9 mg at 07/08/22 2142    nicotine (NICODERM CQ) 21 mg/24 hr TD 24 hr patch 1 patch, 1 patch, Transdermal, Daily, Peter Merlin Hillegass, PA-C, 1 patch at 07/08/22 0930    ondansetron (ZOFRAN) injection 4 mg, 4 mg, Intravenous, Q6H PRN, Peter Merlin Hillegass, PA-C, 4 mg at 07/07/22 2128    pantoprazole (PROTONIX) EC tablet 40 mg, 40 mg, Oral, Early Morning, Peter Merlin Hillegass, PA-C, 40 mg at 07/09/22 0515    QUEtiapine (SEROquel) tablet 100 mg, 100 mg, Oral, HS, Peter Merlin Hillegass, PA-C, 100 mg at 07/08/22 2142    sertraline (ZOLOFT) tablet 50 mg, 50 mg, Oral, Daily, Peter Merlin Hillegass, PA-C, 50 mg at 07/08/22 0930    Laboratory Results:  Results from last 7 days   Lab Units 07/09/22  0516 07/08/22  1615 07/08/22  0507 07/07/22  1611 07/07/22  0521 07/06/22  1308   WBC Thousand/uL 8 07  --  7 00  --   --  9 82   HEMOGLOBIN g/dL 10 5* 10 7* 10 0* 10 8*  --  11 1*   HEMATOCRIT % 31 9* 31 0* 30 0* 34 6*  --  34 2*   PLATELETS Thousands/uL 156  --  131*  --   --  147*   POTASSIUM mmol/L 4 7  --  4 2  --  3 9 4 3   CHLORIDE mmol/L 108  --  109*  --  106 100   CO2 mmol/L 27  --  24  --  23 25   BUN mg/dL 23  --  26*  --  33* 35*   CREATININE mg/dL 2 57*  --  3 53*  --  4 46* 4 98*   CALCIUM mg/dL 9 0  --  8 3*  --  8 4 9 4   MAGNESIUM mg/dL  --   --   --   --  2 0  --    PHOSPHORUS mg/dL  --   --   --   --  3 4  --        PLEASE NOTE:  This encounter was completed utilizing the [employer] Direct Speech Voice Recognition Software   Grammatical errors, random word insertions, pronoun errors and incomplete sentences are occasional consequences of the system due to software limitations, ambient noise and hardware issues  These may be missed by proof reading prior to affixing electronic signature  Any questions or concerns about the content, text or information contained within the body of this dictation should be directly addressed to the physician for clarification  Please do not hesitate to call me directly if you have any any questions or concerns

## 2022-07-09 NOTE — ASSESSMENT & PLAN NOTE
· /95 on admission  · Home medications: lisinopril 5 mg qd and prazosin 2 mg qd    Plan:  · Avoid hypotension  · Hold Prazosin and Lisinopril     · Amlodipine 10 mg   · Add hydralazine 10mg IV Q6 PRN SBP >180

## 2022-07-09 NOTE — PROGRESS NOTES
[facility]  Progress Note Areli Pencil 1965, 64 y o  male MRN: [id number]  Unit/Bed#: W -01 Encounter: [id number]  Primary Care Provider: JOSIAH Mattson   Date and time admitted to hospital: 7/6/2022  3:52 PM    * TEO (acute kidney injury) [facility])  Assessment & Plan  · On admission: Cr is 4 98, BUN 35 (last Cr from April 2022 0 95)  ·   · Presents w/ bilateral flank pain x 2 days, s/p 1 episode of dark urine; also increased urinary frequency w/ small volume urine output  · CK: negative  · UA: occult blood, trace protein; urine microscopy: unremarkable  · CT A/P w/ evidence of bladder wall thickening; no UTI on UA  · Cr (2 57 now), BUN (23 now) improving since admission (likely 2/2 IV hydration); flank pain also improving  · Unclear etiology at this time, think most likely pre-renal azotemia given decreased PO intake for a few days PTA  · Urology and Nephrology following    Plan:  · Acetaminophen 975 mg prn  · Avoid hypotension, nephrotoxins  · Measure PVR & urine output  · Plan for outpatient hematuria workup with cystoscopy and CT renal study  · Measure orthostatic blood pressures  · Fluids stopped by Nephro, encourage patient to increased PO intake  Bradycardia  Assessment & Plan  · Patient's heart rate has been in the 50s since admission  · Patient was found by nurse to have HR 30s while he was asleep  · EKG: sinus bradycardia  · Bradycardia most likley due to hemodynamic instability  Plan:  · Continue to monitor  · Hold Lisinopril    Hypertension  Assessment & Plan  · /95 on admission  · Home medications: lisinopril 5 mg qd and prazosin 2 mg qd    Plan:  · Avoid hypotension  · Hold Prazosin and Lisinopril     · Amlodipine 10 mg   · Add hydralazine 10mg IV Q6 PRN SBP >180    DM (diabetes mellitus), type 2 [facility])  Assessment & Plan  Lab Results   Component Value Date    HGBA1C 5 8 (H) 04/26/2022       Recent Labs     07/08/22  2131 07/09/22  1296 07/09/22  1115 07/09/22  1142   POCGLU 132 127 261* 109       Blood Sugar Average: Last 72 hrs:  · (P) 426 [id number] hgb a1c in April 2022 5 8%  · Not maintained on oral DM medication and/or insulin  · SSI for correction w/ QID accuchecks  · Monitor closely for hypoglycemia w/ insulin use in patient w/ TEO    Anemia  Assessment & Plan  · Patient slightly anemic (Hb 11 1) on admission with slight downtrend since arrival (now Hb 10 5)  · Hematology-oncology following  · Hemolysis smear negative (no schistocytes), elevated LDH (318), fibrinogen normal, haptoglobin normal  · Iron Panel   · Iron 31  · Iron Saturation  14%  · TIBC  225    Plan:  · Follow-up: stool occult blood    Thrombocytopenia (HCC)  Assessment & Plan  · Patient slightly thrombocytopenic (Plt 147) on admission with slight downtrend since arrival (now Plt 131)  · Hematology-oncology following    Plan:  · Follow-up: ADAMS13, heparin-induced platelet antibody  · Hematology following  Opioid dependence in remission [facility])  Assessment & Plan  · Completed drug rehabilitation October 2021- reports no drug use since then (suboxone 1 day PTA)    Recurrent major depressive disorder, in remission ([facility] [address] )  Assessment & Plan  · Mood is stable on home medications  · Continue w/ home medication regimen: seroquel, zoloft          VTE Pharmacologic Prophylaxis: VTE Score: 1 Low Risk (Score 0-2) - Encourage Ambulation  Patient Centered Rounds: I performed bedside rounds with nursing staff today  Discussions with Specialists or Other Care Team Provider: Urology, Nephrology, Hematology    Education and Discussions with Family / Patient: Patient declined call to   Time Spent for Care: 30 minutes  More than 50% of total time spent on counseling and coordination of care as described above      Current Length of Stay: 3 day(s)  Current Patient Status: Inpatient   Certification Statement: The patient will continue to require additional inpatient hospital stay due to Kidney Function monitor and Hypertension  Discharge Plan: Anticipate discharge in 24-48 hrs to home  Code Status: Level 1 - Full Code    Subjective:   64year old male presented to the ED with abdominal pain and hematuria x 2  Bilateral lower abdominal pain have improved  He continues to endorse frequency of urination  Non bloody urine  Patients have slowly started to increase food intake, appetite have slightly come back  Patient denies nausea, vomiting  Passing flatus and having baseline bowel movements  Objective:     Vitals:   Temp (24hrs), Av 7 °F (36 5 °C), Min:97 4 °F (36 3 °C), Max:98 1 °F (36 7 °C)    Temp:  [97 4 °F (36 3 °C)-98 1 °F (36 7 °C)] 97 7 °F (36 5 °C)  HR:  [52-66] 66  Resp:  [18] 18  BP: (147-177)/(80-95) 170/80  SpO2:  [97 %-100 %] 100 %  There is no height or weight on file to calculate BMI  Input and Output Summary (last 24 hours): Intake/Output Summary (Last 24 hours) at 2022 1436  Last data filed at 2022 1411  Gross per 24 hour   Intake 1993 75 ml   Output 3000 ml   Net -1006 25 ml       Physical Exam:   Physical Exam  Cardiovascular:      Rate and Rhythm: Bradycardia present  Pulmonary:      Effort: No respiratory distress  Abdominal:      General: There is no distension  Tenderness: There is no abdominal tenderness  There is no right CVA tenderness or left CVA tenderness  Musculoskeletal:      Right lower leg: No edema  Left lower leg: No edema  Neurological:      Mental Status: He is alert and oriented to person, place, and time  Mental status is at baseline            Additional Data:     Labs:  Results from last 7 days   Lab Units 22  0856 22  0516   WBC Thousand/uL  --  8 07   HEMOGLOBIN g/dL 10 8* 10 5*   HEMATOCRIT % 32 6* 31 9*   PLATELETS Thousands/uL  --  156   NEUTROS PCT %  --  71   LYMPHS PCT %  --  19   MONOS PCT %  --  7   EOS PCT %  --  2     Results from last 7 days   Lab Units 07/09/22  0516   SODIUM mmol/L 140   POTASSIUM mmol/L 4 7   CHLORIDE mmol/L 108   CO2 mmol/L 27   BUN mg/dL 23   CREATININE mg/dL 2 57*   ANION GAP mmol/L 5   CALCIUM mg/dL 9 0   ALBUMIN g/dL 3 2*   TOTAL BILIRUBIN mg/dL 0 40   ALK PHOS U/L 47   ALT U/L 7   AST U/L 10*   GLUCOSE RANDOM mg/dL 94         Results from last 7 days   Lab Units 07/09/22  1142 07/09/22  1115 07/09/22  0806 07/08/22  2131 07/08/22  1612 07/08/22  1036 07/08/22  0733 07/07/22  2057 07/07/22  1556 07/07/22  1147 07/07/22  0716 07/06/22  2244   POC GLUCOSE mg/dl 109 261* 127 132 100 107 176* 128 116 118 110 116               Lines/Drains:  Invasive Devices  Report    Peripheral Intravenous Line  Duration           Peripheral IV 07/06/22 Right Arm 2 days                      Imaging: Reviewed radiology reports from this admission including: abdominal/pelvic CT    Recent Cultures (last 7 days):         Last 24 Hours Medication List:   Current Facility-Administered Medications   Medication Dose Route Frequency Provider Last Rate    acetaminophen  975 mg Oral Q8H PRN Ronald Rubio MD      amLODIPine  10 mg Oral Daily Martin Harley MD      fluticasone  2 spray Nasal Daily Kinjal Pearson PA-C      hydrALAZINE  10 mg Intravenous Q6H PRN Dona Benitez MD      insulin lispro  1-5 Units Subcutaneous TID AC Kinjal Pearson PA-C      insulin lispro  1-5 Units Subcutaneous HS Kinjal Pearson PA-C      lidocaine  1 patch Topical Daily Khadra Briseno DO      melatonin  9 mg Oral HS Kinjal turner PA-C      nicotine  1 patch Transdermal Daily Abram Pearson PA-C      ondansetron  4 mg Intravenous Q6H PRN Abram Pearson PA-C      pantoprazole  40 mg Oral Early Morning Kinjal Pearson PA-C      QUEtiapine  100 mg Oral HS Kinjal Pearson PA-C      sertraline  50 mg Oral Daily Abram Pearson PA-C          Today, Patient Was Seen By: Cedrick Fitzgerald Shubham Seaman MD    **Please Note: This note may have been constructed using a voice recognition system  **

## 2022-07-09 NOTE — ASSESSMENT & PLAN NOTE
· Patient slightly thrombocytopenic (Plt 147) on admission with slight downtrend since arrival (now Plt 131)  · Hematology-oncology following    Plan:  · Follow-up: FIDEL, heparin-induced platelet antibody  · Hematology following

## 2022-07-09 NOTE — PROGRESS NOTES
Patient BP high in the 079P systolic in the AM   PRN hydralazine ordered for BP higher than 347 systolic  Pt given hydralazine with repeat BP taken one hour later  Pt still in the 421E systolic  Provider made aware and advised to continue to monitor for now  Continuing to monitor Pt

## 2022-07-09 NOTE — ASSESSMENT & PLAN NOTE
· Patient slightly anemic (Hb 11 1) on admission with slight downtrend since arrival (now Hb 10 5)  · Hematology-oncology following  · Hemolysis smear negative (no schistocytes), elevated LDH (318), fibrinogen normal, haptoglobin normal  · Iron Panel   · Iron 31  · Iron Saturation  14%  · TIBC  225    Plan:  · Follow-up: stool occult blood

## 2022-07-10 VITALS
SYSTOLIC BLOOD PRESSURE: 156 MMHG | HEART RATE: 53 BPM | RESPIRATION RATE: 18 BRPM | TEMPERATURE: 98.7 F | OXYGEN SATURATION: 96 % | DIASTOLIC BLOOD PRESSURE: 90 MMHG

## 2022-07-10 LAB
GLUCOSE SERPL-MCNC: 102 MG/DL (ref 65–140)
GLUCOSE SERPL-MCNC: 89 MG/DL (ref 65–140)
VWF CP ACT/NOR PPP CHRO: 81.1 %

## 2022-07-10 PROCEDURE — 82948 REAGENT STRIP/BLOOD GLUCOSE: CPT

## 2022-07-10 PROCEDURE — 99232 SBSQ HOSP IP/OBS MODERATE 35: CPT | Performed by: INTERNAL MEDICINE

## 2022-07-10 PROCEDURE — 99239 HOSP IP/OBS DSCHRG MGMT >30: CPT | Performed by: INTERNAL MEDICINE

## 2022-07-10 RX ORDER — AMLODIPINE BESYLATE 10 MG/1
10 TABLET ORAL DAILY
Qty: 30 TABLET | Refills: 0 | Status: SHIPPED | OUTPATIENT
Start: 2022-07-11 | End: 2022-10-24

## 2022-07-10 RX ORDER — QUETIAPINE FUMARATE 200 MG/1
200 TABLET, FILM COATED ORAL
Qty: 30 TABLET | Refills: 0 | Status: SHIPPED | OUTPATIENT
Start: 2022-07-10

## 2022-07-10 RX ADMIN — FLUTICASONE PROPIONATE 2 SPRAY: 50 SPRAY, METERED NASAL at 09:45

## 2022-07-10 RX ADMIN — AMLODIPINE BESYLATE 10 MG: 10 TABLET ORAL at 09:43

## 2022-07-10 RX ADMIN — LIDOCAINE 5% 1 PATCH: 700 PATCH TOPICAL at 09:44

## 2022-07-10 RX ADMIN — NICOTINE 1 PATCH: 21 PATCH, EXTENDED RELEASE TRANSDERMAL at 09:49

## 2022-07-10 RX ADMIN — PANTOPRAZOLE SODIUM 40 MG: 40 TABLET, DELAYED RELEASE ORAL at 05:03

## 2022-07-10 RX ADMIN — SERTRALINE HYDROCHLORIDE 50 MG: 50 TABLET ORAL at 09:43

## 2022-07-10 NOTE — ASSESSMENT & PLAN NOTE
Lab Results   Component Value Date    HGBA1C 5 8 (H) 04/26/2022       Recent Labs     07/09/22  1608 07/09/22  2118 07/10/22  0827 07/10/22  1102   POCGLU 139 79 89 102       Blood Sugar Average: Last 72 hrs:  · (P) 126 2last hgb a1c in April 2022 5 8%  · Not maintained on oral DM medication and/or insulin  · SSI for correction w/ QID accuchecks  · Monitor closely for hypoglycemia w/ insulin use in patient w/ TEO

## 2022-07-10 NOTE — ASSESSMENT & PLAN NOTE
· /95 on admission  · Home medications: lisinopril 5 mg qd and prazosin 2 mg qd    Plan:  · Avoid hypotension  · Hold Prazosin and Lisinopril     · Amlodipine 10 mg, continue as outpatient   · Add hydralazine 10mg IV Q6 PRN SBP >180

## 2022-07-10 NOTE — PROGRESS NOTES
NEPHROLOGY PROGRESS NOTE   Ghassan Faustin 64 y o  male MRN: [id number]  Unit/Bed#: W -01 Encounter: [id number]  Reason for Consult: TEO      SUMMARY:    59-year-old male with history of opioid abuse, hypertension, depression with psychotic features who presented to [facility] for abdominal pain along with low back pain generalized weakness decreased oral intake while also reporting some gross hematuria/dark urine       ASSESSMENT and PLAN:     Acute kidney injury--improving    --admission creatinine 4 98 mg/dL    --suspected to be prerenal azotemia fluctuations in the blood pressure    --urinalysis is bland     There was moderate blood but 0 RBCs  CPK level was normal    --urine protein creatinine ratio 0 1    --no indications for serologies    --renal function improving with volume resuscitation    --continue to hold lisinopril    Can restart once his renal function has returned back to baseline  --creatinine down to 2 5 mg/dL, no labs today to comment, difficult blood draw, follow-up blood work once obtained  --off intravenous fluids  --if repeat blood work shows renal function be stable or improving no objections to discharge  --message sent to the office for follow-up       Anemia    --acute, baseline hemoglobin is normal   --platelet count has improved  --follow-up stool occult   --gross hematuria resolved    --iron 31, ferritin 104, iron saturation 14, TIBC 225  --Glover 9 5, B12 328  --fibrinogen 390 (normal)  --haptoglobin is normal   --LDH is elevated   --hemoglobin stabilized yesterday    No CBC this morning  --heparin-induced antibody was negative  --[id number] 81 1 (normal)  --hemolysis smear showed no schistocytes   --leukemia, lymphoma flow cytometry ordered  --Hematology on board       Bradycardia--resolved    --heart rate has improved      Hypertension   --off minipress, off lisinopril  --amlodipine increased to 10 mg  --blood pressure improving   --euvolemic      SUBJECTIVE / INTERVAL HISTORY:    No chest pain or shortness of breath  Urine is clear no urinary symptoms  OBJECTIVE:  Current Weight:    Vitals:    07/09/22 1448 07/09/22 2122 07/09/22 2122 07/09/22 2145   BP:  162/92  155/77   BP Location:    Left arm   Pulse: 62  67    Resp:       Temp:       TempSrc:       SpO2: 97%  98%        Intake/Output Summary (Last 24 hours) at 7/10/2022 0745  Last data filed at 7/9/2022 1609  Gross per 24 hour   Intake 1160 ml   Output 1950 ml   Net -790 ml       Review of Systems:    12 point ROS has been reviewed  Physical Exam  Vitals and nursing note reviewed  Constitutional:       General: He is not in acute distress  Appearance: He is well-developed  HENT:      Head: Normocephalic and atraumatic  Eyes:      General: No scleral icterus  Conjunctiva/sclera: Conjunctivae normal       Pupils: Pupils are equal, round, and reactive to light  Cardiovascular:      Rate and Rhythm: Normal rate and regular rhythm  Heart sounds: S1 normal and S2 normal  No murmur heard  No friction rub  No gallop  Pulmonary:      Effort: Pulmonary effort is normal  No respiratory distress  Breath sounds: Normal breath sounds  No wheezing or rales  Abdominal:      General: Bowel sounds are normal       Palpations: Abdomen is soft  Tenderness: There is no abdominal tenderness  There is no rebound  Musculoskeletal:         General: Normal range of motion  Cervical back: Normal range of motion and neck supple  Skin:     Findings: No rash  Neurological:      Mental Status: He is alert and oriented to person, place, and time     Psychiatric:         Behavior: Behavior normal          Medications:    Current Facility-Administered Medications:     acetaminophen (TYLENOL) tablet 975 mg, 975 mg, Oral, Q8H PRN, Yazmin Castillo MD, 975 mg at 07/09/22 1848    amLODIPine (NORVASC) tablet 10 mg, 10 mg, Oral, Daily, Italo Donohue MD, 10 mg at 07/09/22 0954    fluticasone (FLONASE) 50 mcg/act nasal spray 2 spray, 2 spray, Nasal, Daily, Mana Pearson PA-C, 2 spray at 07/08/22 0941    hydrALAZINE (APRESOLINE) injection 10 mg, 10 mg, Intravenous, Q6H PRN, Dona Bejarano Cea, MD, 10 mg at 07/09/22 1139    insulin lispro (HumaLOG) 100 units/mL subcutaneous injection 1-5 Units, 1-5 Units, Subcutaneous, TID AC, 1 Units at 07/08/22 0941 **AND** Fingerstick Glucose (POCT), , , TID AC, Kinjal Pearson PA-C    insulin lispro (HumaLOG) 100 units/mL subcutaneous injection 1-5 Units, 1-5 Units, Subcutaneous, HS, Kinjal Pearson PA-C    lidocaine (LIDODERM) 5 % patch 1 patch, 1 patch, Topical, Daily, Fany Briseno DO, 1 patch at 07/08/22 0930    melatonin tablet 9 mg, 9 mg, Oral, HS, Kinjal Pearson PA-C, 9 mg at 07/09/22 2119    nicotine (NICODERM CQ) 21 mg/24 hr TD 24 hr patch 1 patch, 1 patch, Transdermal, Daily, Mana Pearson PA-C, 1 patch at 07/09/22 0958    ondansetron (ZOFRAN) injection 4 mg, 4 mg, Intravenous, Q6H PRN, Mana Pearson PA-C, 4 mg at 07/07/22 2128    pantoprazole (PROTONIX) EC tablet 40 mg, 40 mg, Oral, Early Morning, Mana Pearson PA-C, 40 mg at 07/10/22 0503    QUEtiapine (SEROquel) tablet 100 mg, 100 mg, Oral, HS, Kinjal Pearson PA-C, 100 mg at 07/09/22 2119    sertraline (ZOLOFT) tablet 50 mg, 50 mg, Oral, Daily, Mana Pearson PA-C, 50 mg at 07/09/22 0308    Laboratory Results:  Results from last 7 days   Lab Units 07/09/22  2041 07/09/22  0856 07/09/22  0516 07/08/22  1615 07/08/22  0507 07/07/22  1611 07/07/22  0521 07/06/22  1308   WBC Thousand/uL  --   --  8 07  --  7 00  --   --  9 82   HEMOGLOBIN g/dL 11 1* 10 8* 10 5* 10 7* 10 0* 10 8*  --  11 1*   HEMATOCRIT % 33 5* 32 6* 31 9* 31 0* 30 0* 34 6*  --  34 2*   PLATELETS Thousands/uL  --   --  156  --  131*  --   --  147*   POTASSIUM mmol/L  --   --  4 7  --  4 2  --  3 9 4 3   CHLORIDE mmol/L  --   --  108  -- 109*  --  106 100   CO2 mmol/L  --   --  27  --  24  --  23 25   BUN mg/dL  --   --  23  --  26*  --  33* 35*   CREATININE mg/dL  --   --  2 57*  --  3 53*  --  4 46* 4 98*   CALCIUM mg/dL  --   --  9 0  --  8 3*  --  8 4 9 4   MAGNESIUM mg/dL  --   --   --   --   --   --  2 0  --    PHOSPHORUS mg/dL  --   --   --   --   --   --  3 4  --        PLEASE NOTE:  This encounter was completed utilizing the [employer] Direct Speech Voice Recognition Software  Grammatical errors, random word insertions, pronoun errors and incomplete sentences are occasional consequences of the system due to software limitations, ambient noise and hardware issues  These may be missed by proof reading prior to affixing electronic signature  Any questions or concerns about the content, text or information contained within the body of this dictation should be directly addressed to the physician for clarification  Please do not hesitate to call me directly if you have any any questions or concerns

## 2022-07-10 NOTE — ASSESSMENT & PLAN NOTE
· Patient slightly thrombocytopenic (Plt 147) on admission with slight downtrend since arrival (now Plt 131)  · Hematology-oncology following    Plan:  · Follow-up: ADAMS13, heparin-induced platelet antibody  · Hematology following     · Stable

## 2022-07-10 NOTE — PROGRESS NOTES
Pt refusing early morning labs  PCA tried again later in morning but was unable to obtain labs  Pt only willing to be stuck once  Provider aware and will write script to get labs done outpatient upon discharge

## 2022-07-10 NOTE — ASSESSMENT & PLAN NOTE
· On admission: Cr is 4 98, BUN 35 (last Cr from April 2022 0 95)  ·   · Presents w/ bilateral flank pain x 2 days, s/p 1 episode of dark urine; also increased urinary frequency w/ small volume urine output  · CK: negative  · UA: occult blood, trace protein; urine microscopy: unremarkable  · CT A/P w/ evidence of bladder wall thickening; no UTI on UA  · Cr (2 57 now), BUN (23 now) improving since admission (likely 2/2 IV hydration); flank pain also improving  · Unclear etiology at this time, think most likely pre-renal azotemia given decreased PO intake for a few days PTA  · Urology and Nephrology following    Plan:  · Acetaminophen 975 mg prn  · Avoid hypotension, nephrotoxins  · Measure PVR & urine output  · Plan for outpatient hematuria workup with cystoscopy and CT renal study  · Measure orthostatic blood pressures  · Fluids stopped by Nephro, encourage patient to increased PO intake     · Fall with outpatient Nephrology and Urology

## 2022-07-10 NOTE — PROGRESS NOTES
[facility]  Progress Note Laci Cueto 1965, 64 y o  male MRN: [id number]  Unit/Bed#: W -01 Encounter: [id number]  Primary Care Provider: JOSIAH Durham   Date and time admitted to hospital: 7/6/2022  3:52 PM    Anemia  Assessment & Plan  · Patient slightly anemic (Hb 11 1) on admission with slight downtrend since arrival (now Hb 10 5)  · Hematology-oncology following  · Hemolysis smear negative (no schistocytes), elevated LDH (318), fibrinogen normal, haptoglobin normal  · Iron Panel   · Iron 31  · Iron Saturation  14%  · TIBC  225    Plan:  · Follow-up: stool occult blood  · Hemoglobin stable    Thrombocytopenia (HCC)  Assessment & Plan  · Patient slightly thrombocytopenic (Plt 147) on admission with slight downtrend since arrival (now Plt 131)  · Hematology-oncology following    Plan:  · Follow-up: ADAMS13, heparin-induced platelet antibody  · Hematology following  · Stable    Bradycardia  Assessment & Plan  · Patient's heart rate has been in the 50s since admission  · Patient was found by nurse to have HR 30s while he was asleep  · EKG: sinus bradycardia  · Bradycardia most likley due to hemodynamic instability  Plan:  · Continue to monitor  · Continue lisinopril    Hypertension  Assessment & Plan  · /95 on admission  · Home medications: lisinopril 5 mg qd and prazosin 2 mg qd    Plan:  · Avoid hypotension  · Hold Prazosin and Lisinopril     · Amlodipine 10 mg, continue as outpatient   · Add hydralazine 10mg IV Q6 PRN SBP >180    DM (diabetes mellitus), type 2 [facility])  Assessment & Plan  Lab Results   Component Value Date    HGBA1C 5 8 (H) 04/26/2022       Recent Labs     07/09/22  1608 07/09/22  2118 07/10/22  0827 07/10/22  1102   POCGLU 139 79 89 102       Blood Sugar Average: Last 72 hrs:  · (P) 126 2last hgb a1c in April 2022 5 8%  · Not maintained on oral DM medication and/or insulin  · SSI for correction w/ QID accuchecks  · Monitor closely for hypoglycemia w/ insulin use in patient w/ TEO    Opioid dependence in remission [facility])  Assessment & Plan  · Completed drug rehabilitation October 2021- reports no drug use since then (suboxone 1 day PTA)    Recurrent major depressive disorder, in remission ([facility] [address] )  Assessment & Plan  · Mood is stable on home medications  · Continue w/ home medication regimen: seroquel, zoloft    * TEO (acute kidney injury) ([facility] [address] )  Assessment & Plan  · On admission: Cr is 4 98, BUN 35 (last Cr from April 2022 0 95)  ·   · Presents w/ bilateral flank pain x 2 days, s/p 1 episode of dark urine; also increased urinary frequency w/ small volume urine output  · CK: negative  · UA: occult blood, trace protein; urine microscopy: unremarkable  · CT A/P w/ evidence of bladder wall thickening; no UTI on UA  · Cr (2 57 now), BUN (23 now) improving since admission (likely 2/2 IV hydration); flank pain also improving  · Unclear etiology at this time, think most likely pre-renal azotemia given decreased PO intake for a few days PTA  · Urology and Nephrology following    Plan:  · Acetaminophen 975 mg prn  · Avoid hypotension, nephrotoxins  · Measure PVR & urine output  · Plan for outpatient hematuria workup with cystoscopy and CT renal study  · Measure orthostatic blood pressures  · Fluids stopped by Nephro, encourage patient to increased PO intake     · Fall with outpatient Nephrology and Urology          Medical Problems             Resolved Problems  Date Reviewed: 7/10/2022   None               Discharging Resident: Minor Prudent, DO  Discharging Attending: Karyle Graces, MD  PCP: JOSIAH Cline  Admission Date:   Admission Orders (From admission, onward)     Ordered        07/06/22 2020  INPATIENT ADMISSION  Once                      Discharge Date: 07/10/22    Consultations During Hospital Stay:  · Nephrology, Hematology Oncology    Procedures Performed:   · None    Significant Findings / Test Results:   CT abdomen pelvis wo contrast    Result Date: 7/6/2022  Impression: Thick-walled urinary bladder, correlate for cystitis, otherwise no significant abnormality     Incidental Findings:   · None    Test Results Pending at Discharge (will require follow up): · None     Outpatient Tests Requested:  · CBC and BMP    Complications:  None    Reason for Admission: Abdominal and flank pain, hematuria    Hospital Course:   Reshma Denis is a 64 y o  male with a past medical history of hypertension, major depressive disorder with psychotic features, history of opioid abuse (clean since October 2021) who initially presented to the ED with complaints of abdominal pain for 2 days  Patient reported gradual onset of bilateral abdominal pain which wraps around to his flanks bilaterally  Patient also reported occurrences of dark red brown urine  In the ED, CT of the abdomen and pelvis showed findings of bladder wall thickening consistent with possible cystitis  A urinalysis was obtained which did not show any findings associated with UTI  Creatinine on admission was 4 98 compared to previous values of 0 98 and 1 46  Nephrology was consulted, and suspicion was pre renal azotemia  During hospitalization, creatinine dropped to 2 5 while holding lisinopril and receiving IV fluids  Patient was also found to be anemic, for which iron panel was ordered and showed low iron levels  Hematology-Oncology was consulted for evaluation of thrombocytopenia and anemia  On the final day of the patient's hospitalization, IV fluids were discontinued  On repeat blood work, renal function was stable and improving  Patient was medically cleared by Nephrology and Medicine primary team and stable for discharge on July 10th 2022  Please see above list of diagnoses and related plan for additional information       Condition at Discharge: good    Discharge Day Visit / Exam:   Subjective:  Abdominal pain  Vitals: Blood Pressure: 156/90 (07/10/22 0831)  Pulse: (!) 53 (07/10/22 0831)  Temperature: 98 7 °F (37 1 °C) (07/10/22 0831)  Temp Source: Oral (07/09/22 0841)  Respirations: 18 (07/08/22 1731)  SpO2: 96 % (07/10/22 0831)  Exam:   Physical Exam  Vitals and nursing note reviewed  Constitutional:       General: He is not in acute distress  Appearance: Normal appearance  He is well-developed  He is not ill-appearing  HENT:      Head: Normocephalic and atraumatic  Eyes:      Conjunctiva/sclera: Conjunctivae normal    Cardiovascular:      Rate and Rhythm: Normal rate and regular rhythm  Pulses: Normal pulses  Heart sounds: Normal heart sounds  No murmur heard  No friction rub  No gallop  Pulmonary:      Effort: Pulmonary effort is normal  No respiratory distress  Breath sounds: Normal breath sounds  No wheezing, rhonchi or rales  Abdominal:      General: Abdomen is flat  Bowel sounds are normal  There is no distension  Palpations: Abdomen is soft  Tenderness: There is no abdominal tenderness  Musculoskeletal:      Cervical back: Neck supple  Right lower leg: No edema  Left lower leg: No edema  Skin:     General: Skin is warm and dry  Neurological:      General: No focal deficit present  Mental Status: He is alert and oriented to person, place, and time  Psychiatric:         Mood and Affect: Mood normal          Behavior: Behavior normal           Discussion with Family: Updated  (friend) via phone  Discharge instructions/Information to patient and family:   See after visit summary for information provided to patient and family  Provisions for Follow-Up Care:  See after visit summary for information related to follow-up care and any pertinent home health orders  Disposition:   Home    Planned Readmission:  None    Discharge Medications:  See after visit summary for reconciled discharge medications provided to patient and/or family

## 2022-07-10 NOTE — ASSESSMENT & PLAN NOTE
· Patient slightly anemic (Hb 11 1) on admission with slight downtrend since arrival (now Hb 10 5)  · Hematology-oncology following  · Hemolysis smear negative (no schistocytes), elevated LDH (318), fibrinogen normal, haptoglobin normal  · Iron Panel   · Iron 31  · Iron Saturation  14%  · TIBC  225    Plan:  · Follow-up: stool occult blood  · Hemoglobin stable

## 2022-07-10 NOTE — DISCHARGE SUMMARY
[facility]  Discharge- Reshma Denis 1965, 64 y o  male MRN: [id number]  Unit/Bed#: W -01 Encounter: [id number]  Primary Care Provider: JOSIAH Rich   Date and time admitted to hospital: 7/6/2022  3:52 PM    Anemia  Assessment & Plan  · Patient slightly anemic (Hb 11 1) on admission with slight downtrend since arrival (now Hb 10 5)  · Hematology-oncology following  · Hemolysis smear negative (no schistocytes), elevated LDH (318), fibrinogen normal, haptoglobin normal  · Iron Panel   · Iron 31  · Iron Saturation  14%  · TIBC  225    Plan:  · Follow-up: stool occult blood  · Hemoglobin stable    Thrombocytopenia (HCC)  Assessment & Plan  · Patient slightly thrombocytopenic (Plt 147) on admission with slight downtrend since arrival (now Plt 131)  · Hematology-oncology following    Plan:  · Follow-up: ADAMS13, heparin-induced platelet antibody  · Hematology following  · Stable    Bradycardia  Assessment & Plan  · Patient's heart rate has been in the 50s since admission  · Patient was found by nurse to have HR 30s while he was asleep  · EKG: sinus bradycardia  · Bradycardia most likley due to hemodynamic instability  Plan:  · Continue to monitor  · Continue lisinopril    Hypertension  Assessment & Plan  · /95 on admission  · Home medications: lisinopril 5 mg qd and prazosin 2 mg qd    Plan:  · Avoid hypotension  · Hold Prazosin and Lisinopril     · Amlodipine 10 mg, continue as outpatient   · Add hydralazine 10mg IV Q6 PRN SBP >180    DM (diabetes mellitus), type 2 [facility])  Assessment & Plan  Lab Results   Component Value Date    HGBA1C 5 8 (H) 04/26/2022       Recent Labs     07/09/22  1608 07/09/22  2118 07/10/22  0827 07/10/22  1102   POCGLU 139 79 89 102       Blood Sugar Average: Last 72 hrs:  · (P) 126 2last hgb a1c in April 2022 5 8%  · Not maintained on oral DM medication and/or insulin  · SSI for correction w/ QID accuchecks  · Monitor closely for hypoglycemia w/ insulin use in patient w/ TEO    Opioid dependence in remission [facility])  Assessment & Plan  · Completed drug rehabilitation October 2021- reports no drug use since then (suboxone 1 day PTA)    Recurrent major depressive disorder, in remission ([facility] [address] )  Assessment & Plan  · Mood is stable on home medications  · Continue w/ home medication regimen: seroquel, zoloft    * TEO (acute kidney injury) ([facility] [address] )  Assessment & Plan  · On admission: Cr is 4 98, BUN 35 (last Cr from April 2022 0 95)  ·   · Presents w/ bilateral flank pain x 2 days, s/p 1 episode of dark urine; also increased urinary frequency w/ small volume urine output  · CK: negative  · UA: occult blood, trace protein; urine microscopy: unremarkable  · CT A/P w/ evidence of bladder wall thickening; no UTI on UA  · Cr (2 57 now), BUN (23 now) improving since admission (likely 2/2 IV hydration); flank pain also improving  · Unclear etiology at this time, think most likely pre-renal azotemia given decreased PO intake for a few days PTA  · Urology and Nephrology following    Plan:  · Acetaminophen 975 mg prn  · Avoid hypotension, nephrotoxins  · Measure PVR & urine output  · Plan for outpatient hematuria workup with cystoscopy and CT renal study  · Measure orthostatic blood pressures  · Fluids stopped by Nephro, encourage patient to increased PO intake     · Fall with outpatient Nephrology and Urology        Medical Problems             Resolved Problems  Date Reviewed: 7/10/2022   None               Discharging Resident: Daniel Luo, DO  Discharging Attending: Katharina Benitez MD  PCP: JOSIAH Beltrán  Admission Date:   Admission Orders (From admission, onward)     Ordered        07/06/22 2020  INPATIENT ADMISSION  Once                      Discharge Date: 07/10/22    Consultations During Hospital Stay:  · Nephrology, Hematology Oncology    Procedures Performed:   · None    Significant Findings / Test Results:   CT abdomen pelvis wo contrast    Result Date: 7/6/2022  Impression: Thick-walled urinary bladder, correlate for cystitis, otherwise no significant abnormality     Incidental Findings:   · None    Test Results Pending at Discharge (will require follow up): · None     Outpatient Tests Requested:  · CBC and BMP    Complications:  None    Reason for Admission: Abdominal and flank pain, hematuria    Hospital Course:   Patsy Rubin is a 64 y o  male with a past medical history of hypertension, major depressive disorder with psychotic features, history of opioid abuse (clean since October 2021) who initially presented to the ED with complaints of abdominal pain for 2 days  Patient reported gradual onset of bilateral abdominal pain which wraps around to his flanks bilaterally  Patient also reported occurrences of dark red brown urine  In the ED, CT of the abdomen and pelvis showed findings of bladder wall thickening consistent with possible cystitis  A urinalysis was obtained which did not show any findings associated with UTI  Creatinine on admission was 4 98 compared to previous values of 0 98 and 1 46  Nephrology was consulted, and suspicion was pre renal azotemia  During hospitalization, creatinine dropped to 2 5 while holding lisinopril and receiving IV fluids  Patient was also found to be anemic, for which iron panel was ordered and showed low iron levels  Hematology-Oncology was consulted for evaluation of thrombocytopenia and anemia  On the final day of the patient's hospitalization, IV fluids were discontinued  On repeat blood work, renal function was stable and improving  Patient was medically cleared by Nephrology and Medicine primary team and stable for discharge on July 10th 2022  Please see above list of diagnoses and related plan for additional information       Condition at Discharge: good    Discharge Day Visit / Exam:   Subjective:  Abdominal pain  Vitals: Blood Pressure: 156/90 (07/10/22 0831)  Pulse: (!) 53 (07/10/22 0831)  Temperature: 98 7 °F (37 1 °C) (07/10/22 0831)  Temp Source: Oral (07/09/22 0841)  Respirations: 18 (07/08/22 1731)  SpO2: 96 % (07/10/22 0831)  Exam:   Physical Exam  Vitals and nursing note reviewed  Constitutional:       General: He is not in acute distress  Appearance: Normal appearance  He is well-developed  He is not ill-appearing  HENT:      Head: Normocephalic and atraumatic  Eyes:      Conjunctiva/sclera: Conjunctivae normal    Cardiovascular:      Rate and Rhythm: Normal rate and regular rhythm  Pulses: Normal pulses  Heart sounds: Normal heart sounds  No murmur heard  No friction rub  No gallop  Pulmonary:      Effort: Pulmonary effort is normal  No respiratory distress  Breath sounds: Normal breath sounds  No wheezing, rhonchi or rales  Abdominal:      General: Abdomen is flat  Bowel sounds are normal  There is no distension  Palpations: Abdomen is soft  Tenderness: There is no abdominal tenderness  Musculoskeletal:      Cervical back: Neck supple  Right lower leg: No edema  Left lower leg: No edema  Skin:     General: Skin is warm and dry  Neurological:      General: No focal deficit present  Mental Status: He is alert and oriented to person, place, and time  Psychiatric:         Mood and Affect: Mood normal          Behavior: Behavior normal           Discussion with Family: Updated  (friend) via phone  Discharge instructions/Information to patient and family:   See after visit summary for information provided to patient and family  Provisions for Follow-Up Care:  See after visit summary for information related to follow-up care and any pertinent home health orders  Disposition:   Home    Planned Readmission:  None    Discharge Medications:  See after visit summary for reconciled discharge medications provided to patient and/or family

## 2022-07-10 NOTE — ASSESSMENT & PLAN NOTE
· Patient's heart rate has been in the 50s since admission  · Patient was found by nurse to have HR 30s while he was asleep  · EKG: sinus bradycardia  · Bradycardia most likley due to hemodynamic instability      Plan:  · Continue to monitor  · Continue lisinopril

## 2022-07-10 NOTE — DISCHARGE INSTR - AVS FIRST PAGE
Dear Haylee Hernandes,     It was our pleasure to care for you here at [facility]  It is our hope that we were always able to exceed the expected standards for your care during your stay  You were hospitalized due to abdominal pain  You were cared for on the 4th floor by Libia Baeza DO under the service of Erin Coates MD with the [facility] who covers for your primary care physician (PCP), JOSIAH Santillan, while you were hospitalized  If you have any questions or concerns related to this hospitalization, you may contact us at [phone]  For follow up as well as any medication refills, we recommend that you follow up with your primary care physician  A registered nurse will reach out to you by phone within a few days after your discharge to answer any additional questions that you may have after going home  However, at this time we provide for you here, the most important instructions / recommendations at discharge:     Notable Medication Adjustments -   Continue to take amlodipine (Norvasc) 10 mg once daily  Citalopram (Celexa) 40 mg to be taken once daily  Quetiapine (Seroquel) 200 mg to be taken once daily at bedtime  Testing Required after Discharge -   None  Important follow up information -   None  Other Instructions -   None  Please review this entire after visit summary as additional general instructions including medication list, appointments, activity, diet, any pertinent wound care, and other additional recommendations from your care team that may be provided for you        Sincerely,     Libia Baeza DO

## 2022-07-11 ENCOUNTER — TELEPHONE (OUTPATIENT)
Dept: HEMATOLOGY ONCOLOGY | Facility: CLINIC | Age: 57
End: 2022-07-11

## 2022-07-11 LAB
ALBUMIN SERPL ELPH-MCNC: 3.4 G/DL (ref 3.5–5)
ALBUMIN SERPL ELPH-MCNC: 53.2 % (ref 52–65)
ALPHA1 GLOB SERPL ELPH-MCNC: 0.38 G/DL (ref 0.1–0.4)
ALPHA1 GLOB SERPL ELPH-MCNC: 5.9 % (ref 2.5–5)
ALPHA2 GLOB SERPL ELPH-MCNC: 0.69 G/DL (ref 0.4–1.2)
ALPHA2 GLOB SERPL ELPH-MCNC: 10.8 % (ref 7–13)
BETA GLOB ABNORMAL SERPL ELPH-MCNC: 0.36 G/DL (ref 0.4–0.8)
BETA1 GLOB SERPL ELPH-MCNC: 5.6 % (ref 5–13)
BETA2 GLOB SERPL ELPH-MCNC: 5.4 % (ref 2–8)
BETA2+GAMMA GLOB SERPL ELPH-MCNC: 0.35 G/DL (ref 0.2–0.5)
GAMMA GLOB ABNORMAL SERPL ELPH-MCNC: 1.22 G/DL (ref 0.5–1.6)
GAMMA GLOB SERPL ELPH-MCNC: 19.1 % (ref 12–22)
IGG/ALB SER: 1.14 {RATIO} (ref 1.1–1.8)
PROT PATTERN SERPL ELPH-IMP: ABNORMAL
PROT SERPL-MCNC: 6.4 G/DL (ref 6.4–8.2)

## 2022-07-11 NOTE — TELEPHONE ENCOUNTER
Scheduling Appointment     Who Is Calling to Schedule  patient   Doctor Monique Bailey   Location [facility]   Date and Time 7/25 @ 3pm   Reason for scheduling appointment Hospital follow up   Patient verbalized understanding   Yes

## 2022-07-11 NOTE — UTILIZATION REVIEW
Notification of Discharge   This is a Notification of Discharge from our facility [address]  Please be advised that this patient has been discharge from our facility  Below you will find the admission and discharge date and time including the patients disposition  UTILIZATION REVIEW CONTACT:  Brad Corbin MA  Utilization   Network Utilization Review Department  Phone: [phone] x carefully listen to the prompts  All voicemails are confidential   Email: [email or web address]     PHYSICIAN ADVISORY SERVICES:  FOR EXVS-FO-LCCD REVIEW - MEDICAL NECESSITY DENIAL  Phone: [phone]  Fax: [phone]  Email: [email or web address]     PRESENTATION DATE: 7/6/2022  3:52 PM  OBERVATION ADMISSION DATE:   INPATIENT ADMISSION DATE: 7/6/22  8:20 PM   DISCHARGE DATE: 7/10/2022  4:53 PM  DISPOSITION: Home/Self Care Home/Self Care      IMPORTANT INFORMATION:  Send all requests for admission clinical reviews, approved or denied determinations and any other requests to dedicated fax number below belonging to the campus where the patient is receiving treatment   List of dedicated fax numbers:  [address] DENIALS (Administrative/Medical Necessity) [phone]   [address] (Maternity/NICU/Pediatrics) [phone]   [facility] [phone]   130 [facility] [phone]   [address] [place] [address] [phone]   2000 [facility] [address] [place] [address],[address] [phone]   [facility]  [phone]   [address] [place] [address], [facility]  [address] [facility] [address] [facility] [phone]

## 2022-07-12 ENCOUNTER — TELEPHONE (OUTPATIENT)
Dept: NEPHROLOGY | Facility: CLINIC | Age: 57
End: 2022-07-12

## 2022-07-12 LAB — METHYLMALONATE SERPL-SCNC: 242 NMOL/L (ref 0–378)

## 2022-07-12 NOTE — TELEPHONE ENCOUNTER
----- Message from Ashok Montes De Oca sent at 7/11/2022 10:21 AM EDT -----    ----- Message -----  From: Garret Almaguer MD  Sent: 7/10/2022   7:44 AM EDT  To: Ne[facility]    Can you make follow up with this patient in 4-6 weeks, preferable with Dr Jesus Person   Needs BMP before visit

## 2022-07-13 PROBLEM — R31.0 GROSS HEMATURIA: Status: ACTIVE | Noted: 2022-07-13

## 2022-07-13 NOTE — PROGRESS NOTES
Problem List Items Addressed This Visit        Genitourinary    TEO (acute kidney injury) ([facility] [address] ) - Primary    Relevant Orders    POCT urine dip (Completed)    Gross hematuria    Relevant Orders    POCT urine dip (Completed)       Other    Tobacco abuse    Relevant Orders    POCT urine dip (Completed)    Thrombocytopenia (HCC)    Relevant Orders    POCT urine dip (Completed)            Discussion:    Solo Cowart did well with his cystoscopy today, this shows normal urethral anatomy and some slight enlargement of the lateral lobes of the prostate  There are no bladder lesions  His urine cytology is negative  He is smoking, he has been decreasing his number of cigarettes per day, currently smoking roughly 9 or 10 cigarettes per day  He did present initially with acute kidney injury, I suspect that his hematuria was due to a kidney parenchymal process given no evidence of renal contour abnormalities, no hydronephrosis, negative cytology, and negative cystoscopy today  He will see urology back on an as-needed basis  It is possible this thrombocytopenia also contributed to his propensity for bleeding  I reviewed with him that his creatinine had decreased slightly to 2 5  He does have follow-up with Nephrology ongoing, they will help to manage his chronic kidney disease ongoing  All questions and concerns answered and addressed  His female  does mention that she to has had gross hematuria, we will work with our office staff to get her scheduled for the appropriate workup    Assessment and plan:       Please see problem oriented charting for the assessment plan of today's urological complaints    Gabe Flores MD      Chief Complaint     Chief Complaint   Patient presents with    Cystoscopy         History of Present Illness     Carlita Sanchez is a 64 y o  gentleman seen previously in the emergency room by Urology for gross hematuria as well as bilateral abdominal pain    Urine cytology is seen to be negative  His creatinine was over 4 upon presentation but most recent values are in the mid 2 range  Urologic history includes none  In terms of continued smoking he is smoking 9 or 10 cigarettes per day  Family history is significant for no urologic malignancy  The following portions of the patient's history were reviewed and updated as appropriate: allergies, current medications, past family history, past medical history, past social history, past surgical history and problem list     Detailed Urologic History     - please refer to HPI    Review of Systems     Review of Systems   Constitutional: Negative  HENT: Negative  Eyes: Negative  Respiratory: Negative  Cardiovascular: Negative  Gastrointestinal: Negative  Endocrine: Negative  Genitourinary: Positive for hematuria (None recently)  Musculoskeletal: Negative  Skin: Negative  Allergic/Immunologic: Negative  Neurological: Negative  Hematological: Negative  Psychiatric/Behavioral: Negative  Allergies     Allergies   Allergen Reactions    Oxycodone Swelling     Tongue swelling       Physical Exam     Physical Exam  Vitals reviewed  Constitutional:       General: He is not in acute distress  Appearance: Normal appearance  He is not ill-appearing, toxic-appearing or diaphoretic  HENT:      Head: Normocephalic and atraumatic  Eyes:      General: No scleral icterus  Right eye: No discharge  Left eye: No discharge  Cardiovascular:      Pulses: Normal pulses  Pulmonary:      Effort: Pulmonary effort is normal    Abdominal:      General: There is no distension  Palpations: There is no mass  Genitourinary:     Penis: Normal     Musculoskeletal:         General: No swelling  Skin:     General: Skin is warm  Neurological:      General: No focal deficit present  Mental Status: He is alert and oriented to person, place, and time        Cranial Nerves: No cranial nerve deficit  Psychiatric:         Mood and Affect: Mood normal          Behavior: Behavior normal          Thought Content: Thought content normal          Judgment: Judgment normal              Vital Signs  Vitals:    07/14/22 1418   BP: 120/62   BP Location: Left arm   Patient Position: Sitting   Cuff Size: Large   Pulse: 98   Resp: 16   SpO2: 98%   Weight: 63 2 kg (139 lb 6 4 oz)   Height: 5' 6" (1 676 m)         Current Medications       Current Outpatient Medications:     amLODIPine (NORVASC) 10 mg tablet, Take 1 tablet (10 mg total) by mouth daily, Disp: 30 tablet, Rfl: 0    fluticasone (FLONASE) 50 mcg/act nasal spray, 1 spray into each nostril daily Indications: Hayfever  At 9AM, Disp: 1 Bottle, Rfl: 0    QUEtiapine (SEROquel) 200 mg tablet, Take 1 tablet (200 mg total) by mouth daily at bedtime, Disp: 30 tablet, Rfl: 0    QUEtiapine (SEROquel) 25 mg tablet, , Disp: , Rfl:     sertraline (ZOLOFT) 100 mg tablet, Take 1 tablet by mouth daily, Disp: , Rfl:     atorvastatin (LIPITOR) 40 mg tablet, , Disp: , Rfl:     citalopram (CeleXA) 40 mg tablet, Take 1 tablet (40 mg total) by mouth daily Indications: Depression  At [address] [place] [address] (Patient not taking: No sig reported), Disp: 30 tablet, Rfl: 1    lisinopril (ZESTRIL) 5 mg tablet, Take 1 tablet by mouth daily (Patient not taking: No sig reported), Disp: , Rfl:     naloxone (NARCAN) 4 mg/0 1 mL nasal spray, Administer 1 spray into a nostril  If no response after 2-3 minutes, give another dose in the other nostril using a new spray  (Patient not taking: No sig reported), Disp: 1 each, Rfl: 1    pantoprazole (PROTONIX) 40 mg tablet, Take 1 tablet (40 mg total) by mouth daily in the early morning Indications: Gastroesophageal Reflux Disease   (Patient not taking: No sig reported), Disp: 30 tablet, Rfl: 1    traZODone (DESYREL) 100 mg tablet, Take 1 tablet by mouth daily at bedtime (Patient not taking: No sig reported), Disp: , Rfl:       Active Problems     Patient Active Problem List   Diagnosis    Recurrent major depressive disorder, in remission ([place] [address] )    Back pain    Tobacco abuse    Abnormal liver enzymes    PTSD (post-traumatic stress disorder)    Opioid dependence in remission ([place] [address] )    TEO (acute kidney injury) ([place] [address] )    DM (diabetes mellitus), type 2 ([place] [address] )    Hypertension    Bradycardia    Thrombocytopenia (HCC)    Anemia    Gross hematuria         Past Medical History     Past Medical History:   Diagnosis Date    Abdominal pain     Allergic rhinitis     Cancer (HCC)     Chronic pain     Colon polyps     Depression     Fatigue     Head injury     Homeless     Hypertension     Insomnia     Liver disease     Loss of appetite     Numbness and tingling of right arm     Palpitations     Psychiatric disorder     bipolar    PTSD (post-traumatic stress disorder)     Seizures (HCC)     Shortness of breath     Substance abuse (HCC)     Swallowing difficulty          Surgical History     Past Surgical History:   Procedure Laterality Date    ABDOMINAL SURGERY      COLONOSCOPY      EYE SURGERY      NECK SURGERY      ORCHIECTOMY Right 5/19/2016    Procedure: ORCHIECTOMY INGUINAL biopsy of testicular mass, ;  Surgeon: Morenita Duncan MD;  Location:  MAIN OR;  Service:    Rehan Carbone MS COLONOSCOPY FLX DX W/COLLJ SPEC WHEN PFRMD N/A 2/13/2017    Procedure: EGD AND COLONOSCOPY;  Surgeon: Gali De Leon MD;  Location: [facility] GI LAB; Service: Gastroenterology    MS ESOPHAGOGASTRODUODENOSCOPY TRANSORAL DIAGNOSTIC N/A 11/16/2016    Procedure: EGD AND COLONOSCOPY;  Surgeon: Gali De Leon MD;  Location:  GI LAB;   Service: Gastroenterology         Family History     Family History   Problem Relation Age of Onset    Diabetes Mother     Hypertension Brother          Social History     Social History     Social History     Tobacco Use   Smoking Status Current Every Day Smoker    Packs/day: 0 50    Years: 30 00    Pack years: 15 00    Types: Cigarettes Smokeless Tobacco Current User         Pertinent Lab Values     Lab Results   Component Value Date    CREATININE 2 57 (H) 07/09/2022       PSA 0 77    Final Diagnosis   A  Urine, Clean Catch, :  Negative for high grade urothelial carcinoma ([address]) - see comment  Benign urothelial cells  Benign squamous cells  Neutrophils, lymphocytes and bacteria      Satisfactory for Evaluation      Comment:  The above diagnostic category is from the recently published book, [employer] [place] [employer] Urinary Cytology, and is in keeping with the ongoing effort for utilization of standardized diagnostic terminology in urine cytology  *     *[employer] [place] [employer] Urinary Cytology  Chanda Preciado Counts; 2016         Electronically signed by Amy Duncan MD on 7/11/2022 at  3:12 PM             Pertinent Imaging      A thick-walled urinary bladder is noted on noncontrast CT scan    Unfortunately the patient's kidney function will not allow for a contrast study

## 2022-07-14 ENCOUNTER — PROCEDURE VISIT (OUTPATIENT)
Dept: UROLOGY | Facility: CLINIC | Age: 57
End: 2022-07-14
Payer: MEDICARE

## 2022-07-14 VITALS
WEIGHT: 139.4 LBS | HEART RATE: 98 BPM | HEIGHT: 66 IN | SYSTOLIC BLOOD PRESSURE: 120 MMHG | DIASTOLIC BLOOD PRESSURE: 62 MMHG | RESPIRATION RATE: 16 BRPM | OXYGEN SATURATION: 98 % | BODY MASS INDEX: 22.4 KG/M2

## 2022-07-14 DIAGNOSIS — D69.6 THROMBOCYTOPENIA (HCC): ICD-10-CM

## 2022-07-14 DIAGNOSIS — N17.9 AKI (ACUTE KIDNEY INJURY) (HCC): Primary | ICD-10-CM

## 2022-07-14 DIAGNOSIS — R31.0 GROSS HEMATURIA: ICD-10-CM

## 2022-07-14 DIAGNOSIS — Z72.0 TOBACCO ABUSE: ICD-10-CM

## 2022-07-14 LAB
SCAN RESULT: NORMAL
SL AMB  POCT GLUCOSE, UA: NORMAL
SL AMB LEUKOCYTE ESTERASE,UA: NORMAL
SL AMB POCT BILIRUBIN,UA: NORMAL
SL AMB POCT BLOOD,UA: NORMAL
SL AMB POCT CLARITY,UA: CLEAR
SL AMB POCT COLOR,UA: YELLOW
SL AMB POCT KETONES,UA: NORMAL
SL AMB POCT NITRITE,UA: NORMAL
SL AMB POCT PH,UA: 6.5
SL AMB POCT SPECIFIC GRAVITY,UA: 1005
SL AMB POCT URINE PROTEIN: NORMAL
SL AMB POCT UROBILINOGEN: 0.2

## 2022-07-14 PROCEDURE — 81002 URINALYSIS NONAUTO W/O SCOPE: CPT | Performed by: UROLOGY

## 2022-07-14 PROCEDURE — 99204 OFFICE O/P NEW MOD 45 MIN: CPT | Performed by: UROLOGY

## 2022-07-14 PROCEDURE — 52000 CYSTOURETHROSCOPY: CPT | Performed by: UROLOGY

## 2022-07-14 RX ORDER — ATORVASTATIN CALCIUM 40 MG/1
TABLET, FILM COATED ORAL
COMMUNITY
Start: 2022-07-13

## 2022-07-14 RX ORDER — QUETIAPINE FUMARATE 25 MG/1
TABLET, FILM COATED ORAL
COMMUNITY
Start: 2022-07-07

## 2022-07-14 NOTE — LETTER
July 14, 2022     Lea WilsonJOSIAH  [id number] Replaced by [facility] [address] [place] [id number]    Patient: Ethan Garcia   YOB: 1965   Date of Visit: 7/14/2022       Dear Dr Noy Alan: Thank you for referring Ethan Garcia to me for evaluation  Below are my notes for this consultation  If you have questions, please do not hesitate to call me  I look forward to following your patient along with you  Sincerely,        Skip Hardy MD        CC: MD Skip Morrissey MD  7/14/2022  2:54 PM  Sign when Signing Visit  Office Cystoscopy Procedure Note    Indication:    Hematuria    Informed consent   The risks, benefits, complications, treatment options, and expected outcomes were discussed with the patient  The patient concurred with the proposed plan and provided informed consent  Anesthesia  Lidocaine jelly 2%    Antibiotic prophylaxis   None    Procedure  The patient was placed in the supineposition, was prepped and draped in the usual manner using sterile technique, and 2% lidocaine jelly instilled into the urethra  A 17 F flexible cystoscope was then inserted into the urethra and the urethra and bladder carefully examined  The following findings were noted:    Findings:  Urethra:  Normal  Prostate:  Lateral lobe hypertrophy, no lesions  Bladder:  Normal, no tumors, defects, stones, or carcinoma in-situ  Ureteral orifices:  Orthotopic  Other findings:  None, retroflexed view confirms    Specimens: None                 Complications:    None; patient tolerated the procedure well           Disposition: To home   Condition: Stable    Plan: Negative cystoscopic evaluation for causes of gross hematuria  No malignant findings  Cystoscopy     Date/Time 7/14/2022 2:54 PM     Performed by  Skip Hardy MD     Authorized by Skip Hardy MD      Universal Protocol:  Consent: Verbal consent obtained  Written consent obtained    Risks and benefits: risks, benefits and alternatives were discussed  Consent given by: patient  Patient understanding: patient states understanding of the procedure being performed  Patient consent: the patient's understanding of the procedure matches consent given  Procedure consent: procedure consent matches procedure scheduled  Relevant documents: relevant documents present and verified  Test results: test results available and properly labeled  Site marked: the operative site was not marked  Radiology Images displayed and confirmed  If images not available, report reviewed: imaging studies available  Required items: required blood products, implants, devices, and special equipment available  Patient identity confirmed: verbally with patient and provided demographic data        Procedure Details:  Procedure type: cystoscopy    Patient tolerance: Patient tolerated the procedure well with no immediate complications    Additional Procedure Details: Office Cystoscopy Procedure Note    Indication:    Hematuria    Informed consent   The risks, benefits, complications, treatment options, and expected outcomes were discussed with the patient  The patient concurred with the proposed plan and provided informed consent  Anesthesia  Lidocaine jelly 2%    Antibiotic prophylaxis   None    Procedure  The patient was placed in the supineposition, was prepped and draped in the usual manner using sterile technique, and 2% lidocaine jelly instilled into the urethra  A 17 F flexible cystoscope was then inserted into the urethra and the urethra and bladder carefully examined  The following findings were noted:    Findings:  Urethra:  Normal  Prostate:  Lateral lobe hypertrophy, no lesions  Bladder:  Normal, no tumors, defects, stones, or carcinoma in-situ  Ureteral orifices:  Orthotopic  Other findings:  None, retroflexed view confirms    Specimens: None                 Complications:    None; patient tolerated the procedure well           Disposition: To home   Condition: Stable    Plan: Negative cystoscopic evaluation for causes of gross hematuria  No malignant findings  Eliza Cevallos MD  7/14/2022  2:53 PM  Sign when Signing Visit       Problem List Items Addressed This Visit        Genitourinary    TEO (acute kidney injury) ([facility] [address] ) - Primary    Relevant Orders    POCT urine dip (Completed)    Gross hematuria    Relevant Orders    POCT urine dip (Completed)       Other    Tobacco abuse    Relevant Orders    POCT urine dip (Completed)    Thrombocytopenia (HCC)    Relevant Orders    POCT urine dip (Completed)            Discussion:    Sima Calderón did well with his cystoscopy today, this shows normal urethral anatomy and some slight enlargement of the lateral lobes of the prostate  There are no bladder lesions  His urine cytology is negative  He is smoking, he has been decreasing his number of cigarettes per day, currently smoking roughly 9 or 10 cigarettes per day  He did present initially with acute kidney injury, I suspect that his hematuria was due to a kidney parenchymal process given no evidence of renal contour abnormalities, no hydronephrosis, negative cytology, and negative cystoscopy today  He will see urology back on an as-needed basis  It is possible this thrombocytopenia also contributed to his propensity for bleeding  I reviewed with him that his creatinine had decreased slightly to 2 5  He does have follow-up with Nephrology ongoing, they will help to manage his chronic kidney disease ongoing  All questions and concerns answered and addressed    His female  does mention that she to has had gross hematuria, we will work with our office staff to get her scheduled for the appropriate workup    Assessment and plan:       Please see problem oriented charting for the assessment plan of today's urological complaints    Eliza Cevallos MD      Chief Complaint     Chief Complaint   Patient presents with    Cystoscopy History of Present Illness     Diana Hopson is a 64 y o  gentleman seen previously in the emergency room by Urology for gross hematuria as well as bilateral abdominal pain  Urine cytology is seen to be negative  His creatinine was over 4 upon presentation but most recent values are in the mid 2 range  Urologic history includes none  In terms of continued smoking he is smoking 9 or 10 cigarettes per day  Family history is significant for no urologic malignancy  The following portions of the patient's history were reviewed and updated as appropriate: allergies, current medications, past family history, past medical history, past social history, past surgical history and problem list     Detailed Urologic History     - please refer to HPI    Review of Systems     Review of Systems   Constitutional: Negative  HENT: Negative  Eyes: Negative  Respiratory: Negative  Cardiovascular: Negative  Gastrointestinal: Negative  Endocrine: Negative  Genitourinary: Positive for hematuria (None recently)  Musculoskeletal: Negative  Skin: Negative  Allergic/Immunologic: Negative  Neurological: Negative  Hematological: Negative  Psychiatric/Behavioral: Negative  Allergies     Allergies   Allergen Reactions    Oxycodone Swelling     Tongue swelling       Physical Exam     Physical Exam  Vitals reviewed  Constitutional:       General: He is not in acute distress  Appearance: Normal appearance  He is not ill-appearing, toxic-appearing or diaphoretic  HENT:      Head: Normocephalic and atraumatic  Eyes:      General: No scleral icterus  Right eye: No discharge  Left eye: No discharge  Cardiovascular:      Pulses: Normal pulses  Pulmonary:      Effort: Pulmonary effort is normal    Abdominal:      General: There is no distension  Palpations: There is no mass     Genitourinary:     Penis: Normal     Musculoskeletal:         General: No swelling  Skin:     General: Skin is warm  Neurological:      General: No focal deficit present  Mental Status: He is alert and oriented to person, place, and time  Cranial Nerves: No cranial nerve deficit  Psychiatric:         Mood and Affect: Mood normal          Behavior: Behavior normal          Thought Content: Thought content normal          Judgment: Judgment normal              Vital Signs  Vitals:    07/14/22 1418   BP: 120/62   BP Location: Left arm   Patient Position: Sitting   Cuff Size: Large   Pulse: 98   Resp: 16   SpO2: 98%   Weight: 63 2 kg (139 lb 6 4 oz)   Height: 5' 6" (1 676 m)         Current Medications       Current Outpatient Medications:     amLODIPine (NORVASC) 10 mg tablet, Take 1 tablet (10 mg total) by mouth daily, Disp: 30 tablet, Rfl: 0    fluticasone (FLONASE) 50 mcg/act nasal spray, 1 spray into each nostril daily Indications: Hayfever  At 9AM, Disp: 1 Bottle, Rfl: 0    QUEtiapine (SEROquel) 200 mg tablet, Take 1 tablet (200 mg total) by mouth daily at bedtime, Disp: 30 tablet, Rfl: 0    QUEtiapine (SEROquel) 25 mg tablet, , Disp: , Rfl:     sertraline (ZOLOFT) 100 mg tablet, Take 1 tablet by mouth daily, Disp: , Rfl:     atorvastatin (LIPITOR) 40 mg tablet, , Disp: , Rfl:     citalopram (CeleXA) 40 mg tablet, Take 1 tablet (40 mg total) by mouth daily Indications: Depression  At [address] [place] [address] (Patient not taking: No sig reported), Disp: 30 tablet, Rfl: 1    lisinopril (ZESTRIL) 5 mg tablet, Take 1 tablet by mouth daily (Patient not taking: No sig reported), Disp: , Rfl:     naloxone (NARCAN) 4 mg/0 1 mL nasal spray, Administer 1 spray into a nostril  If no response after 2-3 minutes, give another dose in the other nostril using a new spray  (Patient not taking: No sig reported), Disp: 1 each, Rfl: 1    pantoprazole (PROTONIX) 40 mg tablet, Take 1 tablet (40 mg total) by mouth daily in the early morning Indications: Gastroesophageal Reflux Disease   (Patient not taking: No sig reported), Disp: 30 tablet, Rfl: 1    traZODone (DESYREL) 100 mg tablet, Take 1 tablet by mouth daily at bedtime (Patient not taking: No sig reported), Disp: , Rfl:       Active Problems     Patient Active Problem List   Diagnosis    Recurrent major depressive disorder, in remission ([place] [address] )    Back pain    Tobacco abuse    Abnormal liver enzymes    PTSD (post-traumatic stress disorder)    Opioid dependence in remission ([place] [address] )    TEO (acute kidney injury) ([place] [address] )    DM (diabetes mellitus), type 2 ([place] [address] )    Hypertension    Bradycardia    Thrombocytopenia (HCC)    Anemia    Gross hematuria         Past Medical History     Past Medical History:   Diagnosis Date    Abdominal pain     Allergic rhinitis     Cancer (HCC)     Chronic pain     Colon polyps     Depression     Fatigue     Head injury     Homeless     Hypertension     Insomnia     Liver disease     Loss of appetite     Numbness and tingling of right arm     Palpitations     Psychiatric disorder     bipolar    PTSD (post-traumatic stress disorder)     Seizures (HCC)     Shortness of breath     Substance abuse ([place] [address] )     Swallowing difficulty          Surgical History     Past Surgical History:   Procedure Laterality Date    ABDOMINAL SURGERY      COLONOSCOPY      EYE SURGERY      NECK SURGERY      ORCHIECTOMY Right 5/19/2016    Procedure: ORCHIECTOMY INGUINAL biopsy of testicular mass, ;  Surgeon: Sri Rosario MD;  Location:  MAIN OR;  Service:    Terri Morgan [place] COLONOSCOPY FLX DX W/COLLJ SPEC WHEN PFRMD N/A 2/13/2017    Procedure: EGD AND COLONOSCOPY;  Surgeon: Nils Moritz, MD;  Location: [facility] GI LAB; Service: Gastroenterology    [place] ESOPHAGOGASTRODUODENOSCOPY TRANSORAL DIAGNOSTIC N/A 11/16/2016    Procedure: EGD AND COLONOSCOPY;  Surgeon: Nils Moritz, MD;  Location:  GI LAB;   Service: Gastroenterology         Family History     Family History   Problem Relation Age of Onset    Diabetes Mother     Hypertension Brother          Social History     Social History     Social History     Tobacco Use   Smoking Status Current Every Day Smoker    Packs/day: 0 50    Years: 30 00    Pack years: 15 00    Types: Cigarettes   Smokeless Tobacco Current User         Pertinent Lab Values     Lab Results   Component Value Date    CREATININE 2 57 (H) 07/09/2022       PSA 0 77    Final Diagnosis   A  Urine, Clean Catch, :  Negative for high grade urothelial carcinoma ([address]) - see comment  Benign urothelial cells  Benign squamous cells  Neutrophils, lymphocytes and bacteria      Satisfactory for Evaluation      Comment:  The above diagnostic category is from the recently published book, [employer] [place] [employer] Urinary Cytology, and is in keeping with the ongoing effort for utilization of standardized diagnostic terminology in urine cytology  *     *[employer] [place] [employer] Urinary Cytology  Chanda Shipman; 2016         Electronically signed by Richard Sanz MD on 7/11/2022 at  3:12 PM             Pertinent Imaging      A thick-walled urinary bladder is noted on noncontrast CT scan    Unfortunately the patient's kidney function will not allow for a contrast study

## 2022-07-14 NOTE — PROGRESS NOTES
Office Cystoscopy Procedure Note    Indication:    Hematuria    Informed consent   The risks, benefits, complications, treatment options, and expected outcomes were discussed with the patient  The patient concurred with the proposed plan and provided informed consent  Anesthesia  Lidocaine jelly 2%    Antibiotic prophylaxis   None    Procedure  The patient was placed in the supineposition, was prepped and draped in the usual manner using sterile technique, and 2% lidocaine jelly instilled into the urethra  A 17 F flexible cystoscope was then inserted into the urethra and the urethra and bladder carefully examined  The following findings were noted:    Findings:  Urethra:  Normal  Prostate:  Lateral lobe hypertrophy, no lesions  Bladder:  Normal, no tumors, defects, stones, or carcinoma in-situ  Ureteral orifices:  Orthotopic  Other findings:  None, retroflexed view confirms    Specimens: None                 Complications:    None; patient tolerated the procedure well           Disposition: To home   Condition: Stable    Plan: Negative cystoscopic evaluation for causes of gross hematuria  No malignant findings  Cystoscopy     Date/Time 7/14/2022 2:54 PM     Performed by  John Jurado MD     Authorized by John Jurado MD      Universal Protocol:  Consent: Verbal consent obtained  Written consent obtained  Risks and benefits: risks, benefits and alternatives were discussed  Consent given by: patient  Patient understanding: patient states understanding of the procedure being performed  Patient consent: the patient's understanding of the procedure matches consent given  Procedure consent: procedure consent matches procedure scheduled  Relevant documents: relevant documents present and verified  Test results: test results available and properly labeled  Site marked: the operative site was not marked  Radiology Images displayed and confirmed   If images not available, report reviewed: imaging studies available  Required items: required blood products, implants, devices, and special equipment available  Patient identity confirmed: verbally with patient and provided demographic data        Procedure Details:  Procedure type: cystoscopy    Patient tolerance: Patient tolerated the procedure well with no immediate complications    Additional Procedure Details: Office Cystoscopy Procedure Note    Indication:    Hematuria    Informed consent   The risks, benefits, complications, treatment options, and expected outcomes were discussed with the patient  The patient concurred with the proposed plan and provided informed consent  Anesthesia  Lidocaine jelly 2%    Antibiotic prophylaxis   None    Procedure  The patient was placed in the supineposition, was prepped and draped in the usual manner using sterile technique, and 2% lidocaine jelly instilled into the urethra  A 17 F flexible cystoscope was then inserted into the urethra and the urethra and bladder carefully examined  The following findings were noted:    Findings:  Urethra:  Normal  Prostate:  Lateral lobe hypertrophy, no lesions  Bladder:  Normal, no tumors, defects, stones, or carcinoma in-situ  Ureteral orifices:  Orthotopic  Other findings:  None, retroflexed view confirms    Specimens: None                 Complications:    None; patient tolerated the procedure well           Disposition: To home   Condition: Stable    Plan: Negative cystoscopic evaluation for causes of gross hematuria  No malignant findings

## 2022-07-15 LAB
AMPHETAMINES UR QL SCN: NEGATIVE NG/ML
BARBITURATES UR QL SCN: NEGATIVE NG/ML
BENZODIAZ UR QL: NEGATIVE NG/ML
BZE UR QL: NEGATIVE NG/ML
CANNABINOIDS UR QL SCN: NEGATIVE
METHADONE UR QL SCN: NEGATIVE NG/ML
OPIATES UR QL: POSITIVE
PCP UR QL: NEGATIVE NG/ML
PROPOXYPH UR QL SCN: NEGATIVE NG/ML

## 2022-07-25 ENCOUNTER — TELEPHONE (OUTPATIENT)
Dept: SURGICAL ONCOLOGY | Facility: CLINIC | Age: 57
End: 2022-07-25

## 2022-07-25 NOTE — TELEPHONE ENCOUNTER
I spoke with Ivanna Chin because kaylee did not show up for his appointment  She tried to call earlier to reschedule the appointment but she stated she was getting put on hold and then hung up on  I reschedule the appointment until next week

## 2022-07-26 ENCOUNTER — DOCUMENTATION (OUTPATIENT)
Dept: NEPHROLOGY | Facility: CLINIC | Age: 57
End: 2022-07-26

## 2022-07-26 DIAGNOSIS — N17.9 ACUTE KIDNEY INJURY (HCC): Primary | ICD-10-CM

## 2022-08-04 ENCOUNTER — OFFICE VISIT (OUTPATIENT)
Dept: HEMATOLOGY ONCOLOGY | Facility: CLINIC | Age: 57
End: 2022-08-04
Payer: MEDICARE

## 2022-08-04 ENCOUNTER — APPOINTMENT (OUTPATIENT)
Dept: LAB | Facility: CLINIC | Age: 57
End: 2022-08-04
Payer: MEDICARE

## 2022-08-04 VITALS
HEART RATE: 92 BPM | WEIGHT: 167 LBS | BODY MASS INDEX: 26.95 KG/M2 | SYSTOLIC BLOOD PRESSURE: 122 MMHG | OXYGEN SATURATION: 97 % | DIASTOLIC BLOOD PRESSURE: 84 MMHG

## 2022-08-04 DIAGNOSIS — D64.9 ANEMIA, UNSPECIFIED TYPE: ICD-10-CM

## 2022-08-04 DIAGNOSIS — N17.9 ACUTE KIDNEY INJURY (HCC): ICD-10-CM

## 2022-08-04 DIAGNOSIS — D64.9 ANEMIA, UNSPECIFIED TYPE: Primary | ICD-10-CM

## 2022-08-04 DIAGNOSIS — D69.6 THROMBOCYTOPENIA (HCC): ICD-10-CM

## 2022-08-04 DIAGNOSIS — N17.9 AKI (ACUTE KIDNEY INJURY) (HCC): ICD-10-CM

## 2022-08-04 LAB
ANION GAP SERPL CALCULATED.3IONS-SCNC: 7 MMOL/L (ref 4–13)
BASOPHILS # BLD AUTO: 0.02 THOUSANDS/ΜL (ref 0–0.1)
BASOPHILS NFR BLD AUTO: 0 % (ref 0–1)
BUN SERPL-MCNC: 15 MG/DL (ref 5–25)
CALCIUM SERPL-MCNC: 9.2 MG/DL (ref 8.4–10.2)
CHLORIDE SERPL-SCNC: 103 MMOL/L (ref 96–108)
CO2 SERPL-SCNC: 30 MMOL/L (ref 21–32)
CREAT SERPL-MCNC: 1.37 MG/DL (ref 0.6–1.3)
EOSINOPHIL # BLD AUTO: 0.07 THOUSAND/ΜL (ref 0–0.61)
EOSINOPHIL NFR BLD AUTO: 1 % (ref 0–6)
ERYTHROCYTE [DISTWIDTH] IN BLOOD BY AUTOMATED COUNT: 13.7 % (ref 11.6–15.1)
FERRITIN SERPL-MCNC: 194 NG/ML (ref 8–388)
FOLATE SERPL-MCNC: 6.8 NG/ML (ref 3.1–17.5)
GFR SERPL CREATININE-BSD FRML MDRD: 57 ML/MIN/1.73SQ M
GLUCOSE SERPL-MCNC: 102 MG/DL (ref 65–140)
HCT VFR BLD AUTO: 32.3 % (ref 36.5–49.3)
HGB BLD-MCNC: 10.6 G/DL (ref 12–17)
IMM GRANULOCYTES # BLD AUTO: 0.02 THOUSAND/UL (ref 0–0.2)
IMM GRANULOCYTES NFR BLD AUTO: 0 % (ref 0–2)
IRON SATN MFR SERPL: 14 % (ref 20–50)
IRON SERPL-MCNC: 39 UG/DL (ref 65–175)
LYMPHOCYTES # BLD AUTO: 1.76 THOUSANDS/ΜL (ref 0.6–4.47)
LYMPHOCYTES NFR BLD AUTO: 21 % (ref 14–44)
MCH RBC QN AUTO: 30.5 PG (ref 26.8–34.3)
MCHC RBC AUTO-ENTMCNC: 32.8 G/DL (ref 31.4–37.4)
MCV RBC AUTO: 93 FL (ref 82–98)
MONOCYTES # BLD AUTO: 0.79 THOUSAND/ΜL (ref 0.17–1.22)
MONOCYTES NFR BLD AUTO: 10 % (ref 4–12)
NEUTROPHILS # BLD AUTO: 5.64 THOUSANDS/ΜL (ref 1.85–7.62)
NEUTS SEG NFR BLD AUTO: 68 % (ref 43–75)
NRBC BLD AUTO-RTO: 0 /100 WBCS
PLATELET # BLD AUTO: 205 THOUSANDS/UL (ref 149–390)
PMV BLD AUTO: 10.1 FL (ref 8.9–12.7)
POTASSIUM SERPL-SCNC: 3.5 MMOL/L (ref 3.5–5.3)
RBC # BLD AUTO: 3.48 MILLION/UL (ref 3.88–5.62)
SODIUM SERPL-SCNC: 140 MMOL/L (ref 135–147)
TIBC SERPL-MCNC: 283 UG/DL (ref 250–450)
VIT B12 SERPL-MCNC: 319 PG/ML (ref 100–900)
WBC # BLD AUTO: 8.3 THOUSAND/UL (ref 4.31–10.16)

## 2022-08-04 PROCEDURE — 82607 VITAMIN B-12: CPT

## 2022-08-04 PROCEDURE — 36415 COLL VENOUS BLD VENIPUNCTURE: CPT

## 2022-08-04 PROCEDURE — 82668 ASSAY OF ERYTHROPOIETIN: CPT

## 2022-08-04 PROCEDURE — 80048 BASIC METABOLIC PNL TOTAL CA: CPT

## 2022-08-04 PROCEDURE — 83540 ASSAY OF IRON: CPT

## 2022-08-04 PROCEDURE — 83550 IRON BINDING TEST: CPT

## 2022-08-04 PROCEDURE — 82746 ASSAY OF FOLIC ACID SERUM: CPT

## 2022-08-04 PROCEDURE — 85025 COMPLETE CBC W/AUTO DIFF WBC: CPT

## 2022-08-04 PROCEDURE — 83918 ORGANIC ACIDS TOTAL QUANT: CPT

## 2022-08-04 PROCEDURE — 82728 ASSAY OF FERRITIN: CPT

## 2022-08-04 PROCEDURE — 99213 OFFICE O/P EST LOW 20 MIN: CPT

## 2022-08-04 NOTE — PROGRESS NOTES
[address] [facility] [id number]  Hematology Ambulatory Follow-Up  Carmelo Pena, 1965, [id number]  8/4/2022    Assessment/Plan:    1  Anemia, unspecified type  2  Thrombocytopenia [facility])  Mr Rustam Lawler is a 51-year-old male seen as a hospital follow-up for anemia and thrombocytopenia with negative workup today  He was admitted with gross hematuria but this does not explain the acute drop in both his hemoglobin and platelets  He does not have repeat labs since hospital discharge  He states that he is feeling well and has no complaints or concerns today  Explained that it is hard to determine what the next steps should be until I have the repeat blood work  He will go for this today after leaving the office today  He I did explain that if his levels have dropped again I would suggest a bone marrow biopsy  He is in agreement with this plan and I will call him with the results when they are available to me  He otherwise he will follow-up with me in the office in 1 month to ensure his levels stay elevated  - CBC and differential  - Folate; Future  - Vitamin B12; Future  - Methylmalonic acid, serum; Future  - Iron Panel (Includes Ferritin, Iron Sat%, Iron, and TIBC); Future  - Erythropoietin; Future      3  TEO (acute kidney injury) [facility])  He has had a negative workup today regarding his acute kidney injury for which was found in the hospital   Has seen Urology with a negative workup  He has a consult with Nephrology in September  He is supposed to go for a BMP and will have this drawn with his labs after he leaves office today  We will lost to check her risk for week and to determine if this is also contributing to his anemia     - Erythropoietin; Future      The patient is scheduled for follow-up in approximately 1 month  Patient voiced agreement and understanding to the above   Patient knows to call the Hematology/Oncology office with any questions and concerns regarding the above  Barrier(s) to care: None  The patient is able to self care   ------------------------------------------------------------------------------------------------------    Chief Complaint   Patient presents with    Follow-up       History of present illness:   Cheyenne Villavicencio 64 y  o  male with a PMH of HTN, MDD w/ psychotic features, hx of opioid abuse (clean since Oct 2021) who presented to the ER on 07/06 with 2 days of abdominal pain, decreased oral intake and darkened urine  CBC on admission demonstrated WBC 9 82, RBC 3 7, hemoglobin 11 1, hematocrit 34 2, MCV 92, MPV 9 4, platelets [id number] with normal differential   Creatinine on admission was 4 98 and previously ranged from 0 98-1  46  Which has been improving with hydration  Hemolysis labs are negative, LD slightly elevated at 318  All other workup was negative including leukemia/lymphoma flow cytometry, folate, MMA, B12, SPEP, free light chain  On discharge his hemoglobin had increased to 10 8 and platelets increased to 156,000  Since being discharged he feels well  He has had 2 episodes of blood in his urine and believes he passed the stone last Monday  He has a follow-up with Nephrology in September and has been following with Urology with on remarkable work up-to-date  He had a headache yesterday and feels slightly fatigued but otherwise no complaints or concerns  Review of Systems   Constitutional: Positive for fatigue  Negative for activity change, appetite change, diaphoresis, fever and unexpected weight change  HENT: Negative for trouble swallowing and voice change  Eyes: Negative for photophobia and visual disturbance  Respiratory: Negative for cough, choking, chest tightness, shortness of breath and wheezing  Cardiovascular: Negative for chest pain, palpitations and leg swelling     Gastrointestinal: Negative for abdominal distention, abdominal pain, constipation, diarrhea, nausea and vomiting  Endocrine: Negative for cold intolerance and heat intolerance  Genitourinary: Positive for dysuria and hematuria  Negative for decreased urine volume and frequency  Musculoskeletal: Negative for arthralgias, back pain, gait problem, joint swelling and myalgias  Skin: Negative for color change, pallor and rash  Neurological: Positive for headaches  Negative for dizziness, tremors, weakness, light-headedness and numbness  Hematological: Negative for adenopathy  Does not bruise/bleed easily  Psychiatric/Behavioral: Negative for confusion and sleep disturbance         Patient Active Problem List   Diagnosis    Recurrent major depressive disorder, in remission ([facility] )    Back pain    Tobacco abuse    Abnormal liver enzymes    PTSD (post-traumatic stress disorder)    Opioid dependence in remission ([facility] )    TEO (acute kidney injury) ([facility]  )    DM (diabetes mellitus), type 2 ([facility] )    Hypertension    Bradycardia    Thrombocytopenia (HCC)    Anemia    Gross hematuria       Past Medical History:   Diagnosis Date    Abdominal pain     Allergic rhinitis     Cancer (HCC)     Chronic pain     Colon polyps     Depression     Fatigue     Head injury     Homeless     Hypertension     Insomnia     Liver disease     Loss of appetite     Numbness and tingling of right arm     Palpitations     Psychiatric disorder     bipolar    PTSD (post-traumatic stress disorder)     Seizures (HCC)     Shortness of breath     Substance abuse ([facility] )     Swallowing difficulty        Past Surgical History:   Procedure Laterality Date    ABDOMINAL SURGERY      COLONOSCOPY      EYE SURGERY      NECK SURGERY      ORCHIECTOMY Right 5/19/2016    Procedure: ORCHIECTOMY INGUINAL biopsy of testicular mass, ;  Surgeon: Skye Pandya MD;  Location:  MAIN OR;  Service:    [facility] MS COLONOSCOPY FLX DX W/COLLJ SPEC WHEN PFRMD N/A 2/13/2017    Procedure: EGD AND COLONOSCOPY;  Surgeon: Jori Mary MD; Location: [facility] GI LAB; Service: Gastroenterology    PA ESOPHAGOGASTRODUODENOSCOPY TRANSORAL DIAGNOSTIC N/A 11/16/2016    Procedure: EGD AND COLONOSCOPY;  Surgeon: Luis Sorto MD;  Location:  GI LAB; Service: Gastroenterology       Family History   Problem Relation Age of Onset    Diabetes Mother     Hypertension Brother        Social History     Socioeconomic History    Marital status:      Spouse name: None    Number of children: None    Years of education: None    Highest education level: None   Occupational History    None   Tobacco Use    Smoking status: Current Every Day Smoker     Packs/day: 0 50     Years: 30 00     Pack years: 15 00     Types: Cigarettes    Smokeless tobacco: Current User   Vaping Use    Vaping Use: Never used   Substance and Sexual Activity    Alcohol use: No    Drug use: Not Currently     Types: Heroin    Sexual activity: Not Currently   Other Topics Concern    None   Social History Narrative    None     Social Determinants of Health     Financial Resource Strain: Not on file   Food Insecurity: Not on file   Transportation Needs: Not on file   Physical Activity: Not on file   Stress: Not on file   Social Connections: Not on file   Intimate Partner Violence: Not on file   Housing Stability: Not on file         Current Outpatient Medications:     amLODIPine (NORVASC) 10 mg tablet, Take 1 tablet (10 mg total) by mouth daily, Disp: 30 tablet, Rfl: 0    atorvastatin (LIPITOR) 40 mg tablet, , Disp: , Rfl:     citalopram (CeleXA) 40 mg tablet, Take 1 tablet (40 mg total) by mouth daily Indications: Depression  At [facility] (Patient not taking: No sig reported), Disp: 30 tablet, Rfl: 1    fluticasone (FLONASE) 50 mcg/act nasal spray, 1 spray into each nostril daily Indications: Hayfever   At 9AM, Disp: 1 Bottle, Rfl: 0    lisinopril (ZESTRIL) 5 mg tablet, Take 1 tablet by mouth daily (Patient not taking: No sig reported), Disp: , Rfl:     naloxone (NARCAN) 4 mg/0 1 mL nasal spray, Administer 1 spray into a nostril  If no response after 2-3 minutes, give another dose in the other nostril using a new spray  (Patient not taking: No sig reported), Disp: 1 each, Rfl: 1    pantoprazole (PROTONIX) 40 mg tablet, Take 1 tablet (40 mg total) by mouth daily in the early morning Indications: Gastroesophageal Reflux Disease  (Patient not taking: No sig reported), Disp: 30 tablet, Rfl: 1    QUEtiapine (SEROquel) 200 mg tablet, Take 1 tablet (200 mg total) by mouth daily at bedtime, Disp: 30 tablet, Rfl: 0    QUEtiapine (SEROquel) 25 mg tablet, , Disp: , Rfl:     sertraline (ZOLOFT) 100 mg tablet, Take 1 tablet by mouth daily, Disp: , Rfl:     traZODone (DESYREL) 100 mg tablet, Take 1 tablet by mouth daily at bedtime (Patient not taking: No sig reported), Disp: , Rfl:     Allergies   Allergen Reactions    Oxycodone Swelling     Tongue swelling       Objective:  /84   Pulse 92   Wt 75 8 kg (167 lb)   SpO2 97%   BMI 26 95 kg/m²    Physical Exam  Constitutional:       General: He is not in acute distress  Appearance: Normal appearance  He is normal weight  He is not ill-appearing  HENT:      Head: Normocephalic and atraumatic  Eyes:      Extraocular Movements: Extraocular movements intact  Conjunctiva/sclera: Conjunctivae normal    Cardiovascular:      Rate and Rhythm: Normal rate and regular rhythm  Pulses: Normal pulses  Heart sounds: Normal heart sounds  No murmur heard  Pulmonary:      Effort: Pulmonary effort is normal  No respiratory distress  Breath sounds: Normal breath sounds  No wheezing  Abdominal:      General: Abdomen is flat  Bowel sounds are normal  There is no distension  Palpations: Abdomen is soft  Tenderness: There is no abdominal tenderness  Musculoskeletal:      Cervical back: Normal range of motion  No tenderness  Right lower leg: No edema  Left lower leg: No edema     Lymphadenopathy:      Cervical: No cervical adenopathy  Skin:     General: Skin is warm and dry  Capillary Refill: Capillary refill takes less than 2 seconds  Findings: No bruising  Neurological:      General: No focal deficit present  Mental Status: He is alert and oriented to person, place, and time  Mental status is at baseline  Motor: No weakness  Gait: Gait normal    Psychiatric:         Mood and Affect: Mood normal          Behavior: Behavior normal          Thought Content: Thought content normal          Judgment: Judgment normal          Result Review  Labs:    Lab Results   Component Value Date    WBC 8 07 07/09/2022    HGB 11 1 (L) 07/09/2022    HCT 33 5 (L) 07/09/2022    MCV 91 07/09/2022     07/09/2022     Lab Results   Component Value Date    SODIUM 140 07/09/2022    K 4 7 07/09/2022     07/09/2022    CO2 27 07/09/2022    AGAP 5 07/09/2022    BUN 23 07/09/2022    CREATININE 2 57 (H) 07/09/2022    GLUC 94 07/09/2022    GLUF 116 (H) 05/25/2021    CALCIUM 9 0 07/09/2022    AST 10 (L) 07/09/2022    ALT 7 07/09/2022    ALKPHOS 47 07/09/2022    TP 6 0 (L) 07/09/2022    TBILI 0 40 07/09/2022    EGFR 26 07/09/2022       Imaging:   No relevant imaging to review    Please note: This report has been generated by a voice recognition software system  Therefore there may be syntax, spelling, and/or grammatical errors  Please call if you have any questions

## 2022-08-05 LAB — EPO SERPL-ACNC: 8.6 MIU/ML (ref 2.6–18.5)

## 2022-08-08 LAB — METHYLMALONATE SERPL-SCNC: 199 NMOL/L (ref 0–378)

## 2022-09-06 ENCOUNTER — TELEPHONE (OUTPATIENT)
Dept: HEMATOLOGY ONCOLOGY | Facility: CLINIC | Age: 57
End: 2022-09-06

## 2022-09-06 DIAGNOSIS — D64.9 ANEMIA, UNSPECIFIED TYPE: Primary | ICD-10-CM

## 2022-09-06 NOTE — TELEPHONE ENCOUNTER
SPOKE WITH EMERGENCY CONTACT DEMETRIUS, DEMETRIUS WILL LET PATIENT KNOW TO HAVE LABS DRAWN PRIOR TO APPT

## 2022-09-08 ENCOUNTER — TELEPHONE (OUTPATIENT)
Dept: HEMATOLOGY ONCOLOGY | Facility: CLINIC | Age: 57
End: 2022-09-08

## 2022-09-08 NOTE — TELEPHONE ENCOUNTER
Spoke with friend, Jacqueline---she states patient is sick and cannot drive at this time.  She is also sick--they will call when he is better and can come in for visit

## 2022-09-09 ENCOUNTER — HOSPITAL ENCOUNTER (EMERGENCY)
Facility: HOSPITAL | Age: 57
Discharge: HOME | End: 2022-09-09
Attending: EMERGENCY MEDICINE
Payer: MEDICARE

## 2022-09-09 ENCOUNTER — APPOINTMENT (EMERGENCY)
Dept: RADIOLOGY | Facility: HOSPITAL | Age: 57
End: 2022-09-09
Payer: MEDICARE

## 2022-09-09 VITALS
SYSTOLIC BLOOD PRESSURE: 188 MMHG | HEART RATE: 64 BPM | TEMPERATURE: 97.5 F | OXYGEN SATURATION: 98 % | DIASTOLIC BLOOD PRESSURE: 94 MMHG | RESPIRATION RATE: 16 BRPM

## 2022-09-09 DIAGNOSIS — R31.9 HEMATURIA, UNSPECIFIED TYPE: Primary | ICD-10-CM

## 2022-09-09 DIAGNOSIS — M54.6 BILATERAL THORACIC BACK PAIN, UNSPECIFIED CHRONICITY: ICD-10-CM

## 2022-09-09 LAB
ALBUMIN SERPL BCP-MCNC: 3.6 G/DL (ref 3.5–5)
ALP SERPL-CCNC: 71 U/L (ref 46–116)
ALT SERPL W P-5'-P-CCNC: 30 U/L (ref 12–78)
ANION GAP SERPL CALCULATED.3IONS-SCNC: 3 MMOL/L (ref 4–13)
APTT PPP: 23 SECONDS (ref 23–37)
AST SERPL W P-5'-P-CCNC: 31 U/L (ref 5–45)
BACTERIA UR QL AUTO: ABNORMAL /HPF
BASOPHILS # BLD AUTO: 0.01 THOUSANDS/ΜL (ref 0–0.1)
BASOPHILS NFR BLD AUTO: 0 % (ref 0–1)
BILIRUB SERPL-MCNC: 0.53 MG/DL (ref 0.2–1)
BILIRUB UR QL STRIP: NEGATIVE
BUN SERPL-MCNC: 14 MG/DL (ref 5–25)
CALCIUM SERPL-MCNC: 8.3 MG/DL (ref 8.3–10.1)
CHLORIDE SERPL-SCNC: 105 MMOL/L (ref 96–108)
CK MB SERPL-MCNC: 1.5 NG/ML (ref 0–5)
CK MB SERPL-MCNC: <1 % (ref 0–2.5)
CK SERPL-CCNC: 198 U/L (ref 39–308)
CLARITY UR: CLEAR
CO2 SERPL-SCNC: 29 MMOL/L (ref 21–32)
COLOR UR: YELLOW
CREAT SERPL-MCNC: 1.57 MG/DL (ref 0.6–1.3)
EOSINOPHIL # BLD AUTO: 0.1 THOUSAND/ΜL (ref 0–0.61)
EOSINOPHIL NFR BLD AUTO: 2 % (ref 0–6)
ERYTHROCYTE [DISTWIDTH] IN BLOOD BY AUTOMATED COUNT: 15 % (ref 11.6–15.1)
GFR SERPL CREATININE-BSD FRML MDRD: 48 ML/MIN/1.73SQ M
GLUCOSE SERPL-MCNC: 122 MG/DL (ref 65–140)
GLUCOSE UR STRIP-MCNC: ABNORMAL MG/DL
HCT VFR BLD AUTO: 31.1 % (ref 36.5–49.3)
HGB BLD-MCNC: 10 G/DL (ref 12–17)
HGB UR QL STRIP.AUTO: ABNORMAL
HYALINE CASTS #/AREA URNS LPF: ABNORMAL /LPF
IMM GRANULOCYTES # BLD AUTO: 0.02 THOUSAND/UL (ref 0–0.2)
IMM GRANULOCYTES NFR BLD AUTO: 0 % (ref 0–2)
INR PPP: 1.01 (ref 0.84–1.19)
KETONES UR STRIP-MCNC: NEGATIVE MG/DL
LEUKOCYTE ESTERASE UR QL STRIP: NEGATIVE
LIPASE SERPL-CCNC: 128 U/L (ref 73–393)
LYMPHOCYTES # BLD AUTO: 1.35 THOUSANDS/ΜL (ref 0.6–4.47)
LYMPHOCYTES NFR BLD AUTO: 21 % (ref 14–44)
MCH RBC QN AUTO: 30.4 PG (ref 26.8–34.3)
MCHC RBC AUTO-ENTMCNC: 32.2 G/DL (ref 31.4–37.4)
MCV RBC AUTO: 95 FL (ref 82–98)
MONOCYTES # BLD AUTO: 0.47 THOUSAND/ΜL (ref 0.17–1.22)
MONOCYTES NFR BLD AUTO: 7 % (ref 4–12)
MUCOUS THREADS UR QL AUTO: ABNORMAL
NEUTROPHILS # BLD AUTO: 4.64 THOUSANDS/ΜL (ref 1.85–7.62)
NEUTS SEG NFR BLD AUTO: 70 % (ref 43–75)
NITRITE UR QL STRIP: NEGATIVE
NON-SQ EPI CELLS URNS QL MICRO: ABNORMAL /HPF
NRBC BLD AUTO-RTO: 0 /100 WBCS
PH UR STRIP.AUTO: 5.5 [PH]
PLATELET # BLD AUTO: 156 THOUSANDS/UL (ref 149–390)
PMV BLD AUTO: 9.8 FL (ref 8.9–12.7)
POTASSIUM SERPL-SCNC: 3.5 MMOL/L (ref 3.5–5.3)
PROT SERPL-MCNC: 7.7 G/DL (ref 6.4–8.4)
PROT UR STRIP-MCNC: ABNORMAL MG/DL
PROTHROMBIN TIME: 13.6 SECONDS (ref 11.6–14.5)
RBC # BLD AUTO: 3.29 MILLION/UL (ref 3.88–5.62)
RBC #/AREA URNS AUTO: ABNORMAL /HPF
SODIUM SERPL-SCNC: 137 MMOL/L (ref 135–147)
SP GR UR STRIP.AUTO: 1.02 (ref 1–1.03)
UROBILINOGEN UR STRIP-ACNC: <2 MG/DL
WBC # BLD AUTO: 6.59 THOUSAND/UL (ref 4.31–10.16)
WBC #/AREA URNS AUTO: ABNORMAL /HPF

## 2022-09-09 PROCEDURE — 82550 ASSAY OF CK (CPK): CPT

## 2022-09-09 PROCEDURE — 81001 URINALYSIS AUTO W/SCOPE: CPT

## 2022-09-09 PROCEDURE — 36415 COLL VENOUS BLD VENIPUNCTURE: CPT

## 2022-09-09 PROCEDURE — 99285 EMERGENCY DEPT VISIT HI MDM: CPT | Performed by: EMERGENCY MEDICINE

## 2022-09-09 PROCEDURE — 85025 COMPLETE CBC W/AUTO DIFF WBC: CPT

## 2022-09-09 PROCEDURE — 80053 COMPREHEN METABOLIC PANEL: CPT

## 2022-09-09 PROCEDURE — 85610 PROTHROMBIN TIME: CPT

## 2022-09-09 PROCEDURE — 99284 EMERGENCY DEPT VISIT MOD MDM: CPT

## 2022-09-09 PROCEDURE — 85730 THROMBOPLASTIN TIME PARTIAL: CPT

## 2022-09-09 PROCEDURE — 83690 ASSAY OF LIPASE: CPT

## 2022-09-09 PROCEDURE — 96374 THER/PROPH/DIAG INJ IV PUSH: CPT

## 2022-09-09 PROCEDURE — 74176 CT ABD & PELVIS W/O CONTRAST: CPT

## 2022-09-09 PROCEDURE — G1004 CDSM NDSC: HCPCS

## 2022-09-09 PROCEDURE — 82553 CREATINE MB FRACTION: CPT

## 2022-09-09 RX ORDER — MORPHINE SULFATE 4 MG/ML
4 INJECTION, SOLUTION INTRAMUSCULAR; INTRAVENOUS ONCE
Status: COMPLETED | OUTPATIENT
Start: 2022-09-09 | End: 2022-09-09

## 2022-09-09 RX ADMIN — MORPHINE SULFATE 4 MG: 4 INJECTION INTRAVENOUS at 21:31

## 2022-09-10 NOTE — DISCHARGE INSTRUCTIONS
You were seen in the Emergency Department today for hematuria and back pain  We have tested labs, CT, urine and are sending you home with nephrology follow up  Continue to monitor hematuria at home  Please follow up with your primary care doctor and nephrologist in 5-7 days to reevaluate and establish care  Please return to the Emergency Department if you experience worsening of your current symptoms, worsening hematuria, dizziness, chest pain, or any other concerning symptoms

## 2022-09-10 NOTE — ED ATTENDING ATTESTATION
9/9/2022  I, Suzan Pascual MD, saw and evaluated the patient  I have discussed the patient with the resident/non-physician practitioner and agree with the resident's/non-physician practitioner's findings, Plan of Care, and MDM as documented in the resident's/non-physician practitioner's note, except where noted  All available labs and Radiology studies were reviewed  I was present for key portions of any procedure(s) performed by the resident/non-physician practitioner and I was immediately available to provide assistance  At this point I agree with the current assessment done in the Emergency Department  I have conducted an independent evaluation of this patient a history and physical is as follows:    ED Course         Critical Care Time  Procedures    65 yo male recently dx with kidney stones and now having worse back with gross hematuria  Pt with two days of symptoms  No cp, no sob, no n/v/d, no fever  Pt with discolaration on left side of chest for four days  Vss, afebrile, lungs cta, rrr, abdomen soft tender llq, bilateral cva tenderness, no chest wall tenderness  Pt with similar presentation with daniel in July    Labs, urine, pain meds, ct a/p

## 2022-09-10 NOTE — ED NOTES
Per ED resident no 1:1 at this time  Rn will continue to monitor        Napolean Rich, DOUG  09/09/22 [id number]

## 2022-09-10 NOTE — ED PROVIDER NOTES
History  Chief Complaint   Patient presents with    Blood in Urine    Medical Problem     Per pt, has had blood in urine for 2 days, and a history of kidney problems  Pt woke up for the past 2 days with a black discoloration on L side of chest  Pt feels dizzy  Patient is a 49-year-old male with history of kidney stones who presents to the ED for evaluation of hematuria with worsening back pain for the past 2 days  Patient reports he has had kidney problems in the past that he has been admitted for, and became concerned when he noticed red tinged urine  He reports associated back pain with radiation to the left lower quadrant  Patient reports he has been waking up for the past 4 days with progressive color changed his left upper chest with darkening skin  He denies any chest pain, palpitations, shortness of breath, nausea vomiting diarrhea, fevers or chills, headache or dizziness, or other complaints or concerns at this time   # [id number] used for entire history and physical     Prior to Admission Medications   Prescriptions Last Dose Informant Patient Reported? Taking? QUEtiapine (SEROquel) 200 mg tablet  Self No No   Sig: Take 1 tablet (200 mg total) by mouth daily at bedtime   QUEtiapine (SEROquel) 25 mg tablet   Yes No   amLODIPine (NORVASC) 10 mg tablet  Self No No   Sig: Take 1 tablet (10 mg total) by mouth daily   atorvastatin (LIPITOR) 40 mg tablet   Yes No   citalopram (CeleXA) 40 mg tablet  Self No No   Sig: Take 1 tablet (40 mg total) by mouth daily Indications: Depression  At [facility]   Patient not taking: No sig reported   fluticasone (FLONASE) 50 mcg/act nasal spray  Self No No   Si spray into each nostril daily Indications: Hayfever  At 9AM   lisinopril (ZESTRIL) 5 mg tablet  Self Yes No   Sig: Take 1 tablet by mouth daily   Patient not taking: No sig reported   naloxone (NARCAN) 4 mg/0 1 mL nasal spray  Self No No   Sig: Administer 1 spray into a nostril   If no response after 2-3 minutes, give another dose in the other nostril using a new spray  Patient not taking: No sig reported   pantoprazole (PROTONIX) 40 mg tablet  Self No No   Sig: Take 1 tablet (40 mg total) by mouth daily in the early morning Indications: Gastroesophageal Reflux Disease  Patient not taking: No sig reported   sertraline (ZOLOFT) 100 mg tablet   Yes No   Sig: Take 1 tablet by mouth daily   traZODone (DESYREL) 100 mg tablet  Self Yes No   Sig: Take 1 tablet by mouth daily at bedtime   Patient not taking: No sig reported      Facility-Administered Medications: None       Past Medical History:   Diagnosis Date    Abdominal pain     Allergic rhinitis     Cancer (HCC)     Chronic pain     Colon polyps     Depression     Fatigue     Head injury     Homeless     Hypertension     Insomnia     Liver disease     Loss of appetite     Numbness and tingling of right arm     Palpitations     Psychiatric disorder     bipolar    PTSD (post-traumatic stress disorder)     Seizures (HCC)     Shortness of breath     Substance abuse (HCC)     Swallowing difficulty        Past Surgical History:   Procedure Laterality Date    ABDOMINAL SURGERY      COLONOSCOPY      EYE SURGERY      NECK SURGERY      ORCHIECTOMY Right 5/19/2016    Procedure: ORCHIECTOMY INGUINAL biopsy of testicular mass, ;  Surgeon: Julia Chowdhury MD;  Location:  MAIN OR;  Service:    Rosmery Dubon IN COLONOSCOPY FLX DX W/COLLJ SPEC WHEN PFRMD N/A 2/13/2017    Procedure: EGD AND COLONOSCOPY;  Surgeon: Ren Amato MD;  Location: [facility]; Service: Gastroenterology    IN ESOPHAGOGASTRODUODENOSCOPY TRANSORAL DIAGNOSTIC N/A 11/16/2016    Procedure: EGD AND COLONOSCOPY;  Surgeon: Ren Amato MD;  Location:  GI LAB; Service: Gastroenterology       Family History   Problem Relation Age of Onset    Diabetes Mother     Hypertension Brother      I have reviewed and agree with the history as documented      E-Cigarette/Vaping    E-Cigarette Use Never User      E-Cigarette/Vaping Substances     Social History     Tobacco Use    Smoking status: Current Every Day Smoker     Packs/day: 0 50     Years: 30 00     Pack years: 15 00     Types: Cigarettes    Smokeless tobacco: Current User   Vaping Use    Vaping Use: Never used   Substance Use Topics    Alcohol use: No    Drug use: Not Currently     Types: Heroin        Review of Systems   Constitutional: Negative for chills and fever  HENT: Negative for ear pain and sore throat  Eyes: Negative for pain and visual disturbance  Respiratory: Negative for cough and shortness of breath  Cardiovascular: Negative for chest pain and palpitations  Gastrointestinal: Positive for abdominal pain (Left lower quadrant)  Negative for vomiting  Genitourinary: Positive for hematuria  Negative for dysuria  Musculoskeletal: Positive for back pain  Negative for arthralgias  Skin: Negative for color change and rash  Neurological: Negative for seizures and syncope  All other systems reviewed and are negative  Physical Exam  ED Triage Vitals [09/09/22 1855]   Temperature Pulse Respirations Blood Pressure SpO2   97 5 °F (36 4 °C) 71 18 (!) 195/110 100 %      Temp src Heart Rate Source Patient Position - Orthostatic VS BP Location FiO2 (%)   -- Monitor Sitting Left arm --      Pain Score       9             Orthostatic Vital Signs  Vitals:    09/09/22 1855 09/09/22 2010 09/09/22 2129   BP: (!) 195/110 (!) 192/95 (!) 188/94   Pulse: 71 68 64   Patient Position - Orthostatic VS: Sitting Sitting Sitting       Physical Exam  Vitals and nursing note reviewed  Constitutional:       General: He is not in acute distress  Appearance: He is well-developed  HENT:      Head: Normocephalic and atraumatic  Mouth/Throat:      Mouth: Mucous membranes are moist       Pharynx: Oropharynx is clear  Eyes:      General: No scleral icterus  Right eye: No discharge  Left eye: No discharge  Extraocular Movements: Extraocular movements intact  Conjunctiva/sclera: Conjunctivae normal    Cardiovascular:      Rate and Rhythm: Normal rate and regular rhythm  Heart sounds: No murmur heard  Pulmonary:      Effort: Pulmonary effort is normal  No respiratory distress  Breath sounds: Normal breath sounds  Chest:      Chest wall: No mass, tenderness or crepitus  Comments: Skin darkening consistent with acanthosis nigricans to left upper chest  Abdominal:      Palpations: Abdomen is soft  Tenderness: There is abdominal tenderness (Mild) in the left lower quadrant  There is right CVA tenderness and left CVA tenderness  Musculoskeletal:      Cervical back: Normal range of motion and neck supple  Skin:     General: Skin is warm and dry  Capillary Refill: Capillary refill takes less than 2 seconds  Neurological:      Mental Status: He is alert and oriented to person, place, and time     Psychiatric:         Mood and Affect: Mood normal          Behavior: Behavior normal          ED Medications  Medications   morphine injection 4 mg (4 mg Intravenous Given 9/9/22 2131)       Diagnostic Studies  Results Reviewed     Procedure Component Value Units Date/Time    Urine Microscopic [[id number]]  (Abnormal) Collected: 09/09/22 2053    Lab Status: Final result Specimen: Urine, Clean Catch Updated: 09/09/22 2227     RBC, UA None Seen /hpf      WBC, UA 2-4 /hpf      Epithelial Cells None Seen /hpf      Bacteria, UA None Seen /hpf      MUCUS THREADS Occasional     Hyaline Casts, UA 25-50 /lpf     UA w Reflex to Microscopic w Reflex to Culture [[id number]]  (Abnormal) Collected: 09/09/22 2053    Lab Status: Final result Specimen: Urine, Clean Catch Updated: 09/09/22 2158     Color, UA Yellow     Clarity, UA Clear     Specific Gravity, UA 1 017     pH, UA 5 5     Leukocytes, UA Negative     Nitrite, UA Negative     Protein, UA Trace mg/dl      Glucose, UA 70 (7/100%) mg/dl      Ketones, UA Negative mg/dl      Urobilinogen, UA <2 0 mg/dl      Bilirubin, UA Negative     Occult Blood, UA Small    CKMB [[id number]]  (Normal) Collected: 09/09/22 2112    Lab Status: Final result Specimen: Blood from Arm, Left Updated: 09/09/22 2154     CK-MB Index <1 0 %      CK-MB 1 5 ng/mL     Comprehensive metabolic panel [[id number]]  (Abnormal) Collected: 09/09/22 2112    Lab Status: Final result Specimen: Blood from Arm, Left Updated: 09/09/22 2138     Sodium 137 mmol/L      Potassium 3 5 mmol/L      Chloride 105 mmol/L      CO2 29 mmol/L      ANION GAP 3 mmol/L      BUN 14 mg/dL      Creatinine 1 57 mg/dL      Glucose 122 mg/dL      Calcium 8 3 mg/dL      AST 31 U/L      ALT 30 U/L      Alkaline Phosphatase 71 U/L      Total Protein 7 7 g/dL      Albumin 3 6 g/dL      Total Bilirubin 0 53 mg/dL      eGFR 48 ml/min/1 73sq m     Narrative:      [place] guidelines for Chronic Kidney Disease (CKD):     Stage 1 with normal or high GFR (GFR > 90 mL/min/1 73 square meters)    Stage 2 Mild CKD (GFR = 60-89 mL/min/1 73 square meters)    Stage 3A Moderate CKD (GFR = 45-59 mL/min/1 73 square meters)    Stage 3B Moderate CKD (GFR = 30-44 mL/min/1 73 square meters)    Stage 4 Severe CKD (GFR = 15-29 mL/min/1 73 square meters)    Stage 5 End Stage CKD (GFR <15 mL/min/1 73 square meters)  Note: GFR calculation is accurate only with a steady state creatinine    CK Total with Reflex CKMB [[id number]]  (Normal) Collected: 09/09/22 2112    Lab Status: Final result Specimen: Blood from Arm, Left Updated: 09/09/22 2138     Total  U/L     Lipase [[id number]]  (Normal) Collected: 09/09/22 2112    Lab Status: Final result Specimen: Blood from Arm, Left Updated: 09/09/22 2138     Lipase 128 u/L     Protime-INR [[id number]]  (Normal) Collected: 09/09/22 2112    Lab Status: Final result Specimen: Blood from Arm, Left Updated: 09/09/22 2134     Protime 13 6 seconds      INR 1 01    APTT [[id number]]  (Normal) Collected: 09/09/22 2112    Lab Status: Final result Specimen: Blood from Arm, Left Updated: 09/09/22 2134     PTT 23 seconds     CBC and differential [[id number]]  (Abnormal) Collected: 09/09/22 2112    Lab Status: Final result Specimen: Blood from Arm, Left Updated: 09/09/22 2121     WBC 6 59 Thousand/uL      RBC 3 29 Million/uL      Hemoglobin 10 0 g/dL      Hematocrit 31 1 %      MCV 95 fL      MCH 30 4 pg      MCHC 32 2 g/dL      RDW 15 0 %      MPV 9 8 fL      Platelets 340 Thousands/uL      nRBC 0 /100 WBCs      Neutrophils Relative 70 %      Immat GRANS % 0 %      Lymphocytes Relative 21 %      Monocytes Relative 7 %      Eosinophils Relative 2 %      Basophils Relative 0 %      Neutrophils Absolute 4 64 Thousands/µL      Immature Grans Absolute 0 02 Thousand/uL      Lymphocytes Absolute 1 35 Thousands/µL      Monocytes Absolute 0 47 Thousand/µL      Eosinophils Absolute 0 10 Thousand/µL      Basophils Absolute 0 01 Thousands/µL                  CT abdomen pelvis wo contrast   Final Result by Noe Landrum MD (09/09 2128)      No evidence of nephrolithiasis or obstructive uropathy  No acute intra-abdominal or pelvic process  Workstation performed: [id number]               Procedures  Procedures      ED Course  ED Course as of 09/09/22 2230   Fri Sep 09, 2022   2135 Per RN, patient reported +SI, no plan     2138 CBC and differential(!)  Hgb stable compared to prior   2142 CT abdomen pelvis wo contrast  Noted, no kidney stones or cystitis    2142 CK Total with Reflex CKMB   2147 Lipase  Doubt pancreatitis   2148 Comprehensive metabolic panel(!)  Cr/GFR stable compared to prior                                       MDM  Number of Diagnoses or Management Options  Bilateral thoracic back pain, unspecified chronicity  Hematuria, unspecified type  Diagnosis management comments: Patient is a 59-year-old male with history of kidney stones who presents to the ED for evaluation of hematuria with worsening back pain for the past 2 days  CBC, CMP, coags, CK, lipase, UA, CT abdomen pelvis without contrast ordered  Patient states he is comfortable treated with morphine for pain  Will avoid NSAIDs  See ED course for details  Discussed labs and results including imaging with patient  Clarified RN report  Patient states he told the ER and that he has a history of suicidal ideation in the past, but none currently  Patient states he is feeling depressed at his baseline due to his living situation as he has been trying to find a stable place to live on his own for approximately 7 years  Patient denies any SI or plan at this time  Patient declines speaking with crisis worker here and states he would like to go home at this time  Advised on need for outpatient follow-up  Given strict return precautions  Patient expressed full verbal understanding and is agreeable with plan for discharge with outpatient follow-up   #[id number] used for entire discussion of results and discharge instructions  Disposition  Final diagnoses:   Hematuria, unspecified type   Bilateral thoracic back pain, unspecified chronicity     Time reflects when diagnosis was documented in both MDM as applicable and the Disposition within this note     Time User Action Codes Description Comment    9/9/2022 10:25 PM DePope, Claudette Pares Add [R31 9] Hematuria, unspecified type     9/9/2022 10:26 PM DePope, Claudette Pares Add [M54 6] Bilateral thoracic back pain, unspecified chronicity       ED Disposition     ED Disposition   Discharge    Condition   Stable    Date/Time   Fri Sep 9, 2022 10:25 PM    Jesús 115 discharge to home/self care                 Follow-up Information     Follow up With Specialties Details Why Contact Info Additional [address] [facility], [address] [place] [address], Nurse Practitioner Call   [address] [facility] [address]  [address] [facility] In 2 days  [facility]  [address] [id number]  [address] [place] [address] Nephrology [address], [place] [address], [address] [place], [place], [id number] [phone]          Patient's Medications   Discharge Prescriptions    No medications on file     No discharge procedures on file  PDMP Review       Value Time User    PDMP Reviewed  Yes 7/10/2021 12:09 PM Bigg Campos PA-C           ED Provider  Attending physically available and evaluated Jayden Johnson I managed the patient along with the ED Attending      Electronically Signed by         Eligio Duverney, DO  09/14/22 7856

## 2022-10-11 ENCOUNTER — TELEPHONE (OUTPATIENT)
Dept: NEPHROLOGY | Facility: CLINIC | Age: 57
End: 2022-10-11

## 2022-10-11 NOTE — TELEPHONE ENCOUNTER
Spoke with wife [address] [facility] and schedule hospital follow up appointment for Grant Chacon for 10/24 with Dr Olivia Nuñez in the [place] location   He had been scheduled for 9/9 but was in the ER

## 2022-10-21 ENCOUNTER — TELEPHONE (OUTPATIENT)
Dept: NEPHROLOGY | Facility: CLINIC | Age: 57
End: 2022-10-21

## 2022-10-21 NOTE — TELEPHONE ENCOUNTER
Appointment Confirmation   Person confirmed appointment with  If not patient, name of the person lm    Date and time of appointment 10/24     8:20    Patient acknowledged and will be at appointment? no    Did you advise the patient that they will need a urine sample if they are a new patient?  N/A    Did you advise the patient to bring their current medications for verification? (including any OTC) Yes    Additional Information

## 2022-10-24 ENCOUNTER — OFFICE VISIT (OUTPATIENT)
Dept: NEPHROLOGY | Facility: CLINIC | Age: 57
End: 2022-10-24
Payer: MEDICARE

## 2022-10-24 VITALS
SYSTOLIC BLOOD PRESSURE: 130 MMHG | DIASTOLIC BLOOD PRESSURE: 80 MMHG | WEIGHT: 134 LBS | BODY MASS INDEX: 21.53 KG/M2 | HEIGHT: 66 IN

## 2022-10-24 DIAGNOSIS — N18.31 STAGE 3A CHRONIC KIDNEY DISEASE (HCC): Primary | ICD-10-CM

## 2022-10-24 PROCEDURE — 99203 OFFICE O/P NEW LOW 30 MIN: CPT | Performed by: INTERNAL MEDICINE

## 2022-10-24 NOTE — PROGRESS NOTES
NEPHROLOGY OUTPATIENT CONSULTATION   Michele Alexandra 62 y o  male MRN: [id number]  Date: 10/24/22  Reason for consultation:  Chronic kidney disease    ASSESSMENT and PLAN:  63-year-old male was seen in the Nephrology office today for evaluation and management of chronic kidney disease      # Acute kidney injury  - Admission creatinine 4 98 07/06/2022  - Discharge creatinine 2 57 07/09/2022   - Follow-up creatinine 1 37 08/04/2022  - Etiology was suspected to be prerenal azotemia  - He had anemia and thrombocytopenia but there was no evidence of thrombotic microangiopathy    # Gross hematuria  - Cystoscopy 07/2022 showed enlargement of lateral lobe of the prostate, no bladder lesions  - Urine cytology negative  - No microscopic hematuria has been noted on several urinalysis    # Medullary calcification on imaging   - Questionable history of nephrolithiasis   - Urine pH 5 5-6 5  - No evidence of renal tubular acidosis  - Check Litholink to understand urinary environment in to rule out hypercalciuria  - Discussed increased water intake to at least 60 oz per day to avoid precipitation of calcium stone    # 1 3 cm left renal cyst  - Noted and will follow     # LUTS/BPH  - Mild symptoms   - May need to add Flomax in future    # Chronic Kidney Disease Stage III-A  - Etiology/risk factors: HTN, pre diabetes   - Baseline Cr: 1 0-1 4 before hospital admission in 07/2022, 1 35-1 6 after hospital admission   - Urinalysis: Small blood, trace protein, no RBCs, 2-4 WBCs 09/2022  - Proteinuria: UPCR 0 19 07/2022, recheck UACR/UPCR  - Imaging: Right kidney 10 4 cm, Left kidney 10 2 cm, subtle medullary calcification in the bilateral kidneys  - Discussed risk factor reduction to slow progression of chronic kidney disease  • Intensive blood pressure control as well  • Glycemic control, currently prediabetic not on any antidiabetic medication  • Lipid control, on Lipitor 40 mg daily  • Lifestyle modifications (exercise) discussed  • Discussed avoidance of NSAIDs due to their short-term and long-term effects on kidney function    # Hypertension/Volume   - Goal BP <120/80 per KDIGO guidelines   - Volume status: Euvolemic  - Status: Blood pressure well controlled  - Current antihypertensive regimen:  Lisinopril 5 mg daily, not taking amlodipine  - Changes: No changes    # Screening for Anemia   - Target Hb: More than 10 g/dL  - Most recent hemoglobin: 10 g/dL  - Ferritin 194, T saturation 14 08/2022  - Vitamin B12/folate/methylmalonic acid levels within normal limits 08/2022  - EPO level 8 6 inappropriately normal in setting of anemia 08/2022  - Will consider starting FACUNDO if hemoglobin remains persistently below 10  - He is also following up with Hematology    # Electrolytes/Acid Base status   - Electrolytes and acid base status within normal limits    # CKD Mineral and Bone Disorder   - Goal Ca 8 5-10 mg/dL, goal Phos 2 7-4 6 mg/dL, goal iPTH 30-70 pg/mL  - Currently taking ergocalciferol 50,000 units weekly  - Check 25 hydroxy vitamin-D and iPTH level    # Other issues   - History of depression on Celexa/trazodone/Seroquel   - History of opioid dependence in remission on Suboxone    HISTORY OF PRESENT ILLNESS:  Requesting Physician: JOSIAH Rodgers    Ivan Hernández is a 62 y o  male who has history of hypertension  He does not recall for how long he has had this diagnosis  Currently he is taking lisinopril 5 mg daily  He was seen in the hospital 07/2022 with acute kidney injury  On discharge his lisinopril was held and he was given amlodipine 10 mg daily  However he is currently getting medications for pharmacy in a blister pack that does not have amlodipine and only has lisinopril 5 mg daily  He currently denies dyspnea or leg swelling  He denies hematuria  He has seen Urology in the past and recently had a cystoscopy which was negative except mildly enlarged prostate    He believes that he has history of nephrolithiasis and also passed a stone in the past     >> Major risk factors for CKD  - Diabetes: Pre diabetes   - Hypertension: Yes nut does not remember the timeline   - Age ? 54 years: Yes   - Family history of kidney disease: No   - Obesity or metabolic syndrome: No     >> Medical history evaluation   - Prior kidney disease or dialysis: No  - Incidental hematuria in the past: Yes status post cystoscopy   - Urinary symptoms: Urgency, wakes up twice at night  - History of foamy or frothy urine: No  - History of nephrolithiasis: Possibility of kidney stone in the past   - Diseases that share risk factors with CKD: DM, HTN  - Systemic diseases that might affect kidney: No  - History of use of medications that might affect renal function: Very occasional Naprosyn use    PAST MEDICAL HISTORY:  Past Medical History:   Diagnosis Date   • Abdominal pain    • Allergic rhinitis    • Cancer (HCC)    • Chronic pain    • Colon polyps    • Depression    • Fatigue    • Head injury    • Homeless    • Hypertension    • Insomnia    • Liver disease    • Loss of appetite    • Numbness and tingling of right arm    • Palpitations    • Psychiatric disorder     bipolar   • PTSD (post-traumatic stress disorder)    • Seizures (HCC)    • Shortness of breath    • Substance abuse ([facility] [address] )    • Swallowing difficulty        PAST SURGICAL HISTORY:  Past Surgical History:   Procedure Laterality Date   • ABDOMINAL SURGERY     • COLONOSCOPY     • EYE SURGERY     • NECK SURGERY     • ORCHIECTOMY Right 5/19/2016    Procedure: ORCHIECTOMY INGUINAL biopsy of testicular mass, ;  Surgeon: Leslie Foster MD;  Location: [facility] OR;  Service:    • MS COLONOSCOPY FLX DX W/COLLJ SPEC WHEN PFRMD N/A 2/13/2017    Procedure: EGD AND COLONOSCOPY;  Surgeon: Juan Rangel MD;  Location: [facility] GI LAB;   Service: Gastroenterology   • MS ESOPHAGOGASTRODUODENOSCOPY TRANSORAL DIAGNOSTIC N/A 11/16/2016    Procedure: EGD AND COLONOSCOPY;  Surgeon: Juan Rangel MD; Location: [facility]; Service: Gastroenterology       ALLERGIES:  Allergies   Allergen Reactions   • Oxycodone Swelling     Tongue swelling       SOCIAL HISTORY:  Social History     Substance and Sexual Activity   Alcohol Use No     Social History     Substance and Sexual Activity   Drug Use Not Currently   • Types: Heroin     Social History     Tobacco Use   Smoking Status Current Every Day Smoker   • Packs/day: 0 50   • Years: 30 00   • Pack years: 15 00   • Types: Cigarettes   Smokeless Tobacco Current User       FAMILY HISTORY:  Family History   Problem Relation Age of Onset   • Diabetes Mother    • Hypertension Brother        MEDICATIONS:    Current Outpatient Medications:   •  atorvastatin (LIPITOR) 40 mg tablet, , Disp: , Rfl:   •  lisinopril (ZESTRIL) 5 mg tablet, Take 1 tablet by mouth daily, Disp: , Rfl:   •  pantoprazole (PROTONIX) 40 mg tablet, Take 1 tablet (40 mg total) by mouth daily in the early morning Indications: Gastroesophageal Reflux Disease , Disp: 30 tablet, Rfl: 1  •  QUEtiapine (SEROquel) 200 mg tablet, Take 1 tablet (200 mg total) by mouth daily at bedtime (Patient taking differently: Take 100 mg by mouth daily at bedtime), Disp: 30 tablet, Rfl: 0  •  QUEtiapine (SEROquel) 25 mg tablet, , Disp: , Rfl:   •  sertraline (ZOLOFT) 100 mg tablet, Take 1 tablet by mouth daily, Disp: , Rfl:   •  VITAMIN D PO, Take 50,000 Units by mouth once a week, Disp: , Rfl:   •  amLODIPine (NORVASC) 10 mg tablet, Take 1 tablet (10 mg total) by mouth daily (Patient not taking: Reported on 10/24/2022), Disp: 30 tablet, Rfl: 0  •  citalopram (CeleXA) 40 mg tablet, Take 1 tablet (40 mg total) by mouth daily Indications: Depression  At [address] [place] [address] (Patient not taking: No sig reported), Disp: 30 tablet, Rfl: 1  •  fluticasone (FLONASE) 50 mcg/act nasal spray, 1 spray into each nostril daily Indications: Hayfever   At [address] [place] [address] (Patient not taking: Reported on 10/24/2022), Disp: 1 Bottle, Rfl: 0  •  naloxone (NARCAN) 4 mg/0 1 mL nasal spray, Administer 1 spray into a nostril  If no response after 2-3 minutes, give another dose in the other nostril using a new spray  (Patient not taking: No sig reported), Disp: 1 each, Rfl: 1  •  traZODone (DESYREL) 100 mg tablet, Take 1 tablet by mouth daily at bedtime (Patient not taking: No sig reported), Disp: , Rfl:     REVIEW OF SYSTEMS:  Review of Systems   Constitutional: Negative for chills and fever  HENT: Negative for ear pain and sore throat  Eyes: Negative for pain and visual disturbance  Respiratory: Negative for cough and shortness of breath  Cardiovascular: Negative for chest pain and palpitations  Gastrointestinal: Negative for abdominal pain and vomiting  Genitourinary: Positive for urgency  Negative for dysuria and hematuria  Musculoskeletal: Negative for arthralgias and back pain  Skin: Negative for color change and rash  Neurological: Negative for seizures and syncope  All other systems reviewed and are negative  All the systems were reviewed and were negative except as documented on the HPI  PHYSICAL EXAMINATION:  /80 (BP Location: Right arm, Patient Position: Sitting, Cuff Size: Large)   Ht 5' 6" (1 676 m)   Wt 60 8 kg (134 lb)   BMI 21 63 kg/m²   Current Weight: Weight - Scale: 60 8 kg (134 lb) Body mass index is 21 63 kg/m²  Physical Exam  Constitutional:       Appearance: Normal appearance  HENT:      Head: Normocephalic and atraumatic  Mouth/Throat:      Mouth: Mucous membranes are moist       Pharynx: Oropharynx is clear  Cardiovascular:      Rate and Rhythm: Normal rate and regular rhythm  Pulses: Normal pulses  Heart sounds: Normal heart sounds  Pulmonary:      Effort: Pulmonary effort is normal       Breath sounds: Normal breath sounds  Abdominal:      General: Abdomen is flat  Bowel sounds are normal       Palpations: Abdomen is soft  Musculoskeletal:         General: Normal range of motion        Right lower leg: No edema  Left lower leg: No edema  Skin:     General: Skin is warm and dry  Neurological:      General: No focal deficit present  Mental Status: He is alert and oriented to person, place, and time  Mental status is at baseline  Psychiatric:         Mood and Affect: Mood normal          Behavior: Behavior normal          Thought Content: Thought content normal          Judgment: Judgment normal          LABORATORY RESULTS:  Lab Results   Component Value Date    K 3 5 09/09/2022     09/09/2022    CO2 29 09/09/2022    BUN 14 09/09/2022    CREATININE 1 57 (H) 09/09/2022    GLUF 116 (H) 05/25/2021    CALCIUM 8 3 09/09/2022    CORRECTEDCA 9 6 07/09/2022    AST 31 09/09/2022    ALT 30 09/09/2022    ALKPHOS 71 09/09/2022    EGFR 48 09/09/2022     Lab Results   Component Value Date    WBC 6 59 09/09/2022    HGB 10 0 (L) 09/09/2022    HCT 31 1 (L) 09/09/2022    MCV 95 09/09/2022     09/09/2022     Lab Results   Component Value Date    CALCIUM 8 3 09/09/2022    PHOS 3 4 07/07/2022     IMAGING:  Exophytic 1 3 cm left renal cyst  Suggestion of subtle medullary calcification in the bilateral kidneys, stable  No nephrolithiasis or obstructive uropathy

## 2022-10-24 NOTE — PATIENT INSTRUCTIONS
~ Please AVOID the following pain medications    LIST OF NSAIDS (NONSTEROIDAL ANTI-INFLAMMATORY DRUGS) AND GORDON-2 INHIBITORS    DIFLUNISAL (DOLOBID)  IBUPROFEN (MOTRIN, ADVIL)  FLURBIPROFEN (ANSAID)  KETOPROFEN (ORUDIS, ORUVAIL)  FENOPROFEN (NALFON)  NABUMETONE (RELAFEN)  PIROXICAM (FELDENE)  NAPROXEN (ALEVE, NAPROSYN, NAPRELAN, ANAPROX)  DICLOFENAC (VOLTAREN, CATAFLAM)  INDOMETHACIN (INDOCIN)  SULINDAC (CLINORIL)  TOLMETIN (TOLETIN)  ETODOLAC (LODINE)  MELOXICAM (MOBIC)  KETOROLAC (TORADOL)  OXAPROZIN (DAYPRO)  CELECOXIB (CELEBREX)

## 2022-12-17 ENCOUNTER — HOSPITAL ENCOUNTER (EMERGENCY)
Facility: HOSPITAL | Age: 57
End: 2022-12-18
Attending: EMERGENCY MEDICINE | Admitting: EMERGENCY MEDICINE

## 2022-12-17 ENCOUNTER — APPOINTMENT (EMERGENCY)
Dept: RADIOLOGY | Facility: HOSPITAL | Age: 57
End: 2022-12-17

## 2022-12-17 DIAGNOSIS — Z00.8 MEDICAL CLEARANCE FOR PSYCHIATRIC ADMISSION: ICD-10-CM

## 2022-12-17 DIAGNOSIS — R07.9 CHEST PAIN: ICD-10-CM

## 2022-12-17 DIAGNOSIS — R45.851 SUICIDAL IDEATION: Primary | ICD-10-CM

## 2022-12-17 LAB
ALBUMIN SERPL BCP-MCNC: 4.2 G/DL (ref 3.5–5)
ALP SERPL-CCNC: 72 U/L (ref 46–116)
ALT SERPL W P-5'-P-CCNC: 27 U/L (ref 12–78)
AMPHETAMINES SERPL QL SCN: NEGATIVE
ANION GAP SERPL CALCULATED.3IONS-SCNC: 7 MMOL/L (ref 4–13)
AST SERPL W P-5'-P-CCNC: 28 U/L (ref 5–45)
BARBITURATES UR QL: NEGATIVE
BASOPHILS # BLD AUTO: 0.01 THOUSANDS/ÂΜL (ref 0–0.1)
BASOPHILS NFR BLD AUTO: 0 % (ref 0–1)
BENZODIAZ UR QL: NEGATIVE
BILIRUB SERPL-MCNC: 0.22 MG/DL (ref 0.2–1)
BILIRUB UR QL STRIP: NEGATIVE
BUN SERPL-MCNC: 12 MG/DL (ref 5–25)
CALCIUM SERPL-MCNC: 9.2 MG/DL (ref 8.3–10.1)
CARDIAC TROPONIN I PNL SERPL HS: 3 NG/L
CHLORIDE SERPL-SCNC: 105 MMOL/L (ref 96–108)
CLARITY UR: CLEAR
CO2 SERPL-SCNC: 24 MMOL/L (ref 21–32)
COCAINE UR QL: NEGATIVE
COLOR UR: YELLOW
CREAT SERPL-MCNC: 0.88 MG/DL (ref 0.6–1.3)
CRP SERPL QL: 14.5 MG/L
EOSINOPHIL # BLD AUTO: 0.08 THOUSAND/ÂΜL (ref 0–0.61)
EOSINOPHIL NFR BLD AUTO: 2 % (ref 0–6)
ERYTHROCYTE [DISTWIDTH] IN BLOOD BY AUTOMATED COUNT: 13.4 % (ref 11.6–15.1)
FLUAV RNA RESP QL NAA+PROBE: NEGATIVE
FLUBV RNA RESP QL NAA+PROBE: NEGATIVE
GFR SERPL CREATININE-BSD FRML MDRD: 95 ML/MIN/1.73SQ M
GLUCOSE SERPL-MCNC: 112 MG/DL (ref 65–140)
GLUCOSE UR STRIP-MCNC: NEGATIVE MG/DL
HCT VFR BLD AUTO: 41 % (ref 36.5–49.3)
HGB BLD-MCNC: 13 G/DL (ref 12–17)
HGB UR QL STRIP.AUTO: NEGATIVE
IMM GRANULOCYTES # BLD AUTO: 0.02 THOUSAND/UL (ref 0–0.2)
IMM GRANULOCYTES NFR BLD AUTO: 0 % (ref 0–2)
KETONES UR STRIP-MCNC: NEGATIVE MG/DL
LEUKOCYTE ESTERASE UR QL STRIP: NEGATIVE
LIPASE SERPL-CCNC: 270 U/L (ref 73–393)
LYMPHOCYTES # BLD AUTO: 1.7 THOUSANDS/ÂΜL (ref 0.6–4.47)
LYMPHOCYTES NFR BLD AUTO: 32 % (ref 14–44)
MCH RBC QN AUTO: 29.5 PG (ref 26.8–34.3)
MCHC RBC AUTO-ENTMCNC: 31.7 G/DL (ref 31.4–37.4)
MCV RBC AUTO: 93 FL (ref 82–98)
METHADONE UR QL: NEGATIVE
MONOCYTES # BLD AUTO: 0.39 THOUSAND/ÂΜL (ref 0.17–1.22)
MONOCYTES NFR BLD AUTO: 7 % (ref 4–12)
NEUTROPHILS # BLD AUTO: 3.17 THOUSANDS/ÂΜL (ref 1.85–7.62)
NEUTS SEG NFR BLD AUTO: 59 % (ref 43–75)
NITRITE UR QL STRIP: NEGATIVE
NRBC BLD AUTO-RTO: 0 /100 WBCS
NT-PROBNP SERPL-MCNC: 174 PG/ML
OPIATES UR QL SCN: NEGATIVE
OXYCODONE+OXYMORPHONE UR QL SCN: NEGATIVE
PCP UR QL: NEGATIVE
PH UR STRIP.AUTO: 6.5 [PH]
PLATELET # BLD AUTO: 180 THOUSANDS/UL (ref 149–390)
PMV BLD AUTO: 10.1 FL (ref 8.9–12.7)
POTASSIUM SERPL-SCNC: 4 MMOL/L (ref 3.5–5.3)
PROT SERPL-MCNC: 8.7 G/DL (ref 6.4–8.4)
PROT UR STRIP-MCNC: NEGATIVE MG/DL
RBC # BLD AUTO: 4.4 MILLION/UL (ref 3.88–5.62)
RSV RNA RESP QL NAA+PROBE: NEGATIVE
SARS-COV-2 RNA RESP QL NAA+PROBE: POSITIVE
SODIUM SERPL-SCNC: 136 MMOL/L (ref 135–147)
SP GR UR STRIP.AUTO: 1.02 (ref 1–1.03)
THC UR QL: POSITIVE
UROBILINOGEN UR STRIP-ACNC: <2 MG/DL
WBC # BLD AUTO: 5.37 THOUSAND/UL (ref 4.31–10.16)

## 2022-12-17 RX ORDER — BUPRENORPHINE AND NALOXONE 8; 2 MG/1; MG/1
8 FILM, SOLUBLE BUCCAL; SUBLINGUAL DAILY
Status: DISCONTINUED | OUTPATIENT
Start: 2022-12-18 | End: 2022-12-18 | Stop reason: HOSPADM

## 2022-12-17 RX ORDER — TRAZODONE HYDROCHLORIDE 100 MG/1
100 TABLET ORAL ONCE
Status: COMPLETED | OUTPATIENT
Start: 2022-12-17 | End: 2022-12-17

## 2022-12-17 RX ORDER — LANOLIN ALCOHOL/MO/W.PET/CERES
6 CREAM (GRAM) TOPICAL
Status: DISCONTINUED | OUTPATIENT
Start: 2022-12-17 | End: 2022-12-17

## 2022-12-17 RX ADMIN — IOHEXOL 100 ML: 350 INJECTION, SOLUTION INTRAVENOUS at 16:12

## 2022-12-17 RX ADMIN — TRAZODONE HYDROCHLORIDE 100 MG: 100 TABLET ORAL at 22:26

## 2022-12-17 NOTE — DISCHARGE INSTRUCTIONS
CT chest abdomen pelvis w contrast    Result Date: 12/17/2022  Impression: Atypical pulmonary cyst at the right lung apex  Due to increasing adjacent groundglass opacity since the CT scan from 2016, consider PET/CT, biopsy or referral for further clinical evaluation  The study was marked in [facility] for immediate notification   Workstation performed: [id number]

## 2022-12-17 NOTE — ED PROVIDER NOTES
History  Chief Complaint   Patient presents with   • Chest Pain     Pt c/o CP and SOB over last three days, pt states had fever at home and c/o generalized body aches and headache      Patient is a 59-year-old male presenting for chest pain x3 days  Past medical history significant for IV drug use (heroin), bradycardia, depression, homelessness, diabetes, hypertension  Patient states his chest pain began approximately 3 days ago and is tender palpation  Patient states the pain is sharp, does not radiate up his arm or neck, not accompanied by shortness of breath, nondiaphoretic  Patient denies any fever/chills although patient does report abdominal pain without nausea/vomiting/diarrhea  Patient does admit to heroin use for the last few days with suicidal ideation however denies any active plan and denies any suicidal ideation at present  (This later changed prior to discharge-patient now reporting SI with plan via overdose )  Patient denies HI, AH/VH  On exam, patient has darkening of skin of left-sided chest   Has been documented in previous visits to the ER and appears to be chronic in nature, no palpable crepitus although it is tender to palpation  No cellulitic changes present  Heart regular rate rhythm, lungs clear bilaterally auscultation, generalized abdominal tenderness to palpation although no rebound no guarding, normoactive bowel sounds  -Translation services were used to obtain patient history as patient is primarily [place]-speaking  Prior to Admission Medications   Prescriptions Last Dose Informant Patient Reported? Taking?    QUEtiapine (SEROquel) 200 mg tablet   No No   Sig: Take 1 tablet (200 mg total) by mouth daily at bedtime   Patient taking differently: Take 100 mg by mouth daily at bedtime   QUEtiapine (SEROquel) 25 mg tablet   Yes No   VITAMIN D PO   Yes No   Sig: Take 50,000 Units by mouth once a week   amLODIPine (NORVASC) 10 mg tablet   No No   Sig: Take 1 tablet (10 mg total) by mouth daily   Patient not taking: Reported on 10/24/2022   atorvastatin (LIPITOR) 40 mg tablet   Yes No   citalopram (CeleXA) 40 mg tablet   No No   Sig: Take 1 tablet (40 mg total) by mouth daily Indications: Depression  At [address] [place] [address]   Patient not taking: No sig reported   fluticasone (FLONASE) 50 mcg/act nasal spray   No No   Si spray into each nostril daily Indications: Hayfever  At [address] [place] [address]   Patient not taking: Reported on 10/24/2022   lisinopril (ZESTRIL) 5 mg tablet   Yes No   Sig: Take 1 tablet by mouth daily   naloxone (NARCAN) 4 mg/0 1 mL nasal spray   No No   Sig: Administer 1 spray into a nostril  If no response after 2-3 minutes, give another dose in the other nostril using a new spray  Patient not taking: No sig reported   pantoprazole (PROTONIX) 40 mg tablet   No No   Sig: Take 1 tablet (40 mg total) by mouth daily in the early morning Indications: Gastroesophageal Reflux Disease     sertraline (ZOLOFT) 100 mg tablet   Yes No   Sig: Take 1 tablet by mouth daily   traZODone (DESYREL) 100 mg tablet   Yes No   Sig: Take 1 tablet by mouth daily at bedtime   Patient not taking: No sig reported      Facility-Administered Medications: None       Past Medical History:   Diagnosis Date   • Abdominal pain    • Allergic rhinitis    • Cancer [facility])    • Chronic pain    • Colon polyps    • Depression    • Fatigue    • Head injury    • Homeless    • Hypertension    • Insomnia    • Liver disease    • Loss of appetite    • Numbness and tingling of right arm    • Palpitations    • Psychiatric disorder     bipolar   • PTSD (post-traumatic stress disorder)    • Seizures (HCC)    • Shortness of breath    • Substance abuse ([facility] [address] )    • Swallowing difficulty        Past Surgical History:   Procedure Laterality Date   • ABDOMINAL SURGERY     • COLONOSCOPY     • EYE SURGERY     • NECK SURGERY     • ORCHIECTOMY Right 2016    Procedure: ORCHIECTOMY INGUINAL biopsy of testicular mass, ;  Surgeon: Toby Hannon MD; Location:  MAIN OR;  Service:    • FL COLONOSCOPY FLX DX W/COLLJ SPEC WHEN PFRMD N/A 2/13/2017    Procedure: EGD AND COLONOSCOPY;  Surgeon: Amauri Bowden MD;  Location: [facility] GI LAB; Service: Gastroenterology   • FL ESOPHAGOGASTRODUODENOSCOPY TRANSORAL DIAGNOSTIC N/A 11/16/2016    Procedure: EGD AND COLONOSCOPY;  Surgeon: Amauri Bowden MD;  Location:  GI LAB; Service: Gastroenterology       Family History   Problem Relation Age of Onset   • Diabetes Mother    • Hypertension Brother      I have reviewed and agree with the history as documented  E-Cigarette/Vaping   • E-Cigarette Use Never User      E-Cigarette/Vaping Substances     Social History     Tobacco Use   • Smoking status: Every Day     Packs/day: 1 00     Years: 30 00     Pack years: 30 00     Types: Cigarettes   • Smokeless tobacco: Current   Vaping Use   • Vaping Use: Never used   Substance Use Topics   • Alcohol use: No   • Drug use: Not Currently     Types: Heroin        Review of Systems   Constitutional: Negative  HENT: Negative  Eyes: Negative  Respiratory: Negative  Cardiovascular: Positive for chest pain  Gastrointestinal: Positive for abdominal pain  Negative for diarrhea, nausea and vomiting  Endocrine: Negative  Genitourinary: Negative  Musculoskeletal: Negative  Skin:        Darkening of skin of left-sided chest    Allergic/Immunologic: Negative  Neurological: Negative  Hematological: Negative  Psychiatric/Behavioral: Positive for suicidal ideas         Physical Exam  ED Triage Vitals [12/17/22 1410]   Temperature Pulse Respirations Blood Pressure SpO2   98 2 °F (36 8 °C) (!) 53 20 (!) 192/95 100 %      Temp Source Heart Rate Source Patient Position - Orthostatic VS BP Location FiO2 (%)   Oral Monitor Lying Right arm --      Pain Score       --             Orthostatic Vital Signs  Vitals:    12/17/22 1410 12/17/22 1745 12/17/22 1830 12/17/22 1900   BP: (!) 192/95 (!) 216/92 161/71 (!) 182/105   Pulse: (!) 53 (!) 40 81 62   Patient Position - Orthostatic VS: Lying Sitting Lying Lying       Physical Exam  Vitals and nursing note reviewed  Constitutional:       Appearance: He is well-developed and normal weight  HENT:      Head: Normocephalic and atraumatic  Eyes:      Extraocular Movements: Extraocular movements intact  Pupils: Pupils are equal, round, and reactive to light  Cardiovascular:      Rate and Rhythm: Normal rate and regular rhythm  Pulses:           Radial pulses are 2+ on the right side and 2+ on the left side  Heart sounds: Normal heart sounds  Pulmonary:      Effort: Pulmonary effort is normal       Breath sounds: Normal breath sounds  Chest:      Chest wall: Tenderness present  Comments: Tenderness palpation left side of chest, as well as darkening of skin of left-sided chest   Appears to extend from left axilla across left anterior chest   No darkening of left side of back  On palpation no palpable deformity, no crepitus  No apparent cellulitic changes or erythema  Abdominal:      General: Bowel sounds are normal       Palpations: Abdomen is soft  Tenderness: There is abdominal tenderness  There is no guarding or rebound  Comments: Generalized abdominal tenderness palpation  Musculoskeletal:         General: Normal range of motion  Cervical back: Normal range of motion and neck supple  Skin:     General: Skin is warm and dry  Capillary Refill: Capillary refill takes less than 2 seconds  Comments: CT chest for details regarding skin discoloration  Neurological:      General: No focal deficit present  Mental Status: He is alert and oriented to person, place, and time     Psychiatric:         Mood and Affect: Mood normal          Behavior: Behavior normal          ED Medications  Medications   buprenorphine-naloxone (Suboxone) film 8 mg (has no administration in time range)   iohexol (OMNIPAQUE) 350 MG/ML injection (SINGLE-DOSE) 100 mL (100 mL Intravenous Given 12/17/22 1612)       Diagnostic Studies  Results Reviewed     Procedure Component Value Units Date/Time    FLU/RSV/COVID - if FLU/RSV clinically relevant [[id number]]  (Abnormal) Collected: 12/17/22 9194    Lab Status: Final result Specimen: Nares from Nose Updated: 12/17/22 5506     SARS-CoV-2 Positive     INFLUENZA A PCR Negative     INFLUENZA B PCR Negative     RSV PCR Negative    Narrative:      FOR PEDIATRIC PATIENTS - copy/paste COVID Guidelines URL to browser: [email or web address]    SARS-CoV-2 assay is a Nucleic Acid Amplification assay intended for the  qualitative detection of nucleic acid from SARS-CoV-2 in nasopharyngeal  swabs  Results are for the presumptive identification of SARS-CoV-2 RNA  Positive results are indicative of infection with SARS-CoV-2, the virus  causing COVID-19, but do not rule out bacterial infection or co-infection  with other viruses  Laboratories within the [place] and its  territories are required to report all positive results to the appropriate  public health authorities  Negative results do not preclude SARS-CoV-2  infection and should not be used as the sole basis for treatment or other  patient management decisions  Negative results must be combined with  clinical observations, patient history, and epidemiological information  This test has not been FDA cleared or approved  This test has been authorized by FDA under an Emergency Use Authorization  (EUA)  This test is only authorized for the duration of time the  declaration that circumstances exist justifying the authorization of the  emergency use of an in vitro diagnostic tests for detection of SARS-CoV-2  virus and/or diagnosis of COVID-19 infection under section 564(b)(1) of  the Act, 21 U  S C  447OCN-0(N)(7), unless the authorization is terminated  or revoked sooner   The test has been validated but independent review by FDA  and CLIA is pending  Test performed using [employer] GeneXpert: This RT-PCR assay targets N2,  a region unique to SARS-CoV-2  A conserved region in the E-gene was chosen  for pan-Sarbecovirus detection which includes SARS-CoV-2  According to CMS-2020-01-R, this platform meets the definition of high-throughput technology      HS Troponin 0hr (reflex protocol) [[id number]]  (Normal) Collected: 12/17/22 1458    Lab Status: Final result Specimen: Blood from Arm, Right Updated: 12/17/22 1549     hs TnI 0hr 3 ng/L     NT-BNP PRO [[id number]]  (Abnormal) Collected: 12/17/22 1458    Lab Status: Final result Specimen: Blood from Arm, Right Updated: 12/17/22 1545     NT-proBNP 174 pg/mL     Comprehensive metabolic panel [[id number]]  (Abnormal) Collected: 12/17/22 1458    Lab Status: Final result Specimen: Blood from Arm, Right Updated: 12/17/22 1544     Sodium 136 mmol/L      Potassium 4 0 mmol/L      Chloride 105 mmol/L      CO2 24 mmol/L      ANION GAP 7 mmol/L      BUN 12 mg/dL      Creatinine 0 88 mg/dL      Glucose 112 mg/dL      Calcium 9 2 mg/dL      AST 28 U/L      ALT 27 U/L      Alkaline Phosphatase 72 U/L      Total Protein 8 7 g/dL      Albumin 4 2 g/dL      Total Bilirubin 0 22 mg/dL      eGFR 95 ml/min/1 73sq m     Narrative:      Jose guidelines for Chronic Kidney Disease (CKD):   •  Stage 1 with normal or high GFR (GFR > 90 mL/min/1 73 square meters)  •  Stage 2 Mild CKD (GFR = 60-89 mL/min/1 73 square meters)  •  Stage 3A Moderate CKD (GFR = 45-59 mL/min/1 73 square meters)  •  Stage 3B Moderate CKD (GFR = 30-44 mL/min/1 73 square meters)  •  Stage 4 Severe CKD (GFR = 15-29 mL/min/1 73 square meters)  •  Stage 5 End Stage CKD (GFR <15 mL/min/1 73 square meters)  Note: GFR calculation is accurate only with a steady state creatinine    Lipase [[id number]]  (Normal) Collected: 12/17/22 1458    Lab Status: Final result Specimen: Blood from Arm, Right Updated: 12/17/22 2994 Lipase 270 u/L     C-reactive protein [[id number]]  (Abnormal) Collected: 12/17/22 1458    Lab Status: Final result Specimen: Blood from Arm, Right Updated: 12/17/22 1544     CRP 14 5 mg/L     Rapid drug screen, urine [[id number]]  (Abnormal) Collected: 12/17/22 1502    Lab Status: Final result Specimen: Urine, Clean Catch Updated: 12/17/22 1540     Amph/Meth UR Negative     Barbiturate Ur Negative     Benzodiazepine Urine Negative     Cocaine Urine Negative     Methadone Urine Negative     Opiate Urine Negative     PCP Ur Negative     THC Urine Positive     Oxycodone Urine Negative    Narrative:      Presumptive report  If requested, specimen will be sent to reference lab for confirmation  FOR MEDICAL PURPOSES ONLY  IF CONFIRMATION NEEDED PLEASE CONTACT THE LAB WITHIN 5 DAYS      Drug Screen Cutoff Levels:  AMPHETAMINE/METHAMPHETAMINES  1000 ng/mL  BARBITURATES     200 ng/mL  BENZODIAZEPINES     200 ng/mL  COCAINE      300 ng/mL  METHADONE      300 ng/mL  OPIATES      300 ng/mL  PHENCYCLIDINE     25 ng/mL  THC       50 ng/mL  OXYCODONE      100 ng/mL    UA w Reflex to Microscopic w Reflex to Culture [[id number]] Collected: 12/17/22 1502    Lab Status: Final result Specimen: Urine, Clean Catch Updated: 12/17/22 1530     Color, UA Yellow     Clarity, UA Clear     Specific Gravity, UA 1 018     pH, UA 6 5     Leukocytes, UA Negative     Nitrite, UA Negative     Protein, UA Negative mg/dl      Glucose, UA Negative mg/dl      Ketones, UA Negative mg/dl      Urobilinogen, UA <2 0 mg/dl      Bilirubin, UA Negative     Occult Blood, UA Negative    CBC and differential [[id number]] Collected: 12/17/22 1458    Lab Status: Final result Specimen: Blood from Arm, Right Updated: 12/17/22 1515     WBC 5 37 Thousand/uL      RBC 4 40 Million/uL      Hemoglobin 13 0 g/dL      Hematocrit 41 0 %      MCV 93 fL      MCH 29 5 pg      MCHC 31 7 g/dL      RDW 13 4 %      MPV 10 1 fL      Platelets 922 Thousands/uL      nRBC 0 /100 WBCs Neutrophils Relative 59 %      Immat GRANS % 0 %      Lymphocytes Relative 32 %      Monocytes Relative 7 %      Eosinophils Relative 2 %      Basophils Relative 0 %      Neutrophils Absolute 3 17 Thousands/µL      Immature Grans Absolute 0 02 Thousand/uL      Lymphocytes Absolute 1 70 Thousands/µL      Monocytes Absolute 0 39 Thousand/µL      Eosinophils Absolute 0 08 Thousand/µL      Basophils Absolute 0 01 Thousands/µL                  CT chest abdomen pelvis w contrast   Final Result by Vic Brink MD (12/17 1740)      Atypical pulmonary cyst at the right lung apex  Due to increasing adjacent groundglass opacity since the CT scan from 2016, consider PET/CT, biopsy or referral for further clinical evaluation  The study was marked in [facility] for immediate notification  Workstation performed: [id number]               Procedures  ECG 12 Lead Documentation Only    Date/Time: 12/17/2022 4:12 PM  Performed by: Tariq Varner MD  Authorized by: Tariq Varner MD     Indications / Diagnosis:  Chest pain  ECG reviewed by me, the ED Provider: yes    Patient location:  ED  Previous ECG:     Previous ECG:  Compared to current    Similarity:  Changes noted  Interpretation:     Interpretation: abnormal    Rate:     ECG rate:  58    ECG rate assessment: bradycardic    Rhythm:     Rhythm: sinus rhythm    Ectopy:     Ectopy: none    QRS:     QRS axis:  Normal    QRS intervals:  Normal  Conduction:     Conduction: normal    ST segments:     ST segments:  Normal  T waves:     T waves: normal            ED Course  ED Course as of 12/17/22 2126   Sat Dec 17, 2022   1602 hs TnI 0hr: 3   1628 SARS-COV-2(!): Positive   1628 C-REACTIVE PROTEIN(!): 14 5   1824 Blood pressure is at bedside following 216/92 reading  Blood pressure in room was 000Z systolic  Blood cuff moved to other arm and repeated  Blood pressure was 161/71   1829 HR remains in high 50s     1848 Upon further discussion with patient, patient does admit to suicidal ideation with plan (drug overdose)  Patient would like inpatient psych help at this time  Plan to consult crisis for further evaluation placement  HEART Risk Score    Flowsheet Row Most Recent Value   Heart Score Risk Calculator    History 0 Filed at: 12/17/2022 1610   ECG 0 Filed at: 12/17/2022 1610   Age 1 Filed at: 12/17/2022 1610   Risk Factors 2 Filed at: 12/17/2022 1610   Troponin 0 Filed at: 12/17/2022 1610   HEART Score 3 Filed at: 12/17/2022 1610                                MDM  Number of Diagnoses or Management Options  Chest pain: new and requires workup  Suicidal ideation: new and requires workup  Diagnosis management comments: Patient is a 70-year-old male presenting for left-sided chest pain  Ddx: ACS, PE, Viral pneumonia, bacterial pneumonia, Depression w/ SI and plan, substance abuse, abscess, endocarditis, acanthosis nigricans  Based on patient presentation, history of IV drug use, and depression with suicidal ideation plan, plan for ACS rule out work-up including CBC, CRP, troponin, CMP, UA, UDS  Based on history of IV drug use and mentions using heroin recently, will perform CT chest abdomen pelvis to look for any signs of infection that may have originated from IV drug use (IV septic emboli, abscess formation)  COVID/flu/RSV also ordered to work-up for viral URI   -Lab work-up and imaging negative  However patient is positive for COVID  And given reported SI with plan, plan for crisis consult, one-to-one and probable 201 for admission to inpatient psych           Amount and/or Complexity of Data Reviewed  Clinical lab tests: ordered and reviewed  Tests in the radiology section of CPT®: reviewed and ordered  Tests in the medicine section of CPT®: ordered and reviewed  Review and summarize past medical records: yes  Independent visualization of images, tracings, or specimens: yes    Risk of Complications, Morbidity, and/or Mortality  Presenting problems: low  Diagnostic procedures: low  Management options: low    Patient Progress  Patient progress: stable      Disposition  Final diagnoses:   Chest pain   Suicidal ideation     Time reflects when diagnosis was documented in both MDM as applicable and the Disposition within this note     Time User Action Codes Description Comment    12/17/2022  7:15 PM Erna Wilson Add [R07 9] Chest pain     12/17/2022  7:15 PM Brigette, [address] Suicidal ideation     12/17/2022  7:15 PM Erna Wilson Modify [R07 9] Chest pain     12/17/2022  7:15 PM Brigette, [address] Suicidal ideation       ED Disposition     ED Disposition   Transfer to [address] [place] [address]   --    Date/Time   Sat Dec 17, 2022  7:15 PM    Comment   Adry Jha should be transferred out to ** and has been medically cleared  Follow-up Information    None         Patient's Medications   Discharge Prescriptions    No medications on file     No discharge procedures on file  PDMP Review       Value Time User    PDMP Reviewed  Yes 7/10/2021 12:09 PM Sharona Kimble PA-C           ED Provider  Attending physically available and evaluated Adry Jha  I managed the patient along with the ED Attending      Electronically Signed by         Windy Lucio MD  12/17/22 8899

## 2022-12-17 NOTE — ED ATTENDING ATTESTATION
12/17/2022  INaa DO, saw and evaluated the patient  I have discussed the patient with the resident/non-physician practitioner and agree with the resident's/non-physician practitioner's findings, Plan of Care, and MDM as documented in the resident's/non-physician practitioner's note, except where noted  All available labs and Radiology studies were reviewed  I was present for key portions of any procedure(s) performed by the resident/non-physician practitioner and I was immediately available to provide assistance  At this point I agree with the current assessment done in the Emergency Department  I have conducted an independent evaluation of this patient a history and physical is as follows:    Colleen GANT DO, saw and evaluated the patient  I have discussed the patient with the resident and agree with the resident's findings, Plan of Care, and MDM as documented in the resident's note, except where noted  All available labs and Radiology studies were reviewed  I was present for key portions of any procedure(s) performed by the resident and I was immediately available to provide assistance  At this point I agree with the current assessment done in the Emergency Department  I have conducted an independent evaluation of this patient a history and physical is as follows:    Giovani Bowers is an 62y o  year old male with PMHx significant for SI, substance abuse, seizure, DM, HTN, who presents to the ED today with CP for 2-3 days  Chest pain is exacerbated by palpation and relieved by nothing  The pain is sharp in quality, does not radiate, and currently rated moderate in severity  Also complaining of generalized abdominal pain of unclear duration and without associated symptoms  Also complaining of worsening depression with SI without plan, no HI, VH, AH  No falls or trauma reported      The patient denies fevers, chills, headaches, lightheadedness or syncope, nausea, vomiting, chest pain, shortness of breath, cough, abdominal pain, changes in usual bowel movements, changes with urination, pain anywhere else in body  ROS otherwise negative  General: NAD   HEENT: normocephalic, atraumatic  MMM   CV: RRR, dark skin over the left side of the chest that has been previously documented and does not appear ecchymotic, infectious, or acute  Pulm: normal work of breathing,   GI: abdomen non-distended   : deferred   MSK: no LE edema, no deformity   Skin: warm and dry   Neuro: awake and alert, moves all extremities with good strength, face symmetric, speech normal   Psych: appropriate mood and affect       MDM  The patient denies association with alcohol use, vomiting, eating, position, or pain radiating to the neck, arms, abdomen, or back  Breath sounds are present bilaterally and the patient is not in respiratory distress  No dyspnea, nausea, syncope/presyncope, hypoxia, unilateral leg swelling or tenderness  No signs of NSTI on exam     Anticipated Disposition:  tbd      I have personally reviewed the laboratory studies and imaging studies as ordered by the resident/DESTINY  I have personally examined the patient              ED Course  ED Course as of 12/17/22 1825   Sat Dec 17, 2022   1629 SARS-COV-2(!): Positive         Critical Care Time  Procedures

## 2022-12-18 ENCOUNTER — HOSPITAL ENCOUNTER (INPATIENT)
Facility: HOSPITAL | Age: 57
LOS: 10 days | Discharge: HOME | End: 2022-12-28
Attending: STUDENT IN AN ORGANIZED HEALTH CARE EDUCATION/TRAINING PROGRAM | Admitting: PSYCHIATRY & NEUROLOGY

## 2022-12-18 VITALS
SYSTOLIC BLOOD PRESSURE: 129 MMHG | TEMPERATURE: 98.2 F | DIASTOLIC BLOOD PRESSURE: 80 MMHG | RESPIRATION RATE: 16 BRPM | OXYGEN SATURATION: 97 % | HEART RATE: 62 BPM

## 2022-12-18 DIAGNOSIS — K21.9 GERD (GASTROESOPHAGEAL REFLUX DISEASE): ICD-10-CM

## 2022-12-18 DIAGNOSIS — F33.3 SEVERE EPISODE OF RECURRENT MAJOR DEPRESSIVE DISORDER, WITH PSYCHOTIC FEATURES (HCC): Primary | ICD-10-CM

## 2022-12-18 DIAGNOSIS — E78.5 HYPERLIPIDEMIA: ICD-10-CM

## 2022-12-18 DIAGNOSIS — E11.9 TYPE 2 DIABETES MELLITUS WITHOUT COMPLICATION, WITHOUT LONG-TERM CURRENT USE OF INSULIN (HCC): ICD-10-CM

## 2022-12-18 DIAGNOSIS — I10 PRIMARY HYPERTENSION: ICD-10-CM

## 2022-12-18 DIAGNOSIS — F11.21 OPIOID DEPENDENCE IN REMISSION (HCC): ICD-10-CM

## 2022-12-18 DIAGNOSIS — Z00.8 MEDICAL CLEARANCE FOR PSYCHIATRIC ADMISSION: ICD-10-CM

## 2022-12-18 LAB
ATRIAL RATE: 58 BPM
P AXIS: 78 DEGREES
PR INTERVAL: 144 MS
QRS AXIS: 51 DEGREES
QRSD INTERVAL: 84 MS
QT INTERVAL: 400 MS
QTC INTERVAL: 392 MS
T WAVE AXIS: 64 DEGREES
VENTRICULAR RATE: 58 BPM

## 2022-12-18 RX ORDER — QUETIAPINE FUMARATE 25 MG/1
25 TABLET, FILM COATED ORAL DAILY
Status: DISCONTINUED | OUTPATIENT
Start: 2022-12-19 | End: 2022-12-20

## 2022-12-18 RX ORDER — IBUPROFEN 400 MG/1
400 TABLET ORAL EVERY 4 HOURS PRN
Status: CANCELLED | OUTPATIENT
Start: 2022-12-18

## 2022-12-18 RX ORDER — HALOPERIDOL 5 MG/ML
5 INJECTION INTRAMUSCULAR
Status: CANCELLED | OUTPATIENT
Start: 2022-12-18

## 2022-12-18 RX ORDER — AMOXICILLIN 250 MG
1 CAPSULE ORAL DAILY PRN
Status: DISCONTINUED | OUTPATIENT
Start: 2022-12-18 | End: 2022-12-28 | Stop reason: HOSPADM

## 2022-12-18 RX ORDER — BUPRENORPHINE HYDROCHLORIDE AND NALOXONE HYDROCHLORIDE DIHYDRATE 8; 2 MG/1; MG/1
1 TABLET SUBLINGUAL DAILY
Status: ON HOLD | COMMUNITY
End: 2022-12-28 | Stop reason: SDUPTHER

## 2022-12-18 RX ORDER — LORAZEPAM 2 MG/ML
2 INJECTION INTRAMUSCULAR
Status: CANCELLED | OUTPATIENT
Start: 2022-12-18

## 2022-12-18 RX ORDER — BISACODYL 10 MG
10 SUPPOSITORY, RECTAL RECTAL DAILY PRN
Status: CANCELLED | OUTPATIENT
Start: 2022-12-18

## 2022-12-18 RX ORDER — HYDROXYZINE HYDROCHLORIDE 25 MG/1
100 TABLET, FILM COATED ORAL
Status: CANCELLED | OUTPATIENT
Start: 2022-12-18

## 2022-12-18 RX ORDER — HYDROXYZINE 50 MG/1
50 TABLET, FILM COATED ORAL
Status: DISCONTINUED | OUTPATIENT
Start: 2022-12-18 | End: 2022-12-28 | Stop reason: HOSPADM

## 2022-12-18 RX ORDER — LISINOPRIL 5 MG/1
5 TABLET ORAL DAILY
Status: CANCELLED | OUTPATIENT
Start: 2022-12-19

## 2022-12-18 RX ORDER — LORAZEPAM 2 MG/ML
2 INJECTION INTRAMUSCULAR EVERY 6 HOURS PRN
Status: DISCONTINUED | OUTPATIENT
Start: 2022-12-18 | End: 2022-12-28 | Stop reason: HOSPADM

## 2022-12-18 RX ORDER — ATORVASTATIN CALCIUM 10 MG/1
10 TABLET, FILM COATED ORAL
Status: DISCONTINUED | OUTPATIENT
Start: 2022-12-18 | End: 2022-12-28 | Stop reason: HOSPADM

## 2022-12-18 RX ORDER — LANOLIN ALCOHOL/MO/W.PET/CERES
3 CREAM (GRAM) TOPICAL
Status: DISCONTINUED | OUTPATIENT
Start: 2022-12-18 | End: 2022-12-28 | Stop reason: HOSPADM

## 2022-12-18 RX ORDER — IBUPROFEN 400 MG/1
800 TABLET ORAL EVERY 8 HOURS PRN
Status: DISCONTINUED | OUTPATIENT
Start: 2022-12-18 | End: 2022-12-28 | Stop reason: HOSPADM

## 2022-12-18 RX ORDER — HALOPERIDOL 5 MG/ML
2.5 INJECTION INTRAMUSCULAR
Status: DISCONTINUED | OUTPATIENT
Start: 2022-12-18 | End: 2022-12-28 | Stop reason: HOSPADM

## 2022-12-18 RX ORDER — SERTRALINE HYDROCHLORIDE 100 MG/1
100 TABLET, FILM COATED ORAL DAILY
Status: CANCELLED | OUTPATIENT
Start: 2022-12-19

## 2022-12-18 RX ORDER — QUETIAPINE FUMARATE 25 MG/1
25 TABLET, FILM COATED ORAL DAILY
Status: CANCELLED | OUTPATIENT
Start: 2022-12-19

## 2022-12-18 RX ORDER — AMOXICILLIN 250 MG
1 CAPSULE ORAL DAILY PRN
Status: CANCELLED | OUTPATIENT
Start: 2022-12-18

## 2022-12-18 RX ORDER — BISACODYL 10 MG
10 SUPPOSITORY, RECTAL RECTAL DAILY PRN
Status: DISCONTINUED | OUTPATIENT
Start: 2022-12-18 | End: 2022-12-28 | Stop reason: HOSPADM

## 2022-12-18 RX ORDER — HALOPERIDOL 5 MG/1
5 TABLET ORAL
Status: DISCONTINUED | OUTPATIENT
Start: 2022-12-18 | End: 2022-12-28 | Stop reason: HOSPADM

## 2022-12-18 RX ORDER — LISINOPRIL 5 MG/1
5 TABLET ORAL DAILY
Status: DISCONTINUED | OUTPATIENT
Start: 2022-12-18 | End: 2022-12-18 | Stop reason: HOSPADM

## 2022-12-18 RX ORDER — BENZTROPINE MESYLATE 1 MG/ML
1 INJECTION INTRAMUSCULAR; INTRAVENOUS
Status: DISCONTINUED | OUTPATIENT
Start: 2022-12-18 | End: 2022-12-28 | Stop reason: HOSPADM

## 2022-12-18 RX ORDER — HYDROXYZINE HYDROCHLORIDE 25 MG/1
25 TABLET, FILM COATED ORAL
Status: DISCONTINUED | OUTPATIENT
Start: 2022-12-18 | End: 2022-12-28 | Stop reason: HOSPADM

## 2022-12-18 RX ORDER — POLYETHYLENE GLYCOL 3350 17 G/17G
17 POWDER, FOR SOLUTION ORAL DAILY PRN
Status: CANCELLED | OUTPATIENT
Start: 2022-12-18

## 2022-12-18 RX ORDER — DIPHENHYDRAMINE HYDROCHLORIDE 50 MG/ML
50 INJECTION INTRAMUSCULAR; INTRAVENOUS EVERY 6 HOURS PRN
Status: DISCONTINUED | OUTPATIENT
Start: 2022-12-18 | End: 2022-12-28 | Stop reason: HOSPADM

## 2022-12-18 RX ORDER — MAGNESIUM HYDROXIDE/ALUMINUM HYDROXICE/SIMETHICONE 120; 1200; 1200 MG/30ML; MG/30ML; MG/30ML
30 SUSPENSION ORAL EVERY 4 HOURS PRN
Status: CANCELLED | OUTPATIENT
Start: 2022-12-18

## 2022-12-18 RX ORDER — PANTOPRAZOLE SODIUM 40 MG/1
40 TABLET, DELAYED RELEASE ORAL DAILY
Status: DISCONTINUED | OUTPATIENT
Start: 2022-12-18 | End: 2022-12-18 | Stop reason: HOSPADM

## 2022-12-18 RX ORDER — DIPHENHYDRAMINE HYDROCHLORIDE 50 MG/ML
50 INJECTION INTRAMUSCULAR; INTRAVENOUS EVERY 6 HOURS PRN
Status: CANCELLED | OUTPATIENT
Start: 2022-12-18

## 2022-12-18 RX ORDER — IBUPROFEN 600 MG/1
600 TABLET ORAL EVERY 6 HOURS PRN
Status: CANCELLED | OUTPATIENT
Start: 2022-12-18

## 2022-12-18 RX ORDER — ATORVASTATIN CALCIUM 10 MG/1
10 TABLET, FILM COATED ORAL
Status: DISCONTINUED | OUTPATIENT
Start: 2022-12-18 | End: 2022-12-18 | Stop reason: HOSPADM

## 2022-12-18 RX ORDER — BENZTROPINE MESYLATE 1 MG/ML
0.5 INJECTION INTRAMUSCULAR; INTRAVENOUS
Status: CANCELLED | OUTPATIENT
Start: 2022-12-18

## 2022-12-18 RX ORDER — QUETIAPINE FUMARATE 25 MG/1
25 TABLET, FILM COATED ORAL DAILY
Status: DISCONTINUED | OUTPATIENT
Start: 2022-12-18 | End: 2022-12-18 | Stop reason: HOSPADM

## 2022-12-18 RX ORDER — LANOLIN ALCOHOL/MO/W.PET/CERES
3 CREAM (GRAM) TOPICAL
Status: CANCELLED | OUTPATIENT
Start: 2022-12-18

## 2022-12-18 RX ORDER — QUETIAPINE FUMARATE 100 MG/1
100 TABLET, FILM COATED ORAL
Status: DISCONTINUED | OUTPATIENT
Start: 2022-12-18 | End: 2022-12-28 | Stop reason: HOSPADM

## 2022-12-18 RX ORDER — MAGNESIUM HYDROXIDE/ALUMINUM HYDROXICE/SIMETHICONE 120; 1200; 1200 MG/30ML; MG/30ML; MG/30ML
30 SUSPENSION ORAL EVERY 4 HOURS PRN
Status: DISCONTINUED | OUTPATIENT
Start: 2022-12-18 | End: 2022-12-28 | Stop reason: HOSPADM

## 2022-12-18 RX ORDER — LORAZEPAM 2 MG/ML
2 INJECTION INTRAMUSCULAR
Status: DISCONTINUED | OUTPATIENT
Start: 2022-12-18 | End: 2022-12-28 | Stop reason: HOSPADM

## 2022-12-18 RX ORDER — QUETIAPINE FUMARATE 100 MG/1
100 TABLET, FILM COATED ORAL
Status: DISCONTINUED | OUTPATIENT
Start: 2022-12-18 | End: 2022-12-18 | Stop reason: HOSPADM

## 2022-12-18 RX ORDER — IBUPROFEN 400 MG/1
400 TABLET ORAL EVERY 4 HOURS PRN
Status: DISCONTINUED | OUTPATIENT
Start: 2022-12-18 | End: 2022-12-28 | Stop reason: HOSPADM

## 2022-12-18 RX ORDER — POLYETHYLENE GLYCOL 3350 17 G/17G
17 POWDER, FOR SOLUTION ORAL DAILY PRN
Status: DISCONTINUED | OUTPATIENT
Start: 2022-12-18 | End: 2022-12-28 | Stop reason: HOSPADM

## 2022-12-18 RX ORDER — HYDROXYZINE HYDROCHLORIDE 25 MG/1
50 TABLET, FILM COATED ORAL
Status: CANCELLED | OUTPATIENT
Start: 2022-12-18

## 2022-12-18 RX ORDER — IBUPROFEN 600 MG/1
600 TABLET ORAL EVERY 6 HOURS PRN
Status: DISCONTINUED | OUTPATIENT
Start: 2022-12-18 | End: 2022-12-28 | Stop reason: HOSPADM

## 2022-12-18 RX ORDER — HALOPERIDOL 5 MG/ML
2.5 INJECTION INTRAMUSCULAR
Status: CANCELLED | OUTPATIENT
Start: 2022-12-18

## 2022-12-18 RX ORDER — LORAZEPAM 2 MG/ML
1 INJECTION INTRAMUSCULAR
Status: CANCELLED | OUTPATIENT
Start: 2022-12-18

## 2022-12-18 RX ORDER — QUETIAPINE FUMARATE 100 MG/1
100 TABLET, FILM COATED ORAL
Status: CANCELLED | OUTPATIENT
Start: 2022-12-18

## 2022-12-18 RX ORDER — BENZTROPINE MESYLATE 1 MG/ML
0.5 INJECTION INTRAMUSCULAR; INTRAVENOUS
Status: DISCONTINUED | OUTPATIENT
Start: 2022-12-18 | End: 2022-12-28 | Stop reason: HOSPADM

## 2022-12-18 RX ORDER — SERTRALINE HYDROCHLORIDE 100 MG/1
100 TABLET, FILM COATED ORAL DAILY
Status: DISCONTINUED | OUTPATIENT
Start: 2022-12-18 | End: 2022-12-18 | Stop reason: HOSPADM

## 2022-12-18 RX ORDER — HALOPERIDOL 1 MG/1
1 TABLET ORAL EVERY 6 HOURS PRN
Status: CANCELLED | OUTPATIENT
Start: 2022-12-18

## 2022-12-18 RX ORDER — LISINOPRIL 5 MG/1
5 TABLET ORAL DAILY
Status: DISCONTINUED | OUTPATIENT
Start: 2022-12-19 | End: 2022-12-28 | Stop reason: HOSPADM

## 2022-12-18 RX ORDER — ATORVASTATIN CALCIUM 10 MG/1
10 TABLET, FILM COATED ORAL
Status: CANCELLED | OUTPATIENT
Start: 2022-12-18

## 2022-12-18 RX ORDER — NICOTINE 21 MG/24HR
1 PATCH, TRANSDERMAL 24 HOURS TRANSDERMAL DAILY
Status: DISCONTINUED | OUTPATIENT
Start: 2022-12-18 | End: 2022-12-28 | Stop reason: HOSPADM

## 2022-12-18 RX ORDER — BUPRENORPHINE AND NALOXONE 8; 2 MG/1; MG/1
8 FILM, SOLUBLE BUCCAL; SUBLINGUAL DAILY
Status: DISCONTINUED | OUTPATIENT
Start: 2022-12-19 | End: 2022-12-28 | Stop reason: HOSPADM

## 2022-12-18 RX ORDER — PANTOPRAZOLE SODIUM 40 MG/1
40 TABLET, DELAYED RELEASE ORAL DAILY
Status: CANCELLED | OUTPATIENT
Start: 2022-12-19

## 2022-12-18 RX ORDER — LORAZEPAM 2 MG/ML
2 INJECTION INTRAMUSCULAR EVERY 6 HOURS PRN
Status: CANCELLED | OUTPATIENT
Start: 2022-12-18

## 2022-12-18 RX ORDER — HYDROXYZINE HYDROCHLORIDE 25 MG/1
25 TABLET, FILM COATED ORAL
Status: CANCELLED | OUTPATIENT
Start: 2022-12-18

## 2022-12-18 RX ORDER — IBUPROFEN 400 MG/1
800 TABLET ORAL EVERY 8 HOURS PRN
Status: CANCELLED | OUTPATIENT
Start: 2022-12-18

## 2022-12-18 RX ORDER — HALOPERIDOL 5 MG/1
5 TABLET ORAL
Status: CANCELLED | OUTPATIENT
Start: 2022-12-18

## 2022-12-18 RX ORDER — SERTRALINE HYDROCHLORIDE 100 MG/1
100 TABLET, FILM COATED ORAL DAILY
Status: DISCONTINUED | OUTPATIENT
Start: 2022-12-19 | End: 2022-12-28 | Stop reason: HOSPADM

## 2022-12-18 RX ORDER — HALOPERIDOL 5 MG/ML
5 INJECTION INTRAMUSCULAR
Status: DISCONTINUED | OUTPATIENT
Start: 2022-12-18 | End: 2022-12-28 | Stop reason: HOSPADM

## 2022-12-18 RX ORDER — BUPRENORPHINE AND NALOXONE 8; 2 MG/1; MG/1
8 FILM, SOLUBLE BUCCAL; SUBLINGUAL DAILY
Status: CANCELLED | OUTPATIENT
Start: 2022-12-19

## 2022-12-18 RX ORDER — HALOPERIDOL 1 MG/1
1 TABLET ORAL EVERY 6 HOURS PRN
Status: DISCONTINUED | OUTPATIENT
Start: 2022-12-18 | End: 2022-12-28 | Stop reason: HOSPADM

## 2022-12-18 RX ORDER — NICOTINE 21 MG/24HR
1 PATCH, TRANSDERMAL 24 HOURS TRANSDERMAL DAILY
Status: CANCELLED | OUTPATIENT
Start: 2022-12-18

## 2022-12-18 RX ORDER — LORAZEPAM 2 MG/ML
1 INJECTION INTRAMUSCULAR
Status: DISCONTINUED | OUTPATIENT
Start: 2022-12-18 | End: 2022-12-28 | Stop reason: HOSPADM

## 2022-12-18 RX ORDER — HYDROXYZINE 50 MG/1
100 TABLET, FILM COATED ORAL
Status: DISCONTINUED | OUTPATIENT
Start: 2022-12-18 | End: 2022-12-28 | Stop reason: HOSPADM

## 2022-12-18 RX ORDER — PANTOPRAZOLE SODIUM 40 MG/1
40 TABLET, DELAYED RELEASE ORAL DAILY
Status: DISCONTINUED | OUTPATIENT
Start: 2022-12-19 | End: 2022-12-28 | Stop reason: HOSPADM

## 2022-12-18 RX ORDER — BENZTROPINE MESYLATE 1 MG/ML
1 INJECTION INTRAMUSCULAR; INTRAVENOUS
Status: CANCELLED | OUTPATIENT
Start: 2022-12-18

## 2022-12-18 RX ADMIN — SERTRALINE 100 MG: 100 TABLET, FILM COATED ORAL at 08:39

## 2022-12-18 RX ADMIN — QUETIAPINE FUMARATE 25 MG: 25 TABLET ORAL at 08:39

## 2022-12-18 RX ADMIN — BUPRENORPHINE AND NALOXONE 8 MG: 8; 2 FILM BUCCAL; SUBLINGUAL at 08:39

## 2022-12-18 RX ADMIN — HYDROXYZINE HYDROCHLORIDE 100 MG: 50 TABLET, FILM COATED ORAL at 15:04

## 2022-12-18 RX ADMIN — LISINOPRIL 5 MG: 5 TABLET ORAL at 08:39

## 2022-12-18 RX ADMIN — PANTOPRAZOLE SODIUM 40 MG: 40 TABLET, DELAYED RELEASE ORAL at 08:39

## 2022-12-18 RX ADMIN — QUETIAPINE FUMARATE 100 MG: 100 TABLET ORAL at 21:26

## 2022-12-18 RX ADMIN — ATORVASTATIN CALCIUM 10 MG: 10 TABLET, FILM COATED ORAL at 17:21

## 2022-12-18 RX ADMIN — NICOTINE 1 PATCH: 14 PATCH, EXTENDED RELEASE TRANSDERMAL at 15:02

## 2022-12-18 NOTE — ED NOTES
Chart reviewed  Pt COVID+ on 12/17/22  Call from Shahid, Care Manager; [facility] for bed search update; update given  201 sent to [facility] bed availability currently unknown

## 2022-12-18 NOTE — ED NOTES
Patient is accepted at [facility]  Patient is accepted by Dr Abeba Bucio per Peter Dec, Intake  Transportation is arranged with [employer] is scheduled for TBD   Patient may go to the floor at 10:00 or later        Nurse report is to be called to [phone] prior to patient transfer

## 2022-12-18 NOTE — EMTALA/ACUTE CARE TRANSFER
[address] [address] Jo-Annmarry Goins [address]  Dept: [phone]      EMTALA TRANSFER CONSENT    NAME Wendelin Koyanagi                                         1965                              MRN [id number]    I have been informed of my rights regarding examination, treatment, and transfer   by Dr Marcin Hughes DO    Benefits: Other benefits (Include comment)_______________________ (Inpt MH tx)    Risks: Potential for delay in receiving treatment      Transfer Request   I acknowledge that my medical condition has been evaluated and explained to me by the emergency department physician or other qualified medical person and/or my attending physician who has recommended and offered to me further medical examination and treatment  I understand the Hospital's obligation with respect to the treatment and stabilization of my emergency medical condition  I nevertheless request to be transferred  I release the Hospital, the doctor, and any other persons caring for me from all responsibility or liability for any injury or ill effects that may result from my transfer and agree to accept all responsibility for the consequences of my choice to transfer, rather than receive stabilizing treatment at the Hospital  I understand that because the transfer is my request, my insurance may not provide reimbursement for the services  The Hospital will assist and direct me and my family in how to make arrangements for transfer, but the hospital is not liable for any fees charged by the transport service  In spite of this understanding, I refuse to consent to further medical examination and treatment which has been offered to me, and request transfer to  Jasmina Milton Name, [address] : [address] [address] Madelin Briseida  I authorize the performance of emergency medical procedures and treatments upon me in both transit and upon arrival at the receiving facility    Additionally, I authorize the release of any and all medical records to the receiving facility and request they be transported with me, if possible  I authorize the performance of emergency medical procedures and treatments upon me in both transit and upon arrival at the receiving facility  Additionally, I authorize the release of any and all medical records to the receiving facility and request they be transported with me, if possible  I understand that the safest mode of transportation during a medical emergency is an ambulance and that the Hospital advocates the use of this mode of transport  Risks of traveling to the receiving facility by car, including absence of medical control, life sustaining equipment, such as oxygen, and medical personnel has been explained to me and I fully understand them  (BENOIT CORRECT BOX BELOW)  [  ]  I consent to the stated transfer and to be transported by ambulance/helicopter  [  ]  I consent to the stated transfer, but refuse transportation by ambulance and accept full responsibility for my transportation by car  I understand the risks of non-ambulance transfers and I exonerate the Hospital and its staff from any deterioration in my condition that results from this refusal     X___________________________________________    DATE  22  TIME________  Signature of patient or legally responsible individual signing on patient behalf           RELATIONSHIP TO PATIENT_________________________          Provider Certification    NAME Danielle Bui                                         1965                              MRN [id number]    A medical screening exam was performed on the above named patient  Based on the examination:    Condition Necessitating Transfer The primary encounter diagnosis was Suicidal ideation  Diagnoses of Chest pain and Medical clearance for psychiatric admission were also pertinent to this visit      Patient Condition: The patient has been stabilized such that within reasonable medical probability, no material deterioration of the patient condition or the condition of the unborn child(james) is likely to result from the transfer    Reason for Transfer: Level of Care needed not available at this facility    Transfer Requirements: [address]   · Space available and qualified personnel available for treatment as acknowledged by BG Laura  · Agreed to accept transfer and to provide appropriate medical treatment as acknowledged by       Dr Brinda Rosales  · Appropriate medical records of the examination and treatment of the patient are provided at the time of transfer   [facility], [address] _______  · Transfer will be performed by qualified personnel from [facility]  and appropriate transfer equipment as required, including the use of necessary and appropriate life support measures  Provider Certification: I have examined the patient and explained the following risks and benefits of being transferred/refusing transfer to the patient/family:  General risk, such as traffic hazards, adverse weather conditions, rough terrain or turbulence, possible failure of equipment (including vehicle or aircraft), or consequences of actions of persons outside the control of the transport personnel      Based on these reasonable risks and benefits to the patient and/or the unborn child(james), and based upon the information available at the time of the patient’s examination, I certify that the medical benefits reasonably to be expected from the provision of appropriate medical treatments at another medical facility outweigh the increasing risks, if any, to the individual’s medical condition, and in the case of labor to the unborn child, from effecting the transfer      X____________________________________________ DATE 12/18/22        TIME_______      ORIGINAL - SEND TO MEDICAL RECORDS   COPY - SEND WITH PATIENT DURING TRANSFER Patient tolerated procedure well.

## 2022-12-18 NOTE — NURSING NOTE
Patient is on a 201 voluntary commitment coming to this unit from [facility]  He is cooperative and well oriented  He is primarily Malawian speaking, assessment was done with assistance of Malawian nurse  He reported SI with no plan while in ED  Denies active and passive SI currently  He reports VH of "bodies" and AH of voices "calling me" he describes these symptoms as chronic  He reports increasing depression and intermittent passive SI at home r/t his mother and stepfather's health issues  He reports a recent relapse in heroin use having used 2 bags of heroin a day for 2 months, after being clean for 7 years, last use 4 days ago  He states he lives in a rented room and he has no family here, states all of his family lives in [place]  He states he has not lived in [place] for 11 years  He reports a history of incarceration "for fighting" and states he has PTSD from halfway  He rates anxiety 4/4 and depression 7/10

## 2022-12-18 NOTE — ED NOTES
Pt presents to ED with SI and intent, no clear plan but does not feel safe going home  Pt states that it is possible he may hurt himself if he goes home  Pt cites a lack of family in the area as a stressor  He reports decreased sleep and appetite, decreased motivation, trouble with concentration, difficulty remembering appointments, anxiety, trouble relaxing and feeling restless, and difficulty controlling worry  He is currently disabled and lives alone in a rooming house  Pt is illiterate (in [place] and can only write his name  He does not speak any [place]  Pt reports using heroine since he was 12years old, but was clean for 4 months recently  He has since used again  He reports no access to firearms  He has an upcoming intake appointment with [facility]  He was willing to sign 201

## 2022-12-18 NOTE — ED NOTES
Insurance Authorization for admission:   Phone call placed to [employer] number: [phone]     Spoke to KASIA AGARWAL   5 days approved  Level of care: inpatient psych   Review on 12/22/2022  Authorization # Accepting facility will need to call for auth number upon admission         EVS (Eligibility Verification System) called - [phone]    Automated system indicates: [employer]

## 2022-12-19 PROBLEM — U07.1 COVID-19: Status: ACTIVE | Noted: 2022-12-19

## 2022-12-19 PROBLEM — Z00.8 MEDICAL CLEARANCE FOR PSYCHIATRIC ADMISSION: Status: ACTIVE | Noted: 2022-12-19

## 2022-12-19 PROBLEM — F33.3 SEVERE EPISODE OF RECURRENT MAJOR DEPRESSIVE DISORDER, WITH PSYCHOTIC FEATURES (HCC): Status: ACTIVE | Noted: 2022-12-19

## 2022-12-19 PROBLEM — N18.9 CKD (CHRONIC KIDNEY DISEASE): Status: ACTIVE | Noted: 2022-12-19

## 2022-12-19 LAB
ALBUMIN SERPL BCP-MCNC: 4.1 G/DL (ref 3.5–5)
ALP SERPL-CCNC: 61 U/L (ref 43–122)
ALT SERPL W P-5'-P-CCNC: 21 U/L
ANION GAP SERPL CALCULATED.3IONS-SCNC: 8 MMOL/L (ref 5–14)
AST SERPL W P-5'-P-CCNC: 28 U/L (ref 17–59)
BASOPHILS # BLD AUTO: 0.01 THOUSANDS/ÂΜL (ref 0–0.1)
BASOPHILS NFR BLD AUTO: 0 % (ref 0–1)
BILIRUB SERPL-MCNC: 0.31 MG/DL (ref 0.2–1)
BUN SERPL-MCNC: 12 MG/DL (ref 5–25)
CALCIUM SERPL-MCNC: 9 MG/DL (ref 8.4–10.2)
CHLORIDE SERPL-SCNC: 104 MMOL/L (ref 96–108)
CHOLEST SERPL-MCNC: 144 MG/DL
CO2 SERPL-SCNC: 26 MMOL/L (ref 21–32)
CREAT SERPL-MCNC: 0.83 MG/DL (ref 0.7–1.5)
EOSINOPHIL # BLD AUTO: 0.13 THOUSAND/ÂΜL (ref 0–0.61)
EOSINOPHIL NFR BLD AUTO: 2 % (ref 0–6)
ERYTHROCYTE [DISTWIDTH] IN BLOOD BY AUTOMATED COUNT: 13.2 % (ref 11.6–15.1)
EST. AVERAGE GLUCOSE BLD GHB EST-MCNC: 117 MG/DL
GFR SERPL CREATININE-BSD FRML MDRD: 97 ML/MIN/1.73SQ M
GLUCOSE P FAST SERPL-MCNC: 96 MG/DL (ref 70–99)
GLUCOSE SERPL-MCNC: 96 MG/DL (ref 70–99)
HBA1C MFR BLD: 5.7 %
HCT VFR BLD AUTO: 38.1 % (ref 36.5–49.3)
HDLC SERPL-MCNC: 40 MG/DL
HGB BLD-MCNC: 12.4 G/DL (ref 12–17)
IMM GRANULOCYTES # BLD AUTO: 0.01 THOUSAND/UL (ref 0–0.2)
IMM GRANULOCYTES NFR BLD AUTO: 0 % (ref 0–2)
LDLC SERPL CALC-MCNC: 81 MG/DL
LYMPHOCYTES # BLD AUTO: 2.43 THOUSANDS/ÂΜL (ref 0.6–4.47)
LYMPHOCYTES NFR BLD AUTO: 43 % (ref 14–44)
MCH RBC QN AUTO: 29.9 PG (ref 26.8–34.3)
MCHC RBC AUTO-ENTMCNC: 32.5 G/DL (ref 31.4–37.4)
MCV RBC AUTO: 92 FL (ref 82–98)
MONOCYTES # BLD AUTO: 0.32 THOUSAND/ÂΜL (ref 0.17–1.22)
MONOCYTES NFR BLD AUTO: 6 % (ref 4–12)
NEUTROPHILS # BLD AUTO: 2.71 THOUSANDS/ÂΜL (ref 1.85–7.62)
NEUTS SEG NFR BLD AUTO: 49 % (ref 43–75)
NONHDLC SERPL-MCNC: 104 MG/DL
NRBC BLD AUTO-RTO: 0 /100 WBCS
PLATELET # BLD AUTO: 182 THOUSANDS/UL (ref 149–390)
PMV BLD AUTO: 10.1 FL (ref 8.9–12.7)
POTASSIUM SERPL-SCNC: 4.8 MMOL/L (ref 3.5–5.3)
PROT SERPL-MCNC: 7.5 G/DL (ref 6.4–8.4)
RBC # BLD AUTO: 4.15 MILLION/UL (ref 3.88–5.62)
RPR SER QL: NORMAL
SODIUM SERPL-SCNC: 138 MMOL/L (ref 135–147)
TRIGL SERPL-MCNC: 116 MG/DL
TSH SERPL DL<=0.05 MIU/L-ACNC: 3.96 UIU/ML (ref 0.45–4.5)
WBC # BLD AUTO: 5.61 THOUSAND/UL (ref 4.31–10.16)

## 2022-12-19 RX ORDER — BENZONATATE 100 MG/1
100 CAPSULE ORAL 3 TIMES DAILY PRN
Status: DISCONTINUED | OUTPATIENT
Start: 2022-12-19 | End: 2022-12-28 | Stop reason: HOSPADM

## 2022-12-19 RX ADMIN — LISINOPRIL 5 MG: 5 TABLET ORAL at 08:18

## 2022-12-19 RX ADMIN — SERTRALINE HYDROCHLORIDE 100 MG: 100 TABLET ORAL at 08:18

## 2022-12-19 RX ADMIN — HYDROXYZINE HYDROCHLORIDE 50 MG: 50 TABLET, FILM COATED ORAL at 16:15

## 2022-12-19 RX ADMIN — ATORVASTATIN CALCIUM 10 MG: 10 TABLET, FILM COATED ORAL at 16:15

## 2022-12-19 RX ADMIN — BUPRENORPHINE AND NALOXONE 8 MG: 8; 2 FILM BUCCAL; SUBLINGUAL at 08:18

## 2022-12-19 RX ADMIN — QUETIAPINE FUMARATE 100 MG: 100 TABLET ORAL at 21:36

## 2022-12-19 RX ADMIN — PANTOPRAZOLE SODIUM 40 MG: 40 TABLET, DELAYED RELEASE ORAL at 06:11

## 2022-12-19 RX ADMIN — NICOTINE 1 PATCH: 14 PATCH, EXTENDED RELEASE TRANSDERMAL at 08:18

## 2022-12-19 RX ADMIN — QUETIAPINE FUMARATE 25 MG: 25 TABLET ORAL at 08:18

## 2022-12-19 NOTE — ASSESSMENT & PLAN NOTE
· h/o of anemia and thrombocytopenia but there was no evidence of thrombotic microangiopathy  · Currently Hb and platelet stable  · Continue outpt follow up

## 2022-12-19 NOTE — CASE MANAGEMENT
Psychosocial Assessment 1:1 completed with pt in pt's bedroom  Calm, cooperative, blunted affect, A&O  Assessment completed in Armenian using Language Line for translation  Admission / Details: 201 admission from [facility] for SI without plan, increased depression, AH/VH  [facility]: [facility] Status: 201  Insurance: [employer]  Rx coverage: Yes  Marital Status: ; single  Children: 2 kids  Family: Mother living, father , 1 sister, other siblings, 2 kids  Residence: [address], [address] [place] [address]  Can return home: Yes  Lives with: Alone in rented room   Level of Ed: Pt reports that he was in special classes for 1st and 2nd grade in [place]  2nd grade was the highest level he finished  Work History: Unemployed  Income/Source: $800/month from [address]: [employer]   Transportation: Pt has a license but no car  Legal Issues: Hx of incarceration for fighting; denies current legal issues  Pharmacy:  [address]   [address] Tx HX: Hx of MDD and PTSD  Past IPBH admissions at [facility] x2 in 2016 and and [facility]  Trauma HX: Denies  Family hx: Father - MH and D/A; mother - dementia  D&A HX: Pt recently relapsed on heroin after being clean for 7 years  Pt reports using 2 bags of heroin intravenously daily for the past 2 months  Pt also reports THC use  Pt is not sure if he wants any D/A tx after d/c  Medical: Covid+ as of ; PMH of HTN and DM2  DME: Glasses  Tobacco: Yes, 1 PPD   HX: Denies  Access to firearms: Denies  UDS Results: + for [facility]  PCP: Michael Foster  Psych: Pt reports that he has been receiving OP psych services via telehealth through a provider in [facility]  Pt can't remember provider's name or location  Therapist: Pt reports that he [facility]  Pt can't remember provider's name or location  ICM/ACT: Denies   POA/guardian/rep-payee: Denies  Stressors:  Mother and stepfather's declining health; pt has been on housing waitlist for 7 years which is frustrating/disheartening   Coping Skills: None  ROIS Signed: Mary Mi (friend)  Treatment Plan Signed: TBD  IMM Signed: N/A  Upcoming Appointments: Unknown  Covid vaccine received: Yes  Discharge disposition: Home    Spoke with friend, Adam Hernandez [phone], to obtain collateral info  Adam Hernandez states that she's known pt for past 8 yrs  He's been clean/sober for a long time but recently relapsed  She feels that pt's isolation, loneliness and depression has caused the relapse  They see or speak with each other a few times per week  She reports that he is a very nice person

## 2022-12-19 NOTE — CONSULTS
[id number] [facility] Cuatr Jenni 1965, 62 y o  male MRN: [id number]  Unit/Bed#: Sharla Buckley 067-28 Encounter: [id number]  Primary Care Provider: JOSIAH Elam   Date and time admitted to hospital: 12/18/2022  1:53 PM    Inpatient consult for Medical Clearance for [address] patient  Consult performed by: Fouzia Ramirez MD  Consult ordered by: Мария Fonseca, DO          Medical clearance for psychiatric admission  Assessment & Plan  · Patient is medically clear for psychiatric admission    COVID-19  Assessment & Plan  · Patient has subjective fever, cough  · Vital signs over last 24 hours were stable  · Patient is breathing with SPO2 99% on room air, continue supportive care    Psychiatric disorder  Assessment & Plan  · History of bipolar disorder  · Management per primary service    CKD (chronic kidney disease)  Assessment & Plan  Lab Results   Component Value Date    EGFR 97 12/19/2022    EGFR 95 12/17/2022    EGFR 48 09/09/2022    CREATININE 0 83 12/19/2022    CREATININE 0 88 12/17/2022    CREATININE 1 57 (H) 09/09/2022     · Creatinine seems to be stable around his baseline  · Avoid nephrotoxic agents  · Outpatient nephrology follow-up    Gross hematuria  Assessment & Plan  · Cystoscopy on 7/22 showed enlargement of prostate, no bladder lesion  · Urine cytology negative  · Microscopic hematuria on several UA in the past    Anemia  Assessment & Plan  · h/o of anemia and thrombocytopenia but there was no evidence of thrombotic microangiopathy  · Currently Hb and platelet stable  · Continue outpt follow up    Hypertension  Assessment & Plan  · BP is acceptable  · Continue lisinopril 5 mg daily, not on amlodipine      Total Time for Visit, including Counseling / Coordination of Care: 45 minutes  Greater than 50% of this total time spent on direct patient counseling and coordination of care  Chief Complaint:   Medical management    History of Present Illness:     The patient is a Brazilian speaker  Used the  [employer] [id number] during my encounter  Deysi Felton is a 62 y o  male with PMH of CKD, anemia, thrombocytopenia who was admitted to [facility] for bipolar disorder, depression under 201  We are consulted for medical management  During my encounter, patient stated he is doing so-so  He was complaining of fever  I explained that on vital signs for last 24 hours did not show evidence of fever  He also complained of cough  Patient will be given cough medications  COVID was positive on 12/17/2022  Review of Systems:    Review of Systems Patient was seen and examined at bedside  The patient subjective fever and cough  Denies chest pain, N/V, abd pain  Past Medical and Surgical History:     Past Medical History:   Diagnosis Date   • Abdominal pain    • Allergic rhinitis    • Cancer [facility])    • Chronic pain    • Colon polyps    • Depression    • Fatigue    • Head injury    • Homeless    • Hypertension    • Insomnia    • Liver disease    • Loss of appetite    • Numbness and tingling of right arm    • Palpitations    • Psychiatric disorder     bipolar   • PTSD (post-traumatic stress disorder)    • Seizures (HCC)    • Shortness of breath    • Substance abuse (HCC)    • Swallowing difficulty        Past Surgical History:   Procedure Laterality Date   • ABDOMINAL SURGERY     • COLONOSCOPY     • EYE SURGERY     • NECK SURGERY     • ORCHIECTOMY Right 5/19/2016    Procedure: ORCHIECTOMY INGUINAL biopsy of testicular mass, ;  Surgeon: Toby Hannon MD;  Location:  MAIN OR;  Service:    • NV COLONOSCOPY FLX DX W/COLLJ SPEC WHEN PFRMD N/A 2/13/2017    Procedure: EGD AND COLONOSCOPY;  Surgeon: Kat Osborn MD;  Location: [facility] GI LAB; Service: Gastroenterology   • NV ESOPHAGOGASTRODUODENOSCOPY TRANSORAL DIAGNOSTIC N/A 11/16/2016    Procedure: EGD AND COLONOSCOPY;  Surgeon: Kat Osborn MD;  Location:  GI LAB;   Service: Gastroenterology       Meds/Allergies:    Prior to Admission medications    Medication Sig Start Date End Date Taking? Authorizing Provider   atorvastatin (LIPITOR) 40 mg tablet  7/13/22  Yes Historical Provider, MD   buprenorphine-naloxone (SUBOXONE) 8-2 mg per SL tablet Place 1 tablet under the tongue daily   Yes Historical Provider, MD   lisinopril (ZESTRIL) 5 mg tablet Take 1 tablet by mouth daily 7/7/21  Yes Historical Provider, MD   pantoprazole (PROTONIX) 40 mg tablet Take 1 tablet (40 mg total) by mouth daily in the early morning Indications: Gastroesophageal Reflux Disease  6/22/16  Yes Robert Power PA-C   QUEtiapine (SEROquel) 200 mg tablet Take 1 tablet (200 mg total) by mouth daily at bedtime  Patient taking differently: Take 100 mg by mouth daily at bedtime 7/10/22  Yes Iwona Hampton, DO   QUEtiapine (SEROquel) 25 mg tablet  7/7/22  Yes Historical Provider, MD   sertraline (ZOLOFT) 100 mg tablet Take 1 tablet by mouth daily 7/7/21  Yes Historical Provider, MD   amLODIPine (NORVASC) 10 mg tablet Take 1 tablet (10 mg total) by mouth daily  Patient not taking: Reported on 10/24/2022 7/11/22 10/24/22  Iwona Hampton, DO   citalopram (CeleXA) 40 mg tablet Take 1 tablet (40 mg total) by mouth daily Indications: Depression  At [address]  Patient not taking: Reported on 8/12/2021 6/22/16   Robert Power PA-C   fluticasone [facility]) 50 mcg/act nasal spray 1 spray into each nostril daily Indications: Hayfever  At [address]  Patient not taking: Reported on 10/24/2022 5/31/16   Robert Power PA-C   naloxone [facility]) 4 mg/0 1 mL nasal spray Administer 1 spray into a nostril  If no response after 2-3 minutes, give another dose in the other nostril using a new spray    Patient not taking: Reported on 8/12/2021 7/10/21   Cruz Mauro PA-C   traZODone (DESYREL) 100 mg tablet Take 1 tablet by mouth daily at bedtime  Patient not taking: Reported on 8/12/2021 10/28/16   Historical Provider, MD   VITAMIN D PO Take 50,000 Units by mouth once a week  Patient not taking: Reported on 12/18/2022    Historical Provider, MD     I have reviewed home medications with patient personally  Allergies: Allergies   Allergen Reactions   • Oxycodone Swelling     Tongue swelling       Social History:     Marital Status:      Substance Use History:   Social History     Substance and Sexual Activity   Alcohol Use No     Social History     Tobacco Use   Smoking Status Every Day   • Packs/day: 1 00   • Years: 41 00   • Pack years: 41 00   • Types: Cigarettes   Smokeless Tobacco Current     Social History     Substance and Sexual Activity   Drug Use Yes   • Types: Heroin       Family History:    Family History   Problem Relation Age of Onset   • Diabetes Mother    • Hypertension Brother        Physical Exam:     Vitals:   Blood Pressure: 135/77 (12/19/22 1520)  Pulse: 56 (12/19/22 1520)  Temperature: 97 7 °F (36 5 °C) (12/19/22 1520)  Temp Source: Temporal (12/19/22 1520)  Respirations: 16 (12/19/22 1520)  Height: 5' 6" (167 6 cm) (12/18/22 1449)  Weight - Scale: 56 7 kg (125 lb) (12/18/22 1449)  SpO2: 99 % (12/19/22 1520)    Physical Exam    General: breathing well on room air, no acute distress  HEENT: NC/AT, PERRL, EOM - normal  Neck: Supple  Pulm/Chest: Normal chest wall expansion, clear breath sounds on both side, no wheezing/rhonchi or crackles appreciated  CVS: RRR, normal S1&S2, no murmur appreciated, capillary refill <2s  Abd: soft, non tender, non distended, bowel sounds +  MSK: move all 4 extremities spontaneously  Skin: warm  CNS: no acute focal neuro deficit      Additional Data:     Lab Results: I have personally reviewed pertinent reports        Results from last 7 days   Lab Units 12/19/22  0637   WBC Thousand/uL 5 61   HEMOGLOBIN g/dL 12 4   HEMATOCRIT % 38 1   PLATELETS Thousands/uL 182   NEUTROS PCT % 49   LYMPHS PCT % 43   MONOS PCT % 6   EOS PCT % 2     Results from last 7 days   Lab Units 12/19/22  0637   SODIUM mmol/L 138   POTASSIUM mmol/L 4 8   CHLORIDE mmol/L 104   CO2 mmol/L 26   BUN mg/dL 12   CREATININE mg/dL 0 83   ANION GAP mmol/L 8   CALCIUM mg/dL 9 0   ALBUMIN g/dL 4 1   TOTAL BILIRUBIN mg/dL 0 31   ALK PHOS U/L 61   ALT U/L 21   AST U/L 28   GLUCOSE RANDOM mg/dL 96             Results from last 7 days   Lab Units 12/19/22  0637   HEMOGLOBIN A1C % 5 7*           Imaging: I have personally reviewed pertinent reports  No orders to display       EKG, Pathology, and Other Studies Reviewed on Admission:   · EKG: On 12/17/2022, sinus bradycardia,   [facility] Yes     ** Please Note: This note has been constructed using a voice recognition system   **
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
## 2022-12-19 NOTE — TREATMENT PLAN
TREATMENT PLAN REVIEW - [address] Kiki Viramontes 62 y o  1965 male MRN: [id number]    [address] Room / Bed: Kendrick Rubio [facility] Encounter: [id number]          Admit Date/Time:  12/18/2022  1:53 PM    Treatment Team: Attending Provider: Cameron Salcido MD; Patient Care Technician: Patricia Wells; Nurse Manager: Lizzeth Oliva RN; Patient Care Assistant: Terra Shankar; : Monica Acosta; Registered Nurse: Silvia Garcia RN    Diagnosis: Active Problems:    * No active hospital problems   *      Patient Strengths/Assets: ability for insight, capable of independent living, cooperative, motivated, motivation for treatment/growth, negotiates basic needs, patient is on a voluntary commitment, patient is willing to work on problems, reasoning ability    Patient Barriers/Limitations: difficulty adapting, financial instability, lack of financial means, lack of social/family support, lack of stable employment, limited education, limited family ties, limited motivation, no/few hobbies or interests, noncompliant with medication, poor reasoning ability, poor self-care, substance abuse    Short Term Goals: decrease in depressive symptoms, decrease in psychotic symptoms, decrease in suicidal thoughts, decrease in level of agitation, ability to stay safe on the unit, ability to stay free of restraints, improvement in reality testing, improvement in reasoning ability, improvement in self care, sleep improvement, improvement in appetite, mood stabilization, increase in socialization with peers on the unit, acceptance of need for psychiatric treatment    Long Term Goals: improvement in depression, resolution of depressive symptoms, free of suicidal thoughts, improvement in reality testing, improvement in reasoning ability, improved insight, able to express basic needs, acceptance of need for psychiatric medications, adequate self care, adequate sleep, adequate appetite, adequate oral intake, appropriate interaction with peers, appropriate interaction with family, stable living arrangements upon discharge    Progress Towards Goals: improving, progressing    Recommended Treatment: medication management, patient medication education, group therapy, milieu therapy, continued Behavioral Health psychiatric evaluation/assessment process    Treatment Frequency: daily medication monitoring, group and milieu therapy daily, monitoring through interdisciplinary rounds, monitoring through weekly patient care conferences    Expected Discharge Date: To be determined    Discharge Plan: referrals as indicated, discharge to previous living arrangment       Treatment Plan Created/Updated By: Daryl Sheth MD

## 2022-12-19 NOTE — NURSING NOTE
Pt constricted and withdrawn but pleasant and med-compliant with good appetite and steady gait  VSS  Reported decreased SI and hallucination, contracted to alert staff prior to acting on same  Monitored for safety and support

## 2022-12-19 NOTE — ASSESSMENT & PLAN NOTE
· Cystoscopy on 7/22 showed enlargement of prostate, no bladder lesion  · Urine cytology negative    · Microscopic hematuria on several UA in the past

## 2022-12-19 NOTE — CASE MANAGEMENT
12/19/22 0847   Team Meeting   Meeting Type Daily Rounds   Initial Conference Date 12/19/22   Team Members Present   Team Members Present Physician;Nurse;   Physician Team Member Dr Frank Quiles; Dr David Ingram Team Member [facility] Management Team Member Gabbie   Patient/Family Present   Patient Present No   Patient's Family Present No     Not a 30-day readmission  201 admission from [facility] ED for SI without plan, AH/VH, depression  Recent relapse on heroin  UDS +THC  Covid+, Romansh-speaking, stressors including mother and stepfather's declining health   Family lives in [place]  4/10 depression, 4/4 anxiety, med compliant, slept

## 2022-12-19 NOTE — DISCHARGE INSTR - APPOINTMENTS
Maxine Diaz or Maryann, our Goran and Keegan, will be calling you after your discharge, on the phone number that you provided  They will be available as an additional support, if needed  If you wish to speak with one of them, you may contact Jo Wolff at [phone] or Jennifer Combs at [phone]

## 2022-12-19 NOTE — ASSESSMENT & PLAN NOTE
Lab Results   Component Value Date    EGFR 97 12/19/2022    EGFR 95 12/17/2022    EGFR 48 09/09/2022    CREATININE 0 83 12/19/2022    CREATININE 0 88 12/17/2022    CREATININE 1 57 (H) 09/09/2022     · Creatinine seems to be stable around his baseline  · Avoid nephrotoxic agents  · Outpatient nephrology follow-up

## 2022-12-19 NOTE — NURSING NOTE
Pt primarily [address] speaking  Visible in milieu with limited interactions with peers and staff  Pt is cooperative with care and med compliant, Will continue to monitor

## 2022-12-19 NOTE — DISCHARGE INSTR - OTHER ORDERS
You are being discharged to: [facility], [address] [place] [address]    Triggers you have identified during your hospitalization that led to your admission include: parents' health issues and housing  If you are unable to deal with your distressed mood alone, please contact your new outpatient provider at [facility]  If that is not effective and you continue to have suicidal ideation, a distressed mood, or feel like you're in crisis, please contact 911 and go to the nearest emergency center  [facility] Crisis Intervention: [facility] Suicide Prevention Lifeline:  5-946.754.5267  *Alcohol Anonymous: [address] [facility]ness ([place]) HELPLINE: 350.881.3033/Website: www nanci org  *Substance Abuse and [address] Administration([facility]) [employer], which is a confidential, free, 24-hour-a-day, 365-day-a-year, information service for individuals and family members facing mental health and/or substance use disorders  This service provides referrals to local treatment facilities, support groups, and community-based organizations  Callers can also order free publications and other information  Call 2-168.573.8472/Website: www [facility] gov  *[employer] 2-1-1: This is a toll free, confidential, 24-hour-a-day service which connects you to a community  in your area who can help you find services and resources that are available to you locally and provide critical services that can improve and save lives    Call: 211  /Website: https://tommy ellington/

## 2022-12-19 NOTE — ASSESSMENT & PLAN NOTE
· Patient has subjective fever, cough  · Vital signs over last 24 hours were stable  · Patient is breathing with SPO2 99% on room air, continue supportive care

## 2022-12-20 RX ORDER — PRAZOSIN HYDROCHLORIDE 1 MG/1
1 CAPSULE ORAL
Status: DISCONTINUED | OUTPATIENT
Start: 2022-12-20 | End: 2022-12-22

## 2022-12-20 RX ADMIN — ATORVASTATIN CALCIUM 10 MG: 10 TABLET, FILM COATED ORAL at 17:20

## 2022-12-20 RX ADMIN — QUETIAPINE FUMARATE 100 MG: 100 TABLET ORAL at 21:21

## 2022-12-20 RX ADMIN — PRAZOSIN HYDROCHLORIDE 1 MG: 1 CAPSULE ORAL at 21:21

## 2022-12-20 RX ADMIN — QUETIAPINE FUMARATE 25 MG: 25 TABLET ORAL at 08:00

## 2022-12-20 RX ADMIN — PANTOPRAZOLE SODIUM 40 MG: 40 TABLET, DELAYED RELEASE ORAL at 05:51

## 2022-12-20 RX ADMIN — IBUPROFEN 800 MG: 400 TABLET ORAL at 21:21

## 2022-12-20 RX ADMIN — SERTRALINE HYDROCHLORIDE 100 MG: 100 TABLET ORAL at 08:00

## 2022-12-20 RX ADMIN — NICOTINE 1 PATCH: 14 PATCH, EXTENDED RELEASE TRANSDERMAL at 08:01

## 2022-12-20 RX ADMIN — BUPRENORPHINE AND NALOXONE 8 MG: 8; 2 FILM BUCCAL; SUBLINGUAL at 08:00

## 2022-12-20 NOTE — CASE MANAGEMENT
Pt would like to be referred to IP d/a rehab in [place]  He is open and agreeable to d/a assessment with Gaby Armstrong CM contacted Gaby to request assessment

## 2022-12-20 NOTE — NURSING NOTE
Patient visible in the milieu intermittently this shift, compliant with meals and scheduled medications  Denies anxiety, depression, SI/HI/AH/VH  Encouraged to make needs known  Safety checks ongoing

## 2022-12-20 NOTE — NURSING NOTE
Pt visible in dayroom, withdrawn to self  Pt denies depression and reports anxiety 3/4  Pt states "I've seen some bad things in my life " Pt received PRN atarax 50mg at 16:15, medication  Pt reports that he sometimes hears voices yelling "help"  Pt relates this to his past trauma  He denies SI/HI  Pt is cooperative with medications and care

## 2022-12-20 NOTE — PLAN OF CARE
Problem: Ineffective Coping  Goal: Cooperates with admission process  Description: Interventions:   - Complete admission process  Outcome: Progressing  Goal: Identifies ineffective coping skills  Outcome: Progressing  Goal: Identifies healthy coping skills  Outcome: Progressing  Goal: Demonstrates healthy coping skills  Outcome: Progressing  Goal: Participates in unit activities  Description: Interventions:  - Provide therapeutic environment   - Provide required programming   - Redirect inappropriate behaviors   Outcome: Progressing  Goal: Patient/Family participate in treatment and DC plans  Description: Interventions:  - Provide therapeutic environment  Outcome: Progressing  Goal: Patient/Family verbalizes awareness of resources  Outcome: Progressing  Goal: Understands least restrictive measures  Description: Interventions:  - Utilize least restrictive behavior  Outcome: Progressing  Goal: Free from restraint events  Description: - Utilize least restrictive measures   - Provide behavioral interventions   - Redirect inappropriate behaviors   Outcome: Progressing     Problem: Risk for Self Injury/Neglect  Goal: Treatment Goal: Remain safe during length of stay, learn and adopt new coping skills, and be free of self-injurious ideation, impulses and acts at the time of discharge  Outcome: Progressing  Goal: Verbalize thoughts and feelings  Description: Interventions:  - Assess and re-assess patient's lethality and potential for self-injury  - Engage patient in 1:1 interactions, daily, for a minimum of 15 minutes  - Encourage patient to express feelings, fears, frustrations, hopes  - Establish rapport/trust with patient   Outcome: Progressing  Goal: Refrain from harming self  Description: Interventions:  - Monitor patient closely, per order  - Develop a trusting relationship  - Supervise medication ingestion, monitor effects and side effects   Outcome: Progressing  Goal: Attend and participate in unit activities, including therapeutic, recreational, and educational groups  Description: Interventions:  - Provide therapeutic and educational activities daily, encourage attendance and participation, and document same in the medical record  - Obtain collateral information, encourage visitation and family involvement in care   Outcome: Progressing  Goal: Recognize maladaptive responses and adopt new coping mechanisms  Outcome: Progressing  Goal: Complete daily ADLs, including personal hygiene independently, as able  Description: Interventions:  - Observe, teach, and assist patient with ADLS  - Monitor and promote a balance of rest/activity, with adequate nutrition and elimination  Outcome: Progressing     Problem: Depression  Goal: Treatment Goal: Demonstrate behavioral control of depressive symptoms, verbalize feelings of improved mood/affect, and adopt new coping skills prior to discharge  Outcome: Progressing  Goal: Verbalize thoughts and feelings  Description: Interventions:  - Assess and re-assess patient's level of risk   - Engage patient in 1:1 interactions, daily, for a minimum of 15 minutes   - Encourage patient to express feelings, fears, frustrations, hopes   Outcome: Progressing  Goal: Refrain from harming self  Description: Interventions:  - Monitor patient closely, per order   - Supervise medication ingestion, monitor effects and side effects   Outcome: Progressing  Goal: Refrain from isolation  Description: Interventions:  - Develop a trusting relationship   - Encourage socialization   Outcome: Progressing  Goal: Refrain from self-neglect  Outcome: Progressing  Goal: Attend and participate in unit activities, including therapeutic, recreational, and educational groups  Description: Interventions:  - Provide therapeutic and educational activities daily, encourage attendance and participation, and document same in the medical record   Outcome: Progressing  Goal: Complete daily ADLs, including personal hygiene independently, as able  Description: Interventions:  - Observe, teach, and assist patient with ADLS  -  Monitor and promote a balance of rest/activity, with adequate nutrition and elimination   Outcome: Progressing     Problem: Anxiety  Goal: Anxiety is at manageable level  Description: Interventions:  - Assess and monitor patient's anxiety level  - Monitor for signs and symptoms (heart palpitations, chest pain, shortness of breath, headaches, nausea, feeling jumpy, restlessness, irritable, apprehensive)  - Collaborate with interdisciplinary team and initiate plan and interventions as ordered    - [place] patient to unit/surroundings  - Explain treatment plan  - Encourage participation in care  - Encourage verbalization of concerns/fears  - Identify coping mechanisms  - Assist in developing anxiety-reducing skills  - Administer/offer alternative therapies  - Limit or eliminate stimulants  Outcome: Progressing

## 2022-12-20 NOTE — TREATMENT TEAM
12/20/22 1212   Team Meeting   Meeting Type Tx Team Meeting   Initial Conference Date 12/20/22   Team Members Present   Team Members Present Physician;Nurse;   Physician Team Member Dr Stiven Pham Team Member [facility] Management Team Member Gabbie   Patient/Family Present   Patient Present Yes   Patient's Family Present No     Treatment goals include: building effective coping skills, remaining safe on unit, decreasing depression/anxiety, discharge planning

## 2022-12-20 NOTE — CASE MANAGEMENT
12/20/22 0902   Team Meeting   Meeting Type Daily Rounds   Initial Conference Date 12/20/22   Team Members Present   Team Members Present Physician;Nurse;;; Occupational Therapist   Physician Team Member Dr Colt Mccollum; Dr Calzada Batch; [address] Management Team Member [facility] Work Team Member Tangela   OT Team Member Young Bains   Patient/Family Present   Patient Present No   Patient's Family Present No     Visible, med compliant, slept, received PRN Atarax last night, reports AH, cooperative, calm, 3/4 anxiety

## 2022-12-20 NOTE — PROGRESS NOTES
Progress Note - Behavioral Health   Mari Hernandez 62 y o  male MRN: [id number]  Unit/Bed#: [facility]ireya Ol[facility] 916-43 Encounter: [id number]    Assessment/Plan   Principal Problem:    Severe episode of recurrent major depressive disorder, with psychotic features ([facility] [address] )  Active Problems:    PTSD (post-traumatic stress disorder)    Hypertension    Anemia    Gross hematuria    CKD (chronic kidney disease)    Medical clearance for psychiatric admission    Psychiatric disorder    COVID-19      Recommended Treatment:     · Will start prazosin 1 mg at bedtime for nightmares  · Reduced Seroquel to 100 mg at bedtime for augmentation of antidepressant and for symptoms of psychosis  · Continue sertraline 100 mg daily for mood and anxiety  · Continue Suboxone 8 mg sublingual daily for MAT  · Further medication management per  IM  · At this time the patient is open to pursuing inpatient drug and alcohol rehab and is agreeable to drug and alcohol assessment    · Continue with pharmacotherapy, group therapy, milieu therapy and occupational therapy  · Continue to assess for adverse medication side effects  · Encourage Mari Hernandez to participate in nonverbal forms of therapy including journaling and art/music therapy  · Continue frequent safety checks and vitals per unit protocol  · Continue to engage CM/SW to assist with collateral, disposition planning, and the implementation of an individualized, patient-centered plan of care  · Continue medical management by medical team   · Case discussed with treatment team     Legal Status: 201  ------------------------------------------------------------    Subjective: All documentation including nursing notes, medication history to ensure medication adherence on the unit, labs, and vitals were reviewed  Per nursing report, on the inpatient psychiatric unit Laine Holcomb has remained calm, polite, and cooperative on the inpatient psychiatric unit    Ilanroseline Edwardo was noted to be visible in the day room, constricted and withdrawn, cooperative with rules and unit routine, medication and meal compliant  On nursing staff assessments throughout the day he reported that his thoughts of suicidal ideation were improving and his hallucinations were also improving  He contracted for safety on nursing assessments  In the evening Marta Sams reported increased anxiety due to past traumas in his life and received a as needed of Atarax 50 mg at [phone] for symptoms of anxiety  Over the past 24 hours per nursing report, Marta Sams has been cooperative on the unit and compliant with medications  Marta Sams was evaluated this morning for continuity of care and no acute distress noted throughout the evaluation  On interview this morning Marta Sams reports feeling "better"  He reports on the inpatient psychiatric unit that his symptoms of depression and anxiety are slowly improving and he rates his depression as a 6/10  Today Marta Sams reports ongoing feelings of anxiety about his living situation and rates his anxiety as a 3/4 situation  He reports that he lives with 7 other individuals in a rooming house and he states that he knows the people who live with him and is able to return there upon discharge  However, he reports ongoing anxiety as he has been on the housing waitlist for 7 years which she finds frustrating and disheartening  Marta Sams reports that he is worried for his stepfather who is suffering from cancer that has spread throughout his body and his mother who suffers from diabetes and had bilateral leg amputation due to uncontrolled diabetes  He reports that his stepfather and mother live in [facility] and he plans to call them after he leaves the hospital   He states he has not spoken to either his stepfather or his mother and several months and he has not visited [facility] for about 3 years  On interview today Marta Sams reports that he continues to experience nightmares every night    He reports that these nightmares are about "death" and he states that in these nightmares he sees "people" that attempt to harm him (for example by attempting to shoot him with a pistol) and about people being killed  Lexie Israel describes his dreams as "like a movie"  Lexie Israel reports that 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 and during his time in longterm he saw individuals murdered  Lexie Israel is in agreement with starting a medication to help assist with these nightmares, as he reports that these negative thoughts are often a source of sadness in his life  Lexie Israel reports that he slept approximately 6 hours last night (he reports he awoke about 3 times in the night), however feels "tired" this morning  He reports that he has been spending his time on the unit and keeping to himself and reports that yesterday he called his sister on the phone and that the conversation went well  On interview today Lexie Israel reports he has been considering pursuing inpatient drug and alcohol rehab  However, he is concerned that he will be sent to a rehab location that is far away from his boarding home and that they will "throw him onto the streets" when he has completed the therapy and that he will not have the resources to return home  Lexie Israel was reassured that this is not the case and that he will receive assistance with returning home once he has completed his inpatient rehab stay  Lexie Israel states he will consider this as an option in the future  Today, Lexie Israel is consenting for safety on the unit  Lexie Israel reports feeling "better " Lexie Israel notes having good sleep  Lexie Israel states having a good appetite  Lexie Israel has been taking the medications as prescribed and reporting no side effects  At the time of interview Lexie Israel denies suicidal ideations, homicidal ideations  At the time of interview Lexie Israel reports experiencing intermittent auditory hallucinations of people calling out his name and reports intermittent visual hallucinations of shadows darting around the room  They report that both of these hallucinations have been progressively improving  PRNs overnight:  None  VS: Reviewed, within normal limits    Progress Toward Goals: slow improvement    Psychiatric Review of Systems:  Behavior over the last 24 hours:  improved  Sleep: normal, slept better  Appetite: improving, poor oral intake  Medication side effects: No   ROS: all other systems are negative    Vital signs in last 24 hours:  Temp:  [97 7 °F (36 5 °C)-98 6 °F (37 °C)] 98 6 °F (37 °C)  HR:  [56-68] 68  Resp:  [15-17] 17  BP: (105-135)/(65-77) 105/65    Laboratory results:  I have personally reviewed all pertinent laboratory/tests results  Recent Results (from the past 48 hour(s))   TSH, 3rd generation with Free T4 reflex    Collection Time: 12/19/22  6:37 AM   Result Value Ref Range    TSH 3RD GENERATON 3 960 0 450 - 4 500 uIU/mL   Hemoglobin A1C    Collection Time: 12/19/22  6:37 AM   Result Value Ref Range    Hemoglobin A1C 5 7 (H) Normal 3 8-5 6%; PreDiabetic 5 7-6 4%;  Diabetic >=6 5%; Glycemic control for adults with diabetes <7 0% %     mg/dl   Lipid panel    Collection Time: 12/19/22  6:37 AM   Result Value Ref Range    Cholesterol 144 See Comment mg/dL    Triglycerides 116 See Comment mg/dL    HDL, Direct 40 >=40 mg/dL    LDL Calculated 81 <130 mg/dL    Non-HDL-Chol (CHOL-HDL) 104 mg/dl   CBC and differential    Collection Time: 12/19/22  6:37 AM   Result Value Ref Range    WBC 5 61 4 31 - 10 16 Thousand/uL    RBC 4 15 3 88 - 5 62 Million/uL    Hemoglobin 12 4 12 0 - 17 0 g/dL    Hematocrit 38 1 36 5 - 49 3 %    MCV 92 82 - 98 fL    MCH 29 9 26 8 - 34 3 pg    MCHC 32 5 31 4 - 37 4 g/dL    RDW 13 2 11 6 - 15 1 %    MPV 10 1 8 9 - 12 7 fL    Platelets 639 389 - 870 Thousands/uL    nRBC 0 /100 WBCs    Neutrophils Relative 49 43 - 75 %    Immat GRANS % 0 0 - 2 %    Lymphocytes Relative 43 14 - 44 %    Monocytes Relative 6 4 - 12 %    Eosinophils Relative 2 0 - 6 %    Basophils Relative 0 0 - 1 % Neutrophils Absolute 2 71 1 85 - 7 62 Thousands/µL    Immature Grans Absolute 0 01 0 00 - 0 20 Thousand/uL    Lymphocytes Absolute 2 43 0 60 - 4 47 Thousands/µL    Monocytes Absolute 0 32 0 17 - 1 22 Thousand/µL    Eosinophils Absolute 0 13 0 00 - 0 61 Thousand/µL    Basophils Absolute 0 01 0 00 - 0 10 Thousands/µL   Comprehensive metabolic panel    Collection Time: 12/19/22  6:37 AM   Result Value Ref Range    Sodium 138 135 - 147 mmol/L    Potassium 4 8 3 5 - 5 3 mmol/L    Chloride 104 96 - 108 mmol/L    CO2 26 21 - 32 mmol/L    ANION GAP 8 5 - 14 mmol/L    BUN 12 5 - 25 mg/dL    Creatinine 0 83 0 70 - 1 50 mg/dL    Glucose 96 70 - 99 mg/dL    Glucose, Fasting 96 70 - 99 mg/dL    Calcium 9 0 8 4 - 10 2 mg/dL    AST 28 17 - 59 U/L    ALT 21 <50 U/L    Alkaline Phosphatase 61 43 - 122 U/L    Total Protein 7 5 6 4 - 8 4 g/dL    Albumin 4 1 3 5 - 5 0 g/dL    Total Bilirubin 0 31 0 20 - 1 00 mg/dL    eGFR 97 ml/min/1 73sq m   RPR    Collection Time: 12/19/22 10:46 AM   Result Value Ref Range    RPR Non-Reactive Non-Reactive         Mental Status Evaluation:    Appearance:  casually dressed, marginal hygiene, looks older than stated age, underweight   Behavior:  pleasant, cooperative, calm   Speech:  normal rate, normal volume, clear, coherent   Mood:  "better"   Affect:  constricted   Thought Process:  organized, logical, coherent, goal directed, linear, normal rate of thoughts   Associations: intact associations   Thought Content:  normal, no overt delusions   Perceptual Disturbances: At the time of interview Garrett Dior reports experiencing intermittent auditory hallucinations of people calling out his name and reports intermittent visual hallucinations of shadows darting around the room  They report that both of these hallucinations have been progressively improving  Does not appear to be responding to internal stimuli at this time     Risk Potential: Suicidal ideation - None at present  Homicidal ideation - None at present  Potential for aggression - No   Sensorium:  oriented to person, place and time/date   Memory:  recent and remote memory grossly intact   Consciousness:  alert and awake   Attention/Concentration: attention span and concentration are age appropriate   Insight:  improving   Judgment: improving   Gait/Station: normal gait/station, normal balance   Motor Activity: no abnormal movements       Current Medications:  Current Facility-Administered Medications   Medication Dose Route Frequency Provider Last Rate   • aluminum-magnesium hydroxide-simethicone  30 mL Oral Q4H PRN Kendrick Black, DO     • atorvastatin  10 mg Oral Daily With Surekha Adam, DO     • benzonatate  100 mg Oral TID PRN Duke Walker MD     • haloperidol lactate  2 5 mg Intramuscular Q4H PRN Max 4/day Kendrick Black, DO      And   • LORazepam  1 mg Intramuscular Q4H PRN Max 4/day Kendrick Black, DO      And   • benztropine  0 5 mg Intramuscular Q4H PRN Max 4/day Kendrick Black, DO     • haloperidol lactate  5 mg Intramuscular Q4H PRN Max 4/day Kendrick Black, DO      And   • LORazepam  2 mg Intramuscular Q4H PRN Max 4/day Kendrick Black, DO      And   • benztropine  1 mg Intramuscular Q4H PRN Max 4/day Kendrick Black, DO     • bisacodyl  10 mg Rectal Daily PRN Kendrick Black, DO     • buprenorphine-naloxone  8 mg Sublingual Daily Kendrick Black, DO     • hydrOXYzine HCL  50 mg Oral Q6H PRN Max 4/day Kendrick Black, DO      Or   • diphenhydrAMINE  50 mg Intramuscular Q6H PRN Kendrick Black, DO     • glycerin-hypromellose-  1 drop Both Eyes Q3H PRN Kendrick Black, DO     • haloperidol  1 mg Oral Q6H PRN Kendrick Black, DO     • haloperidol  2 5 mg Oral Q4H PRN Max 4/day Kendrick Black, DO     • haloperidol  5 mg Oral Q4H PRN Max 4/day Kendrick Black, DO     • hydrOXYzine HCL  100 mg Oral Q6H PRN Max 4/day Kendrick Black, DO      Or   • LORazepam  2 mg Intramuscular Q6H PRN Taylor Pacini, DO     • hydrOXYzine HCL  25 mg Oral Q6H PRN Max 4/day Taylor Pacini, DO     • ibuprofen  400 mg Oral Q4H PRN Taylor Pacini, DO     • ibuprofen  600 mg Oral Q6H PRN Taylor Pacini, DO     • ibuprofen  800 mg Oral Q8H PRN Taylor Pacini, DO     • lisinopril  5 mg Oral Daily Taylor Pacini, DO     • melatonin  3 mg Oral HS PRN Taylor Pacini, DO     • nicotine  1 patch Transdermal Daily Taylor Pacini, DO     • pantoprazole  40 mg Oral Daily Taylor Pacini, DO     • polyethylene glycol  17 g Oral Daily PRN Taylor Pacini, DO     • prazosin  1 mg Oral HS Brooks Small MD     • QUEtiapine  100 mg Oral HS Taylor Pacini, DO     • senna-docusate sodium  1 tablet Oral Daily PRN Taylor Pacini, DO     • sertraline  100 mg Oral Daily Taylor Pacini, DO       The following interventions are recommended: behavioral checks every 7 minutes, continued hospitalization on locked unit    Behavioral Health Medications: All current active meds have been reviewed  Changes as in plan section above  Risks, benefits and possible side effects of Medications:   Risks, benefits, and possible side effects of medications explained to patient and patient verbalizes understanding  Counseling / Coordination of Care:  Patient's progress discussed with staff in treatment team meeting  Medications, treatment progress and treatment plan reviewed with patient  Medication changes discussed with patient  Medication education provided to patient  Discharge plan discussed with patient  Charlann Moritz, MD 12/20/22  Psychiatry Resident, PGY-II    This note was completed in part utilizing Dragon dictation Software   Grammatical, translation, syntax errors, random word insertions, spelling mistakes, and incomplete sentences may be an occasional consequence of this system secondary to software limitations with voice recognition, ambient noise, and hardware issues  If you have any questions or concerns about the content, text, or information contained within the body of this dictation, please contact the provider for clarification

## 2022-12-20 NOTE — PLAN OF CARE
Problem: Ineffective Coping  Goal: Cooperates with admission process  Description: Interventions:   - Complete admission process  Outcome: Progressing  Goal: Identifies ineffective coping skills  Outcome: Progressing  Goal: Identifies healthy coping skills  Outcome: Progressing  Goal: Demonstrates healthy coping skills  Outcome: Progressing  Goal: Participates in unit activities  Description: Interventions:  - Provide therapeutic environment   - Provide required programming   - Redirect inappropriate behaviors   Outcome: Progressing  Goal: Patient/Family participate in treatment and DC plans  Description: Interventions:  - Provide therapeutic environment  Outcome: Progressing  Goal: Patient/Family verbalizes awareness of resources  Outcome: Progressing  Goal: Understands least restrictive measures  Description: Interventions:  - Utilize least restrictive behavior  Outcome: Progressing  Goal: Free from restraint events  Description: - Utilize least restrictive measures   - Provide behavioral interventions   - Redirect inappropriate behaviors   Outcome: Progressing     Problem: Risk for Self Injury/Neglect  Goal: Treatment Goal: Remain safe during length of stay, learn and adopt new coping skills, and be free of self-injurious ideation, impulses and acts at the time of discharge  Outcome: Progressing  Goal: Verbalize thoughts and feelings  Description: Interventions:  - Assess and re-assess patient's lethality and potential for self-injury  - Engage patient in 1:1 interactions, daily, for a minimum of 15 minutes  - Encourage patient to express feelings, fears, frustrations, hopes  - Establish rapport/trust with patient   Outcome: Progressing  Goal: Refrain from harming self  Description: Interventions:  - Monitor patient closely, per order  - Develop a trusting relationship  - Supervise medication ingestion, monitor effects and side effects   Outcome: Progressing  Goal: Attend and participate in unit activities, including therapeutic, recreational, and educational groups  Description: Interventions:  - Provide therapeutic and educational activities daily, encourage attendance and participation, and document same in the medical record  - Obtain collateral information, encourage visitation and family involvement in care   Outcome: Progressing  Goal: Recognize maladaptive responses and adopt new coping mechanisms  Outcome: Progressing  Goal: Complete daily ADLs, including personal hygiene independently, as able  Description: Interventions:  - Observe, teach, and assist patient with ADLS  - Monitor and promote a balance of rest/activity, with adequate nutrition and elimination  Outcome: Progressing     Problem: Depression  Goal: Treatment Goal: Demonstrate behavioral control of depressive symptoms, verbalize feelings of improved mood/affect, and adopt new coping skills prior to discharge  Outcome: Progressing  Goal: Verbalize thoughts and feelings  Description: Interventions:  - Assess and re-assess patient's level of risk   - Engage patient in 1:1 interactions, daily, for a minimum of 15 minutes   - Encourage patient to express feelings, fears, frustrations, hopes   Outcome: Progressing  Goal: Refrain from harming self  Description: Interventions:  - Monitor patient closely, per order   - Supervise medication ingestion, monitor effects and side effects   Outcome: Progressing  Goal: Refrain from isolation  Description: Interventions:  - Develop a trusting relationship   - Encourage socialization   Outcome: Progressing  Goal: Refrain from self-neglect  Outcome: Progressing  Goal: Complete daily ADLs, including personal hygiene independently, as able  Description: Interventions:  - Observe, teach, and assist patient with ADLS  -  Monitor and promote a balance of rest/activity, with adequate nutrition and elimination   Outcome: Progressing     Problem: Anxiety  Goal: Anxiety is at manageable level  Description: Interventions:  - Assess and monitor patient's anxiety level  - Monitor for signs and symptoms (heart palpitations, chest pain, shortness of breath, headaches, nausea, feeling jumpy, restlessness, irritable, apprehensive)  - Collaborate with interdisciplinary team and initiate plan and interventions as ordered    - [place] patient to unit/surroundings  - Explain treatment plan  - Encourage participation in care  - Encourage verbalization of concerns/fears  - Identify coping mechanisms  - Assist in developing anxiety-reducing skills  - Administer/offer alternative therapies  - Limit or eliminate stimulants  Outcome: Progressing

## 2022-12-21 RX ADMIN — IBUPROFEN 800 MG: 400 TABLET ORAL at 17:18

## 2022-12-21 RX ADMIN — QUETIAPINE FUMARATE 100 MG: 100 TABLET ORAL at 21:37

## 2022-12-21 RX ADMIN — NICOTINE 1 PATCH: 14 PATCH, EXTENDED RELEASE TRANSDERMAL at 08:33

## 2022-12-21 RX ADMIN — PANTOPRAZOLE SODIUM 40 MG: 40 TABLET, DELAYED RELEASE ORAL at 05:46

## 2022-12-21 RX ADMIN — ATORVASTATIN CALCIUM 10 MG: 10 TABLET, FILM COATED ORAL at 17:18

## 2022-12-21 RX ADMIN — SERTRALINE HYDROCHLORIDE 100 MG: 100 TABLET ORAL at 08:32

## 2022-12-21 RX ADMIN — LISINOPRIL 5 MG: 5 TABLET ORAL at 08:33

## 2022-12-21 RX ADMIN — BUPRENORPHINE AND NALOXONE 8 MG: 8; 2 FILM BUCCAL; SUBLINGUAL at 08:32

## 2022-12-21 NOTE — NURSING NOTE
Patient visible on unit at times  Calm and cooperative with care  Pt rates anxiety 2/4 and 7/10 depression  Medication complaint, will continue to maintain q7 min checks  Ibuprofen 800 mg given at 2121 for 7/10 headache  Pt noted sleeping in room

## 2022-12-21 NOTE — PROGRESS NOTES
Progress Note - Behavioral Health   Deven Urrutia 62 y o  male MRN: [id number]  Unit/Bed#: [facility] 614-27 Encounter: [id number]    Assessment/Plan   Principal Problem:    Severe episode of recurrent major depressive disorder, with psychotic features ([address] )  Active Problems:    PTSD (post-traumatic stress disorder)    Hypertension    Anemia    Gross hematuria    CKD (chronic kidney disease)    Medical clearance for psychiatric admission    Psychiatric disorder    COVID-19      Recommended Treatment:   · No psychopharmacologic changes necessary at this moment; will continue to assess daily for further optimization  · Continue prazosin 1 mg at bedtime for nightmares  · Continue Seroquel 100 mg at bedtime for augmentation of antidepressant as well as for symptoms of psychosis  · Continue sertraline 100 mg daily for mood and anxiety  · Continue Suboxone 8 mg sublingual daily for MAT  · Of note, at this time patient does not have an outpatient Suboxone provider and arrangements will need to be made prior to discharge  · Further medication management per [facility]  · On interview today the patient is not willing to pursue inpatient drug rehab at this time and wants to pursue ambulatory services for drugs    · Continue with pharmacotherapy, group therapy, milieu therapy and occupational therapy  · Continue to assess for adverse medication side effects  · Encourage Deven Urrutia to participate in nonverbal forms of therapy including journaling and art/music therapy  · Continue frequent safety checks and vitals per unit protocol  · Continue to engage CM/SW to assist with collateral, disposition planning, and the implementation of an individualized, patient-centered plan of care  · Continue medical management by medical team   · Case discussed with treatment team     Legal Status: [id number]  ------------------------------------------------------------    Subjective:  All documentation including nursing notes, medication history to ensure medication adherence on the unit, labs, and vitals were reviewed  Per nursing report, on the inpatient psychiatric unit Orestes Butler has been making steady progress  Yesterday on the inpatient psychiatric unit Orestes Butler was noted to be intermittently visible in the milieu, generally isolative to himself  He was observed to be calm and cooperative with care, spending time throughout the day watching TV  On nursing assessment in the evening he rated his anxiety as a 2/4 and depression as a 7/10  In the evening he experienced it a headache and received ibuprofen 800 mg at 9:21 PM     Orestes Butler was evaluated this morning for continuity of care and no acute distress noted throughout the evaluation  Over the past 24 hours per nursing report, Orestes Butler has been cooperative on the unit and compliant with medications  On interview today Orestes Butler reports that he is doing "a little better" in the hospital   He continues to report ongoing symptoms of depression and anxiety, however reports that his symptoms are slowly improving  He currently rates his depression as a 5/10 and his anxiety as a 3/4  Sources of depression and anxiety include his current housing situation as well as his family  He also reports that he feels lonely as he has limited family around during the [place] holiday (he has one sister who lives in [facility] but they rarely talk)  On interview today, Orestes Butler reports that he has changed his mind about inpatient rehab and wants to pursue ambulatory drug and alcohol services upon discharge  Orestes Butler reports that he currently pays $480 every months for his current living situation (patient lives in a boarding home with 7 other individuals) and reports that he can only pay cash  At this time, Orestes Butler he is worried he will lose his current housing in the event that he goes to inpatient rehab  On interview today, Orestes Butler reports that he slept well throughout the night    He reports improvements in his nightmares and reports last night that his dreams were "smooth"  Arabella Hoff reports that he has been eating better with the help of Ensure supplements  On interview Lady Dean understanding that medication management is an important part of his treatment plan and states that he will continue taking his medications upon discharge from the hospital and will follow up with an outpatient psychiatrist upon discharge from the hospital     Today, Arabella Hoff is consenting for safety on the unit  Arabella Hoff reports feeling "a little better " Arabella Hoff notes having good sleep and slept soundly throughout the night  Arabella Hoff states having an improved appetite  Arabella Hoff has been taking the medications as prescribed and reporting no side effects  Arabella Hoff denies suicidal ideations, homicidal ideations, visual auditory hallucinations, thoughts of hurting himself or others  Arabella Hoff reports yesterday afternoon he experienced auditory hallucinations of people calling out his name and throughout the day yesterday experienced intermittent visual hallucinations of shadows briefly darting around the room  Arabella Hoff reports he has not experienced these hallucinations today  PRNs overnight: None   VS: Reviewed, within normal limits    Progress Toward Goals: slow improvement    Psychiatric Review of Systems:  Behavior over the last 24 hours:  improved  Sleep: improved, nightmares are improving  Appetite: improving  Medication side effects: No   ROS: all other systems are negative    Vital signs in last 24 hours:  Temp:  [97 7 °F (36 5 °C)-98 8 °F (37 1 °C)] 97 7 °F (36 5 °C)  HR:  [52-78] 60  Resp:  [17-18] 17  BP: (105-160)/(65-82) 123/73    Laboratory results:  I have personally reviewed all pertinent laboratory/tests results    Recent Results (from the past 48 hour(s))   RPR    Collection Time: 12/19/22 10:46 AM   Result Value Ref Range    RPR Non-Reactive Non-Reactive         Mental Status Evaluation:    Appearance:  casually dressed, marginal hygiene, looks older than stated age, underweight   Behavior:  pleasant, cooperative, calm   Speech:  normal rate, normal volume, clear, coherent   Mood:  "a little better"   Affect:  constricted   Thought Process:  organized, logical, coherent, goal directed, linear, normal rate of thoughts, normal abstract reasoning   Associations: intact associations   Thought Content:  normal, no overt delusions   Perceptual Disturbances: Vincenzo Baker reports yesterday afternoon he experienced auditory hallucinations of people calling out his name and throughout the day yesterday experienced intermittent visual hallucinations of shadows briefly darting around the room  Clejackie Min reports he has not experienced these hallucinations today  Does not appear to be responding to internal stimuli     Risk Potential: Suicidal ideation - None  Homicidal ideation - None  Potential for aggression - No   Sensorium:  oriented to person, place and time/date   Memory:  recent and remote memory grossly intact   Consciousness:  alert and awake   Attention/Concentration: attention span and concentration are age appropriate   Insight:  improving   Judgment: improving   Gait/Station: normal gait/station, normal balance   Motor Activity: no abnormal movements       Current Medications:  Current Facility-Administered Medications   Medication Dose Route Frequency Provider Last Rate   • aluminum-magnesium hydroxide-simethicone  30 mL Oral Q4H PRN Marybel Newsome DO     • atorvastatin  10 mg Oral Daily With Randy Leblanc DO     • benzonatate  100 mg Oral TID PRN Josseline Delgado MD     • haloperidol lactate  2 5 mg Intramuscular Q4H PRN Max 4/day Marybel Newsome DO      And   • LORazepam  1 mg Intramuscular Q4H PRN Max 4/day Marybel Newsome DO      And   • benztropine  0 5 mg Intramuscular Q4H PRN Max 4/day Marybel Newsome DO     • haloperidol lactate  5 mg Intramuscular Q4H PRN Max 4/day Marybel Newsome DO      And   • LORazepam  2 mg Intramuscular Q4H PRN Max 4/day Lenoria Lasso, DO      And   • benztropine  1 mg Intramuscular Q4H PRN Max 4/day Lenoria Lasso, DO     • bisacodyl  10 mg Rectal Daily PRN Lenoria Lasso, DO     • buprenorphine-naloxone  8 mg Sublingual Daily Lenoria Lasso, DO     • hydrOXYzine HCL  50 mg Oral Q6H PRN Max 4/day Lenoria Lasso, DO      Or   • diphenhydrAMINE  50 mg Intramuscular Q6H PRN Lenoria Lasso, DO     • glycerin-hypromellose-  1 drop Both Eyes Q3H PRN Lenoria Lasso, DO     • haloperidol  1 mg Oral Q6H PRN Lenoria Lasso, DO     • haloperidol  2 5 mg Oral Q4H PRN Max 4/day Lenoria Lasso, DO     • haloperidol  5 mg Oral Q4H PRN Max 4/day Lenoria Lasso, DO     • hydrOXYzine HCL  100 mg Oral Q6H PRN Max 4/day Lenoria Lasso, DO      Or   • LORazepam  2 mg Intramuscular Q6H PRN Lenoria Lasso, DO     • hydrOXYzine HCL  25 mg Oral Q6H PRN Max 4/day Lenoria Lasso, DO     • ibuprofen  400 mg Oral Q4H PRN Lenoria Lasso, DO     • ibuprofen  600 mg Oral Q6H PRN Lenoria Lasso, DO     • ibuprofen  800 mg Oral Q8H PRN Lenoria Lasso, DO     • lisinopril  5 mg Oral Daily Lenoria Lasso, DO     • melatonin  3 mg Oral HS PRN Lenoria Lasso, DO     • nicotine  1 patch Transdermal Daily Lenoria Lasso, DO     • pantoprazole  40 mg Oral Daily Lenoria Lasso, DO     • polyethylene glycol  17 g Oral Daily PRN Lenoria Lasso, DO     • prazosin  1 mg Oral HS Juana Saha MD     • QUEtiapine  100 mg Oral HS Lenoria Lasso, DO     • senna-docusate sodium  1 tablet Oral Daily PRN Lenoria Lasso, DO     • sertraline  100 mg Oral Daily Lenoria Lasso, DO         The following interventions are recommended: behavioral checks every 7 minutes, continued hospitalization on locked unit    Behavioral Health Medications: All current active meds have been reviewed  Changes as in plan section above    Risks, benefits and possible side effects of Medications:   Risks, benefits, and possible side effects of medications explained to patient and patient verbalizes understanding  Counseling / Coordination of Care:  Patient's progress discussed with staff in treatment team meeting  Medications, treatment progress and treatment plan reviewed with patient  Medication education provided to patient  Discharge plan discussed with patient  Lobo Gilbert MD 12/21/22  Psychiatry Resident, PGY-II    This note was completed in part utilizing Dragon dictation Software  Grammatical, translation, syntax errors, random word insertions, spelling mistakes, and incomplete sentences may be an occasional consequence of this system secondary to software limitations with voice recognition, ambient noise, and hardware issues  If you have any questions or concerns about the content, text, or information contained within the body of this dictation, please contact the provider for clarification

## 2022-12-21 NOTE — CASE MANAGEMENT
12/21/22 0906   Team Meeting   Meeting Type Daily Rounds   Initial Conference Date 12/21/22   Team Members Present   Team Members Present Physician;Nurse;;    Physician Team Member Dr Sofia Joel; Dr Francheska Bruce Management Team Member [address] [place] [address] Work Team Member Aguila Zhang   Patient/Family Present   Patient Present No   Patient's Family Present No     Pursuing IP rehab, visible, calm, cooperative, 2/4 anxiety, 7/10 depression, med compliant, slept

## 2022-12-21 NOTE — NURSING NOTE
Patient is pleasant and cooperative with he care and medications  Patient is isolative in his room   Reported depression 5/10 and Anxiety 3/4  Denies SI,HI   A,V/H

## 2022-12-21 NOTE — PLAN OF CARE
Problem: Ineffective Coping  Goal: Cooperates with admission process  Description: Interventions:   - Complete admission process  Outcome: Progressing  Goal: Identifies ineffective coping skills  Outcome: Progressing  Goal: Identifies healthy coping skills  Outcome: Progressing  Goal: Demonstrates healthy coping skills  Outcome: Progressing  Goal: Participates in unit activities  Description: Interventions:  - Provide therapeutic environment   - Provide required programming   - Redirect inappropriate behaviors   Outcome: Progressing  Goal: Patient/Family participate in treatment and DC plans  Description: Interventions:  - Provide therapeutic environment  Outcome: Progressing  Goal: Patient/Family verbalizes awareness of resources  Outcome: Progressing  Goal: Understands least restrictive measures  Description: Interventions:  - Utilize least restrictive behavior  Outcome: Progressing  Goal: Free from restraint events  Description: - Utilize least restrictive measures   - Provide behavioral interventions   - Redirect inappropriate behaviors   Outcome: Progressing     Problem: Risk for Self Injury/Neglect  Goal: Treatment Goal: Remain safe during length of stay, learn and adopt new coping skills, and be free of self-injurious ideation, impulses and acts at the time of discharge  Outcome: Progressing  Goal: Verbalize thoughts and feelings  Description: Interventions:  - Assess and re-assess patient's lethality and potential for self-injury  - Engage patient in 1:1 interactions, daily, for a minimum of 15 minutes  - Encourage patient to express feelings, fears, frustrations, hopes  - Establish rapport/trust with patient   Outcome: Progressing  Goal: Refrain from harming self  Description: Interventions:  - Monitor patient closely, per order  - Develop a trusting relationship  - Supervise medication ingestion, monitor effects and side effects   Outcome: Progressing  Goal: Attend and participate in unit activities, including therapeutic, recreational, and educational groups  Description: Interventions:  - Provide therapeutic and educational activities daily, encourage attendance and participation, and document same in the medical record  - Obtain collateral information, encourage visitation and family involvement in care   Outcome: Progressing  Goal: Recognize maladaptive responses and adopt new coping mechanisms  Outcome: Progressing  Goal: Complete daily ADLs, including personal hygiene independently, as able  Description: Interventions:  - Observe, teach, and assist patient with ADLS  - Monitor and promote a balance of rest/activity, with adequate nutrition and elimination  Outcome: Progressing     Problem: Depression  Goal: Treatment Goal: Demonstrate behavioral control of depressive symptoms, verbalize feelings of improved mood/affect, and adopt new coping skills prior to discharge  Outcome: Progressing  Goal: Verbalize thoughts and feelings  Description: Interventions:  - Assess and re-assess patient's level of risk   - Engage patient in 1:1 interactions, daily, for a minimum of 15 minutes   - Encourage patient to express feelings, fears, frustrations, hopes   Outcome: Progressing  Goal: Refrain from harming self  Description: Interventions:  - Monitor patient closely, per order   - Supervise medication ingestion, monitor effects and side effects   Outcome: Progressing  Goal: Refrain from isolation  Description: Interventions:  - Develop a trusting relationship   - Encourage socialization   Outcome: Progressing  Goal: Refrain from self-neglect  Outcome: Progressing  Goal: Attend and participate in unit activities, including therapeutic, recreational, and educational groups  Description: Interventions:  - Provide therapeutic and educational activities daily, encourage attendance and participation, and document same in the medical record   Outcome: Progressing  Goal: Complete daily ADLs, including personal hygiene independently, as able  Description: Interventions:  - Observe, teach, and assist patient with ADLS  -  Monitor and promote a balance of rest/activity, with adequate nutrition and elimination   Outcome: Progressing     Problem: Anxiety  Goal: Anxiety is at manageable level  Description: Interventions:  - Assess and monitor patient's anxiety level  - Monitor for signs and symptoms (heart palpitations, chest pain, shortness of breath, headaches, nausea, feeling jumpy, restlessness, irritable, apprehensive)  - Collaborate with interdisciplinary team and initiate plan and interventions as ordered    - [place] patient to unit/surroundings  - Explain treatment plan  - Encourage participation in care  - Encourage verbalization of concerns/fears  - Identify coping mechanisms  - Assist in developing anxiety-reducing skills  - Administer/offer alternative therapies  - Limit or eliminate stimulants  Outcome: Progressing

## 2022-12-21 NOTE — CASE MANAGEMENT
CM notified that pt no longer wants IP rehab and prefers OP d/a services  D/A assessment with Gaby cancelled for tomorrow  In-basket message sent to [facility] program to see if pt would be appropriate

## 2022-12-22 RX ORDER — PRAZOSIN HYDROCHLORIDE 1 MG/1
2 CAPSULE ORAL
Status: DISCONTINUED | OUTPATIENT
Start: 2022-12-22 | End: 2022-12-28 | Stop reason: HOSPADM

## 2022-12-22 RX ADMIN — ATORVASTATIN CALCIUM 10 MG: 10 TABLET, FILM COATED ORAL at 17:10

## 2022-12-22 RX ADMIN — IBUPROFEN 800 MG: 400 TABLET ORAL at 17:09

## 2022-12-22 RX ADMIN — SERTRALINE HYDROCHLORIDE 100 MG: 100 TABLET ORAL at 08:56

## 2022-12-22 RX ADMIN — PRAZOSIN HYDROCHLORIDE 2 MG: 1 CAPSULE ORAL at 21:49

## 2022-12-22 RX ADMIN — NICOTINE 1 PATCH: 14 PATCH, EXTENDED RELEASE TRANSDERMAL at 08:57

## 2022-12-22 RX ADMIN — IBUPROFEN 400 MG: 400 TABLET ORAL at 12:12

## 2022-12-22 RX ADMIN — QUETIAPINE FUMARATE 100 MG: 100 TABLET ORAL at 21:49

## 2022-12-22 RX ADMIN — BUPRENORPHINE AND NALOXONE 8 MG: 8; 2 FILM BUCCAL; SUBLINGUAL at 08:56

## 2022-12-22 RX ADMIN — LISINOPRIL 5 MG: 5 TABLET ORAL at 08:56

## 2022-12-22 RX ADMIN — PANTOPRAZOLE SODIUM 40 MG: 40 TABLET, DELAYED RELEASE ORAL at 06:04

## 2022-12-22 NOTE — NURSING NOTE
Patient visible on unit, social with [place] speaking staff  Pt rates 3/4 anxiety and 5/10 depression  Pt given 800 mg Motrin for 7/10 headache, was effective  Pt denies SI,HI,AVH  Medication compliant, will continue to maintain q7 min checks

## 2022-12-22 NOTE — CASE MANAGEMENT
Spoke with Lauri Alejandro at [facility] [phone]  In-basket message sent to [facility] with referral info  Waiting on response      [facility]ika feels that pt would be a good candidate for the program  Intake appt scheduled for 12/28 at 2pm

## 2022-12-22 NOTE — CASE MANAGEMENT
12/22/22 0908   Team Meeting   Meeting Type Daily Rounds   Initial Conference Date 12/22/22   Team Members Present   Team Members Present Physician;Nurse;;; Occupational Therapist   Physician Team Member Dr Judy Shore; Dr Manish Escobar Management Team Member [address] [place] [address] Work Team Member Tangela   OT Team Member Kathia Ramon   Patient/Family Present   Patient Present No   Patient's Family Present No     Visible, social, 3/4 anxiety, 5/10 depression, med compliant, slept

## 2022-12-22 NOTE — PROGRESS NOTES
Progress Note - Behavioral Health   Gaylene Paget 62 y o  male MRN: [id number]  Unit/Bed#: Rexann Aase 882-45 Encounter: [id number]    Assessment/Plan   Principal Problem:    Severe episode of recurrent major depressive disorder, with psychotic features ([facility] [address] )  Active Problems:    PTSD (post-traumatic stress disorder)    Hypertension    Anemia    Gross hematuria    CKD (chronic kidney disease)    Medical clearance for psychiatric admission    Psychiatric disorder    COVID-19      Recommended Treatment:       · At this time we will increase prazosin to 2 mg at bedtime for nightmares  · Continue Seroquel 100 mg at bedtime for augmentation of antidepressant as well as for symptoms of psychosis  · Continue sertraline 100 mg daily for mood and anxiety  · Continue Suboxone 8 mg sublingual daily for MAT  · At this time, care is being established with the [facility] program and an intake appointment is scheduled for 12/28 at 2:00 PM   · Further medical management per  IM    · Continue with pharmacotherapy, group therapy, milieu therapy and occupational therapy  · Continue to assess for adverse medication side effects  · Encourage Gaylene Paget to participate in nonverbal forms of therapy including journaling and art/music therapy  · Continue frequent safety checks and vitals per unit protocol  · Continue to engage CM/SW to assist with collateral, disposition planning, and the implementation of an individualized, patient-centered plan of care  · Continue medical management by medical team   · Case discussed with treatment team     Legal Status: 201  ------------------------------------------------------------    Subjective: All documentation including nursing notes, medication history to ensure medication adherence on the unit, labs, and vitals were reviewed      Per nursing report, on the inpatient psychiatric unit Arabella Hoff remains intermittently visible in the milieu, social with Korean-speaking staff, calm and cooperative with rules and unit routine  At times he has been noted to be isolative to his room  On evening nursing assessment Shiva Herrera reported 3/4 anxiety and 5/10 depression  He reported a 7/10 headache and received a as needed of Motrin 800 mg at around 5:18 PM     Shiva Herrera was evaluated this morning for continuity of care and no acute distress noted throughout the evaluation  Over the past 24 hours per nursing report, Shiva Herrera has been cooperative on the unit and compliant with medications  At the time of interview, Shiva Herrera reports overall he is feeling "a little better", however reports today that he continues to have an ongoing 7/10 headache that has been bothersome and has been ongoing since yesterday afternoon  Shiva Herrera reports that he has been trying to spend time in his room sleeping as a result of this headache, however reports that his roommate has been bothersome and has not been allowing him to get additional sleep  At the time of interview, Shiva Herrera reports that he has been spending his time on the inpatient unit listening to music  Shiva Herrera reports that he did not call anyone on the phone yesterday, but is considering calling his friend today  Shiva Herrera reports that last night he continued to have "regular" nightmares that are distressing to him  He is in agreement with increasing prazosin for better control of these nightmares  Today, Shiva Herrera is consenting for safety on the unit  Shiva Herrera reports feeling "a little better " Shiva Herrera notes having good sleep last night, stating he slept 6 hours in total  Cailinjeremy Herrera states having a good appetite  Shiva Herrera has been taking the medications as prescribed and reporting no side effects  At the time of interview Shiva Herrera denies suicidal ideations and homicidal ideations  He reports that he last experienced visual (brief hallucinations of shadows) and auditory hallucinations (of people calling out his name) yesterday afternoon, but has not experienced them today      PRNs overnight: None   VS: Reviewed, within normal limits    Progress Toward Goals: slow improvement    Psychiatric Review of Systems:  Behavior over the last 24 hours:  unchanged  Sleep: normal, reports he went to the bathroom 3 times during the night, but otherwise slept 6 hours in total  Appetite: normal  Medication side effects: No   ROS: headache all other systems are negative    Vital signs in last 24 hours:  Temp:  [97 6 °F (36 4 °C)-98 8 °F (37 1 °C)] 98 5 °F (36 9 °C)  HR:  [68-80] 80  Resp:  [16-18] 17  BP: (107-118)/(60-69) 108/69    Laboratory results:  I have personally reviewed all pertinent laboratory/tests results  No results found for this or any previous visit (from the past 48 hour(s))        Mental Status Evaluation:    Appearance:  casually dressed, marginal hygiene, looks older than stated age, underweight   Behavior:  cooperative, calm, appears uncomfortable due to headache   Speech:  normal rate, clear, coherent, soft   Mood:  "a little better"   Affect:  constricted   Thought Process:  organized, logical, goal directed, linear, normal rate of thoughts, normal abstract reasoning   Associations: intact associations   Thought Content:  normal, no overt delusions   Perceptual Disturbances: Denies auditory or visual hallucinations and Appears to be responding to internal stimuli   Risk Potential: Suicidal ideation - None  Homicidal ideation - None  Potential for aggression - No   Sensorium:  oriented to person, place and time/date   Memory:  recent and remote memory grossly intact   Consciousness:  alert and awake   Attention/Concentration: attention span and concentration are age appropriate   Insight:  improving   Judgment: improving   Gait/Station: normal gait/station, normal balance   Motor Activity: no abnormal movements       Current Medications:  Current Facility-Administered Medications   Medication Dose Route Frequency Provider Last Rate   • aluminum-magnesium hydroxide-simethicone  30 mL Oral Q4H PRN Kendrick Damon DO • atorvastatin  10 mg Oral Daily With Nilesh Abdi, DO     • benzonatate  100 mg Oral TID PRN Osbaldo Arellano MD     • haloperidol lactate  2 5 mg Intramuscular Q4H PRN Max 4/day Gerianne Boyd, DO      And   • LORazepam  1 mg Intramuscular Q4H PRN Max 4/day Gerianne Boyd, DO      And   • benztropine  0 5 mg Intramuscular Q4H PRN Max 4/day Gerianne Boyd, DO     • haloperidol lactate  5 mg Intramuscular Q4H PRN Max 4/day Gerianne Boyd, DO      And   • LORazepam  2 mg Intramuscular Q4H PRN Max 4/day Gerianne Boyd, DO      And   • benztropine  1 mg Intramuscular Q4H PRN Max 4/day Gerianne Boyd, DO     • bisacodyl  10 mg Rectal Daily PRN Gerianne Boyd, DO     • buprenorphine-naloxone  8 mg Sublingual Daily Gerianne Boyd, DO     • hydrOXYzine HCL  50 mg Oral Q6H PRN Max 4/day Gerianne Boyd, DO      Or   • diphenhydrAMINE  50 mg Intramuscular Q6H PRN Gerianne Boyd, DO     • glycerin-hypromellose-  1 drop Both Eyes Q3H PRN Gerianne Boyd, DO     • haloperidol  1 mg Oral Q6H PRN Gerianne Boyd, DO     • haloperidol  2 5 mg Oral Q4H PRN Max 4/day Gerianne Boyd, DO     • haloperidol  5 mg Oral Q4H PRN Max 4/day Gerianne Boyd, DO     • hydrOXYzine HCL  100 mg Oral Q6H PRN Max 4/day Gerianne Boyd, DO      Or   • LORazepam  2 mg Intramuscular Q6H PRN Gerianne Boyd, DO     • hydrOXYzine HCL  25 mg Oral Q6H PRN Max 4/day Gerianne Boyd, DO     • ibuprofen  400 mg Oral Q4H PRN Gerianne Boyd, DO     • ibuprofen  600 mg Oral Q6H PRN Gerianne Boyd, DO     • ibuprofen  800 mg Oral Q8H PRN Gerianne Boyd, DO     • lisinopril  5 mg Oral Daily Gerianne Boyd, DO     • melatonin  3 mg Oral HS PRN Gerianne Boyd, DO     • nicotine  1 patch Transdermal Daily Domi Mcmahan, DO     • pantoprazole  40 mg Oral Daily Domi Mcmahan, DO     • polyethylene glycol  17 g Oral Daily PRN Domi Mcmahan, DO     • prazosin  2 mg Oral HS Major Cruz MD     • QUEtiapine  100 mg Oral HS Debbi High DO     • senna-docusate sodium  1 tablet Oral Daily PRN Debbi High DO     • sertraline  100 mg Oral Daily Debbi High DO         The following interventions are recommended: behavioral checks every 7 minutes, continued hospitalization on locked unit    Behavioral Health Medications: All current active meds have been reviewed  Changes as in plan section above  Risks, benefits and possible side effects of Medications:   Risks, benefits, and possible side effects of medications explained to patient and patient verbalizes understanding  Counseling / Coordination of Care:  Patient's progress discussed with staff in treatment team meeting  Medications, treatment progress and treatment plan reviewed with patient  Medication changes discussed with patient  Importance of medication and treatment compliance reviewed with patient  Encouraged participation in milieu and group therapy on the unit  Discharge plan discussed with patient  Major Cruz MD 12/22/22  Psychiatry Resident, PGY-II    This note was completed in part utilizing Dragon dictation Software  Grammatical, translation, syntax errors, random word insertions, spelling mistakes, and incomplete sentences may be an occasional consequence of this system secondary to software limitations with voice recognition, ambient noise, and hardware issues  If you have any questions or concerns about the content, text, or information contained within the body of this dictation, please contact the provider for clarification

## 2022-12-22 NOTE — NURSING NOTE
Patient c/o headache rated 6/10, PRN motrin given at 1212  Patient is visible on the milieu, calm, and withdrawn to self  Patient denies SI, AH, VH  Patient reports anxiety 3/4, and 8/10 for depression  Patient is medication and meals compliant  No further distress noted, patient in room listening to music

## 2022-12-23 RX ADMIN — LISINOPRIL 5 MG: 5 TABLET ORAL at 08:35

## 2022-12-23 RX ADMIN — IBUPROFEN 400 MG: 400 TABLET ORAL at 13:17

## 2022-12-23 RX ADMIN — PANTOPRAZOLE SODIUM 40 MG: 40 TABLET, DELAYED RELEASE ORAL at 06:00

## 2022-12-23 RX ADMIN — QUETIAPINE FUMARATE 100 MG: 100 TABLET ORAL at 21:03

## 2022-12-23 RX ADMIN — SERTRALINE HYDROCHLORIDE 100 MG: 100 TABLET ORAL at 08:31

## 2022-12-23 RX ADMIN — NICOTINE 1 PATCH: 14 PATCH, EXTENDED RELEASE TRANSDERMAL at 08:31

## 2022-12-23 RX ADMIN — ATORVASTATIN CALCIUM 10 MG: 10 TABLET, FILM COATED ORAL at 17:44

## 2022-12-23 RX ADMIN — BUPRENORPHINE AND NALOXONE 8 MG: 8; 2 FILM BUCCAL; SUBLINGUAL at 08:31

## 2022-12-23 NOTE — PLAN OF CARE
Problem: Ineffective Coping  Goal: Cooperates with admission process  Description: Interventions:   - Complete admission process  Outcome: Progressing  Goal: Participates in unit activities  Description: Interventions:  - Provide therapeutic environment   - Provide required programming   - Redirect inappropriate behaviors   Outcome: Progressing  Goal: Patient/Family participate in treatment and DC plans  Description: Interventions:  - Provide therapeutic environment  Outcome: Progressing  Goal: Patient/Family verbalizes awareness of resources  Outcome: Progressing  Goal: Understands least restrictive measures  Description: Interventions:  - Utilize least restrictive behavior  Outcome: Progressing  Goal: Free from restraint events  Description: - Utilize least restrictive measures   - Provide behavioral interventions   - Redirect inappropriate behaviors   Outcome: Progressing     Problem: Risk for Self Injury/Neglect  Goal: Treatment Goal: Remain safe during length of stay, learn and adopt new coping skills, and be free of self-injurious ideation, impulses and acts at the time of discharge  Outcome: Progressing  Goal: Refrain from harming self  Description: Interventions:  - Monitor patient closely, per order  - Develop a trusting relationship  - Supervise medication ingestion, monitor effects and side effects   Outcome: Progressing  Goal: Attend and participate in unit activities, including therapeutic, recreational, and educational groups  Description: Interventions:  - Provide therapeutic and educational activities daily, encourage attendance and participation, and document same in the medical record  - Obtain collateral information, encourage visitation and family involvement in care   Outcome: Progressing  Goal: Recognize maladaptive responses and adopt new coping mechanisms  Outcome: Progressing  Goal: Complete daily ADLs, including personal hygiene independently, as able  Description: Interventions:  - Observe, teach, and assist patient with ADLS  - Monitor and promote a balance of rest/activity, with adequate nutrition and elimination  Outcome: Progressing     Problem: Depression  Goal: Treatment Goal: Demonstrate behavioral control of depressive symptoms, verbalize feelings of improved mood/affect, and adopt new coping skills prior to discharge  Outcome: Progressing  Goal: Refrain from harming self  Description: Interventions:  - Monitor patient closely, per order   - Supervise medication ingestion, monitor effects and side effects   Outcome: Progressing  Goal: Refrain from isolation  Description: Interventions:  - Develop a trusting relationship   - Encourage socialization   Outcome: Progressing  Goal: Refrain from self-neglect  Outcome: Progressing  Goal: Attend and participate in unit activities, including therapeutic, recreational, and educational groups  Description: Interventions:  - Provide therapeutic and educational activities daily, encourage attendance and participation, and document same in the medical record   Outcome: Progressing  Goal: Complete daily ADLs, including personal hygiene independently, as able  Description: Interventions:  - Observe, teach, and assist patient with ADLS  -  Monitor and promote a balance of rest/activity, with adequate nutrition and elimination   Outcome: Progressing     Problem: Anxiety  Goal: Anxiety is at manageable level  Description: Interventions:  - Assess and monitor patient's anxiety level  - Monitor for signs and symptoms (heart palpitations, chest pain, shortness of breath, headaches, nausea, feeling jumpy, restlessness, irritable, apprehensive)  - Collaborate with interdisciplinary team and initiate plan and interventions as ordered    - [place] patient to unit/surroundings  - Explain treatment plan  - Encourage participation in care  - Encourage verbalization of concerns/fears  - Identify coping mechanisms  - Assist in developing anxiety-reducing skills  - Administer/offer alternative therapies  - Limit or eliminate stimulants  Outcome: Progressing

## 2022-12-23 NOTE — NURSING NOTE
Patient is visible, calm, withdrawn to self  Patient is medication compliant, reports anxiety 3/4, and 6/10 for depression  Patient denies SI, AH, VH  Patient is c/o headache rated 4/10  PRN motrin given at 1317  Patient is resting comfortably in bed, will continue to monitor

## 2022-12-23 NOTE — TREATMENT TEAM
12/23/22 0808   Team Meeting   Meeting Type Daily Rounds   Initial Conference Date 12/23/22   Team Members Present   Team Members Present Physician;Nurse;;; Occupational Therapist   Physician Team Member Dr Nate Lambert, Dr Mardelle Riedel Team Member Isreal [id number], [address] Management Team Member [address] Work Team Member Tangela   OT Team Member Ekaterina ROBERSON   Patient/Family Present   Patient Present No   Patient's Family Present No     PRN motrin for pain, was effective, talking in his sleep, pt reports sleeping, unknown if sleep talking or responding to internal stimuli, anxiety 3/4, depression 8/10, med and meal compliant

## 2022-12-23 NOTE — PROGRESS NOTES
Progress Note - Behavioral Health   Lunette Paget 62 y o  male MRN: [id number]  Unit/Bed#: [facility] 087-12 Encounter: [id number]    Assessment/Plan   Principal Problem:    Severe episode of recurrent major depressive disorder, with psychotic features ([facility] [address] )  Active Problems:    PTSD (post-traumatic stress disorder)    Hypertension    Anemia    Gross hematuria    CKD (chronic kidney disease)    Medical clearance for psychiatric admission    Psychiatric disorder    COVID-19      Recommended Treatment:   No psychopharmacologic changes necessary at this moment; will continue to assess daily for further optimization  · Continue Seroquel 100 mg daily at bedtime for augmentation of antidepressant as well as for symptoms of psychosis  · Continue sertraline 100 mg daily for mood and anxiety  · Continue Suboxone 8 mg sublingual daily for MAT  · At this time, care is being established with the [facility] program and an intake appointment is scheduled for 12/28 at 2:00 PM  · Further medical management per  IM    Continue with pharmacotherapy, group therapy, milieu therapy and occupational therapy  Continue to assess for adverse medication side effects  Encourage Lunette Paget to participate in nonverbal forms of therapy including journaling and art/music therapy  Continue frequent safety checks and vitals per unit protocol  Continue to engage CM/SW to assist with collateral, disposition planning, and the implementation of an individualized, patient-centered plan of care  Continue medical management by medical team   Case discussed with treatment team     Legal Status: [id number]  ------------------------------------------------------------    Subjective: All documentation including nursing notes, medication history to ensure medication adherence on the unit, labs, and vitals were reviewed  Per nursing report, on the inpatient psychiatric unit Bing Mcelroy has remained calm, pleasant, and cooperative    On the inpatient unit Bing Mcelroy has remained isolative to his room and has had minimal interactions with other peers (unfortunately at this time on the unit no other patients speak [place]ian)  Yesterday throughout the day the patient reported a headache and received as needed Motrin (400 mg given at 12:12 PM)  Throughout the day Jose Antonio Saeed rated his depression as an 8/10 and anxiety as a 3/4  Per nursing report, Jose Antonio Saeed appears to have difficulty getting along with his roommate  Last night Jose Antonio Saeed was observed by nursing talking in his sleep and is uncertain if he was responding to internal stimuli at that time  Jose Antonio Saeed was evaluated this morning for continuity of care and no acute distress noted throughout the evaluation  Over the past 24 hours per nursing report, Jose Antonio Saeed has been cooperative on the unit and compliant with medications  On interview Jose Antonio Saeed reports feeling "bad"  He reports that he has been having difficulty on the inpatient unit because his roommate has been very bothersome  Jose Antonio Saeed reports that he did not get any sleep last night and feels tired because his roommate was constantly getting up and out of bed, going in and out of the bathroom, opening and closing the bedroom door  They reports that his roommate has been frequently calling on the phone as well  At this time Jose Antonio Saeed is requesting a room change if possible  At this time on the inpatient unit Jose Antonio Saeed reports "there is nothing to do"  He reports feeling isolated because other patients on the unit do not speak [place]ian  Discharge planning was reviewed with Brady Real  He is agreeable to following up with the [facility] program upon discharge to continue receiving Suboxone  Today, Jose Antonio Saeed is consenting for safety on the unit  Brady Christophe reports feeling "bad " Jose Antonio Saeed notes having no sleep last night  Jose Antonio Saeed states having an improved appetite  Jose Antonio Saeed has been taking the medications as prescribed and reporting no side effects      At the time of interview, Bradydarlin Saeed denies suicidal ideations, homicidal ideations, visual and auditory hallucinations  He reports he did not experience any visual or auditory hallucinations yesterday and has not experienced any visual or auditory hallucinations today  PRNs overnight: None   VS: Reviewed, within normal limits    Progress Toward Goals: slow improvement    Psychiatric Review of Systems:  Behavior over the last 24 hours:  unchanged  Sleep: frequent awakenings, minimal sleep  Appetite: improving  Medication side effects: None  ROS: all other systems are negative, At this time of interview Wesly Long reports that he has a mild 2/10 headache that is continuing to improve,     Vital signs in last 24 hours:  Temp:  [97 7 °F (36 5 °C)-98 3 °F (36 8 °C)] 98 3 °F (36 8 °C)  HR:  [63-87] 84  Resp:  [16-18] 18  BP: ()/(54-73) 92/56    Laboratory results:  I have personally reviewed all pertinent laboratory/tests results  No results found for this or any previous visit (from the past 48 hour(s))        Mental Status Evaluation:    Appearance:  casually dressed, marginal hygiene, looks older than stated age, underweight   Behavior:  pleasant, cooperative, calm   Speech:  normal rate, normal volume, clear, coherent   Mood:  "bad"   Affect:  constricted   Thought Process:  organized, logical, coherent, goal directed   Associations: intact associations   Thought Content:  normal, no overt delusions   Perceptual Disturbances: Denies auditory or visual hallucinations and Does not appear to be responding to internal stimuli   Risk Potential: Suicidal ideation - None  Homicidal ideation - None  Potential for aggression - No   Sensorium:  oriented to person, place and time/date   Memory:  recent and remote memory grossly intact   Consciousness:  alert and awake   Attention/Concentration: attention span and concentration are age appropriate   Insight:  improving   Judgment: improving   Gait/Station: normal gait/station, normal balance   Motor Activity: no abnormal movements       Current Medications:  Current Facility-Administered Medications   Medication Dose Route Frequency Provider Last Rate   • aluminum-magnesium hydroxide-simethicone  30 mL Oral Q4H PRN Kim Mar, DO     • atorvastatin  10 mg Oral Daily With Lidia Prasad DO     • benzonatate  100 mg Oral TID PRN Gloria Peralta MD     • haloperidol lactate  2 5 mg Intramuscular Q4H PRN Max 4/day Saljavier Mar, DO      And   • LORazepam  1 mg Intramuscular Q4H PRN Max 4/day Kim Mar, DO      And   • benztropine  0 5 mg Intramuscular Q4H PRN Max 4/day Saljavier Mar, DO     • haloperidol lactate  5 mg Intramuscular Q4H PRN Max 4/day Kim Mar, DO      And   • LORazepam  2 mg Intramuscular Q4H PRN Max 4/day Kim Mar, DO      And   • benztropine  1 mg Intramuscular Q4H PRN Max 4/day Kim Mar, DO     • bisacodyl  10 mg Rectal Daily PRN Kim Mar, DO     • buprenorphine-naloxone  8 mg Sublingual Daily Saljavier Mar, DO     • hydrOXYzine HCL  50 mg Oral Q6H PRN Max 4/day Kim Mar, DO      Or   • diphenhydrAMINE  50 mg Intramuscular Q6H PRN Kim Mar, DO     • glycerin-hypromellose-  1 drop Both Eyes Q3H PRN Kim Mar, DO     • haloperidol  1 mg Oral Q6H PRN Kim Mar, DO     • haloperidol  2 5 mg Oral Q4H PRN Max 4/day Kim Mar, DO     • haloperidol  5 mg Oral Q4H PRN Max 4/day Kim Mra, DO     • hydrOXYzine HCL  100 mg Oral Q6H PRN Max 4/day Saljavier Mar, DO      Or   • LORazepam  2 mg Intramuscular Q6H PRN Kim Mar, DO     • hydrOXYzine HCL  25 mg Oral Q6H PRN Max 4/day Saljavier Mar, DO     • ibuprofen  400 mg Oral Q4H PRN Kim Mar, DO     • ibuprofen  600 mg Oral Q6H PRN Kim Mar, DO     • ibuprofen  800 mg Oral Q8H PRN Kim Mar, DO     • lisinopril  5 mg Oral Daily Saljavier Mar, DO     • melatonin  3 mg Oral HS PRN Kim Mar, DO     • nicotine  1 patch Transdermal Daily eDstiney Brown, DO     • pantoprazole  40 mg Oral Daily Destiney Brown, DO     • polyethylene glycol  17 g Oral Daily PRN Destiney Brown, DO     • prazosin  2 mg Oral HS Wray Leventhal, MD     • QUEtiapine  100 mg Oral HS Destiney Brown, DO     • senna-docusate sodium  1 tablet Oral Daily PRN Destiney Brown, DO     • sertraline  100 mg Oral Daily Destiney Brown, DO       The following interventions are recommended: behavioral checks every 7 minutes, continued hospitalization on locked unit    Behavioral Health Medications: All current active meds have been reviewed  Changes as in plan section above  Risks, benefits and possible side effects of Medications:   Risks, benefits, and possible side effects of medications explained to patient and patient verbalizes understanding  Counseling / Coordination of Care:  Patient's progress discussed with staff in treatment team meeting  Medications, treatment progress and treatment plan reviewed with patient  Supportive therapy provided to patient  Discharge plan discussed with patient  Wray Leventhal, MD 12/23/22  Psychiatry Resident, PGY-II    This note was completed in part utilizing Dragon dictation Software  Grammatical, translation, syntax errors, random word insertions, spelling mistakes, and incomplete sentences may be an occasional consequence of this system secondary to software limitations with voice recognition, ambient noise, and hardware issues  If you have any questions or concerns about the content, text, or information contained within the body of this dictation, please contact the provider for clarification

## 2022-12-24 RX ADMIN — NICOTINE 1 PATCH: 14 PATCH, EXTENDED RELEASE TRANSDERMAL at 08:19

## 2022-12-24 RX ADMIN — QUETIAPINE FUMARATE 100 MG: 100 TABLET ORAL at 21:22

## 2022-12-24 RX ADMIN — BUPRENORPHINE AND NALOXONE 8 MG: 8; 2 FILM BUCCAL; SUBLINGUAL at 08:18

## 2022-12-24 RX ADMIN — PANTOPRAZOLE SODIUM 40 MG: 40 TABLET, DELAYED RELEASE ORAL at 05:36

## 2022-12-24 RX ADMIN — PRAZOSIN HYDROCHLORIDE 2 MG: 1 CAPSULE ORAL at 21:22

## 2022-12-24 RX ADMIN — SERTRALINE HYDROCHLORIDE 100 MG: 100 TABLET ORAL at 08:18

## 2022-12-24 RX ADMIN — ATORVASTATIN CALCIUM 10 MG: 10 TABLET, FILM COATED ORAL at 16:24

## 2022-12-24 NOTE — NURSING NOTE
Patient was visible in the milieu watching a movie  VSS  Denies all psych s/s  No complaints offered  No behavior noted  Compliant with HS  Medication  Held prazosin due to parameter  Safety checks ongoing

## 2022-12-24 NOTE — PLAN OF CARE
Problem: Ineffective Coping  Goal: Cooperates with admission process  Description: Interventions:   - Complete admission process  Outcome: Not Progressing  Goal: Identifies ineffective coping skills  Outcome: Not Progressing  Goal: Identifies healthy coping skills  Outcome: Not Progressing  Goal: Demonstrates healthy coping skills  Outcome: Not Progressing  Goal: Participates in unit activities  Description: Interventions:  - Provide therapeutic environment   - Provide required programming   - Redirect inappropriate behaviors   Outcome: Not Progressing  Goal: Patient/Family participate in treatment and DC plans  Description: Interventions:  - Provide therapeutic environment  Outcome: Not Progressing  Goal: Patient/Family verbalizes awareness of resources  Outcome: Not Progressing  Goal: Understands least restrictive measures  Description: Interventions:  - Utilize least restrictive behavior  Outcome: Not Progressing  Goal: Free from restraint events  Description: - Utilize least restrictive measures   - Provide behavioral interventions   - Redirect inappropriate behaviors   Outcome: Not Progressing     Problem: Risk for Self Injury/Neglect  Goal: Treatment Goal: Remain safe during length of stay, learn and adopt new coping skills, and be free of self-injurious ideation, impulses and acts at the time of discharge  Outcome: Not Progressing  Goal: Verbalize thoughts and feelings  Description: Interventions:  - Assess and re-assess patient's lethality and potential for self-injury  - Engage patient in 1:1 interactions, daily, for a minimum of 15 minutes  - Encourage patient to express feelings, fears, frustrations, hopes  - Establish rapport/trust with patient   Outcome: Not Progressing  Goal: Refrain from harming self  Description: Interventions:  - Monitor patient closely, per order  - Develop a trusting relationship  - Supervise medication ingestion, monitor effects and side effects   Outcome: Not Progressing  Goal: Attend and participate in unit activities, including therapeutic, recreational, and educational groups  Description: Interventions:  - Provide therapeutic and educational activities daily, encourage attendance and participation, and document same in the medical record  - Obtain collateral information, encourage visitation and family involvement in care   Outcome: Not Progressing  Goal: Recognize maladaptive responses and adopt new coping mechanisms  Outcome: Not Progressing  Goal: Complete daily ADLs, including personal hygiene independently, as able  Description: Interventions:  - Observe, teach, and assist patient with ADLS  - Monitor and promote a balance of rest/activity, with adequate nutrition and elimination  Outcome: Not Progressing     Problem: Depression  Goal: Treatment Goal: Demonstrate behavioral control of depressive symptoms, verbalize feelings of improved mood/affect, and adopt new coping skills prior to discharge  Outcome: Not Progressing  Goal: Verbalize thoughts and feelings  Description: Interventions:  - Assess and re-assess patient's level of risk   - Engage patient in 1:1 interactions, daily, for a minimum of 15 minutes   - Encourage patient to express feelings, fears, frustrations, hopes   Outcome: Not Progressing  Goal: Refrain from harming self  Description: Interventions:  - Monitor patient closely, per order   - Supervise medication ingestion, monitor effects and side effects   Outcome: Not Progressing  Goal: Refrain from isolation  Description: Interventions:  - Develop a trusting relationship   - Encourage socialization   Outcome: Not Progressing  Goal: Refrain from self-neglect  Outcome: Not Progressing  Goal: Attend and participate in unit activities, including therapeutic, recreational, and educational groups  Description: Interventions:  - Provide therapeutic and educational activities daily, encourage attendance and participation, and document same in the medical record   Outcome: Not Progressing  Goal: Complete daily ADLs, including personal hygiene independently, as able  Description: Interventions:  - Observe, teach, and assist patient with ADLS  -  Monitor and promote a balance of rest/activity, with adequate nutrition and elimination   Outcome: Not Progressing     Problem: Anxiety  Goal: Anxiety is at manageable level  Description: Interventions:  - Assess and monitor patient's anxiety level  - Monitor for signs and symptoms (heart palpitations, chest pain, shortness of breath, headaches, nausea, feeling jumpy, restlessness, irritable, apprehensive)  - Collaborate with interdisciplinary team and initiate plan and interventions as ordered    - [place] patient to unit/surroundings  - Explain treatment plan  - Encourage participation in care  - Encourage verbalization of concerns/fears  - Identify coping mechanisms  - Assist in developing anxiety-reducing skills  - Administer/offer alternative therapies  - Limit or eliminate stimulants  Outcome: Not Progressing     Problem: DISCHARGE PLANNING  Goal: Discharge to home or other facility with appropriate resources  Description: INTERVENTIONS:  - Identify barriers to discharge w/patient and caregiver  - Arrange for needed discharge resources and transportation as appropriate  - Identify discharge learning needs (meds, wound care, etc )  - Arrange for interpretive services to assist at discharge as needed  - Refer to Case Management Department for coordinating discharge planning if the patient needs post-hospital services based on physician/advanced practitioner order or complex needs related to functional status, cognitive ability, or social support system  Outcome: Not Progressing

## 2022-12-24 NOTE — NURSING NOTE
Aniyah Nithin is calm, pleasant, and cooperative on approach  Aniyah Weller denies all  Aniyah Nithin is isolative and withdrawn  Aniyah Weller states he is eating and sleeping well  Aniyah Weller states his anxiety is 3/4 and his depression 6/10  Aniyah Leavittkandis is has a flat affect  Aniyah Weller is med compliant  Will continue to monitor and assess for safety

## 2022-12-24 NOTE — PROGRESS NOTES
Progress Note - Behavioral Health   Medardo Longo 62 y o  male MRN: [id number]  Unit/Bed#: [facility] 403-39 Encounter: [id number]    Patient was seen today for continuation of care, records reviewed and patient was discussed with the morning case review team     Aniyah Weller was seen today for psychiatric follow-up  On assessment today, Aniyah Weller was found laying in bed  He appears depressed and withdrawn  He does not offer any acute complaints  He reports spending most of his time in bed, Aniyah Weller reports adequate daytime energy and denies any difficulties with initiating or staying asleep  Oral appetite and hydration is adequate  Aniyah Weller denies acute suicidal/self-harm ideation/intent/plan upon direct inquiry at this time  Aniyah Weller is able to contract for safety while on the unit and would feel comfortable seeking staff support should suicidal symptoms or urges appear or worsen  Aniyah Weller remains behaviorally appropriate, no agitation or aggression noted on exam or in report  Aniyah Weller also denies HI/AH/VH, and does not appear overtly manic  Patient does not verbalize any experiences that can be categorized as paranoid, persecutory, bizarre, or somatic delusions  Aniyah Weller remains adherent to his current psychotropic medication regimen and denies any side effects from medications, as well as none noted on exam     Vitals:  Vitals:    12/24/22 0746   BP: 104/68   Pulse: 62   Resp: 16   Temp: 98 2 °F (36 8 °C)   SpO2: 100%       Laboratory Results:    I have personally reviewed all pertinent laboratory/tests results    Most Recent Labs:   Lab Results   Component Value Date    WBC 5 61 12/19/2022    RBC 4 15 12/19/2022    HGB 12 4 12/19/2022    HCT 38 1 12/19/2022     12/19/2022    RDW 13 2 12/19/2022    TOTANEUTABS 3 72 01/06/2017    NEUTROABS 2 71 12/19/2022    SODIUM 138 12/19/2022    K 4 8 12/19/2022     12/19/2022    CO2 26 12/19/2022    BUN 12 12/19/2022    CREATININE 0 83 12/19/2022    GLUC 96 12/19/2022    GLUF 96 12/19/2022 CALCIUM 9 0 12/19/2022    AST 28 12/19/2022    ALT 21 12/19/2022    ALKPHOS 61 12/19/2022    TP 7 5 12/19/2022    ALB 4 1 12/19/2022    TBILI 0 31 12/19/2022    CHOLESTEROL 144 12/19/2022    HDL 40 12/19/2022    TRIG 116 12/19/2022    LDLCALC 81 12/19/2022    NONHDLC 104 12/19/2022    PJC5HXJQJJHT 3 960 12/19/2022    FREET4 0 87 05/25/2021    T3FREE 2 43 05/31/2016    PREGSERUM Negative 05/18/2016    RPR Non-Reactive 12/19/2022    HGBA1C 5 7 (H) 12/19/2022     12/19/2022       Psychiatric Review of Systems:  Behavior over the last 24 hours:  unchanged     Sleep: Hypersomnia  Appetite: Adequate  Medication side effects: None reported  ROS: no complaints, denies shortness of breath or chest pain and all other systems are negative for acute changes    Mental Status Evaluation:  Appearance:  disheveled, looks stated age   Behavior:  cooperative, calm, guarded, poor eye contact   Speech:  normal rate, normal volume, normal pitch   Mood:  depressed, anxious   Affect:  constricted   Thought Process:  organized   Associations: intact associations   Thought Content:  no overt delusions, no paranoia noted on exam   Perceptual Disturbances: no auditory hallucinations, no visual hallucinations, denies when asked, does not appear responding to internal stimuli   Risk Potential: Suicidal ideation - None at present, contracts for safety on the unit, would talk to staff if not feeling safe on the unit  Homicidal ideation - None at present  Potential for aggression - Not at present   Sensorium:  oriented to person, place and time/date   Memory:  recent and remote memory grossly intact   Consciousness:  alert and awake   Attention/Concentration: attention span and concentration appear shorter than expected for age   Insight:  limited   Judgment: limited   Gait/Station: normal gait/station, normal balance   Motor Activity: no abnormal movements     Progress Toward Goals:   Marc Jorge is progressing towards goals of inpatient psychiatric treatment by continued medication compliance and is attending therapeutic modalities on the milieu  However, the patient continues to require inpatient psychiatric hospitalization for continued medication management and titration to optimize symptom reduction, improve sleep hygiene, and demonstrate adequate self-care  Assessment/Plan   Principal Problem:    Severe episode of recurrent major depressive disorder, with psychotic features ([address] )  Active Problems:    PTSD (post-traumatic stress disorder)    Hypertension    Anemia    Gross hematuria    CKD (chronic kidney disease)    Medical clearance for psychiatric admission    Psychiatric disorder    COVID-19      Recommended Treatment: Treatment plan and medication changes discussed and per the attending physician the plan is: 1  Continue with group therapy, milieu therapy and occupational therapy  2  Behavioral Health checks every 7 minutes  3  Continue frequent safety checks and vitals per unit protocol  4  Continue with SLIM medical management as indicated  5  Continue with current medication regimen  6  Will review labs in the a m  7 Disposition Planning: Discharge planning and efforts remain ongoing    Behavioral Health Medications: all current active meds have been reviewed and continue current psychiatric medications    Current Facility-Administered Medications   Medication Dose Route Frequency Provider Last Rate   • aluminum-magnesium hydroxide-simethicone  30 mL Oral Q4H PRN Socorro Tinsley, DO     • atorvastatin  10 mg Oral Daily With Hermelindo Bowers DO     • benzonatate  100 mg Oral TID PRN Lisa Mckinnon MD     • haloperidol lactate  2 5 mg Intramuscular Q4H PRN Max 4/day Socorro Tinsley DO      And   • LORazepam  1 mg Intramuscular Q4H PRN Max 4/day Socorro Tinsley DO      And   • benztropine  0 5 mg Intramuscular Q4H PRN Max 4/day Socorro Tinsley DO     • haloperidol lactate  5 mg Intramuscular Q4H PRN Max 4/day Alexandra Clos Kavitha Wren, DO      And   • LORazepam  2 mg Intramuscular Q4H PRN Max 4/day Мария Speck, DO      And   • benztropine  1 mg Intramuscular Q4H PRN Max 4/day Мария Speck, DO     • bisacodyl  10 mg Rectal Daily PRN Мария Speck, DO     • buprenorphine-naloxone  8 mg Sublingual Daily Мария Speck, DO     • hydrOXYzine HCL  50 mg Oral Q6H PRN Max 4/day Мария Speck, DO      Or   • diphenhydrAMINE  50 mg Intramuscular Q6H PRN Мария Speck, DO     • glycerin-hypromellose-  1 drop Both Eyes Q3H PRN Мария Speck, DO     • haloperidol  1 mg Oral Q6H PRN Мария Speck, DO     • haloperidol  2 5 mg Oral Q4H PRN Max 4/day Мария Speck, DO     • haloperidol  5 mg Oral Q4H PRN Max 4/day Мария Speck, DO     • hydrOXYzine HCL  100 mg Oral Q6H PRN Max 4/day Мария Speck, DO      Or   • LORazepam  2 mg Intramuscular Q6H PRN Мария Fonseca, DO     • hydrOXYzine HCL  25 mg Oral Q6H PRN Max 4/day Мария Speck, DO     • ibuprofen  400 mg Oral Q4H PRN Мария Speck, DO     • ibuprofen  600 mg Oral Q6H PRN Мария Speck, DO     • ibuprofen  800 mg Oral Q8H PRN Мария Speck, DO     • lisinopril  5 mg Oral Daily Мария Speck, DO     • melatonin  3 mg Oral HS PRN Мария Speck, DO     • nicotine  1 patch Transdermal Daily Мария Speck, DO     • pantoprazole  40 mg Oral Daily Мария Speck, DO     • polyethylene glycol  17 g Oral Daily PRN Мария Fonseca, DO     • prazosin  2 mg Oral HS Hamilton Reddy MD     • QUEtiapine  100 mg Oral HS Мария Speck, DO     • senna-docusate sodium  1 tablet Oral Daily PRN Мария Fonseca, DO     • sertraline  100 mg Oral Daily Мария Speck, DO         Risks / Benefits of Treatment:  Risks, benefits, and possible side effects of medications explained to patient and patient verbalizes understanding and agreement for treatment      Counseling / Coordination of Care:  Total floor/unit time spent today 25 minutes  Greater than 50% of total time was spent with the patient and / or family counseling and / or coordination of care  A description of the counseling / coordination of care:   Patient's progress discussed with staff in treatment team meeting  Medications, treatment progress and treatment plan reviewed with patient  Educated on importance of medication and treatment compliance  Reassurance and supportive therapy provided  Encouraged participation in milieu and group therapy on the unit

## 2022-12-25 RX ADMIN — BUPRENORPHINE AND NALOXONE 8 MG: 8; 2 FILM BUCCAL; SUBLINGUAL at 08:14

## 2022-12-25 RX ADMIN — SERTRALINE HYDROCHLORIDE 100 MG: 100 TABLET ORAL at 08:14

## 2022-12-25 RX ADMIN — PANTOPRAZOLE SODIUM 40 MG: 40 TABLET, DELAYED RELEASE ORAL at 05:41

## 2022-12-25 RX ADMIN — PRAZOSIN HYDROCHLORIDE 2 MG: 1 CAPSULE ORAL at 21:23

## 2022-12-25 RX ADMIN — ATORVASTATIN CALCIUM 10 MG: 10 TABLET, FILM COATED ORAL at 17:15

## 2022-12-25 RX ADMIN — NICOTINE 1 PATCH: 14 PATCH, EXTENDED RELEASE TRANSDERMAL at 08:17

## 2022-12-25 RX ADMIN — QUETIAPINE FUMARATE 100 MG: 100 TABLET ORAL at 21:23

## 2022-12-25 RX ADMIN — IBUPROFEN 400 MG: 400 TABLET ORAL at 12:43

## 2022-12-25 NOTE — PLAN OF CARE
Problem: Ineffective Coping  Goal: Identifies healthy coping skills  Outcome: Progressing     Problem: Ineffective Coping  Goal: Understands least restrictive measures  Description: Interventions:  - Utilize least restrictive behavior  Outcome: Progressing     Problem: Ineffective Coping  Goal: Free from restraint events  Description: - Utilize least restrictive measures   - Provide behavioral interventions   - Redirect inappropriate behaviors   Outcome: Progressing     Problem: Risk for Self Injury/Neglect  Goal: Verbalize thoughts and feelings  Description: Interventions:  - Assess and re-assess patient's lethality and potential for self-injury  - Engage patient in 1:1 interactions, daily, for a minimum of 15 minutes  - Encourage patient to express feelings, fears, frustrations, hopes  - Establish rapport/trust with patient   Outcome: Progressing     Problem: Risk for Self Injury/Neglect  Goal: Refrain from harming self  Description: Interventions:  - Monitor patient closely, per order  - Develop a trusting relationship  - Supervise medication ingestion, monitor effects and side effects   Outcome: Progressing

## 2022-12-25 NOTE — NURSING NOTE
Pt was observed sitting in the hallway with peers watching movies  Patient is calm and cooperative  Pt rated anxiety 0/4 while having 0/10 depression  Pt denies any AH, VH, SI and HI  Patient denies pain at this time  No agitation or aggression noted  Patient appetite good at dinner as he ate 100%  Pt compliant with medications as he tolerated them well  Q 7 minute checks maintained

## 2022-12-25 NOTE — PROGRESS NOTES
Progress Note - Behavioral Health   Corrie Alexander 62 y o  male MRN: [id number]  Unit/Bed#: [facility] 375-28 Encounter: [id number]    Psychiatric Review of Systems:    Behavior over the last 24 hours: Unchanged  Sleep: Slept throughout the night  Appetite: Adequate  Medication side effects: None reported  ROS: no complaints, denies any shortness of breath or chest pain and all other systems are negative  Subjective: Patient was seen today for continuation of care, records reviewed and patient was discussed with the morning case review team   No behavioral outbursts or acute events noted overnight and no significant changes in behaviors or clinical status over the last 24 hours  Marta Call was seen today while laying in bed  He remains scant in conversation, offers no acute complaints  He does smile at times with this writer however does appear overtly depressed, and withdrawn  Per staff, he can be more visible during the day  Dorirmae Call denies suicidal ideation/intent/plan  Dorlene Call also denies HI/AH/VH, does not voice any paranoia and delusional content, and does not appear overtly manic  Dorlene Call states that he feels safe on the unit  No medication changes indicated at this time, continue current medication regimen      Mental Status Evaluation:    Appearance:  disheveled, looks stated age   Behavior:  cooperative, calm, poor eye contact   Speech:  normal rate, normal volume, normal pitch   Mood:  depressed   Affect:  constricted   Thought Process:  goal directed   Thought Content:  no overt delusions, no overt paranoia noted on exam   Perceptual Disturbances: no auditory hallucinations, no visual hallucinations, denies when asked, does not appear responding to internal stimuli   Risk Potential: Suicidal ideation - None at present, contracts for safety on the unit, would talk to staff if not feeling safe on the unit  Homicidal ideation - None at present  Potential for aggression - Not at present   Memory:  recent memory intact Consciousness:  alert and awake   Attention: attention span and concentration appear shorter than expected for age   Insight:  limited   Judgment: limited   Gait/Station: normal gait/station, normal balance   Motor Activity: no abnormal movements     Progress Toward Goals: Unchanged  No significant changes in behaviors or clinical status over the last 24 hours  Garrett Dior will continue working on current treatment goals which include: 1  Continue with group therapy, milieu therapy and occupational therapy  2  Behavioral Health checks every 7 minutes  3  Continue frequent safety checks and vitals per unit protocol  4  Continue with SLIM medical management as indicated  5   Will review labs in the A M  6  The patient will be maintained on the following medications:     Current Facility-Administered Medications   Medication Dose Route Frequency Provider Last Rate   • aluminum-magnesium hydroxide-simethicone  30 mL Oral Q4H PRN Acquanetta Quarry, DO     • atorvastatin  10 mg Oral Daily With Jena Nunez DO     • benzonatate  100 mg Oral TID PRN Denia Pulido MD     • haloperidol lactate  2 5 mg Intramuscular Q4H PRN Max 4/day Acquanetta Quarry, DO      And   • LORazepam  1 mg Intramuscular Q4H PRN Max 4/day Acquanetta Quarry, DO      And   • benztropine  0 5 mg Intramuscular Q4H PRN Max 4/day Acquanetta Quarry, DO     • haloperidol lactate  5 mg Intramuscular Q4H PRN Max 4/day Acquanetta Quarry, DO      And   • LORazepam  2 mg Intramuscular Q4H PRN Max 4/day Acquanetta Quarry, DO      And   • benztropine  1 mg Intramuscular Q4H PRN Max 4/day Acquanetta Quarry, DO     • bisacodyl  10 mg Rectal Daily PRN Acquanetta Quarry, DO     • buprenorphine-naloxone  8 mg Sublingual Daily Acquanetta Quarry, DO     • hydrOXYzine HCL  50 mg Oral Q6H PRN Max 4/day Acquanetta Quarry, DO      Or   • diphenhydrAMINE  50 mg Intramuscular Q6H PRN Acquanetta Quarry, DO     • glycerin-hypromellose-  1 drop Both Eyes Q3H PRN Мария Fonseca, DO     • haloperidol  1 mg Oral Q6H PRN Мария Fonseca, DO     • haloperidol  2 5 mg Oral Q4H PRN Max 4/day Мария Fonseca, DO     • haloperidol  5 mg Oral Q4H PRN Max 4/day Мария Fonseca, DO     • hydrOXYzine HCL  100 mg Oral Q6H PRN Max 4/day Мария Fonseca, DO      Or   • LORazepam  2 mg Intramuscular Q6H PRN Мария Fonseca, DO     • hydrOXYzine HCL  25 mg Oral Q6H PRN Max 4/day Мария Fonseca, DO     • ibuprofen  400 mg Oral Q4H PRN Мария Fonseca, DO     • ibuprofen  600 mg Oral Q6H PRN Мария Fonseca, DO     • ibuprofen  800 mg Oral Q8H PRN Мария Fonseca, DO     • lisinopril  5 mg Oral Daily Мария Fonseca, DO     • melatonin  3 mg Oral HS PRN Мария Fonseca, DO     • nicotine  1 patch Transdermal Daily Мария Fonseca, DO     • pantoprazole  40 mg Oral Daily Мария Fonseca, DO     • polyethylene glycol  17 g Oral Daily PRN Мария Fonseca, DO     • prazosin  2 mg Oral HS Hamilton Reddy MD     • QUEtiapine  100 mg Oral HS Мария Fonseca, DO     • senna-docusate sodium  1 tablet Oral Daily PRN Мария Fonseca, DO     • sertraline  100 mg Oral Daily Мария Fonseca, DO          Discharge Disposition: Discharge disposition planning remain ongoing    Vitals:  Vitals:    12/25/22 0754   BP: 103/65   Pulse: 79   Resp: 18   Temp: 97 8 °F (36 6 °C)   SpO2: 100%       Laboratory Results:    I have personally reviewed all pertinent laboratory/tests results    Most Recent Labs:   Lab Results   Component Value Date    WBC 5 61 12/19/2022    RBC 4 15 12/19/2022    HGB 12 4 12/19/2022    HCT 38 1 12/19/2022     12/19/2022    RDW 13 2 12/19/2022    TOTANEUTABS 3 72 01/06/2017    NEUTROABS 2 71 12/19/2022    SODIUM 138 12/19/2022    K 4 8 12/19/2022     12/19/2022    CO2 26 12/19/2022    BUN 12 12/19/2022    CREATININE 0 83 12/19/2022    GLUC 96 12/19/2022    GLUF 96 12/19/2022    CALCIUM 9 0 12/19/2022    AST 28 12/19/2022    ALT 21 12/19/2022    ALKPHOS 61 12/19/2022    TP 7 5 12/19/2022    ALB 4 1 12/19/2022    TBILI 0 31 12/19/2022    CHOLESTEROL 144 12/19/2022    HDL 40 12/19/2022    TRIG 116 12/19/2022    LDLCALC 81 12/19/2022    NONHDLC 104 12/19/2022    RUC1UOMJPUIE 3 960 12/19/2022    FREET4 0 87 05/25/2021    T3FREE 2 43 05/31/2016    PREGSERUM Negative 05/18/2016    RPR Non-Reactive 12/19/2022    HGBA1C 5 7 (H) 12/19/2022     12/19/2022         Severe episode of recurrent major depressive disorder, with psychotic features ([address] )        Assessment/Plan   Principal Problem:    Severe episode of recurrent major depressive disorder, with psychotic features ([address] )  Active Problems:    PTSD (post-traumatic stress disorder)    Hypertension    Anemia    Gross hematuria    CKD (chronic kidney disease)    Medical clearance for psychiatric admission    Psychiatric disorder    COVID-19    Risks / Benefits of Treatment:  Risks, benefits, and possible side effects of medications explained to patient and patient verbalizes understanding and agreement for treatment  , Ernestina LEIGH(Attending)

## 2022-12-25 NOTE — NURSING NOTE
Patient in bed most of the shift, calm, guarded affect  Patient is medication compliant, and cooperative  Patient denies all psych s/s  Patient c/o headache rated 3/10, and requested PRN  Motrin given at 1243, and patient reported partially effective  No further distress noted  Patient is in the hallway watching movie

## 2022-12-26 RX ADMIN — ATORVASTATIN CALCIUM 10 MG: 10 TABLET, FILM COATED ORAL at 17:06

## 2022-12-26 RX ADMIN — QUETIAPINE FUMARATE 100 MG: 100 TABLET ORAL at 21:29

## 2022-12-26 RX ADMIN — IBUPROFEN 800 MG: 400 TABLET ORAL at 08:19

## 2022-12-26 RX ADMIN — NICOTINE 1 PATCH: 14 PATCH, EXTENDED RELEASE TRANSDERMAL at 08:19

## 2022-12-26 RX ADMIN — PRAZOSIN HYDROCHLORIDE 2 MG: 1 CAPSULE ORAL at 21:29

## 2022-12-26 RX ADMIN — PANTOPRAZOLE SODIUM 40 MG: 40 TABLET, DELAYED RELEASE ORAL at 05:56

## 2022-12-26 RX ADMIN — SERTRALINE HYDROCHLORIDE 100 MG: 100 TABLET ORAL at 08:14

## 2022-12-26 RX ADMIN — BUPRENORPHINE AND NALOXONE 8 MG: 8; 2 FILM BUCCAL; SUBLINGUAL at 08:14

## 2022-12-26 NOTE — MALNUTRITION/BMI
This medical record reflects one or more clinical indicators suggestive of malnutrition and/or morbid obesity  Malnutrition Findings:   Adult Malnutrition type: Unknown (comment)  Adult Degree of Malnutrition: Malnutrition of mild degree  Malnutrition Characteristics: Weight loss, Inadequate energy                  360 Statement: Pt presents with mild malnutrition related to MDD as evidenced by inadequate oral intake meeting <50% of estimated needs, 24% weight loss x 4 months (167# 8/4/22, 126# 12/20/22); to be treated with regular diet and Ensure Max TID    BMI Findings: Body mass index is 20 39 kg/m²  See Nutrition note dated 12/26/22 for additional details  Completed nutrition assessment is viewable in the nutrition documentation

## 2022-12-26 NOTE — NURSING NOTE
Patient isolative to self in room this shift, calm and cooperative on approach  Compliant with meals and scheduled medications  Denies anxiety, depression, SI/HI/AH/VH  Requested and received PRN Motrin 800 mg @ 0342 for 10/10 headache, which was effective  Safety checks ongoing

## 2022-12-26 NOTE — NURSING NOTE
Patient was withdrawn to his room at the beginning of the shift but came out later on and watched a movie  VSS  Denies all psych s/s  No complaints offered  No behavior noted  Had 100% for dinner  Compliant with all his medications  Safety checks ongoing

## 2022-12-26 NOTE — PROGRESS NOTES
Progress Note - Behavioral Health     Mayte Bowman 62 y o  male MRN: [id number]   Unit/Bed#: Torsten Schul[facility] 215-31 Encounter: [id number]      Subjective: Patient chart reviewed and case discussed with the nurse  The patient was seen in his room today  This patient was able to speak in [place] though is mainly [place] speaker  He said he is doing okay today  Reports feels little sad but denies any significant depression  Denies any suicidal thoughts or any urges to hurt other  Reports appetite and sleep has been fine  Reports low energy level  denies any anxiety  Denies any auditory or visual hallucination  Reports compliant with the medication and denies any side effect  As per the nurse though the patient has been mostly in his room and isolating himself  does not have any behavioral issues  Had not expressed any suicidal ideation to the staff  M has been compliant with his medication            ROS: all other systems are negative    Mental Status Evaluation:    Appearance:  casually dressed   Behavior:  cooperative   Speech:  normal rate, normal volume, normal pitch   Mood:  mildly depressed   Affect:  constricted   Thought Process:  logical   Associations: intact associations   Thought Content:  no overt delusions   Perceptual Disturbances: denies auditory or visual hallucinations when asked   Risk Potential: Suicidal ideation - None  Homicidal ideation - None  Potential for aggression - No   Sensorium:  oriented to person, place and time/date   Memory:  recent and remote memory grossly intact   Consciousness:  alert and awake   Attention: attention span and concentration are age appropriate   Insight:  fair   Judgment: fair   Gait/Station: in bed   Motor Activity: no abnormal movements     Vital signs in last 24 hours:    Temp:  [97 4 °F (36 3 °C)-99 1 °F (37 3 °C)] 99 1 °F (37 3 °C)  HR:  [70-78] 78  Resp:  [16] 16  BP: (102-122)/(60-78) 102/60    Laboratory results:   I have personally reviewed all pertinent laboratory/tests results  Most Recent Labs:   Lab Results   Component Value Date    WBC 5 61 12/19/2022    RBC 4 15 12/19/2022    HGB 12 4 12/19/2022    HCT 38 1 12/19/2022     12/19/2022    RDW 13 2 12/19/2022    TOTANEUTABS 3 72 01/06/2017    NEUTROABS 2 71 12/19/2022    SODIUM 138 12/19/2022    K 4 8 12/19/2022     12/19/2022    CO2 26 12/19/2022    BUN 12 12/19/2022    CREATININE 0 83 12/19/2022    GLUC 96 12/19/2022    GLUF 96 12/19/2022    CALCIUM 9 0 12/19/2022    AST 28 12/19/2022    ALT 21 12/19/2022    ALKPHOS 61 12/19/2022    TP 7 5 12/19/2022    ALB 4 1 12/19/2022    TBILI 0 31 12/19/2022    CHOLESTEROL 144 12/19/2022    HDL 40 12/19/2022    TRIG 116 12/19/2022    LDLCALC 81 12/19/2022    NONHDLC 104 12/19/2022    NSB3OEELDREC 3 960 12/19/2022    FREET4 0 87 05/25/2021    T3FREE 2 43 05/31/2016    PREGSERUM Negative 05/18/2016    RPR Non-Reactive 12/19/2022    HGBA1C 5 7 (H) 12/19/2022     12/19/2022       Progress Toward Goals: improving slowly    Assessment/Plan  The patient endorses mild depression today  Though mostly isolating himself in his room  Denies any COVID-related symptoms to me today  Plan is to continue with his current medication with no changes  We will continue to monitor the patient for his mood, behavior, safety, sleep and response to medication  Principal Problem:    Severe episode of recurrent major depressive disorder, with psychotic features ([facility] [address] )  Active Problems:    PTSD (post-traumatic stress disorder)    Hypertension    Anemia    Gross hematuria    CKD (chronic kidney disease)    Medical clearance for psychiatric admission    Psychiatric disorder    COVID-19    Recommended Treatment:     Planned medication and treatment changes:     All current active medications have been reviewed  Encourage group therapy, milieu therapy and occupational therapy  Behavioral Health checks every 7 minutes  Continue current medications:  Current Facility-Administered Medications   Medication Dose Route Frequency Provider Last Rate   • aluminum-magnesium hydroxide-simethicone  30 mL Oral Q4H PRN Fontanelle Em, DO     • atorvastatin  10 mg Oral Daily With Indra Anderson, DO     • benzonatate  100 mg Oral TID PRN Mark Mcqueen MD     • haloperidol lactate  2 5 mg Intramuscular Q4H PRN Max 4/day Fontanelle Em, DO      And   • LORazepam  1 mg Intramuscular Q4H PRN Max 4/day Fontanelle Em, DO      And   • benztropine  0 5 mg Intramuscular Q4H PRN Max 4/day Fontanelle Em, DO     • haloperidol lactate  5 mg Intramuscular Q4H PRN Max 4/day Fontanelle Em, DO      And   • LORazepam  2 mg Intramuscular Q4H PRN Max 4/day Fontanelle Em, DO      And   • benztropine  1 mg Intramuscular Q4H PRN Max 4/day Fontanelle Em, DO     • bisacodyl  10 mg Rectal Daily PRN Fontanelle Em, DO     • buprenorphine-naloxone  8 mg Sublingual Daily Fontanelle Em, DO     • hydrOXYzine HCL  50 mg Oral Q6H PRN Max 4/day Fontanelle Em, DO      Or   • diphenhydrAMINE  50 mg Intramuscular Q6H PRN Fontanelle Em, DO     • glycerin-hypromellose-  1 drop Both Eyes Q3H PRN Fontanelle Em, DO     • haloperidol  1 mg Oral Q6H PRN Fontanelle Em, DO     • haloperidol  2 5 mg Oral Q4H PRN Max 4/day Fontanelle Em, DO     • haloperidol  5 mg Oral Q4H PRN Max 4/day Fontanelle Em, DO     • hydrOXYzine HCL  100 mg Oral Q6H PRN Max 4/day Fontanelle Em, DO      Or   • LORazepam  2 mg Intramuscular Q6H PRN Fontanelle Em, DO     • hydrOXYzine HCL  25 mg Oral Q6H PRN Max 4/day Fontanelle Em, DO     • ibuprofen  400 mg Oral Q4H PRN Fontanelle Em, DO     • ibuprofen  600 mg Oral Q6H PRN Fontanelle Em, DO     • ibuprofen  800 mg Oral Q8H PRN Fontanelle Em, DO     • lisinopril  5 mg Oral Daily Fontanelle Em, DO     • melatonin  3 mg Oral HS PRN Fontanelle Em, DO     • nicotine  1 patch Transdermal Daily Marybel Newsome, DO     • pantoprazole  40 mg Oral Daily Marybel Eatones, DO     • polyethylene glycol  17 g Oral Daily PRN Marybel Newsome, DO     • prazosin  2 mg Oral HS Moiz Reyna MD     • QUEtiapine  100 mg Oral HS Marybel Newsome, DO     • senna-docusate sodium  1 tablet Oral Daily PRN Marybel Newsome, DO     • sertraline  100 mg Oral Daily Marybel Newsome, DO         Risks / Benefits of Treatment:    Risks, benefits, and possible side effects of medications explained to patient and patient verbalizes understanding and agreement for treatment  Counseling / Coordination of Care:    Patient's progress discussed with staff in treatment team meeting  Medications, treatment progress and treatment plan reviewed with patient      Sindy Weiss MD 12/26/22

## 2022-12-27 PROBLEM — E44.1 MILD PROTEIN-CALORIE MALNUTRITION (HCC): Status: ACTIVE | Noted: 2022-12-27

## 2022-12-27 RX ORDER — PRAZOSIN HYDROCHLORIDE 2 MG/1
2 CAPSULE ORAL
Qty: 30 CAPSULE | Refills: 1 | Status: SHIPPED | OUTPATIENT
Start: 2022-12-27

## 2022-12-27 RX ORDER — ATORVASTATIN CALCIUM 10 MG/1
10 TABLET, FILM COATED ORAL
Qty: 30 TABLET | Refills: 1 | Status: SHIPPED | OUTPATIENT
Start: 2022-12-28

## 2022-12-27 RX ORDER — SERTRALINE HYDROCHLORIDE 100 MG/1
100 TABLET, FILM COATED ORAL DAILY
Qty: 30 TABLET | Refills: 1 | Status: SHIPPED | OUTPATIENT
Start: 2022-12-27

## 2022-12-27 RX ORDER — PANTOPRAZOLE SODIUM 40 MG/1
40 TABLET, DELAYED RELEASE ORAL DAILY
Qty: 30 TABLET | Refills: 1 | Status: SHIPPED | OUTPATIENT
Start: 2022-12-28

## 2022-12-27 RX ORDER — QUETIAPINE FUMARATE 100 MG/1
100 TABLET, FILM COATED ORAL
Qty: 30 TABLET | Refills: 1 | Status: SHIPPED | OUTPATIENT
Start: 2022-12-27

## 2022-12-27 RX ORDER — LISINOPRIL 5 MG/1
5 TABLET ORAL DAILY
Qty: 30 TABLET | Refills: 1 | Status: SHIPPED | OUTPATIENT
Start: 2022-12-27

## 2022-12-27 RX ADMIN — PRAZOSIN HYDROCHLORIDE 2 MG: 1 CAPSULE ORAL at 21:00

## 2022-12-27 RX ADMIN — SERTRALINE HYDROCHLORIDE 100 MG: 100 TABLET ORAL at 08:11

## 2022-12-27 RX ADMIN — HYDROXYZINE HYDROCHLORIDE 50 MG: 50 TABLET, FILM COATED ORAL at 15:17

## 2022-12-27 RX ADMIN — LISINOPRIL 5 MG: 5 TABLET ORAL at 08:11

## 2022-12-27 RX ADMIN — PANTOPRAZOLE SODIUM 40 MG: 40 TABLET, DELAYED RELEASE ORAL at 06:03

## 2022-12-27 RX ADMIN — NICOTINE 1 PATCH: 14 PATCH, EXTENDED RELEASE TRANSDERMAL at 08:12

## 2022-12-27 RX ADMIN — BUPRENORPHINE AND NALOXONE 8 MG: 8; 2 FILM BUCCAL; SUBLINGUAL at 08:11

## 2022-12-27 RX ADMIN — IBUPROFEN 400 MG: 400 TABLET ORAL at 15:18

## 2022-12-27 RX ADMIN — ATORVASTATIN CALCIUM 10 MG: 10 TABLET, FILM COATED ORAL at 15:48

## 2022-12-27 RX ADMIN — QUETIAPINE FUMARATE 100 MG: 100 TABLET ORAL at 21:00

## 2022-12-27 NOTE — PLAN OF CARE
Problem: Ineffective Coping  Goal: Cooperates with admission process  Description: Interventions:   - Complete admission process  Outcome: Progressing  Goal: Identifies ineffective coping skills  Outcome: Progressing  Goal: Identifies healthy coping skills  Outcome: Progressing  Goal: Demonstrates healthy coping skills  Outcome: Progressing  Goal: Participates in unit activities  Description: Interventions:  - Provide therapeutic environment   - Provide required programming   - Redirect inappropriate behaviors   Outcome: Progressing  Goal: Patient/Family participate in treatment and DC plans  Description: Interventions:  - Provide therapeutic environment  Outcome: Progressing  Goal: Patient/Family verbalizes awareness of resources  Outcome: Progressing  Goal: Understands least restrictive measures  Description: Interventions:  - Utilize least restrictive behavior  Outcome: Progressing  Goal: Free from restraint events  Description: - Utilize least restrictive measures   - Provide behavioral interventions   - Redirect inappropriate behaviors   Outcome: Progressing     Problem: Risk for Self Injury/Neglect  Goal: Treatment Goal: Remain safe during length of stay, learn and adopt new coping skills, and be free of self-injurious ideation, impulses and acts at the time of discharge  Outcome: Progressing  Goal: Verbalize thoughts and feelings  Description: Interventions:  - Assess and re-assess patient's lethality and potential for self-injury  - Engage patient in 1:1 interactions, daily, for a minimum of 15 minutes  - Encourage patient to express feelings, fears, frustrations, hopes  - Establish rapport/trust with patient   Outcome: Progressing  Goal: Refrain from harming self  Description: Interventions:  - Monitor patient closely, per order  - Develop a trusting relationship  - Supervise medication ingestion, monitor effects and side effects   Outcome: Progressing  Goal: Recognize maladaptive responses and adopt new coping mechanisms  Outcome: Progressing  Goal: Complete daily ADLs, including personal hygiene independently, as able  Description: Interventions:  - Observe, teach, and assist patient with ADLS  - Monitor and promote a balance of rest/activity, with adequate nutrition and elimination  Outcome: Progressing     Problem: Depression  Goal: Treatment Goal: Demonstrate behavioral control of depressive symptoms, verbalize feelings of improved mood/affect, and adopt new coping skills prior to discharge  Outcome: Progressing  Goal: Verbalize thoughts and feelings  Description: Interventions:  - Assess and re-assess patient's level of risk   - Engage patient in 1:1 interactions, daily, for a minimum of 15 minutes   - Encourage patient to express feelings, fears, frustrations, hopes   Outcome: Progressing  Goal: Refrain from harming self  Description: Interventions:  - Monitor patient closely, per order   - Supervise medication ingestion, monitor effects and side effects   Outcome: Progressing  Goal: Refrain from isolation  Description: Interventions:  - Develop a trusting relationship   - Encourage socialization   Outcome: Progressing  Goal: Refrain from self-neglect  Outcome: Progressing  Goal: Complete daily ADLs, including personal hygiene independently, as able  Description: Interventions:  - Observe, teach, and assist patient with ADLS  -  Monitor and promote a balance of rest/activity, with adequate nutrition and elimination   Outcome: Progressing     Problem: Anxiety  Goal: Anxiety is at manageable level  Description: Interventions:  - Assess and monitor patient's anxiety level  - Monitor for signs and symptoms (heart palpitations, chest pain, shortness of breath, headaches, nausea, feeling jumpy, restlessness, irritable, apprehensive)  - Collaborate with interdisciplinary team and initiate plan and interventions as ordered    - [place] patient to unit/surroundings  - Explain treatment plan  - Encourage participation in care  - Encourage verbalization of concerns/fears  - Identify coping mechanisms  - Assist in developing anxiety-reducing skills  - Administer/offer alternative therapies  - Limit or eliminate stimulants  Outcome: Progressing     Problem: Nutrition/Hydration-ADULT  Goal: Nutrient/Hydration intake appropriate for improving, restoring or maintaining nutritional needs  Description: Monitor and assess patient's nutrition/hydration status for malnutrition  Collaborate with interdisciplinary team and initiate plan and interventions as ordered  Monitor patient's weight and dietary intake as ordered or per policy  Utilize nutrition screening tool and intervene as necessary  Determine patient's food preferences and provide high-protein, high-caloric foods as appropriate       INTERVENTIONS:  - Monitor oral intake, urinary output, labs, and treatment plans  - Assess nutrition and hydration status and recommend course of action  - Evaluate amount of meals eaten  - Assist patient with eating if necessary   - Allow adequate time for meals  - Recommend/ encourage appropriate diets, oral nutritional supplements, and vitamin/mineral supplements  - Order, calculate, and assess calorie counts as needed  - Recommend, monitor, and adjust tube feedings and TPN/PPN based on assessed needs  - Assess need for intravenous fluids  - Provide specific nutrition/hydration education as appropriate  - Include patient/family/caregiver in decisions related to nutrition  Outcome: Progressing

## 2022-12-27 NOTE — CASE MANAGEMENT
12/27/22 0853   Team Meeting   Meeting Type Daily Rounds   Initial Conference Date 12/27/22   Team Members Present   Team Members Present Physician;Nurse;;   Physician Team Member Dr Vivi Gay; Dr Meenakshi Garcia Management Team Member [address] [place] [address] Work Team Member Cheryl Lafleur   Patient/Family Present   Patient Present No   Patient's Family Present No     Visible, social, med compliant, good appetite, denies s/s, slept, discharge today

## 2022-12-27 NOTE — CASE MANAGEMENT
Spoke with Edinson Azul at [facility]  She reports that their provider is booked tomorrow and pt's intake appt will just be with the   Therefore, pt will not receive suboxone script from [facility] provider tomorrow  Rika suggested that medical toxicology be consulted to see if they'll provide script prior to d/c  Per Rika, the next available med management appt for new pt isn't until 1/24  MD notified  Spoke with pt's friend, Cherie Camarena [phone], to find out pt's previous OP suboxone provider  Cherie Camarena states that pt's previous provider is Dr Epifanio Moralez on [facility]  in [facility]  Cherie Camarena states that pt was last seen by Dr Indio Lopes in June 2022  Cherie Camarena states that pt cannot return to this provider because Dr Indio Lopes wanted pt to see his for PCP services but pt wanted to keep his current PCP through [facility]

## 2022-12-27 NOTE — NURSING NOTE
Patient withdrawn to room this shift, flat and soft spoken on approach  Complaint with meals and scheduled medications  Denies anxiety, depression, SI/HI/AH/VH  Able to make needs known  Safety checks ongoing

## 2022-12-27 NOTE — NURSING NOTE
Pt visible in dayroom, withdrawn to self  Pt reports 3/4 anxiety and denies depression  Pt received PRN atarax 50mg for anxiety at 15:17  Medication effective  Pt reported 3/10 headache pain and requested medication for pain  Pt received PRN motrin 400mg at 15:18, medication effective  Pt reports no AH/VH today but states " I was seeing bad things last night " Pt is unable to verbalize past visual hallucinations  Pt is cooperative with medications and care

## 2022-12-28 VITALS
RESPIRATION RATE: 18 BRPM | OXYGEN SATURATION: 98 % | DIASTOLIC BLOOD PRESSURE: 61 MMHG | SYSTOLIC BLOOD PRESSURE: 96 MMHG | HEIGHT: 66 IN | BODY MASS INDEX: 20.3 KG/M2 | TEMPERATURE: 98 F | WEIGHT: 126.32 LBS | HEART RATE: 72 BPM

## 2022-12-28 RX ORDER — BUPRENORPHINE AND NALOXONE 8; 2 MG/1; MG/1
8 FILM, SOLUBLE BUCCAL; SUBLINGUAL DAILY
Qty: 30 FILM | Refills: 0 | Status: SHIPPED | OUTPATIENT
Start: 2022-12-29 | End: 2023-01-28

## 2022-12-28 RX ADMIN — NICOTINE 1 PATCH: 14 PATCH, EXTENDED RELEASE TRANSDERMAL at 08:09

## 2022-12-28 RX ADMIN — BUPRENORPHINE AND NALOXONE 8 MG: 8; 2 FILM BUCCAL; SUBLINGUAL at 08:08

## 2022-12-28 RX ADMIN — PANTOPRAZOLE SODIUM 40 MG: 40 TABLET, DELAYED RELEASE ORAL at 06:13

## 2022-12-28 RX ADMIN — SERTRALINE HYDROCHLORIDE 100 MG: 100 TABLET ORAL at 08:09

## 2022-12-28 NOTE — CASE MANAGEMENT
12/28/22 0859   Team Meeting   Meeting Type Daily Rounds   Initial Conference Date 12/28/22   Team Members Present   Team Members Present Physician;Nurse;;; Occupational Therapist   Physician Team Member Dr Zheng Hodge; Dr Claus Go; Ashanti Weston   Nursing Team Member Lead-[facility] Management Team Member Nidia Long   Social Work Team Member Tangela   OT Team Member Viv Wheatley   Patient/Family Present   Patient Present No   Patient's Family Present No     Isolative, flat, med compliant, woke up in middle of night with bloody nose, slept, discharge today, 3/4 anxiety, received PRN Atarax

## 2022-12-28 NOTE — DISCHARGE SUMMARY
Discharge Summary - Trinh Georges 62 y o  male MRN: [id number]  Unit/Bed#: Jennifer José 605-46 Encounter: [id number]     Admission Date:   Admission Orders (From admission, onward)     Ordered        12/18/22 1354  ED TO DIFFERENT CAMPUS [facility] UNIT or INPATIENT MEDICAL UNIT to [facility] UNIT (using Discharge Readmit Navigator) - Admit Patient to [address] [place] [address]  Once                          Discharge Date: 12/28/22    Attending Psychiatrist: Jessica Mcleod MD    Admission Diagnosis:    Principal Problem:    Severe episode of recurrent major depressive disorder, with psychotic features ([facility] )  Active Problems:    PTSD (post-traumatic stress disorder)    Hypertension    Anemia    Gross hematuria    CKD (chronic kidney disease)    Medical clearance for psychiatric admission    Psychiatric disorder    COVID-19    Mild protein-calorie malnutrition ([facility] [address] )    Discharge Diagnosis:     Principal Problem:    Severe episode of recurrent major depressive disorder, with psychotic features ([facility] [address] [address] )  Active Problems:    PTSD (post-traumatic stress disorder)    Hypertension    Anemia    Gross hematuria    CKD (chronic kidney disease)    Medical clearance for psychiatric admission    Psychiatric disorder    COVID-19    Mild protein-calorie malnutrition ([facility] [address] )  Resolved Problems:    * No resolved hospital problems  *      Reason for Admission/HPI:     This is a discharge summary for Charlie Marie, a 62year old male with a significant past psychiatric history of depression with psychosis, PTSD, and opioid use disorder (drug of choice IV heroin) who presented to the [facility] ED 12/17/22 due to chest pain and shortness of breath that had been ongoing for three days prior to ED presentation in the setting of heroin use  In the ED Garrett Dior endorsed symptoms of worsening depression with suicidal ideation with intent but no plan   He was admitted to the [address] [place] [address] Unit for further treatment  The following is copied from Dr Lennox Polio H&P 12/19/22:  Patient is a 62year old male,  Azeri speaking only , Unemployed on SSD, lives alone in [address] of depression with psychosis, PTSD, no known history of suicidal attempts of violence, Opioid use disorder( IV Heroin), Nicotine use disorder  PMHx of DM2, HTN, seizures       Who presented to the ED for chestpain and later admitted to psychiatry for worsening depression and SI with intent but no plan       While in ED, prior to discharge; he reported worsening depression and said he would hurt self if he returned home, citing family stressors  He was admitted to psychiatry on a 201  He tested positive for COVID 19 on 12/17/22 and is currently admitted to the isolation unit  UDS was positive for cannabis       Patient seen alone in his room, used cyracom [id number]  He is a poor historian  Pt says he came into the hopital because he is suffering from depression , worsened over the past 2 weeks  This depression is mainly related to his current housing situation and having to share a bathroom and a kitchen with 7 other people in a rooming house, and not being able to secure housing for years  He also reports depression 2/2 the ill health of his mother and stepfather  His mother  recently had double a amputation and his stepfather  has cancer  He has not seen them in 5 years but has been in contact with them via phone       He has poor sleep overall at times disturbed by at times violent nightmares of his prior experiences from nursing home as he reports seeiing people being tortured and killed  He has lost interest in activities reports guilt but does not want to elaborate on this  He has poor concentration, appetite is poor, he  reports weight loss but unable to quantify  He has passive SI but reports he wants to live and does to want to kill himself  He denies active SI   He was guarded and irritable intermittently with questioning from [employer]   He did not  report any history of sarah, he endorses AH of someone calling him, an unfamiliar voice  He reports VH of shadows when at home  He reports Hx of traumatic events  during incarceration in 1980s in [place], today reports intermittent nightmares, avoidance, inrusive thoughts related to traumatic events  Pt reports recent relapse on IV heroin 2 months ago( he did not quantify daily use) after 7 years of no use but he states is related to his housing stressors  He us currently receiving suboxone 8mg daily     Patient has been non- compliant with medications , he does not remember names of medications or name of prior provider  He reports medication used during his last admission helped and would like to be restarted on same  Past Medical History:   Diagnosis Date   • Abdominal pain    • Allergic rhinitis    • Cancer [facility])    • Chronic pain    • Colon polyps    • Depression    • Fatigue    • Head injury    • Homeless    • Hypertension    • Insomnia    • Liver disease    • Loss of appetite    • Numbness and tingling of right arm    • Palpitations    • Psychiatric disorder     bipolar   • PTSD (post-traumatic stress disorder)    • Seizures (HCC)    • Shortness of breath    • Substance abuse (HCC)    • Swallowing difficulty      Past Surgical History:   Procedure Laterality Date   • ABDOMINAL SURGERY     • COLONOSCOPY     • EYE SURGERY     • NECK SURGERY     • ORCHIECTOMY Right 5/19/2016    Procedure: ORCHIECTOMY INGUINAL biopsy of testicular mass, ;  Surgeon: Deb Jordan MD;  Location:  MAIN OR;  Service:    • [place] COLONOSCOPY FLX DX W/COLLJ SPEC WHEN PFRMD N/A 2/13/2017    Procedure: EGD AND COLONOSCOPY;  Surgeon: Pj Devi MD;  Location: [facility] GI LAB; Service: Gastroenterology   • [place] ESOPHAGOGASTRODUODENOSCOPY TRANSORAL DIAGNOSTIC N/A 11/16/2016    Procedure: EGD AND COLONOSCOPY;  Surgeon: Pj Devi MD;  Location:  GI LAB;   Service: Gastroenterology Medications: All current active medications have been reviewed  Allergies: Allergies   Allergen Reactions   • Oxycodone Swelling     Tongue swelling       Please refer to the initial H&P for full details  Vital signs in last 24 hours:    Temp:  [98 °F (36 7 °C)] 98 °F (36 7 °C)  HR:  [63-72] 72  Resp:  [18] 18  BP: ()/(61-69) 96/61      Intake/Output Summary (Last 24 hours) at 12/28/2022 1659  Last data filed at 12/28/2022 1200  Gross per 24 hour   Intake 1890 ml   Output --   Net 1890 ml         Hospital Course: On admission, Garrett Dior was admitted to the inpatient psychiatric unit and started on Behavioral Health checks every 7 minutes  During the hospitalization he was encouraged to attend individual therapy, group therapy, milieu therapy and occupational therapy  Upon admission Garrett Dior was seen by medical service for medical clearance for inpatient treatment  Medication Changes:  · At the time of hospital presentation, Garrett Dior had not been compliant with his psychiatric medications  · In the emergency department 12/18/22 Garrett Dior was restarted on Seroquel 25 mg daily and 100 mg QHS for symptoms of psychosis  Seroquel was adjusted to 100 mg QHS during the course of his inpatient hospitalization  · In the emergency department 12/18/22 Garrett Dior was restarted on sertraline 100 mg daily for mood and anxiety  He was continued on this dose of sertraline during his inpatient hospitalization  · On 12/20/22 due to persistent nightmares Garrett Dior was started on prazosin 1 mg QHS for nightmares  This dose was titrated to 2 mg QHS during his inpatient hospitalization      At the time of admission to the inpatient psychiatric unit medications risks and benefits and possible side effects including risk of parkinsonian symptoms, Tardive Dyskinesia and metabolic syndrome related to treatment with antipsychotic medications and risk of suicidality and serotonin syndrome related to treatment with antidepressants were reviewed with Merle Castillo  Merle Castillo verbalized understanding and agreement for treatment  Barrett tolerated these medications with no acute side effects  On the inpatient unit Merle Castillo made steady progress  Throughout his hospitalization Merle Castillo was generally isolative to his room, with limited peer interactions Rosalva Lamar is primarily [address] speaking and during his inpatient stay there were no Fijian speaking peers), however as his hospitalization progressed he was noted to be increasingly visible in the milieu, watching movies and conversing with Fijian speaking staff  Merle Castillo spent his time on the inpatient unit listening to [address] radio stations and calling his friend Dangelo Ramírez on the phone  On the inpatient psychiatric unit Merle Castillo continued to struggle with his thoughts surrounding current life stressors  Merle Castillo lives with 7 other individuals in a rooming house and expressed frustration at being on the housing waitlist for over 7 years  In addition, he expressed concern for his stepfather (who is suffering from cancer) and his mother (who suffers from diabetes and had a bilateral leg amputation due to uncontrolled diabetes) who both live in [facility] also continued to report nightmares with themes relating to the violence he witnessed while in custodial in [place] (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 and during his time in custodial he saw individuals murdered) about individuals attempting to harm him  Through medication management and milieu therapy these symptoms progressively improved during inpatient treatment  During his inpatient hospitalization treatment options for the patient IV heroin use were reviewed  Merle Castillo agreed to pursue outpatient ambulatory drug and alcohol treatment and he was referred to the [facility] program for dual diagnosis       Barrett's patient's mood brightened over the course of treatment, and he was seen interacting appropriately with staff by the end of his inpatient hospital stay  Eric Vo did not demonstrate dangerous behavior to self or others during his inpatient stay  On the day of discharge, Eric Vo reported feeling "a little anxious" and apprehensive, however he felt ready to leave the hospital after discharge planning was reviewed in detail  Eric Vo reports he feels ready to return home and feels safe to return home  Eric Vo reports that he plans to clean when he returns home and reports looking forward to cooking  At the time of discharge Eric Vo reports that his friend Patrick Camargo is a source of support  He verbalized understanding that will return to the hospital if he begins to have thoughts about harming himself or others  At the time of discharge Eric Vo verbalized understanding of continuing on his psychiatric medications and the importance of following up for ongoing care  Eric Vo was appreciative of the care he received on the inpatient unit and reported the staff have been professional and courteous to him during his stay  At the time of discharge Eric Vo continues to report intermittent visual and auditory hallucinations that are brief, lasting seconds in duration  He reports that yesterday in the evening he heard someone calling out his name on two separate occasions, however looked around to find nobody was there  He reports that he last saw brief visual "shadows" daring around the room 2 days ago  He denies hallucinations today and at the time of interview does not appear to be responding to internal stimuli  At the time of discharge hallucinations are significantly improved and adequately controlled and do not impair the patient's ability to function on a day to day basis  Eric Vo denied suicidal ideation, intent or plan at the time of discharge and denied homicidal ideation, intent or plan at the time of discharge  There was no overt psychosis at the time of discharge  Auditory hallucinations were significantly improved and not command in nature   Visual hallucinations were also significantly improved  He was participating appropriately in milieu at the time of discharge  Since Santa Almeida was doing well at the end of the hospitalization, treatment team felt that she could be safely discharged to outpatient care  Davidconfirmed that there were no guns at home and that he had no access to firearms of any kind  Santa Almeida also felt stable and ready for discharge at the end of the hospital stay  The outpatient follow up with the [facility] and [address] [place] [address] for both depression and for opioid dependence was arranged by the unit  upon discharge  I reviewed with Santa Almeida the importance of compliance with medications and outpatient treatment after discharge , I discussed the medication regimen and possible side effects of the medications with Santa Almeida prior to discharge  At the time of discharge he was tolerating psychiatric medications  , I discussed outpatient follow up with Santa Almeida , I reviewed with Santa Pall crisis plan and safety plan upon discharge , Santa Almeida was competent to understand risks and benefits of withholding information and risks and benefits of his actions  and Santa Almeida agreed to abstain from drug and alcohol use after discharge  The patient understands the importance of taking their medications and attending their outpatient appointments  The patient knows that if there are concerns for safety to utilize their coping skills and can call the suicide hotline as well as 911 if there are concerns for safety  Behavioral Health Medications: all current active meds have been reviewed    Discharge on Two Antipsychotic Medications: No    Psychiatric Medication Upon Discharge:  Continue Seroquel 100 mg daily at bedtime for augmentation of antidepressant as well as for symptoms of psychosis  Continue prazosin 2 mg nightly for nightmares  Continue sertraline 100 mg daily for mood and anxiety    General Medications Upon Discharge:  Continue Suboxone 8 mg sublingual daily for MAT  Continue lisinopril 5 mg daily for hypertension  Continue atorvastatin 10 mg daily for hypercholesterolemia  Continue pantoprazole 40 mg daily for GERD    Labs/Imaging:   I have personally reviewed all pertinent laboratory/tests results    Admission Labs:   Admission on 12/18/2022, Discharged on 12/28/2022   Component Date Value   • TSH 3RD GENERATON 12/19/2022 3 960    • RPR 12/19/2022 Non-Reactive    • Hemoglobin A1C 12/19/2022 5 7 (H)    • EAG 12/19/2022 117    • Cholesterol 12/19/2022 144    • Triglycerides 12/19/2022 116    • HDL, Direct 12/19/2022 40    • LDL Calculated 12/19/2022 81    • Non-HDL-Chol (CHOL-HDL) 12/19/2022 104    • WBC 12/19/2022 5 61    • RBC 12/19/2022 4 15    • Hemoglobin 12/19/2022 12 4    • Hematocrit 12/19/2022 38 1    • MCV 12/19/2022 92    • MCH 12/19/2022 29 9    • MCHC 12/19/2022 32 5    • RDW 12/19/2022 13 2    • MPV 12/19/2022 10 1    • Platelets 92/67/2660 182    • nRBC 12/19/2022 0    • Neutrophils Relative 12/19/2022 49    • Immat GRANS % 12/19/2022 0    • Lymphocytes Relative 12/19/2022 43    • Monocytes Relative 12/19/2022 6    • Eosinophils Relative 12/19/2022 2    • Basophils Relative 12/19/2022 0    • Neutrophils Absolute 12/19/2022 2 71    • Immature Grans Absolute 12/19/2022 0 01    • Lymphocytes Absolute 12/19/2022 2 43    • Monocytes Absolute 12/19/2022 0 32    • Eosinophils Absolute 12/19/2022 0 13    • Basophils Absolute 12/19/2022 0 01    • Sodium 12/19/2022 138    • Potassium 12/19/2022 4 8    • Chloride 12/19/2022 104    • CO2 12/19/2022 26    • ANION GAP 12/19/2022 8    • BUN 12/19/2022 12    • Creatinine 12/19/2022 0 83    • Glucose 12/19/2022 96    • Glucose, Fasting 12/19/2022 96    • Calcium 12/19/2022 9 0    • AST 12/19/2022 28    • ALT 12/19/2022 21    • Alkaline Phosphatase 12/19/2022 61    • Total Protein 12/19/2022 7 5    • Albumin 12/19/2022 4 1    • Total Bilirubin 12/19/2022 0 31    • eGFR 12/19/2022 97        Mental Status at time of Discharge:   Appearance:  age appropriate, dressed appropriately, looks older than stated age, sitting comfortably in chair, dressed in casual clothing, adequate hygiene and grooming, cooperative with interview, good eye contact    Behavior:  cooperative   Speech:  normal rate, normal volume, normal pitch, fluent, clear and coherent   Mood:  "anxious"   Affect:  mood-congruent   Language Within normal limits   Thought Process:  organized, logical, goal directed, normal rate of thoughts, normal abstract reasoning   Associations: intact associations      Thought Content:  No verbalized delusions   Perceptual Disturbances: Does not appear to be responding to internal stimuli  At the time of discharge Billy continues to report intermittent visual and auditory hallucinations that are brief, lasting seconds in duration  He reports that yesterday in the evening he heard someone calling out his name on two separate occasions, however looked around to find nobody was there  He reports that he last saw brief visual "shadows" daring around the room 2 days ago  He denies hallucinations today     Risk Potential: Denies suicidal or homicidal ideation, intent, or plan   Sensorium:  person, place, time and current situation   Cognition:  Grossly intact   Consciousness:  alert and awake   Attention: attention span and concentration were age appropriate   Insight:  fair   Judgment: fair   Intellect appears to be of average intelligence   Gait/Station: normal gait/station and normal balance   Motor Activity: no abnormal movements     Suicide/Homicide Risk Assessment:  Risk of Harm to Self:   • The following ratings are based on assessment at the time of discharge  • Demographic risk factors include: lowest socioeconomic class,  status, male, age: over 48 or older  • Historical Risk Factors include: chronic psychiatric problems, chronic depression, chronic anxiety symptoms, chronic psychotic symptoms, drug use  • Current Specific Risk Factors include: recent inpatient psychiatric admission - being discharged today, diagnosis of depression, chronic depressive symptoms, chronic psychotic symptoms, lack of support, substance use, social isolation  • Protective Factors: no current suicidal ideation, improved mood, improved anxiety symptoms, improved psychotic symptoms, ability to make plans for the future, no current suicidal plan or intent, outpatient psychiatric follow up established, outpatient D&A follow up established, compliant with medications, compliant with mental health treatment, personal beliefs about the meaning and value of life, supportive friends, ability to contract for safety with staff, ability to communicate with staff  • Weapons/Firearms: none  The following steps have been taken to ensure weapons are properly secured: not applicable  • Based on today's assessment, Effie Sood presents the following risk of harm to self: low    Risk of Harm to Others:  • The following ratings are based on assessment at the time of discharge  • Demographic Risk Factors include: male, unemployed  • Historical Risk Factors include: history of previous acts of violence, drug abuse, prior arrest   • Current Specific Risk Factors include: chronic psychotic symptoms, multiple stressors  • Protective Factors: no current homicidal ideation, stable mood, compliant with medications, compliant with treatment, willing to continue psychiatric treatment, willing to remain free from substance use, outpatient follow up established, outpatient D&A follow up established, able to manage anger well, supportive friends, access to mental health treatment  • Weapons/Firearms: none   The following steps have been taken to ensure weapons are properly secured: not applicable  • Based on today's assessment, Effie Sood presents the following risk of harm to others: low    The following interventions are recommended: outpatient follow up with a psychiatrist, outpatient follow up with a therapist, outpatient follow up with Drug and Alcohol counseling    Discharge Medications:  See list above, as well as the after visit summary for all reconciled discharge medications provided to patient and family  Discharge instructions/Information to patient and family:   See after visit summary for information provided to patient and family  Provisions for Follow-Up Care:  See after visit summary for information related to follow-up care and any pertinent home health orders  Juana Saha MD 12/28/22  Psychiatry Resident, PGY-II    This note was completed in part utilizing Dragon dictation Software  Grammatical, translation, syntax errors, random word insertions, spelling mistakes, and incomplete sentences may be an occasional consequence of this system secondary to software limitations with voice recognition, ambient noise, and hardware issues  If you have any questions or concerns about the content, text, or information contained within the body of this dictation, please contact the provider for clarification

## 2022-12-28 NOTE — PROGRESS NOTES
Progress Note - Behavioral Health   Melony Fischer 62 y o  male MRN: [id number]  Unit/Bed#: [facility] 457-22 Encounter: [id number]    Assessment/Plan   Principal Problem:    Severe episode of recurrent major depressive disorder, with psychotic features ([facility] )  Active Problems:    PTSD (post-traumatic stress disorder)    Hypertension    Anemia    Gross hematuria    CKD (chronic kidney disease)    Medical clearance for psychiatric admission    Psychiatric disorder    COVID-19    Mild protein-calorie malnutrition ([facility] )      Recommended Treatment:   No psychopharmacologic changes necessary at this moment; will continue to assess daily for further optimization  Continue Seroquel 100 mg daily at bedtime for augmentation of antidepressant as well as for symptoms of psychosis  Continue prazosin 2 mg nightly for nightmares  Continue sertraline 100 mg daily for mood and anxiety  Continue Suboxone 8 mg sublingual daily for MAT  At this time, care is being established with the [facility] program and an intake appointment is scheduled for 12/28/2022 at 2:00 PM  Planning to tentatively discharge the patient tomorrow morning 12/28/2022 barring unforeseen events  Continue with pharmacotherapy, group therapy, milieu therapy and occupational therapy  Continue to assess for adverse medication side effects  Encourage Melony Fischer to participate in nonverbal forms of therapy including journaling and art/music therapy  Continue frequent safety checks and vitals per unit protocol  Continue to engage CM/SW to assist with collateral, disposition planning, and the implementation of an individualized, patient-centered plan of care  Continue medical management by medical team   Case discussed with treatment team     Legal Status: [id number]  ------------------------------------------------------------    Subjective:  All documentation including nursing notes, medication history to ensure medication adherence on the unit, labs, and vitals were reviewed  Per nursing report, on the inpatient psychiatric unit Ankur Cheatham has remained in positive behavior control  On the inpatient unit Ankur Cheatham continues to remain generally isolative to his room, however over the holiday weekend he was noted to be increasingly visible in the milieu, watching movies  He remains with limited interactions with other peers (unfortunately at this time on the COVID unit no other patient speaks [place]  On nursing assessments throughout the day yesterday 12/26 Ankur Cheatham denied all psychiatric signs and symptoms and offered no issues or complaints  He consistently denied suicidal ideation, homicidal ideation, visual and auditory hallucinations  Ankur Cheatham was evaluated this morning for continuity of care and no acute distress noted throughout the evaluation  Over the past 24 hours per nursing report, Ankur Cheatham has been cooperative on the unit and compliant with medications  At the time of interview today, Ankur Cheatham is seen lying in his bed in no acute distress  He continues to appear constricted in affect, however is noted to be brighter than previous interviews  On assessment today, Ankur Cheatham reports that he is feeling "a little better"  He reports on the inpatient unit that his symptoms of depression and anxiety continue to slowly improve  On the inpatient psychiatric unit Ankur Cheatham reports there is not much for him to do at this time and states he has been spending time on the inpatient unit listening to music and watching movies  He continues to remain in contact with his friend Adam Damoner on the phone  At this time on the inpatient psychiatric unit, Ankur Cheatham reports that he continues to intermittently experience visual (brief hallucinations of shadows) and auditory hallucinations (brief auditory hallucinations of people calling his name)   He reports that he last experienced these hallucinations yesterday and reports that around bedtime he got up from bed because he thought someone was calling to him, only to find that there was nobody there  Vinnie Mccartney reports that he does continue to experience nightmares at nighttime, however reports that they have been continuing to improve in the hospital and Vinnie Mccartney reports that he has been sleeping better in the hospital (getting approximately 6 hours of sleep at night)  Today, Vinnie Mccartney is consenting for safety on the unit  Vinnie Mccartney reports feeling "a little better " Vinnie Mccartney notes having improved sleep  Vinnie Mccartney states having a good appetite  Vinnie Mccartney has been taking the medications as prescribed and reporting no side effects  At the time of assessment Vinnie Mccartney denies suicidal ideations, homicidal ideations, visual and auditory hallucinations  He reports that he last experienced hallucinations yesterday evening, but has not yet experienced them today  PRNs overnight: None   VS: Reviewed, within normal limits    Progress Toward Goals: steady improvement    Psychiatric Review of Systems:  Behavior over the last 24 hours:  improved  Sleep: normal, reports he did get up at night to go the bathroom, but otherwise slept 6 hours in total  Appetite: normal  Medication side effects: No   ROS: all other systems are negative    Vital signs in last 24 hours:  Temp:  [97 7 °F (36 5 °C)-98 °F (36 7 °C)] 98 °F (36 7 °C)  HR:  [60-70] 70  Resp:  [18-19] 18  BP: (100-160)/(58-79) 100/58    Laboratory results:  I have personally reviewed all pertinent laboratory/tests results  No results found for this or any previous visit (from the past 48 hour(s))        Mental Status Evaluation:    Appearance:  casually dressed, marginal hygiene, looks older than stated    Behavior:  cooperative, calm   Speech:  normal rate, fluent, clear, coherent, soft   Mood:  "a little better"   Affect:  constricted   Thought Process:  organized, logical, coherent, linear, normal rate of thoughts   Associations: intact associations   Thought Content:  normal, no overt delusions   Perceptual Disturbances: Denies auditory or visual hallucinations and Does not appear to be responding to internal stimuli   Risk Potential: Suicidal ideation - None  Homicidal ideation - None  Potential for aggression - No   Sensorium:  oriented to person, place and time/date   Memory:  recent and remote memory grossly intact   Consciousness:  alert and awake   Attention/Concentration: attention span and concentration are age appropriate   Insight:  improving   Judgment: improving   Gait/Station: normal gait/station, normal balance   Motor Activity: no abnormal movements       Current Medications:  Current Facility-Administered Medications   Medication Dose Route Frequency Provider Last Rate   • aluminum-magnesium hydroxide-simethicone  30 mL Oral Q4H PRN Romy Hose, DO     • atorvastatin  10 mg Oral Daily With Reather Boning, DO     • benzonatate  100 mg Oral TID PRN Annie Dejesus MD     • haloperidol lactate  2 5 mg Intramuscular Q4H PRN Max 4/day Romy Hose, DO      And   • LORazepam  1 mg Intramuscular Q4H PRN Max 4/day Romy Hose, DO      And   • benztropine  0 5 mg Intramuscular Q4H PRN Max 4/day Romy Hose, DO     • haloperidol lactate  5 mg Intramuscular Q4H PRN Max 4/day Romy Hose, DO      And   • LORazepam  2 mg Intramuscular Q4H PRN Max 4/day Romy Hose, DO      And   • benztropine  1 mg Intramuscular Q4H PRN Max 4/day Romy Hose, DO     • bisacodyl  10 mg Rectal Daily PRN Romy Hose, DO     • buprenorphine-naloxone  8 mg Sublingual Daily Romy Hose, DO     • hydrOXYzine HCL  50 mg Oral Q6H PRN Max 4/day Romy Hose, DO      Or   • diphenhydrAMINE  50 mg Intramuscular Q6H PRN Romy Hose, DO     • glycerin-hypromellose-  1 drop Both Eyes Q3H PRN Romy Hose, DO     • haloperidol  1 mg Oral Q6H PRN Romy Hose, DO     • haloperidol  2 5 mg Oral Q4H PRN Max 4/day Romy Hose, DO     • haloperidol  5 mg Oral Q4H PRN Max 4/day Kendrick Black, DO     • hydrOXYzine HCL  100 mg Oral Q6H PRN Max 4/day Kendrick Black, DO      Or   • LORazepam  2 mg Intramuscular Q6H PRN Kendrick Black, DO     • hydrOXYzine HCL  25 mg Oral Q6H PRN Max 4/day Kendrick Black, DO     • ibuprofen  400 mg Oral Q4H PRN Kendrick Black, DO     • ibuprofen  600 mg Oral Q6H PRN Kendrick Black, DO     • ibuprofen  800 mg Oral Q8H PRN Kendrick Black, DO     • lisinopril  5 mg Oral Daily Kendrick Black, DO     • melatonin  3 mg Oral HS PRN Kendrick Black, DO     • nicotine  1 patch Transdermal Daily Kendrick Black, DO     • pantoprazole  40 mg Oral Daily Kendrick Black, DO     • polyethylene glycol  17 g Oral Daily PRN Kendrick Black, DO     • prazosin  2 mg Oral HS Karly Reese MD     • QUEtiapine  100 mg Oral HS Kendrick Black, DO     • senna-docusate sodium  1 tablet Oral Daily PRN Kendrick Black, DO     • sertraline  100 mg Oral Daily Kendrick Black, DO       Behavioral Health Medications: All current active meds have been reviewed  Changes as in plan section above  Risks, benefits and possible side effects of Medications:   Risks, benefits, and possible side effects of medications explained to patient and patient verbalizes understanding  Counseling / Coordination of Care:  Patient's progress discussed with staff in treatment team meeting  Medications, treatment progress and treatment plan reviewed with patient  Importance of medication and treatment compliance reviewed with patient  Importance of follow up for substance abuse issues discussed with patient  Discharge plan discussed with patient  Karly Reese MD 12/27/22  Psychiatry Resident, PGY-II    This note was completed in part utilizing Dragon dictation Software   Grammatical, translation, syntax errors, random word insertions, spelling mistakes, and incomplete sentences may be an occasional consequence of this system secondary to software limitations with voice recognition, ambient noise, and hardware issues  If you have any questions or concerns about the content, text, or information contained within the body of this dictation, please contact the provider for clarification

## 2022-12-28 NOTE — NURSING NOTE
Patient isolative to self in room this shift, flat on approach  Compliant with meals and scheduled medications  Denies anxiety, depression, SI/HI/AH/VH  Able to make needs known  Safety checks ongoing

## 2022-12-28 NOTE — BH TRANSITION RECORD
Contact Information: If you have any questions, concerns, pended studies, tests and/or procedures, or emergencies regarding your inpatient behavioral health visit  Please contact [facility] behavioral health unit 6T [phone] and ask to speak to a , nurse or physician  A contact is available 24 hours/ 7 days a week at this number  Summary of Procedures Performed During your Stay:  Below is a list of major procedures performed during your hospital stay and a summary of results:    - No major procedures performed  EKG performed 12/17/22:   Sinus bradycardia with heart rate 58 beats per minute  Biatrial enlargement  Left ventricular hypertrophy  Abnormal ECG  When compared with ECG of 07-JUL-2022, criteria for Left ventricular hypertrophy are now present  If studies are pending at discharge, follow up with your PCP and/or referring provider

## 2022-12-28 NOTE — NURSING NOTE
Patient discharged at [phone] with belongings, case manger walked patient over to his appointment  AVS reviewed with patient all questions answered

## 2022-12-28 NOTE — PLAN OF CARE
Problem: DISCHARGE PLANNING  Goal: Discharge to home or other facility with appropriate resources  Description: INTERVENTIONS:  - Identify barriers to discharge w/patient and caregiver  - Arrange for needed discharge resources and transportation as appropriate  - Identify discharge learning needs (meds, wound care, etc )  - Arrange for interpretive services to assist at discharge as needed  - Refer to Case Management Department for coordinating discharge planning if the patient needs post-hospital services based on physician/advanced practitioner order or complex needs related to functional status, cognitive ability, or social support system  Outcome: Completed     Discharge planning discussed with pt and pt's friend, Tyler ALBERT dual-diagnosis intake [facility]) appt scheduled  Pt will discharge directly to intake appt at 2pm  Lyft transport will be arranged after appt  Med scripts should be sent to preferred pharmacy ([id number])

## 2022-12-28 NOTE — NURSING NOTE
Patient had disrupted sleep with an epistaxis around 0230  Patient applied Vaseline to the affected nostril, which was effective  Patient went back to sleep for rest of the night  Staff maintained continuous rounding for safety and support

## 2023-01-03 ENCOUNTER — TELEPHONE (OUTPATIENT)
Dept: NEPHROLOGY | Facility: CLINIC | Age: 58
End: 2023-01-03

## 2023-01-03 ENCOUNTER — DOCUMENTATION (OUTPATIENT)
Dept: PSYCHIATRY | Facility: CLINIC | Age: 58
End: 2023-01-03

## 2023-01-03 NOTE — PROGRESS NOTES
Note Type: Case Management Note                  Date of Service: 12/28/2022  Service:  [facility] Office    Note: Case Management Note  Shaina Wu 62 y o  male [id number]  Attending  Substance Use History     Social History     Substance and Sexual Activity   Alcohol Use No        Social History     Substance and Sexual Activity   Drug Use Yes   • Types: Heroin       Encounter Type:   Patient Face-to-Face    Start Time 1415  End Time 5467    Recovery needs addressed at this meeting:  Basic  Needs, Physical Health, Emotional/Mental Health and Family    Note  D: Patient presented for in-person, scheduled visit with this CM  This is to be patient's initial visit with this CM  Please utilize this documentation as intake to the [facility] office  This CM utilized MOD[employer] interpretor services throughout this session to help translate the [place] language  Patient was recently discharged from [facility] unit  Patient and this CM worked together to complete patient's new patient paperwork where patient and this CM took the time to discuss patient's needs within the [facility] office  Patient's DOC is heroin in which patient would like to meet with medical toxicology  Patient did request continuing care with behavioral health in which this CM scheduled patient with Psychotherapy and Psychiatry  [place] [facility] [place]  A: Patient appeared in a normal mood, normal affect  P: Patient has been scheduled with this following providers:  Psychiatry  Psychotherapy  Medical Toxicology        Referrals made  Please see above    Next appointment date and time  No future appointments have been scheduled with this CM

## 2023-01-03 NOTE — TELEPHONE ENCOUNTER
Reschedule Appointment   Person speaking to [facility] patients friend    Date of original appointment 01/06/23   New appointment date 01/25/23   Patient on dialysis no   Location [facility]    Provider Dr Carlos Barbosa      Additional Information Change in schedule

## 2023-01-17 ENCOUNTER — TELEPHONE (OUTPATIENT)
Dept: NEPHROLOGY | Facility: CLINIC | Age: 58
End: 2023-01-17

## 2023-01-17 ENCOUNTER — TELEPHONE (OUTPATIENT)
Dept: PSYCHIATRY | Facility: CLINIC | Age: 58
End: 2023-01-17

## 2023-01-19 ENCOUNTER — TELEPHONE (OUTPATIENT)
Dept: NEPHROLOGY | Facility: CLINIC | Age: 58
End: 2023-01-19

## 2023-01-19 NOTE — TELEPHONE ENCOUNTER
Pt's friend Chase Hough called office to cancel 1/23  appointment with Dr Ivy Chris, pt has no transportation at this time, unable to have labs done as well  She will call back to reschedule

## 2023-03-24 ENCOUNTER — TELEPHONE (OUTPATIENT)
Dept: PSYCHIATRY | Facility: CLINIC | Age: 58
End: 2023-03-24

## 2023-03-24 NOTE — TELEPHONE ENCOUNTER
Called to remind patient of appointment 3/28/23 w/ Dr Brayden Riddle patient, spoke with friend, Keily Gillis   She stated he was not heard from for a week, She was notified patient was in halfway/hospital

## 2023-06-12 ENCOUNTER — APPOINTMENT (OUTPATIENT)
Dept: LAB | Facility: CLINIC | Age: 58
End: 2023-06-12
Payer: MEDICARE

## 2023-06-12 DIAGNOSIS — Z79.899 ENCOUNTER FOR LONG-TERM (CURRENT) USE OF OTHER MEDICATIONS: ICD-10-CM

## 2023-06-12 DIAGNOSIS — N18.31 STAGE 3A CHRONIC KIDNEY DISEASE (HCC): ICD-10-CM

## 2023-06-12 DIAGNOSIS — F41.1 GENERALIZED ANXIETY DISORDER: ICD-10-CM

## 2023-06-12 DIAGNOSIS — D64.9 ANEMIA, UNSPECIFIED TYPE: ICD-10-CM

## 2023-06-12 DIAGNOSIS — F31.5 BIPOLAR I DISORDER, MOST RECENT EPISODE (OR CURRENT) DEPRESSED, SEVERE, SPECIFIED AS WITH PSYCHOTIC BEHAVIOR (HCC): ICD-10-CM

## 2023-06-12 LAB
25(OH)D3 SERPL-MCNC: 31.4 NG/ML (ref 30–100)
ALBUMIN SERPL BCP-MCNC: 3.9 G/DL (ref 3.5–5)
ALP SERPL-CCNC: 63 U/L (ref 46–116)
ALT SERPL W P-5'-P-CCNC: 31 U/L (ref 12–78)
ANION GAP SERPL CALCULATED.3IONS-SCNC: 1 MMOL/L (ref 4–13)
AST SERPL W P-5'-P-CCNC: 24 U/L (ref 5–45)
BASOPHILS # BLD AUTO: 0.01 THOUSANDS/ÂΜL (ref 0–0.1)
BASOPHILS NFR BLD AUTO: 0 % (ref 0–1)
BILIRUB SERPL-MCNC: 0.33 MG/DL (ref 0.2–1)
BUN SERPL-MCNC: 20 MG/DL (ref 5–25)
CALCIUM SERPL-MCNC: 8.6 MG/DL (ref 8.3–10.1)
CHLORIDE SERPL-SCNC: 105 MMOL/L (ref 96–108)
CHOLEST SERPL-MCNC: 203 MG/DL
CO2 SERPL-SCNC: 30 MMOL/L (ref 21–32)
CREAT SERPL-MCNC: 1.1 MG/DL (ref 0.6–1.3)
CREAT UR-MCNC: 144 MG/DL
CREAT UR-MCNC: 144 MG/DL
EOSINOPHIL # BLD AUTO: 0.25 THOUSAND/ÂΜL (ref 0–0.61)
EOSINOPHIL NFR BLD AUTO: 4 % (ref 0–6)
ERYTHROCYTE [DISTWIDTH] IN BLOOD BY AUTOMATED COUNT: 13.1 % (ref 11.6–15.1)
GFR SERPL CREATININE-BSD FRML MDRD: 74 ML/MIN/1.73SQ M
GLUCOSE P FAST SERPL-MCNC: 101 MG/DL (ref 65–99)
HCT VFR BLD AUTO: 39.4 % (ref 36.5–49.3)
HDLC SERPL-MCNC: 38 MG/DL
HGB BLD-MCNC: 13.3 G/DL (ref 12–17)
IMM GRANULOCYTES # BLD AUTO: 0.01 THOUSAND/UL (ref 0–0.2)
IMM GRANULOCYTES NFR BLD AUTO: 0 % (ref 0–2)
LDLC SERPL CALC-MCNC: 114 MG/DL (ref 0–100)
LYMPHOCYTES # BLD AUTO: 2.31 THOUSANDS/ÂΜL (ref 0.6–4.47)
LYMPHOCYTES NFR BLD AUTO: 38 % (ref 14–44)
MCH RBC QN AUTO: 30.4 PG (ref 26.8–34.3)
MCHC RBC AUTO-ENTMCNC: 33.8 G/DL (ref 31.4–37.4)
MCV RBC AUTO: 90 FL (ref 82–98)
MICROALBUMIN UR-MCNC: <5 MG/L (ref 0–20)
MICROALBUMIN/CREAT 24H UR: <3 MG/G CREATININE (ref 0–30)
MONOCYTES # BLD AUTO: 0.53 THOUSAND/ÂΜL (ref 0.17–1.22)
MONOCYTES NFR BLD AUTO: 9 % (ref 4–12)
NEUTROPHILS # BLD AUTO: 2.9 THOUSANDS/ÂΜL (ref 1.85–7.62)
NEUTS SEG NFR BLD AUTO: 49 % (ref 43–75)
NONHDLC SERPL-MCNC: 165 MG/DL
NRBC BLD AUTO-RTO: 0 /100 WBCS
PHOSPHATE SERPL-MCNC: 3.6 MG/DL (ref 2.7–4.5)
PLATELET # BLD AUTO: 218 THOUSANDS/UL (ref 149–390)
PMV BLD AUTO: 10 FL (ref 8.9–12.7)
POTASSIUM SERPL-SCNC: 4.3 MMOL/L (ref 3.5–5.3)
PROT SERPL-MCNC: 7.8 G/DL (ref 6.4–8.4)
PROT UR-MCNC: 7 MG/DL
PROT/CREAT UR: 0.05 MG/G{CREAT} (ref 0–0.1)
PTH-INTACT SERPL-MCNC: 31.8 PG/ML (ref 12–88)
RBC # BLD AUTO: 4.37 MILLION/UL (ref 3.88–5.62)
SODIUM SERPL-SCNC: 136 MMOL/L (ref 135–147)
TRIGL SERPL-MCNC: 257 MG/DL
WBC # BLD AUTO: 6.01 THOUSAND/UL (ref 4.31–10.16)

## 2023-06-12 PROCEDURE — 80061 LIPID PANEL: CPT

## 2023-06-12 PROCEDURE — 82306 VITAMIN D 25 HYDROXY: CPT

## 2023-06-12 PROCEDURE — 80053 COMPREHEN METABOLIC PANEL: CPT

## 2023-06-12 PROCEDURE — 36415 COLL VENOUS BLD VENIPUNCTURE: CPT

## 2023-06-12 PROCEDURE — 83970 ASSAY OF PARATHORMONE: CPT

## 2023-06-12 PROCEDURE — 84100 ASSAY OF PHOSPHORUS: CPT

## 2023-06-12 PROCEDURE — 85025 COMPLETE CBC W/AUTO DIFF WBC: CPT

## 2023-06-13 ENCOUNTER — TELEPHONE (OUTPATIENT)
Dept: NEPHROLOGY | Facility: CLINIC | Age: 58
End: 2023-06-13

## 2023-06-13 DIAGNOSIS — N18.9 CHRONIC KIDNEY DISEASE, UNSPECIFIED CKD STAGE: Primary | ICD-10-CM

## 2023-06-13 NOTE — TELEPHONE ENCOUNTER
----- Message from Rachel Roach MD sent at 6/12/2023  3:21 PM EDT -----  Please let the patient know that his kidney numbers are looking stable at baseline but slightly higher than in December  here is no protein in the urine which is a good sign  Please ask for any symptoms/signs of dehydration such as excessive diarrhea or excessive urination or lightheadedness or dizziness  Please also ask about any symptoms of urinary urinary retention such as difficulty starting or stopping urinary stream or slow urinary stream or urinating several times during the night  Please encourage oral hydration  Please advise to check BMP in 1 month  Thank you

## 2023-06-13 NOTE — TELEPHONE ENCOUNTER
Called and spoke to the patient's emergency contact, Damion Foster, to relay the message above  Damion Foster expressed understanding and stated that the patient does not drink nearly enough water, but she would try to have him drink more  She denies any other symptoms  Agreeable to BMP  No further questions or concerns at this time

## 2023-08-17 ENCOUNTER — OFFICE VISIT (OUTPATIENT)
Dept: FAMILY MEDICINE CLINIC | Facility: CLINIC | Age: 58
End: 2023-08-17
Payer: MEDICARE

## 2023-08-17 VITALS
HEART RATE: 72 BPM | BODY MASS INDEX: 27 KG/M2 | RESPIRATION RATE: 16 BRPM | TEMPERATURE: 98.4 F | OXYGEN SATURATION: 97 % | HEIGHT: 66 IN | WEIGHT: 168 LBS | DIASTOLIC BLOOD PRESSURE: 72 MMHG | SYSTOLIC BLOOD PRESSURE: 120 MMHG

## 2023-08-17 DIAGNOSIS — Z12.12 SCREENING FOR COLORECTAL CANCER: ICD-10-CM

## 2023-08-17 DIAGNOSIS — Z12.11 SCREENING FOR COLORECTAL CANCER: ICD-10-CM

## 2023-08-17 DIAGNOSIS — E78.5 HYPERLIPIDEMIA: ICD-10-CM

## 2023-08-17 DIAGNOSIS — K21.9 GERD (GASTROESOPHAGEAL REFLUX DISEASE): ICD-10-CM

## 2023-08-17 DIAGNOSIS — F33.40 RECURRENT MAJOR DEPRESSIVE DISORDER, IN REMISSION (HCC): ICD-10-CM

## 2023-08-17 DIAGNOSIS — E78.5 HYPERLIPIDEMIA ASSOCIATED WITH TYPE 2 DIABETES MELLITUS (HCC): ICD-10-CM

## 2023-08-17 DIAGNOSIS — E11.9 TYPE 2 DIABETES MELLITUS WITHOUT COMPLICATION, WITHOUT LONG-TERM CURRENT USE OF INSULIN (HCC): Primary | ICD-10-CM

## 2023-08-17 DIAGNOSIS — I10 PRIMARY HYPERTENSION: ICD-10-CM

## 2023-08-17 DIAGNOSIS — Z72.0 TOBACCO ABUSE: ICD-10-CM

## 2023-08-17 DIAGNOSIS — E11.69 HYPERLIPIDEMIA ASSOCIATED WITH TYPE 2 DIABETES MELLITUS (HCC): ICD-10-CM

## 2023-08-17 DIAGNOSIS — F33.3 SEVERE EPISODE OF RECURRENT MAJOR DEPRESSIVE DISORDER, WITH PSYCHOTIC FEATURES (HCC): ICD-10-CM

## 2023-08-17 PROBLEM — F11.11: Status: ACTIVE | Noted: 2017-05-31

## 2023-08-17 PROBLEM — K22.10 EROSIVE ESOPHAGITIS: Status: ACTIVE | Noted: 2017-03-01

## 2023-08-17 PROBLEM — B18.2 CHRONIC HEPATITIS C VIRUS INFECTION (HCC): Status: ACTIVE | Noted: 2017-04-10

## 2023-08-17 PROBLEM — K64.4 HEMORRHOIDS, EXTERNAL: Status: ACTIVE | Noted: 2017-03-01

## 2023-08-17 PROBLEM — F31.9 AFFECTIVE PSYCHOSIS, BIPOLAR (HCC): Status: ACTIVE | Noted: 2017-03-08

## 2023-08-17 LAB — SL AMB POCT HEMOGLOBIN AIC: 6.1 (ref ?–6.5)

## 2023-08-17 PROCEDURE — 83036 HEMOGLOBIN GLYCOSYLATED A1C: CPT | Performed by: FAMILY MEDICINE

## 2023-08-17 PROCEDURE — 99204 OFFICE O/P NEW MOD 45 MIN: CPT | Performed by: FAMILY MEDICINE

## 2023-08-17 RX ORDER — PRAZOSIN HYDROCHLORIDE 2 MG/1
2 CAPSULE ORAL
Qty: 30 CAPSULE | Refills: 5 | Status: SHIPPED | OUTPATIENT
Start: 2023-08-17

## 2023-08-17 RX ORDER — NICOTINE 21 MG/24HR
1 PATCH, TRANSDERMAL 24 HOURS TRANSDERMAL EVERY 24 HOURS
Qty: 14 PATCH | Refills: 1 | Status: SHIPPED | OUTPATIENT
Start: 2023-08-17

## 2023-08-17 RX ORDER — PANTOPRAZOLE SODIUM 40 MG/1
40 TABLET, DELAYED RELEASE ORAL DAILY
Qty: 30 TABLET | Refills: 5 | Status: SHIPPED | OUTPATIENT
Start: 2023-08-17

## 2023-08-17 RX ORDER — ATORVASTATIN CALCIUM 10 MG/1
10 TABLET, FILM COATED ORAL
Qty: 30 TABLET | Refills: 5 | Status: SHIPPED | OUTPATIENT
Start: 2023-08-17

## 2023-08-17 RX ORDER — LISINOPRIL 5 MG/1
5 TABLET ORAL DAILY
Qty: 30 TABLET | Refills: 5 | Status: SHIPPED | OUTPATIENT
Start: 2023-08-17

## 2023-08-17 NOTE — ASSESSMENT & PLAN NOTE
Hyperlipidemia.   Cholesterol stable on current dose of statin therapy  Lab Results   Component Value Date    HGBA1C 6.1 08/17/2023

## 2023-08-17 NOTE — ASSESSMENT & PLAN NOTE
Tobacco abuse. Patient given prescription for NicoDerm patches to use 21 mg patch for minimum of 2 weeks as directed. If patient tolerates well he will continue with titrating patches of 14 mg x 2 weeks, 7 mg patch x2 weeks.

## 2023-08-17 NOTE — ASSESSMENT & PLAN NOTE
Recurrent major depressive disorder. Patient currently in remission on current regimen of medications. He will continue to meet with his psychiatry office for continued monitoring of condition and refills on medications.

## 2023-08-17 NOTE — PROGRESS NOTES
FAMILY PRACTICE OFFICE VISIT       NAME: Tank Awan  AGE: 62 y.o. SEX: male       : 1965        MRN: [id number]    DATE: 2023  TIME: 11:06 AM    Assessment and Plan     Problem List Items Addressed This Visit        Digestive    GERD (gastroesophageal reflux disease)    Relevant Medications    pantoprazole (PROTONIX) 40 mg tablet       Endocrine    DM (diabetes mellitus), type 2 ([address]) - Primary     Diabetes. A1c stable at 6.1. Currently patient is controlled by diet. He does admit to having a sweet tooth and consuming excess carbohydrates at times. He will try to curb these practices to continue to improve his blood sugars. Patient states he had eye exam at [facility] in [facility] 2 months ago. His foot exam is up-to-date  Lab Results   Component Value Date    HGBA1C 6.1 2023            Relevant Medications    atorvastatin (LIPITOR) 10 mg tablet    Other Relevant Orders    POCT hemoglobin A1c (Completed)    Hyperlipidemia associated with type 2 diabetes mellitus ([address])     Hyperlipidemia. Cholesterol stable on current dose of statin therapy  Lab Results   Component Value Date    HGBA1C 6.1 2023            Relevant Medications    atorvastatin (LIPITOR) 10 mg tablet       Cardiovascular and Mediastinum    Hypertension     Hypertension. The patient's blood pressure is stable at this time and he will continue current regimen of medications         Relevant Medications    lisinopril (ZESTRIL) 5 mg tablet    prazosin (MINIPRESS) 2 mg capsule       Other    Recurrent major depressive disorder, in remission ([address])     Recurrent major depressive disorder. Patient currently in remission on current regimen of medications. He will continue to meet with his psychiatry office for continued monitoring of condition and refills on medications. Relevant Medications    nicotine (NICODERM CQ) 21 mg/24 hr TD 24 hr patch    Tobacco abuse     Tobacco abuse.   Patient given prescription for NicoDerm patches to use 21 mg patch for minimum of 2 weeks as directed. If patient tolerates well he will continue with titrating patches of 14 mg x 2 weeks, 7 mg patch x2 weeks. Relevant Medications    nicotine (NICODERM CQ) 21 mg/24 hr TD 24 hr patch    Severe episode of recurrent major depressive disorder, with psychotic features (HCC)    Relevant Medications    prazosin (MINIPRESS) 2 mg capsule    nicotine (NICODERM CQ) 21 mg/24 hr TD 24 hr patch   Other Visit Diagnoses     Screening for colorectal cancer        Relevant Orders    Ambulatory referral to Gastroenterology    Hyperlipidemia        Relevant Medications    atorvastatin (LIPITOR) 10 mg tablet              Chief Complaint     Chief Complaint   Patient presents with   • Physical Exam     DM FOOT EXAM_ Socks and shoes removed    • Establish Care       History of Present Illness     Patient in the office to establish new PCP. He has been in this country since approximately 2014 when he arrived from [place].  He is currently living in a rooming house where he shares a kitchen with several other people. He is on disability due to his psychiatric condition. He has a long history of psychosis, PTSD, seizures, bipolar disorder, depression and anxiety. He has a prior history of heroin use however he states he has not been using illegal substances other than occasional recreational marijuana since April 2023. He is working with his psychiatry office to get approved for medical marijuana use. Patient continues to smoke 1/2 to 1 pack of cigarettes per day. Patient would like to use nicotine patches to try and discontinue smoking. At this time he denies any suicidal or homicidal ideations. He is connected with [facility] for prescriptions for his psychiatric medications. Review of Systems   Review of Systems   Constitutional: Negative. Respiratory: Negative. Cardiovascular: Negative. Gastrointestinal: Negative. Genitourinary: Negative.     Psychiatric/Behavioral:        As per HPI       Active Problem List     Patient Active Problem List   Diagnosis   • Recurrent major depressive disorder, in remission ([address])   • Back pain   • Tobacco abuse   • Abnormal liver enzymes   • PTSD (post-traumatic stress disorder)   • Opioid dependence in remission ([address])   • TEO (acute kidney injury) ([address])   • DM (diabetes mellitus), type 2 (HCC)   • Hypertension   • Bradycardia   • Thrombocytopenia (HCC)   • Anemia   • Gross hematuria   • CKD (chronic kidney disease)   • Medical clearance for psychiatric admission   • Psychiatric disorder   • Severe episode of recurrent major depressive disorder, with psychotic features ([address])   • COVID-19   • Mild protein-calorie malnutrition (HCC)   • Allergic rhinitis   • Chronic constipation   • Chronic hepatitis C virus infection ([address])   • Affective psychosis, bipolar ([address])   • Depression   • Erosive esophagitis   • GERD (gastroesophageal reflux disease)   • Hemorrhoids, external   • Heroin use disorder, mild, in sustained remission, abuse ([address])   • Hyperlipidemia associated with type 2 diabetes mellitus ([address])       Past Medical History:  Past Medical History:   Diagnosis Date   • Abdominal pain    • Allergic rhinitis    • Cancer (HCC)    • Chronic pain    • Colon polyps    • Depression    • Fatigue    • Head injury    • Homeless    • Hypertension    • Insomnia    • Liver disease    • Loss of appetite    • Numbness and tingling of right arm    • Palpitations    • Psychiatric disorder     bipolar   • PTSD (post-traumatic stress disorder)    • Seizures (HCC)    • Shortness of breath    • Substance abuse ([address])    • Swallowing difficulty        Past Surgical History:  Past Surgical History:   Procedure Laterality Date   • ABDOMINAL SURGERY     • COLONOSCOPY     • EYE SURGERY     • NECK SURGERY     • ORCHIECTOMY Right 5/19/2016    Procedure: ORCHIECTOMY INGUINAL biopsy of testicular mass, ;  Surgeon: [facility] Annetta Fernandez MD;  Location:  MAIN OR;  Service:    • [place] COLONOSCOPY FLX DX W/COLLJ SPEC WHEN PFRMD N/A 2/13/2017    Procedure: EGD AND COLONOSCOPY;  Surgeon: Demetrice Arreaga MD;  Location: [facility] GI LAB; Service: Gastroenterology   • [place] ESOPHAGOGASTRODUODENOSCOPY TRANSORAL DIAGNOSTIC N/A 11/16/2016    Procedure: EGD AND COLONOSCOPY;  Surgeon: Demetrice Arreaga MD;  Location:  GI LAB; Service: Gastroenterology       Family History:  Family History   Problem Relation Age of Onset   • Diabetes Mother    • Hypertension Brother        Social History:  Social History     Socioeconomic History   • Marital status:      Spouse name: Not on file   • Number of children: Not on file   • Years of education: Not on file   • Highest education level: Not on file   Occupational History   • Not on file   Tobacco Use   • Smoking status: Every Day     Packs/day: 1.00     Years: 41.00     Total pack years: 41.00     Types: Cigarettes   • Smokeless tobacco: Current   Vaping Use   • Vaping Use: Never used   Substance and Sexual Activity   • Alcohol use: No   • Drug use: Yes     Types: Heroin   • Sexual activity: Not Currently   Other Topics Concern   • Not on file   Social History Narrative   • Not on file     Social Determinants of Health     Financial Resource Strain: Not on file   Food Insecurity: Not on file   Transportation Needs: Not on file   Physical Activity: Not on file   Stress: Not on file   Social Connections: Not on file   Intimate Partner Violence: Not on file   Housing Stability: Not on file       Objective     Vitals:    08/17/23 0909   BP: 120/72   Pulse: 72   Resp: 16   Temp: 98.4 °F (36.9 °C)   SpO2: 97%     Wt Readings from Last 3 Encounters:   08/17/23 76.2 kg (168 lb)   12/20/22 57.3 kg (126 lb 5.2 oz)   10/24/22 60.8 kg (134 lb)       Physical Exam  Constitutional:       General: He is not in acute distress. Appearance: Normal appearance. He is not ill-appearing.    HENT:      Head: Normocephalic and atraumatic. Eyes:      General:         Right eye: No discharge. Left eye: No discharge. Extraocular Movements: Extraocular movements intact. Conjunctiva/sclera: Conjunctivae normal.      Pupils: Pupils are equal, round, and reactive to light. Neck:      Vascular: No carotid bruit. Cardiovascular:      Rate and Rhythm: Normal rate and regular rhythm. Pulses: no weak pulses          Dorsalis pedis pulses are 2+ on the right side and 2+ on the left side. Posterior tibial pulses are 2+ on the right side and 2+ on the left side. Heart sounds: Normal heart sounds. No murmur heard. Pulmonary:      Effort: Pulmonary effort is normal.      Breath sounds: Normal breath sounds. No wheezing, rhonchi or rales. Abdominal:      General: Abdomen is flat. Bowel sounds are normal. There is no distension. Palpations: Abdomen is soft. Tenderness: There is no abdominal tenderness. There is no guarding or rebound. Musculoskeletal:      Right lower leg: No edema. Left lower leg: No edema. Feet:      Right foot:      Skin integrity: No ulcer, skin breakdown, erythema, warmth, callus or dry skin. Left foot:      Skin integrity: No ulcer, skin breakdown, erythema, warmth, callus or dry skin. Lymphadenopathy:      Cervical: No cervical adenopathy. Skin:     Findings: No rash. Neurological:      General: No focal deficit present. Mental Status: He is alert and oriented to person, place, and time. Cranial Nerves: No cranial nerve deficit. Psychiatric:         Mood and Affect: Mood normal.         Behavior: Behavior normal.         Thought Content: Thought content normal.         Judgment: Judgment normal.       .Patient's shoes and socks removed. Right Foot/Ankle   Right Foot Inspection  Skin Exam: skin normal and skin intact. No dry skin, no warmth, no callus, no erythema, no maceration, no abnormal color, no pre-ulcer, no ulcer and no callus.      Toe Exam: ROM and strength within normal limits. Sensory   Vibration: intact  Monofilament testing: intact    Vascular  The right DP pulse is 2+. The right PT pulse is 2+. Left Foot/Ankle  Left Foot Inspection  Skin Exam: skin normal and skin intact. No dry skin, no warmth, no erythema, no maceration, normal color, no pre-ulcer, no ulcer and no callus. Toe Exam: ROM and strength within normal limits. Sensory   Vibration: intact  Monofilament testing: intact    Vascular  The left DP pulse is 2+. The left PT pulse is 2+.      Assign Risk Category  No deformity present  No loss of protective sensation  No weak pulses  Risk: 0      Pertinent Laboratory/Diagnostic Studies:  Lab Results   Component Value Date    BUN 20 06/12/2023    CREATININE 1.10 06/12/2023    CALCIUM 8.6 06/12/2023    K 4.3 06/12/2023    CO2 30 06/12/2023     06/12/2023     Lab Results   Component Value Date    ALT 31 06/12/2023    AST 24 06/12/2023     (H) 03/01/2017    ALKPHOS 63 06/12/2023       Lab Results   Component Value Date    WBC 6.01 06/12/2023    HGB 13.3 06/12/2023    HCT 39.4 06/12/2023    MCV 90 06/12/2023     06/12/2023       No results found for: "TSH"    No results found for: "CHOL"  Lab Results   Component Value Date    TRIG 257 (H) 06/12/2023     Lab Results   Component Value Date    HDL 38 (L) 06/12/2023     Lab Results   Component Value Date    LDLCALC 114 (H) 06/12/2023     Lab Results   Component Value Date    HGBA1C 6.1 08/17/2023       Results for orders placed or performed in visit on 08/17/23   POCT hemoglobin A1c   Result Value Ref Range    Hemoglobin A1C 6.1 6.5       Orders Placed This Encounter   Procedures   • Ambulatory referral to Gastroenterology   • POCT hemoglobin A1c       ALLERGIES:  Allergies   Allergen Reactions   • Oxycodone Swelling     Tongue swelling       Current Medications     Current Outpatient Medications   Medication Sig Dispense Refill   • atorvastatin (LIPITOR) 10 mg tablet Take 1 tablet (10 mg total) by mouth daily with dinner 30 tablet 5   • lisinopril (ZESTRIL) 5 mg tablet Take 1 tablet (5 mg total) by mouth daily 30 tablet 5   • nicotine (NICODERM CQ) 21 mg/24 hr TD 24 hr patch Place 1 patch on the skin over 24 hours every 24 hours 14 patch 1   • pantoprazole (PROTONIX) 40 mg tablet Take 1 tablet (40 mg total) by mouth in the morning 30 tablet 5   • prazosin (MINIPRESS) 2 mg capsule Take 1 capsule (2 mg total) by mouth daily at bedtime 30 capsule 5   • QUEtiapine (SEROquel) 100 mg tablet Take 1 tablet (100 mg total) by mouth daily at bedtime 30 tablet 1   • sertraline (ZOLOFT) 100 mg tablet Take 1 tablet (100 mg total) by mouth daily 30 tablet 1     No current facility-administered medications for this visit.          Health Maintenance     Health Maintenance   Topic Date Due   • COVID-19 Vaccine (1) Never done   • Diabetic Foot Exam  Never done   • BMI: Followup Plan  Never done   • Annual Physical  Never done   • Hepatitis A Vaccine (1 of 2 - Risk 2-dose series) Never done   • Pneumococcal Vaccine: Pediatrics (0 to 5 Years) and At-Risk Patients (6 to 59 Years) (2 - PCV) 05/22/2018   • Colorectal Cancer Screening  02/13/2020   • Influenza Vaccine (1) 09/01/2023   • DM Eye Exam  09/17/2023 (Originally 10/14/1975)   • Lung Cancer Screening  12/17/2023   • HEMOGLOBIN A1C  02/17/2024   • Kidney Health Evaluation: GFR  06/12/2024   • Kidney Health Evaluation: Albumin/Creatinine Ratio  06/12/2024   • BMI: Adult  08/17/2024   • Hepatitis B Vaccine (1 of 3 - Risk 3-dose series) 10/14/2025   • DTaP,Tdap,and Td Vaccines (2 - Td or Tdap) 10/11/2027   • HIV Screening  Completed   • Hepatitis C Screening  Completed   • HIB Vaccine  Aged Out   • IPV Vaccine  Aged Out   • Meningococcal ACWY Vaccine  Aged Out   • HPV Vaccine  Aged Out     Immunization History   Administered Date(s) Administered   • INFLUENZA 10/11/2017, 11/29/2018, 11/30/2020, 10/29/2021, 10/14/2022   • Pneumococcal Polysaccharide PPV23 05/22/2017   • Tdap 10/11/2017   • Tuberculin Skin Test-PPD Intradermal 23/22/8629       Hiro Kaminski MD    I spent 30 minutes with this patient of which greater than 50% was spent counseling or reviewing chart

## 2023-08-17 NOTE — ASSESSMENT & PLAN NOTE
Diabetes. A1c stable at 6.1. Currently patient is controlled by diet. He does admit to having a sweet tooth and consuming excess carbohydrates at times. He will try to curb these practices to continue to improve his blood sugars. Patient states he had eye exam at [facility] 2 months ago.   His foot exam is up-to-date  Lab Results   Component Value Date    HGBA1C 6.1 08/17/2023

## 2023-08-17 NOTE — ASSESSMENT & PLAN NOTE
Hypertension.   The patient's blood pressure is stable at this time and he will continue current regimen of medications

## 2023-10-02 ENCOUNTER — APPOINTMENT (EMERGENCY)
Dept: RADIOLOGY | Facility: HOSPITAL | Age: 58
End: 2023-10-02
Payer: MEDICARE

## 2023-10-02 ENCOUNTER — HOSPITAL ENCOUNTER (EMERGENCY)
Facility: HOSPITAL | Age: 58
Discharge: HOME | End: 2023-10-02
Attending: EMERGENCY MEDICINE | Admitting: EMERGENCY MEDICINE
Payer: MEDICARE

## 2023-10-02 VITALS
OXYGEN SATURATION: 98 % | DIASTOLIC BLOOD PRESSURE: 85 MMHG | TEMPERATURE: 97.4 F | SYSTOLIC BLOOD PRESSURE: 158 MMHG | RESPIRATION RATE: 18 BRPM | HEART RATE: 58 BPM

## 2023-10-02 DIAGNOSIS — M54.9 BACK PAIN: ICD-10-CM

## 2023-10-02 DIAGNOSIS — R51.9 HEADACHE: ICD-10-CM

## 2023-10-02 DIAGNOSIS — R07.9 CHEST PAIN: ICD-10-CM

## 2023-10-02 DIAGNOSIS — R05.9 COUGH: Primary | ICD-10-CM

## 2023-10-02 LAB
ALBUMIN SERPL BCP-MCNC: 3.9 G/DL (ref 3.5–5)
ALP SERPL-CCNC: 57 U/L (ref 34–104)
ALT SERPL W P-5'-P-CCNC: 16 U/L (ref 7–52)
ANION GAP SERPL CALCULATED.3IONS-SCNC: 7 MMOL/L
AST SERPL W P-5'-P-CCNC: 18 U/L (ref 13–39)
BASOPHILS # BLD AUTO: 0.02 THOUSANDS/ÂΜL (ref 0–0.1)
BASOPHILS NFR BLD AUTO: 0 % (ref 0–1)
BILIRUB SERPL-MCNC: 0.4 MG/DL (ref 0.2–1)
BUN SERPL-MCNC: 14 MG/DL (ref 5–25)
CALCIUM SERPL-MCNC: 8.7 MG/DL (ref 8.4–10.2)
CARDIAC TROPONIN I PNL SERPL HS: 3 NG/L
CHLORIDE SERPL-SCNC: 106 MMOL/L (ref 96–108)
CO2 SERPL-SCNC: 25 MMOL/L (ref 21–32)
CREAT SERPL-MCNC: 0.8 MG/DL (ref 0.6–1.3)
EOSINOPHIL # BLD AUTO: 0.08 THOUSAND/ÂΜL (ref 0–0.61)
EOSINOPHIL NFR BLD AUTO: 1 % (ref 0–6)
ERYTHROCYTE [DISTWIDTH] IN BLOOD BY AUTOMATED COUNT: 12.3 % (ref 11.6–15.1)
FLUAV RNA RESP QL NAA+PROBE: NEGATIVE
FLUBV RNA RESP QL NAA+PROBE: NEGATIVE
GFR SERPL CREATININE-BSD FRML MDRD: 99 ML/MIN/1.73SQ M
GLUCOSE SERPL-MCNC: 124 MG/DL (ref 65–140)
HCT VFR BLD AUTO: 40.9 % (ref 36.5–49.3)
HGB BLD-MCNC: 13.8 G/DL (ref 12–17)
IMM GRANULOCYTES # BLD AUTO: 0.03 THOUSAND/UL (ref 0–0.2)
IMM GRANULOCYTES NFR BLD AUTO: 1 % (ref 0–2)
LYMPHOCYTES # BLD AUTO: 1.34 THOUSANDS/ÂΜL (ref 0.6–4.47)
LYMPHOCYTES NFR BLD AUTO: 20 % (ref 14–44)
MCH RBC QN AUTO: 31 PG (ref 26.8–34.3)
MCHC RBC AUTO-ENTMCNC: 33.7 G/DL (ref 31.4–37.4)
MCV RBC AUTO: 92 FL (ref 82–98)
MONOCYTES # BLD AUTO: 0.47 THOUSAND/ÂΜL (ref 0.17–1.22)
MONOCYTES NFR BLD AUTO: 7 % (ref 4–12)
NEUTROPHILS # BLD AUTO: 4.62 THOUSANDS/ÂΜL (ref 1.85–7.62)
NEUTS SEG NFR BLD AUTO: 71 % (ref 43–75)
NRBC BLD AUTO-RTO: 0 /100 WBCS
PLATELET # BLD AUTO: 227 THOUSANDS/UL (ref 149–390)
PMV BLD AUTO: 10 FL (ref 8.9–12.7)
POTASSIUM SERPL-SCNC: 3.7 MMOL/L (ref 3.5–5.3)
PROCALCITONIN SERPL-MCNC: <0.05 NG/ML
PROT SERPL-MCNC: 7.7 G/DL (ref 6.4–8.4)
RBC # BLD AUTO: 4.45 MILLION/UL (ref 3.88–5.62)
RSV RNA RESP QL NAA+PROBE: NEGATIVE
SARS-COV-2 RNA RESP QL NAA+PROBE: NEGATIVE
SODIUM SERPL-SCNC: 138 MMOL/L (ref 135–147)
WBC # BLD AUTO: 6.56 THOUSAND/UL (ref 4.31–10.16)

## 2023-10-02 PROCEDURE — 96375 TX/PRO/DX INJ NEW DRUG ADDON: CPT

## 2023-10-02 PROCEDURE — 80053 COMPREHEN METABOLIC PANEL: CPT

## 2023-10-02 PROCEDURE — 71046 X-RAY EXAM CHEST 2 VIEWS: CPT

## 2023-10-02 PROCEDURE — 94640 AIRWAY INHALATION TREATMENT: CPT

## 2023-10-02 PROCEDURE — 36415 COLL VENOUS BLD VENIPUNCTURE: CPT

## 2023-10-02 PROCEDURE — 99285 EMERGENCY DEPT VISIT HI MDM: CPT | Performed by: EMERGENCY MEDICINE

## 2023-10-02 PROCEDURE — 85025 COMPLETE CBC W/AUTO DIFF WBC: CPT

## 2023-10-02 PROCEDURE — 96374 THER/PROPH/DIAG INJ IV PUSH: CPT

## 2023-10-02 PROCEDURE — 93005 ELECTROCARDIOGRAM TRACING: CPT

## 2023-10-02 PROCEDURE — 84484 ASSAY OF TROPONIN QUANT: CPT

## 2023-10-02 PROCEDURE — 99284 EMERGENCY DEPT VISIT MOD MDM: CPT

## 2023-10-02 PROCEDURE — 0241U HB NFCT DS VIR RESP RNA 4 TRGT: CPT

## 2023-10-02 PROCEDURE — 84145 PROCALCITONIN (PCT): CPT

## 2023-10-02 RX ORDER — LIDOCAINE 50 MG/G
2 PATCH TOPICAL ONCE
Status: DISCONTINUED | OUTPATIENT
Start: 2023-10-02 | End: 2023-10-02 | Stop reason: HOSPADM

## 2023-10-02 RX ORDER — ONDANSETRON 2 MG/ML
4 INJECTION INTRAMUSCULAR; INTRAVENOUS ONCE
Status: COMPLETED | OUTPATIENT
Start: 2023-10-02 | End: 2023-10-02

## 2023-10-02 RX ORDER — KETOROLAC TROMETHAMINE 30 MG/ML
15 INJECTION, SOLUTION INTRAMUSCULAR; INTRAVENOUS ONCE
Status: COMPLETED | OUTPATIENT
Start: 2023-10-02 | End: 2023-10-02

## 2023-10-02 RX ORDER — ACETAMINOPHEN 325 MG/1
975 TABLET ORAL ONCE
Status: COMPLETED | OUTPATIENT
Start: 2023-10-02 | End: 2023-10-02

## 2023-10-02 RX ORDER — ALBUTEROL SULFATE 2.5 MG/3ML
2.5 SOLUTION RESPIRATORY (INHALATION) ONCE
Status: COMPLETED | OUTPATIENT
Start: 2023-10-02 | End: 2023-10-02

## 2023-10-02 RX ADMIN — ALBUTEROL SULFATE 2.5 MG: 2.5 SOLUTION RESPIRATORY (INHALATION) at 09:34

## 2023-10-02 RX ADMIN — ACETAMINOPHEN 975 MG: 325 TABLET, FILM COATED ORAL at 10:43

## 2023-10-02 RX ADMIN — ONDANSETRON 4 MG: 2 INJECTION INTRAMUSCULAR; INTRAVENOUS at 09:06

## 2023-10-02 RX ADMIN — IPRATROPIUM BROMIDE 0.5 MG: 0.5 SOLUTION RESPIRATORY (INHALATION) at 09:34

## 2023-10-02 RX ADMIN — LIDOCAINE 2 PATCH: 50 PATCH CUTANEOUS at 10:38

## 2023-10-02 RX ADMIN — KETOROLAC TROMETHAMINE 15 MG: 30 INJECTION, SOLUTION INTRAMUSCULAR; INTRAVENOUS at 10:43

## 2023-10-02 NOTE — ED PROVIDER NOTES
History  Chief Complaint   Patient presents with   • Cold Like Symptoms     C/o of fever, n/v, decreased appetite, lower back pain and a cough for about 2 days. • Pain With Breathing     C/o of chest pain that does not radiate anywhere     HPI  Patient is a 62 y.o. male with history of liver disease presenting to the emergency department for multiple complaints. Patient states that he developed a fever, body aches, and cough two days ago. Patient complains of having chest pain associated with coughing but denies having any chest pain at rest or with exertion. He also complains of having a diffuse headaches, denies having any focal weakness or paraesthesias. Denies any recent falls or injury. He developed vomiting prior to arrival today which he states was non-bloody / non-bilious. Denies having any bowel changes. Prior to Admission Medications   Prescriptions Last Dose Informant Patient Reported? Taking?    QUEtiapine (SEROquel) 100 mg tablet   No No   Sig: Take 1 tablet (100 mg total) by mouth daily at bedtime   atorvastatin (LIPITOR) 10 mg tablet   No No   Sig: Take 1 tablet (10 mg total) by mouth daily with dinner   lisinopril (ZESTRIL) 5 mg tablet   No No   Sig: Take 1 tablet (5 mg total) by mouth daily   nicotine (NICODERM CQ) 21 mg/24 hr TD 24 hr patch   No No   Sig: Place 1 patch on the skin over 24 hours every 24 hours   pantoprazole (PROTONIX) 40 mg tablet   No No   Sig: Take 1 tablet (40 mg total) by mouth in the morning   prazosin (MINIPRESS) 2 mg capsule   No No   Sig: Take 1 capsule (2 mg total) by mouth daily at bedtime   sertraline (ZOLOFT) 100 mg tablet   No No   Sig: Take 1 tablet (100 mg total) by mouth daily      Facility-Administered Medications: None       Past Medical History:   Diagnosis Date   • Abdominal pain    • Allergic rhinitis    • Cancer [facility])    • Chronic pain    • Colon polyps    • Depression    • Fatigue    • Head injury    • Homeless    • Hypertension    • Insomnia    • Liver disease    • Loss of appetite    • Numbness and tingling of right arm    • Palpitations    • Psychiatric disorder     bipolar   • PTSD (post-traumatic stress disorder)    • Seizures (HCC)    • Shortness of breath    • Substance abuse (HCC)    • Swallowing difficulty        Past Surgical History:   Procedure Laterality Date   • ABDOMINAL SURGERY     • COLONOSCOPY     • EYE SURGERY     • NECK SURGERY     • ORCHIECTOMY Right 5/19/2016    Procedure: ORCHIECTOMY INGUINAL biopsy of testicular mass, ;  Surgeon: Deepika Pearson MD;  Location:  MAIN OR;  Service:    • CA COLONOSCOPY FLX DX W/COLLJ SPEC WHEN PFRMD N/A 2/13/2017    Procedure: EGD AND COLONOSCOPY;  Surgeon: Roopa Camargo MD;  Location: [facility] GI LAB; Service: Gastroenterology   • CA ESOPHAGOGASTRODUODENOSCOPY TRANSORAL DIAGNOSTIC N/A 11/16/2016    Procedure: EGD AND COLONOSCOPY;  Surgeon: Roopa Camargo MD;  Location:  GI LAB; Service: Gastroenterology       Family History   Problem Relation Age of Onset   • Diabetes Mother    • Hypertension Brother      I have reviewed and agree with the history as documented. E-Cigarette/Vaping   • E-Cigarette Use Never User      E-Cigarette/Vaping Substances   • Nicotine No    • THC No    • CBD No    • Flavoring No    • Other No    • Unknown No      Social History     Tobacco Use   • Smoking status: Every Day     Packs/day: 1.00     Years: 41.00     Total pack years: 41.00     Types: Cigarettes   • Smokeless tobacco: Current   Vaping Use   • Vaping Use: Never used   Substance Use Topics   • Alcohol use: No   • Drug use: Yes     Types: Heroin        Review of Systems   Constitutional: Negative for fever. HENT: Negative for congestion. Eyes: Negative for pain. Respiratory: Positive for cough. Cardiovascular: Positive for chest pain. Gastrointestinal: Positive for abdominal pain, nausea and vomiting. Negative for diarrhea. Genitourinary: Negative for dysuria. Musculoskeletal: Negative for back pain. Skin: Negative for rash. Neurological: Positive for headaches. Negative for dizziness. All other systems reviewed and are negative. Physical Exam  ED Triage Vitals   Temperature Pulse Respirations Blood Pressure SpO2   10/02/23 0819 10/02/23 0819 10/02/23 0819 10/02/23 0819 10/02/23 0819   (!) 97.4 °F (36.3 °C) (!) 154 (!) 24 (!) 198/100 97 %      Temp Source Heart Rate Source Patient Position - Orthostatic VS BP Location FiO2 (%)   10/02/23 0819 10/02/23 0819 10/02/23 0830 10/02/23 0830 --   Oral Monitor Lying Right arm       Pain Score       10/02/23 0819       7             Orthostatic Vital Signs  Vitals:    10/02/23 0819 10/02/23 0830 10/02/23 1030 10/02/23 1130   BP: (!) 198/100 (!) 176/67 143/71 158/85   Pulse: (!) 154 96 60 58   Patient Position - Orthostatic VS:  Lying Lying Lying       Physical Exam  Vitals and nursing note reviewed. Constitutional:       Appearance: Normal appearance. HENT:      Head: Normocephalic and atraumatic. Mouth/Throat:      Mouth: Mucous membranes are moist.   Eyes:      Conjunctiva/sclera: Conjunctivae normal.   Cardiovascular:      Rate and Rhythm: Normal rate and regular rhythm. Pulses: Normal pulses. Heart sounds: Normal heart sounds. Pulmonary:      Effort: Pulmonary effort is normal.      Breath sounds: Wheezing present. Abdominal:      Palpations: Abdomen is soft. Tenderness: There is no abdominal tenderness. Musculoskeletal:         General: No tenderness. Cervical back: Neck supple. Right lower leg: No edema. Left lower leg: No edema. Skin:     General: Skin is warm and dry. Capillary Refill: Capillary refill takes less than 2 seconds. Neurological:      General: No focal deficit present. Mental Status: He is alert and oriented to person, place, and time. Mental status is at baseline. Cranial Nerves: No cranial nerve deficit. Motor: No weakness.       Coordination: Coordination normal. Psychiatric:         Mood and Affect: Mood normal.         ED Medications  Medications   albuterol inhalation solution 2.5 mg (2.5 mg Nebulization Given 10/2/23 0934)   ipratropium (ATROVENT) 0.02 % inhalation solution 0.5 mg (0.5 mg Nebulization Given 10/2/23 0934)   ondansetron (ZOFRAN) injection 4 mg (4 mg Intravenous Given 10/2/23 0906)   ketorolac (TORADOL) injection 15 mg (15 mg Intravenous Given 10/2/23 1043)   acetaminophen (TYLENOL) tablet 975 mg (975 mg Oral Given 10/2/23 1043)       Diagnostic Studies  Results Reviewed     Procedure Component Value Units Date/Time    Comprehensive metabolic panel [[id number]] Collected: 10/02/23 0947    Lab Status: Final result Specimen: Blood from Arm, Left Updated: 10/02/23 1027     Sodium 138 mmol/L      Potassium 3.7 mmol/L      Chloride 106 mmol/L      CO2 25 mmol/L      ANION GAP 7 mmol/L      BUN 14 mg/dL      Creatinine 0.80 mg/dL      Glucose 124 mg/dL      Calcium 8.7 mg/dL      AST 18 U/L      ALT 16 U/L      Alkaline Phosphatase 57 U/L      Total Protein 7.7 g/dL      Albumin 3.9 g/dL      Total Bilirubin 0.40 mg/dL      eGFR 99 ml/min/1.73sq m     Narrative:      [place] guidelines for Chronic Kidney Disease (CKD):   •  Stage 1 with normal or high GFR (GFR > 90 mL/min/1.73 square meters)  •  Stage 2 Mild CKD (GFR = 60-89 mL/min/1.73 square meters)  •  Stage 3A Moderate CKD (GFR = 45-59 mL/min/1.73 square meters)  •  Stage 3B Moderate CKD (GFR = 30-44 mL/min/1.73 square meters)  •  Stage 4 Severe CKD (GFR = 15-29 mL/min/1.73 square meters)  •  Stage 5 End Stage CKD (GFR <15 mL/min/1.73 square meters)  Note: GFR calculation is accurate only with a steady state creatinine    FLU/RSV/COVID - if FLU/RSV clinically relevant [[id number]]  (Normal) Collected: 10/02/23 0905    Lab Status: Final result Specimen: Nares from Nose Updated: 10/02/23 1002     SARS-CoV-2 Negative     INFLUENZA A PCR Negative     INFLUENZA B PCR Negative     RSV PCR Negative    Narrative:      FOR PEDIATRIC PATIENTS - copy/paste COVID Guidelines URL to browser: [email or web address]    SARS-CoV-2 assay is a Nucleic Acid Amplification assay intended for the  qualitative detection of nucleic acid from SARS-CoV-2 in nasopharyngeal  swabs. Results are for the presumptive identification of SARS-CoV-2 RNA. Positive results are indicative of infection with SARS-CoV-2, the virus  causing COVID-19, but do not rule out bacterial infection or co-infection  with other viruses. Laboratories within the [facility] and its  territories are required to report all positive results to the appropriate  public health authorities. Negative results do not preclude SARS-CoV-2  infection and should not be used as the sole basis for treatment or other  patient management decisions. Negative results must be combined with  clinical observations, patient history, and epidemiological information. This test has not been FDA cleared or approved. This test has been authorized by FDA under an Emergency Use Authorization  (EUA). This test is only authorized for the duration of time the  declaration that circumstances exist justifying the authorization of the  emergency use of an in vitro diagnostic tests for detection of SARS-CoV-2  virus and/or diagnosis of COVID-19 infection under section 564(b)(1) of  the Act, 21 U. S.C. 586WOX-0(P)(9), unless the authorization is terminated  or revoked sooner. The test has been validated but independent review by FDA  and CLIA is pending. Test performed using [employer] GeneXpert: This RT-PCR assay targets N2,  a region unique to SARS-CoV-2. A conserved region in the E-gene was chosen  for pan-Sarbecovirus detection which includes SARS-CoV-2. According to CMS-2020-01-R, this platform meets the definition of high-throughput technology.     Procalcitonin [[id number]]  (Normal) Collected: 10/02/23 0905    Lab Status: Final result Specimen: Blood from Arm, Right Updated: 10/02/23 0954     Procalcitonin <0.05 ng/ml     HS Troponin 0hr (reflex protocol) [[id number]]  (Normal) Collected: 10/02/23 0905    Lab Status: Final result Specimen: Blood from Arm, Right Updated: 10/02/23 0951     hs TnI 0hr 3 ng/L     CBC and differential [[id number]] Collected: 10/02/23 0905    Lab Status: Final result Specimen: Blood from Arm, Right Updated: 10/02/23 0914     WBC 6.56 Thousand/uL      RBC 4.45 Million/uL      Hemoglobin 13.8 g/dL      Hematocrit 40.9 %      MCV 92 fL      MCH 31.0 pg      MCHC 33.7 g/dL      RDW 12.3 %      MPV 10.0 fL      Platelets 636 Thousands/uL      nRBC 0 /100 WBCs      Neutrophils Relative 71 %      Immat GRANS % 1 %      Lymphocytes Relative 20 %      Monocytes Relative 7 %      Eosinophils Relative 1 %      Basophils Relative 0 %      Neutrophils Absolute 4.62 Thousands/µL      Immature Grans Absolute 0.03 Thousand/uL      Lymphocytes Absolute 1.34 Thousands/µL      Monocytes Absolute 0.47 Thousand/µL      Eosinophils Absolute 0.08 Thousand/µL      Basophils Absolute 0.02 Thousands/µL                  XR chest 2 views   Final Result by Quin Mendoza MD (10/02 0957)      No acute cardiopulmonary disease. Right apical groundglass opacity evident by CT is not visualized            Workstation performed: [id number]               Procedures  Procedures      ED Course  ED Course as of 10/04/23 1849   Mon Oct 02, 2023   5362 Procedure Note: EKG  Date/Time: 10/02/23 8:32 AM   Interpreted by: Leann Naidu  Indications / Diagnosis: chest pain  ECG reviewed by me, the ED Provider: yes   The EKG demonstrates:  Rate: 59  Rhythm: sinus bradycardia  Intervals: regular  Axis: regular  QRS/Blocks: LVH  ST Changes: No acute ST Changes, no STD/ANNIE. Compared to prior EKG on 12/17/22, no acute change.       0924 WBC: 6.56   1004 FLU/RSV/COVID - if FLU/RSV clinically relevant  Negative for covid/flu/rsv   1005 hs TnI 0hr: 3   1005 Procalcitonin: <0.05  Low concern for acute pneumonia   1005 No acute abnormalities on CXR   1030 Comprehensive metabolic panel  No acute abnormalities   1033 Lungs clear, symptoms improved. Complaining of lower back pain   1158 Patients symptoms improved. Stable for discharge home. HEART Risk Score    Flowsheet Row Most Recent Value   Heart Score Risk Calculator    History 0 Filed at: 10/02/2023 1159   ECG 0 Filed at: 10/02/2023 1159   Age 1 Filed at: 10/02/2023 1159   Risk Factors 1 Filed at: 10/02/2023 1159   Troponin 0 Filed at: 10/02/2023 1159   HEART Score 2 Filed at: 10/02/2023 1159                                Medical Decision Making  Amount and/or Complexity of Data Reviewed  Labs: ordered. Decision-making details documented in ED Course. Radiology: ordered. Risk  OTC drugs. Prescription drug management. Patient is a 62 y.o. male with PMH of liver disease who presents to the ED with multiple complaints. Vital signs initial pulse documented as 154, on arrival to room heart rate in the 50s, initial documentation likely error. On exam no abdominal tenderness, lungs clear. History and physical exam most consistent with viral illness. However, differential diagnosis included but not limited to ACS, pneumonia, asthma. Plan basic labs, troponin, CXR. Will treat with duo-neb, toradol, tylenol. View ED course above for further discussion on patient workup. All labs reviewed and utilized in the medical decision making process  All radiology studies independently viewed by me and interpreted by the radiologist.  I reviewed all testing with the patient. Upon re-evaluation patients symptoms improved, stable for discharge home. I have reviewed the patient's vital signs, nursing notes, and other relevant tests/information. I had a detailed discussion with the patient regarding the history, exam findings, and any diagnostic results.    Plan to discharge home in stable condition, follow up with PCP. Discussed with patient who is agreeable to plan. I discussed discharge instructions, need for follow-up, and oral return precautions for what to return for in addition to the written return precautions and discharge instructions, specifically highlighting areas of special concern. The patient verbalized understanding of the discharge instructions and warnings that would necessitate return to the Emergency Department including exertional chest pain, difficulty breathing. All questions the patient had were answered prior to discharge to the best of my ability. Disposition  Final diagnoses:   Cough   Back pain   Chest pain   Headache     Time reflects when diagnosis was documented in both MDM as applicable and the Disposition within this note     Time User Action Codes Description Comment    10/2/2023 11:59 AM Denisha Bogus Add [R05.9] Cough     10/2/2023 12:00 PM Denisha Bogus Add [M54.9] Back pain     10/2/2023 12:00 PM Denisha Bogus Add [R07.9] Chest pain     10/2/2023 12:00 PM Denisha Bogus Add [R51.9] Headache       ED Disposition     ED Disposition   Discharge    Condition   Stable    Date/Time   Mon Oct 2, 2023 11:59 AM    Comment   Jamaal Villaseñor discharge to home/self care.                Follow-up Information     Follow up With Specialties Details Why Contact Tiffany Castillo MD Family Medicine Call in 1 day  [address]            Discharge Medication List as of 10/2/2023 12:27 PM      CONTINUE these medications which have NOT CHANGED    Details   atorvastatin (LIPITOR) 10 mg tablet Take 1 tablet (10 mg total) by mouth daily with dinner, Starting Thu 8/17/2023, Normal      lisinopril (ZESTRIL) 5 mg tablet Take 1 tablet (5 mg total) by mouth daily, Starting Thu 8/17/2023, Normal      nicotine (NICODERM CQ) 21 mg/24 hr TD 24 hr patch Place 1 patch on the skin over 24 hours every 24 hours, Starting TPACK 8/17/2023, Normal      pantoprazole (PROTONIX) 40 mg tablet Take 1 tablet (40 mg total) by mouth in the morning, Starting Thu 8/17/2023, Normal      prazosin (MINIPRESS) 2 mg capsule Take 1 capsule (2 mg total) by mouth daily at bedtime, Starting Thu 8/17/2023, Normal      QUEtiapine (SEROquel) 100 mg tablet Take 1 tablet (100 mg total) by mouth daily at bedtime, Starting Tue 12/27/2022, Normal      sertraline (ZOLOFT) 100 mg tablet Take 1 tablet (100 mg total) by mouth daily, Starting Tue 12/27/2022, Normal           No discharge procedures on file. PDMP Review       Value Time User    PDMP Reviewed  Yes 12/19/2022  2:25 PM Osiel Fatima MD           ED Provider  Attending physically available and evaluated Renzo Steiner. I managed the patient along with the ED Attending.     Electronically Signed by         Ricki Barnes DO  10/04/23 5356

## 2023-10-02 NOTE — ED PROVIDER NOTES
History  Chief Complaint   Patient presents with   • Cold Like Symptoms     C/o of fever, n/v, decreased appetite, lower back pain and a cough for about 2 days. • Pain With Breathing     C/o of chest pain that does not radiate anywhere     HPI this is a 51-year-old male who presents to the emergency department with fever, headache, body aches that started 2 days ago. Patient states that he has also been having a cough. Patient states that he has allover body aches, the cough has felt moist to him, and his chest hurts with coughing. The patient does not have pleuritic chest pain. He does not constant chest pain. He does not have exertional chest pain. His headache is all over his head, constant, and aching. He does not have neck stiffness. He does not have numbness, tingling, or weakness. He denies trauma. The patient additionally had 2 episodes of nonbloody nonbilious vomiting today. The patient review of systems is otherwise negative and 12 systems reviewed    Prior to Admission Medications   Prescriptions Last Dose Informant Patient Reported? Taking?    QUEtiapine (SEROquel) 100 mg tablet   No No   Sig: Take 1 tablet (100 mg total) by mouth daily at bedtime   atorvastatin (LIPITOR) 10 mg tablet   No No   Sig: Take 1 tablet (10 mg total) by mouth daily with dinner   lisinopril (ZESTRIL) 5 mg tablet   No No   Sig: Take 1 tablet (5 mg total) by mouth daily   nicotine (NICODERM CQ) 21 mg/24 hr TD 24 hr patch   No No   Sig: Place 1 patch on the skin over 24 hours every 24 hours   pantoprazole (PROTONIX) 40 mg tablet   No No   Sig: Take 1 tablet (40 mg total) by mouth in the morning   prazosin (MINIPRESS) 2 mg capsule   No No   Sig: Take 1 capsule (2 mg total) by mouth daily at bedtime   sertraline (ZOLOFT) 100 mg tablet   No No   Sig: Take 1 tablet (100 mg total) by mouth daily      Facility-Administered Medications: None       Past Medical History:   Diagnosis Date   • Abdominal pain    • Allergic rhinitis    • Cancer [facility])    • Chronic pain    • Colon polyps    • Depression    • Fatigue    • Head injury    • Homeless    • Hypertension    • Insomnia    • Liver disease    • Loss of appetite    • Numbness and tingling of right arm    • Palpitations    • Psychiatric disorder     bipolar   • PTSD (post-traumatic stress disorder)    • Seizures (HCC)    • Shortness of breath    • Substance abuse ([address])    • Swallowing difficulty        Past Surgical History:   Procedure Laterality Date   • ABDOMINAL SURGERY     • COLONOSCOPY     • EYE SURGERY     • NECK SURGERY     • ORCHIECTOMY Right 5/19/2016    Procedure: ORCHIECTOMY INGUINAL biopsy of testicular mass, ;  Surgeon: Lynne Ureña MD;  Location:  MAIN OR;  Service:    • [place]Y COLONOSCOPY FLX DX W/COLLJ SPEC WHEN PFRMD N/A 2/13/2017    Procedure: EGD AND COLONOSCOPY;  Surgeon: Becka Cortes MD;  Location: [facility] GI LAB; Service: Gastroenterology   • WY ESOPHAGOGASTRODUODENOSCOPY TRANSORAL DIAGNOSTIC N/A 11/16/2016    Procedure: EGD AND COLONOSCOPY;  Surgeon: Becka Cortes MD;  Location:  GI LAB; Service: Gastroenterology       Family History   Problem Relation Age of Onset   • Diabetes Mother    • Hypertension Brother      I have reviewed and agree with the history as documented.     E-Cigarette/Vaping   • E-Cigarette Use Never User      E-Cigarette/Vaping Substances   • Nicotine No    • THC No    • CBD No    • Flavoring No    • Other No    • Unknown No      Social History     Tobacco Use   • Smoking status: Every Day     Packs/day: 1.00     Years: 41.00     Total pack years: 41.00     Types: Cigarettes   • Smokeless tobacco: Current   Vaping Use   • Vaping Use: Never used   Substance Use Topics   • Alcohol use: No   • Drug use: Yes     Types: Heroin       Review of Systems  Review of systems otherwise -12 system reviewed  Physical Exam  Physical Exam    Vital Signs  ED Triage Vitals   Temperature Pulse Respirations Blood Pressure SpO2   10/02/23 0819 10/02/23 4527 10/02/23 0819 10/02/23 0819 10/02/23 0819   (!) 97.4 °F (36.3 °C) (!) 154 (!) 24 (!) 198/100 97 %      Temp Source Heart Rate Source Patient Position - Orthostatic VS BP Location FiO2 (%)   10/02/23 0819 10/02/23 0819 10/02/23 0830 10/02/23 0830 --   Oral Monitor Lying Right arm       Pain Score       10/02/23 0819       7           Vitals:    10/02/23 0819 10/02/23 0830 10/02/23 1030 10/02/23 1130   BP: (!) 198/100 (!) 176/67 143/71 158/85   Pulse: (!) 154 96 60 58   Patient Position - Orthostatic VS:  Lying Lying Lying     On exam the patient is awake, alert, interactive. Vital signs that were obtained in triage or not consistent with his vital signs currently. His heart rate right now is in the 50s and is sinus on the monitor. The patient is hypertensive. He has normal work of breathing and normal respiratory rate. On HEENT exam, the patient has moderate nasal congestion. His face is symmetric. His oropharynx is clear with moist mucous membranes. Neck is supple and nontender. He does not of adenopathy. His heart is regularly bradycardic without murmurs, rubs, or gallops. The patient has wheezing in both lung fields, but overall good air movement. His abdomen is soft and nontender with no masses, rebound, or guarding. His extremities are intact without lateralizing edema.   He is neurologically nonfocal with normal skin and back exam.    Visual Acuity      ED Medications  Medications   albuterol inhalation solution 2.5 mg (2.5 mg Nebulization Given 10/2/23 0934)   ipratropium (ATROVENT) 0.02 % inhalation solution 0.5 mg (0.5 mg Nebulization Given 10/2/23 0934)   ondansetron (ZOFRAN) injection 4 mg (4 mg Intravenous Given 10/2/23 0906)   ketorolac (TORADOL) injection 15 mg (15 mg Intravenous Given 10/2/23 1043)   acetaminophen (TYLENOL) tablet 975 mg (975 mg Oral Given 10/2/23 1043)       Diagnostic Studies  Results Reviewed     Procedure Component Value Units Date/Time    Comprehensive metabolic panel [[id number]] Collected: 10/02/23 0947    Lab Status: Final result Specimen: Blood from Arm, Left Updated: 10/02/23 1027     Sodium 138 mmol/L      Potassium 3.7 mmol/L      Chloride 106 mmol/L      CO2 25 mmol/L      ANION GAP 7 mmol/L      BUN 14 mg/dL      Creatinine 0.80 mg/dL      Glucose 124 mg/dL      Calcium 8.7 mg/dL      AST 18 U/L      ALT 16 U/L      Alkaline Phosphatase 57 U/L      Total Protein 7.7 g/dL      Albumin 3.9 g/dL      Total Bilirubin 0.40 mg/dL      eGFR 99 ml/min/1.73sq m     Narrative:      [facility] guidelines for Chronic Kidney Disease (CKD):   •  Stage 1 with normal or high GFR (GFR > 90 mL/min/1.73 square meters)  •  Stage 2 Mild CKD (GFR = 60-89 mL/min/1.73 square meters)  •  Stage 3A Moderate CKD (GFR = 45-59 mL/min/1.73 square meters)  •  Stage 3B Moderate CKD (GFR = 30-44 mL/min/1.73 square meters)  •  Stage 4 Severe CKD (GFR = 15-29 mL/min/1.73 square meters)  •  Stage 5 End Stage CKD (GFR <15 mL/min/1.73 square meters)  Note: GFR calculation is accurate only with a steady state creatinine    FLU/RSV/COVID - if FLU/RSV clinically relevant [[id number]]  (Normal) Collected: 10/02/23 0905    Lab Status: Final result Specimen: Nares from Nose Updated: 10/02/23 1002     SARS-CoV-2 Negative     INFLUENZA A PCR Negative     INFLUENZA B PCR Negative     RSV PCR Negative    Narrative:      FOR PEDIATRIC PATIENTS - copy/paste COVID Guidelines URL to browser: [email or web address] ashx    SARS-CoV-2 assay is a Nucleic Acid Amplification assay intended for the  qualitative detection of nucleic acid from SARS-CoV-2 in nasopharyngeal  swabs. Results are for the presumptive identification of SARS-CoV-2 RNA. Positive results are indicative of infection with SARS-CoV-2, the virus  causing COVID-19, but do not rule out bacterial infection or co-infection  with other viruses.  Laboratories within the [facility] and its  territories are required to report all positive results to the appropriate  public health authorities. Negative results do not preclude SARS-CoV-2  infection and should not be used as the sole basis for treatment or other  patient management decisions. Negative results must be combined with  clinical observations, patient history, and epidemiological information. This test has not been FDA cleared or approved. This test has been authorized by FDA under an Emergency Use Authorization  (EUA). This test is only authorized for the duration of time the  declaration that circumstances exist justifying the authorization of the  emergency use of an in vitro diagnostic tests for detection of SARS-CoV-2  virus and/or diagnosis of COVID-19 infection under section 564(b)(1) of  the Act, 21 U. S.C. 217KLU-4(G)(9), unless the authorization is terminated  or revoked sooner. The test has been validated but independent review by FDA  and CLIA is pending. Test performed using [employer] GeneXpert: This RT-PCR assay targets N2,  a region unique to SARS-CoV-2. A conserved region in the E-gene was chosen  for pan-Sarbecovirus detection which includes SARS-CoV-2. According to CMS-2020-01-R, this platform meets the definition of high-throughput technology.     Procalcitonin [[id number]]  (Normal) Collected: 10/02/23 0905    Lab Status: Final result Specimen: Blood from Arm, Right Updated: 10/02/23 0954     Procalcitonin <0.05 ng/ml     HS Troponin 0hr (reflex protocol) [[id number]]  (Normal) Collected: 10/02/23 0905    Lab Status: Final result Specimen: Blood from Arm, Right Updated: 10/02/23 0951     hs TnI 0hr 3 ng/L     CBC and differential [[id number]] Collected: 10/02/23 0905    Lab Status: Final result Specimen: Blood from Arm, Right Updated: 10/02/23 0914     WBC 6.56 Thousand/uL      RBC 4.45 Million/uL      Hemoglobin 13.8 g/dL      Hematocrit 40.9 %      MCV 92 fL      MCH 31.0 pg      MCHC 33.7 g/dL      RDW 12.3 % MPV 10.0 fL      Platelets 770 Thousands/uL      nRBC 0 /100 WBCs      Neutrophils Relative 71 %      Immat GRANS % 1 %      Lymphocytes Relative 20 %      Monocytes Relative 7 %      Eosinophils Relative 1 %      Basophils Relative 0 %      Neutrophils Absolute 4.62 Thousands/µL      Immature Grans Absolute 0.03 Thousand/uL      Lymphocytes Absolute 1.34 Thousands/µL      Monocytes Absolute 0.47 Thousand/µL      Eosinophils Absolute 0.08 Thousand/µL      Basophils Absolute 0.02 Thousands/µL                  XR chest 2 views   Final Result by Abram Hassan MD (10/02 0957)      No acute cardiopulmonary disease. Right apical groundglass opacity evident by CT is not visualized            Workstation performed: [id number]                    Procedures  Procedures         ED Course             HEART Risk Score    Flowsheet Row Most Recent Value   Heart Score Risk Calculator    History 0 Filed at: 10/02/2023 1159   ECG 0 Filed at: 10/02/2023 1159   Age 1 Filed at: 10/02/2023 1159   Risk Factors 1 Filed at: 10/02/2023 1159   Troponin 0 Filed at: 10/02/2023 1159   HEART Score 2 Filed at: 10/02/2023 3655 David Littlejohn    Number and Complexity of Problems  • Differential diagnosis: Viral URI, cardiac chest pain, pneumonia, bronchospasm, vomiting    Medical Decision Making Data  • External documents reviewed:   • My EKG interpretation: Sinus rhythm, no ischemia or ectopy  • My CT interpretation:   • My X-ray interpretation: Negative  • My ultrasound interpretation:     XR chest 2 views   Final Result      No acute cardiopulmonary disease.       Right apical groundglass opacity evident by CT is not visualized            Workstation performed: [id number]             Labs Reviewed   COVID19, INFLUENZA A/B, RSV PCR, [facility] - Normal       Result Value Ref Range Status    SARS-CoV-2 Negative  Negative Final    Comment:      INFLUENZA A PCR Negative  Negative Final    Comment:      INFLUENZA B PCR Negative  Negative Final    Comment:      RSV PCR Negative  Negative Final    Comment:      Narrative:     FOR PEDIATRIC PATIENTS - copy/paste COVID Guidelines URL to browser: [email or web address] ashx    SARS-CoV-2 assay is a Nucleic Acid Amplification assay intended for the  qualitative detection of nucleic acid from SARS-CoV-2 in nasopharyngeal  swabs. Results are for the presumptive identification of SARS-CoV-2 RNA. Positive results are indicative of infection with SARS-CoV-2, the virus  causing COVID-19, but do not rule out bacterial infection or co-infection  with other viruses. Laboratories within the [facility] and its  territories are required to report all positive results to the appropriate  public health authorities. Negative results do not preclude SARS-CoV-2  infection and should not be used as the sole basis for treatment or other  patient management decisions. Negative results must be combined with  clinical observations, patient history, and epidemiological information. This test has not been FDA cleared or approved. This test has been authorized by FDA under an Emergency Use Authorization  (EUA). This test is only authorized for the duration of time the  declaration that circumstances exist justifying the authorization of the  emergency use of an in vitro diagnostic tests for detection of SARS-CoV-2  virus and/or diagnosis of COVID-19 infection under section 564(b)(1) of  the Act, 21 U. S.C. 272ZLV-3(K)(5), unless the authorization is terminated  or revoked sooner. The test has been validated but independent review by FDA  and CLIA is pending. Test performed using [employer] GeneXpert: This RT-PCR assay targets N2,  a region unique to SARS-CoV-2. A conserved region in the E-gene was chosen  for pan-Sarbecovirus detection which includes SARS-CoV-2.     According to CMS-2020-01-R, this platform meets the definition of high-throughput technology. HS TROPONIN I 0HR - Normal    hs TnI 0hr 3  "Refer to ACS Flowchart"- see link ng/L Final    Comment:                                              Initial (time 0) result  If >=50 ng/L, Myocardial injury suggested ;  Type of myocardial injury and treatment strategy  to be determined. If 5-49 ng/L, a delta result at 2 hours and or 4 hours will be needed to further evaluate. If <4 ng/L, and chest pain has been >3 hours since onset, patient may qualify for discharge based on the HEART score in the ED. If <5 ng/L and <3hours since onset of chest pain, a delta result at 2 hours will be needed to further evaluate. HS Troponin 99th Percentile URL of a Health Population=12 ng/L with a 95% Confidence Interval of 8-18 ng/L. Second Troponin (time 2 hours)  If calculated delta >= 20 ng/L,  Myocardial injury suggested ; Type of myocardial injury and treatment strategy to be determined. If 5-49 ng/L and the calculated delta is 5-19 ng/L, consult medical service for evaluation. Continue evaluation for ischemia on ecg and other possible etiology and repeat hs troponin at 4 hours. If delta is <5 ng/L at 2 hours, consider discharge based on risk stratification via the HEART score (if in ED), or ERIKA risk score in IP/Observation. HS Troponin 99th Percentile URL of a Health Population=12 ng/L with a 95% Confidence Interval of 8-18 ng/L. PROCALCITONIN TEST - Normal    Procalcitonin <0.05  <=0.25 ng/ml Final    Comment: Suspected Lower Respiratory Tract Infection (LRTI):  - LESS than or EQUAL to 0.25 ng/mL:   low likelihood for bacterial LRTI; antibiotics DISCOURAGED.  - GREATER than 0.25 ng/mL:   increased likelihood for bacterial LRTI; antibiotics ENCOURAGED. Suspected Sepsis:  - Strongly consider initiating antibiotics in ALL UNSTABLE patients.   - LESS than or EQUAL to 0.5 ng/mL:   low likelihood for bacterial sepsis; antibiotics DISCOURAGED.  - GREATER than 0.5 ng/mL:   increased likelihood for bacterial sepsis; antibiotics ENCOURAGED.  - GREATER than 2 ng/mL:   high risk for severe sepsis / septic shock; antibiotics strongly ENCOURAGED. Decisions on antibiotic use should not be based solely on Procalcitonin (PCT) levels. If PCT is low but uncertainty exists with stopping antibiotics, repeat PCT in 6-24 hours to confirm the low level. If antibiotics are administered (regardless if initial PCT was high or low), repeat PCT every 1-2 days to consider early antibiotic cessation (when GREATER than 80% decrease from the peak OR when PCT drops below designated cutoffs, whichever comes first), so long as the infection is NOT one that typically requires prolonged treatment durations (e.g., bone/joint infections, endocarditis, Staph. aureus bacteremia).     Situations of FALSE-POSITIVE Procalcitonin values:  1) Newborns < 67 hours old  2) Massive stress from severe trauma / burns, major surgery, acute pancreatitis, cardiogenic / hemorrhagic shock, sickle cell crisis, or other organ perfusion abnormalities  3) Malaria and some Candidal infections  4) Treatment with agents that stimulate cytokines (e.g., OKT3, anti-lymphocyte globulins, alemtuzumab, IL-2, granulocyte transfusion [NOT GCSFs])  5) Chronic renal disease causes elevated baseline levels (consider GREATER than 0.75 ng/mL as an abnormal cut-off); initiating HD/CRRT may cause transient decreases  6) Paraneoplastic syndromes from medullary thyroid or SCLC, some forms of vasculitis, and acute pvzvo-eu-ekpu disease    Situations of FALSE-NEGATIVE Procalcitonin values:  1) Too early in clinical course for PCT to have reached its peak (may repeat in 6-24 hours to confirm low level)  2) Localized infection WITHOUT systemic (SIRS / sepsis) response (e.g., an abscess, osteomyelitis, cystitis)  3) Mycobacteria (e.g., Tuberculosis, MAC)  4) Cystic fibrosis exacerbations     CBC AND DIFFERENTIAL    WBC 6.56  4.31 - 10.16 Thousand/uL Final    RBC 4.45  3.88 - 5.62 Million/uL Final    Hemoglobin 13.8  12.0 - 17.0 g/dL Final    Hematocrit 40.9  36.5 - 49.3 % Final    MCV 92  82 - 98 fL Final    MCH 31.0  26.8 - 34.3 pg Final    MCHC 33.7  31.4 - 37.4 g/dL Final    RDW 12.3  11.6 - 15.1 % Final    MPV 10.0  8.9 - 12.7 fL Final    Platelets 033  610 - 390 Thousands/uL Final    nRBC 0  /100 WBCs Final    Neutrophils Relative 71  43 - 75 % Final    Immat GRANS % 1  0 - 2 % Final    Lymphocytes Relative 20  14 - 44 % Final    Monocytes Relative 7  4 - 12 % Final    Eosinophils Relative 1  0 - 6 % Final    Basophils Relative 0  0 - 1 % Final    Neutrophils Absolute 4.62  1.85 - 7.62 Thousands/µL Final    Immature Grans Absolute 0.03  0.00 - 0.20 Thousand/uL Final    Lymphocytes Absolute 1.34  0.60 - 4.47 Thousands/µL Final    Monocytes Absolute 0.47  0.17 - 1.22 Thousand/µL Final    Eosinophils Absolute 0.08  0.00 - 0.61 Thousand/µL Final    Basophils Absolute 0.02  0.00 - 0.10 Thousands/µL Final   COMPREHENSIVE METABOLIC PANEL    Sodium 793  135 - 147 mmol/L Final    Potassium 3.7  3.5 - 5.3 mmol/L Final    Chloride 106  96 - 108 mmol/L Final    CO2 25  21 - 32 mmol/L Final    ANION GAP 7  mmol/L Final    BUN 14  5 - 25 mg/dL Final    Creatinine 0.80  0.60 - 1.30 mg/dL Final    Comment: Standardized to IDMS reference method    Glucose 124  65 - 140 mg/dL Final    Comment: If the patient is fasting, the ADA then defines impaired fasting glucose as > 100 mg/dL and diabetes as > or equal to 123 mg/dL. Calcium 8.7  8.4 - 10.2 mg/dL Final    AST 18  13 - 39 U/L Final    ALT 16  7 - 52 U/L Final    Comment: Specimen collection should occur prior to Sulfasalazine administration due to the potential for falsely depressed results.      Alkaline Phosphatase 57  34 - 104 U/L Final    Total Protein 7.7  6.4 - 8.4 g/dL Final    Albumin 3.9  3.5 - 5.0 g/dL Final    Total Bilirubin 0.40  0.20 - 1.00 mg/dL Final    Comment: Use of this assay is not recommended for patients undergoing treatment with eltrombopag due to the potential for falsely elevated results. N-acetyl-p-benzoquinone imine (metabolite of Acetaminophen) will generate erroneously low results in samples for patients that have taken an overdose of Acetaminophen. eGFR 99  ml/min/1.73sq m Final    Narrative:     [facility] guidelines for Chronic Kidney Disease (CKD):   •  Stage 1 with normal or high GFR (GFR > 90 mL/min/1.73 square meters)  •  Stage 2 Mild CKD (GFR = 60-89 mL/min/1.73 square meters)  •  Stage 3A Moderate CKD (GFR = 45-59 mL/min/1.73 square meters)  •  Stage 3B Moderate CKD (GFR = 30-44 mL/min/1.73 square meters)  •  Stage 4 Severe CKD (GFR = 15-29 mL/min/1.73 square meters)  •  Stage 5 End Stage CKD (GFR <15 mL/min/1.73 square meters)  Note: GFR calculation is accurate only with a steady state creatinine       • Labs reviewed by me are significant for: Unremarkable  • Clinical decision rules/scores are significant for:     • Discussed case with:   • Considered admission for:     Treatment and Disposition  ED course: Patient here with body aches, fever, backaches, relatively benign exam here. Labs done for restratification, these are unremarkable.   We will plan to discharge home with diagnosis of 1 URI, 2 body aches, 3 headache  Shared decision making:   Code status:                             MDM    Disposition  Final diagnoses:   Cough   Back pain   Chest pain   Headache     Time reflects when diagnosis was documented in both MDM as applicable and the Disposition within this note     Time User Action Codes Description Comment    10/2/2023 11:59 AM Bindu Hock Add [R05.9] Cough     10/2/2023 12:00 PM Bindu Hock Add [M54.9] Back pain     10/2/2023 12:00 PM Bindu Hock Add [R07.9] Chest pain     10/2/2023 12:00 PM Bindu Hock Add [R51.9] Headache       ED Disposition     ED Disposition   Discharge    Condition   Stable    Date/Time   Mon Oct 2, 2023 11:59 AM    Comment   Nehemias Pablo Jeffry discharge to home/self care. Follow-up Information     Follow up With Specialties Details Why Contact Kamar Virk MD Family Medicine Call in 1 day  [facility]  [phone]            Discharge Medication List as of 10/2/2023 12:27 PM      CONTINUE these medications which have NOT CHANGED    Details   atorvastatin (LIPITOR) 10 mg tablet Take 1 tablet (10 mg total) by mouth daily with dinner, Starting Thu 8/17/2023, Normal      lisinopril (ZESTRIL) 5 mg tablet Take 1 tablet (5 mg total) by mouth daily, Starting Thu 8/17/2023, Normal      nicotine (NICODERM CQ) 21 mg/24 hr TD 24 hr patch Place 1 patch on the skin over 24 hours every 24 hours, Starting Thu 8/17/2023, Normal      pantoprazole (PROTONIX) 40 mg tablet Take 1 tablet (40 mg total) by mouth in the morning, Starting Thu 8/17/2023, Normal      prazosin (MINIPRESS) 2 mg capsule Take 1 capsule (2 mg total) by mouth daily at bedtime, Starting Thu 8/17/2023, Normal      QUEtiapine (SEROquel) 100 mg tablet Take 1 tablet (100 mg total) by mouth daily at bedtime, Starting Tue 12/27/2022, Normal      sertraline (ZOLOFT) 100 mg tablet Take 1 tablet (100 mg total) by mouth daily, Starting Tue 12/27/2022, Normal             No discharge procedures on file.     PDMP Review       Value Time User    PDMP Reviewed  Yes 12/19/2022  2:25 PM Darlyn Moore MD          ED Provider  Electronically Signed by           Chato Epps MD  10/02/23 25 [id number]

## 2023-10-02 NOTE — DISCHARGE INSTRUCTIONS
You were seen in the emergency department today for cough, body aches, headache. Your testing showed no acute abnormalities. Follow up with your primary care provider. Take Tylenol / Ibuprofen as needed following the instructions on the bottle for pain. Return to the emergency department for any new or concerning symptoms including difficulty breathing, worsening pain. Thank you for choosing [facility] for your care today.

## 2023-10-02 NOTE — ED ATTENDING ATTESTATION
10/2/2023  Jade GANT MD, saw and evaluated the patient. I have discussed the patient with the resident/non-physician practitioner and agree with the resident's/non-physician practitioner's findings, Plan of Care, and MDM as documented in the resident's/non-physician practitioner's note, except where noted. All available labs and Radiology studies were reviewed. I was present for key portions of any procedure(s) performed by the resident/non-physician practitioner and I was immediately available to provide assistance. At this point I agree with the current assessment done in the Emergency Department.   I have conducted an independent evaluation of this patient a history and physical is as follows:  Primary note dictated by me  ED Course         Critical Care Time  Procedures

## 2023-10-03 ENCOUNTER — VBI (OUTPATIENT)
Dept: ADMINISTRATIVE | Facility: OTHER | Age: 58
End: 2023-10-03

## 2023-10-03 DIAGNOSIS — Z71.89 COMPLEX CARE COORDINATION: Primary | ICD-10-CM

## 2023-10-03 LAB
ATRIAL RATE: 59 BPM
P AXIS: 56 DEGREES
PR INTERVAL: 148 MS
QRS AXIS: 68 DEGREES
QRSD INTERVAL: 82 MS
QT INTERVAL: 418 MS
QTC INTERVAL: 413 MS
T WAVE AXIS: 48 DEGREES
VENTRICULAR RATE: 59 BPM

## 2023-10-03 PROCEDURE — 93010 ELECTROCARDIOGRAM REPORT: CPT | Performed by: INTERNAL MEDICINE

## 2023-10-03 NOTE — TELEPHONE ENCOUNTER
Negro Fischer    ED Visit Information     Ed visit date: 10/2/23  Diagnosis Description: cough/back pain  In Network? Yes [address]  Discharge status: Home  Discharged with meds ? No  Number of ED visits to date: 1  ED Severity:3     Outreach Information    Outreach successful: Yes 1  Date letter mailed:none  Date Finalized:10/3/23    Care Coordination    Follow up appointment with pcp: yes yes 2-22-24 pcp  Transportation issues ? No    [employer] type: 3 Day Outreach  Care Coordination Status: Referred to [facility]  Emergent necessity warranted by diagnosis: Yes  [facility] PCP: Yes  Transportation: Self Transport  If able to choose an ED. Would you have chosen [facility]: Yes  Called PCP first?: No  Feels able to call PCP for urgent problems ?: Yes  Understands what emergencies can be handled by PCP ?: Yes  Ever any problems getting appointment with PCP for minor emergency/urgency problems?: No  Practice Contacted Patient ?: No  Pt had ED follow up with pcp/staff ?: No    Seen for follow-up out of network ?: No  Reason Patient went to ED instead of Urgent Care or PCP?: Perceived Severity of Illness  10/03/2023 12:16 PM EDT by Dany Esparza 10/03/2023 12:16 PM EDT by Jenna Meredith (Self) [phone] (PZPIRK)[phone] (Mobile)  [phone] (Mobile) Remove  - Call [employer] - [facility] Aysha Littlejohn by his daughter since has no english  Letter sent for high risk to [employer].

## 2023-10-04 ENCOUNTER — PATIENT OUTREACH (OUTPATIENT)
Dept: FAMILY MEDICINE CLINIC | Facility: CLINIC | Age: 58
End: 2023-10-04

## 2023-10-04 NOTE — PROGRESS NOTES
Patients friend Mariana Duque returned my call. She is on consent form. Reports Viola Webb still has cough and achy. Not smoking cause it hurts his chest to breath when she does. She did give him a treatment of her nebulizer yesterday instructed not to do so again. Appetite only a little better today. She will call PCP for appointment.

## 2023-11-01 ENCOUNTER — OFFICE VISIT (OUTPATIENT)
Dept: FAMILY MEDICINE CLINIC | Facility: CLINIC | Age: 58
End: 2023-11-01
Payer: MEDICARE

## 2023-11-01 VITALS
RESPIRATION RATE: 16 BRPM | OXYGEN SATURATION: 98 % | WEIGHT: 165 LBS | BODY MASS INDEX: 26.52 KG/M2 | TEMPERATURE: 98.2 F | SYSTOLIC BLOOD PRESSURE: 110 MMHG | HEIGHT: 66 IN | HEART RATE: 77 BPM | DIASTOLIC BLOOD PRESSURE: 80 MMHG

## 2023-11-01 DIAGNOSIS — M25.512 ACUTE PAIN OF LEFT SHOULDER: Primary | ICD-10-CM

## 2023-11-01 DIAGNOSIS — R06.02 SOB (SHORTNESS OF BREATH): ICD-10-CM

## 2023-11-01 DIAGNOSIS — M54.9 BACK PAIN, UNSPECIFIED BACK LOCATION, UNSPECIFIED BACK PAIN LATERALITY, UNSPECIFIED CHRONICITY: ICD-10-CM

## 2023-11-01 DIAGNOSIS — Z71.6 ENCOUNTER FOR SMOKING CESSATION COUNSELING: ICD-10-CM

## 2023-11-01 DIAGNOSIS — R93.89 ABNORMAL CT OF THE CHEST: ICD-10-CM

## 2023-11-01 PROCEDURE — 93000 ELECTROCARDIOGRAM COMPLETE: CPT | Performed by: NURSE PRACTITIONER

## 2023-11-01 PROCEDURE — 99214 OFFICE O/P EST MOD 30 MIN: CPT | Performed by: NURSE PRACTITIONER

## 2023-11-01 RX ORDER — VENLAFAXINE HYDROCHLORIDE 75 MG/1
CAPSULE, EXTENDED RELEASE ORAL
COMMUNITY
Start: 2023-09-18

## 2023-11-01 RX ORDER — PREDNISONE 10 MG/1
TABLET ORAL
Qty: 20 TABLET | Refills: 0 | Status: SHIPPED | OUTPATIENT
Start: 2023-11-01

## 2023-11-01 RX ORDER — HYDROXYZINE HYDROCHLORIDE 25 MG/1
TABLET, FILM COATED ORAL
COMMUNITY
Start: 2023-10-17

## 2023-11-01 RX ORDER — VENLAFAXINE HYDROCHLORIDE 37.5 MG/1
CAPSULE, EXTENDED RELEASE ORAL
COMMUNITY
Start: 2023-08-22

## 2023-11-01 RX ORDER — CARIPRAZINE 1.5 MG/1
CAPSULE, GELATIN COATED ORAL
COMMUNITY
Start: 2023-10-27

## 2023-11-01 RX ORDER — FLUTICASONE PROPIONATE 50 MCG
SPRAY, SUSPENSION (ML) NASAL
COMMUNITY
Start: 2023-08-18
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
## 2023-11-03 ENCOUNTER — TELEPHONE (OUTPATIENT)
Dept: FAMILY MEDICINE CLINIC | Facility: CLINIC | Age: 58
End: 2023-11-03

## 2023-11-03 RX ORDER — LIDOCAINE 4 G/G
1 PATCH TOPICAL DAILY
Qty: 30 PATCH | Refills: 1 | Status: SHIPPED | OUTPATIENT
Start: 2023-11-03

## 2023-11-03 NOTE — TELEPHONE ENCOUNTER
Pt's friend Pineda Bolivar calling for pt (on communication consent), requesting a letter for [employer] stating due to patients health condition needs a reserved parking space in the vicinity of where he lives. Last seen by Rika but you are PCP, not sure who would need to sign off on this.  Please advise, thanks

## 2023-11-06 ENCOUNTER — TELEPHONE (OUTPATIENT)
Dept: FAMILY MEDICINE CLINIC | Facility: CLINIC | Age: 58
End: 2023-11-06

## 2023-11-06 DIAGNOSIS — Z72.0 TOBACCO ABUSE: Primary | ICD-10-CM

## 2023-11-06 NOTE — TELEPHONE ENCOUNTER
Valentin Isabella states he was evaluated recently for back pain by Rika and that it affects his walking, please advise.

## 2023-11-06 NOTE — TELEPHONE ENCOUNTER
What symptoms does he have that would require him to have such a letter. I do not see any documentation of having a physical disability that he sees any specialist for?

## 2023-11-06 NOTE — TELEPHONE ENCOUNTER
[facility]  Beth Tucker, PharmD, BCPS, BCACP     Communication: per telephone; spoke with patient's friend, Simone Dinh, per [facility] Communication consent     Reason for documentation: provider referral    Recommendations:     Behavior change readiness assessment: action - ready to set action plan and implement     Will also avoid bupropion. Patient has used all preferred forms of NRT per insurance formulary. The following actions were taken today by the Clinical Pharmacist:   1. Will submit prior authorizations for nicotine oral inhaler as well as nicotine nasal spray. Will follow-up. Discussion:      Currently smokin pack every 2 days    Has been smoking: over 40 years    There have been multiple attempts to quit. Was not able to get nicotine patches to stick to his skin. Tried several body areas. Has tried nicotine gum and lozenges. Not successful. Concerned about possible side effects, mood changes  with Chantix; would like to avoid.      First cigarette of the day: more than 30 minutes after awakening    Pharmacist Tracking Tool:     Reason For Outreach: Embedded Pharmacist  Demographics:  Intervention Method: Phone  Type of Intervention: New  Topics Addressed: Substance use  Pharmacologic Interventions: Medication Initiation  Non-Pharmacologic Interventions: Care coordination and Medication/Device education  Time:  Direct Patient Care:  20  mins  Care Coordination:  20  mins  Recommendation Recipient: Patient/Caregiver and Provider  Outcome: Pending/ Follow-up Required

## 2023-11-08 DIAGNOSIS — G89.29 CHRONIC MIDLINE LOW BACK PAIN WITHOUT SCIATICA: Primary | ICD-10-CM

## 2023-11-08 DIAGNOSIS — M54.50 CHRONIC MIDLINE LOW BACK PAIN WITHOUT SCIATICA: Primary | ICD-10-CM

## 2023-11-08 NOTE — TELEPHONE ENCOUNTER
Referral placed to [facility] [place] comprehensive spine program.  Please provide phone number and patient may call for appointment

## 2023-11-08 NOTE — TELEPHONE ENCOUNTER
Patient's x-ray from 2020 of lumbar spine only shows evidence of mild arthritis.   Also patient does not have a history of being seen by any back specialists therefore I cannot justify writing a letter on his behalf as he requested

## 2023-11-09 ENCOUNTER — TELEPHONE (OUTPATIENT)
Dept: PHYSICAL THERAPY | Facility: OTHER | Age: 58
End: 2023-11-09

## 2023-11-09 NOTE — TELEPHONE ENCOUNTER
Call placed to the patient per Comprehensive Spine Program referral.    Spoke with patients friend Donald Garcia (on communication list) who states he is not home now. Provided her with our business hrs and phone number and she states she will have Kemal Montalov calls us when his available. This is the 1st attempt to reach the patient. Will defer per protocol.

## 2023-11-13 NOTE — NURSING NOTE
Pt reported trouble sleeping but otherwise resting comfortably in bed  No Abd pain reported and urine at bedside was yellow/clear 
lungs clear

## 2023-11-15 NOTE — TELEPHONE ENCOUNTER
Call placed to the patient per [facility] Sp[facility] referral.    Voice message left for patient to call back. Phone number and hours of business provided. This the 2nd attempt to reach the patient. Will defer per protocol.     Spoke with patient's friend on 11/9/23

## 2023-11-27 ENCOUNTER — TELEPHONE (OUTPATIENT)
Dept: FAMILY MEDICINE CLINIC | Facility: CLINIC | Age: 58
End: 2023-11-27

## 2023-11-27 DIAGNOSIS — Z72.0 TOBACCO ABUSE: Primary | ICD-10-CM

## 2023-11-27 NOTE — TELEPHONE ENCOUNTER
[address] [facility] Doctor, PharmD     Communication with patient: per telephone    Reason for documentation: follow-up    Findings:      Patient was prescribed nicotine inhaler and nicotine nasal spray for tobacco cessation. Contacted patient's pharmacy to follow-up on nicotine replacement therapy. Was informed that both products are currently on backorder and not available. Outreach call to notify patient. Will follow-up.

## 2023-12-07 ENCOUNTER — TELEPHONE (OUTPATIENT)
Dept: FAMILY MEDICINE CLINIC | Facility: CLINIC | Age: 58
End: 2023-12-07

## 2023-12-07 NOTE — TELEPHONE ENCOUNTER
Spoke with Lucia Cruz and explained this message and she will call and schedule with spine and pain as soon as she can.   No other questions at this time

## 2023-12-07 NOTE — TELEPHONE ENCOUNTER
Comprehensive spine program at [facility] had contacted patient on November 9 and spoke with Monica Real to make an appointment for patient. Patient was not home at the time and they were asked to contact program to schedule an appointment. As I explained on notes from November 8 I cannot justify writing for a permanent parking space given that patient does not have any documentation of a chronic back condition nor has he seen any specialist to resolve his back pain in the first place.

## 2023-12-07 NOTE — TELEPHONE ENCOUNTER
Dusty Cleary called for patient with patient in the back ground. Patient needs a letter stating that he needs a letter stating that he needs a parking space in front of his apartment building due to being unable to walk far. He already has received 2 parking tickets since he would park in the first open spot he would see so he did not have to walk far. Is this letter ok to write for him?   Please call to advise

## 2023-12-13 ENCOUNTER — VBI (OUTPATIENT)
Dept: ADMINISTRATIVE | Facility: OTHER | Age: 58
End: 2023-12-13

## 2024-01-05 ENCOUNTER — CONSULT (OUTPATIENT)
Dept: PAIN MEDICINE | Facility: CLINIC | Age: 59
End: 2024-01-05
Payer: MEDICARE

## 2024-01-05 VITALS
HEART RATE: 64 BPM | HEIGHT: 66 IN | BODY MASS INDEX: 27.97 KG/M2 | DIASTOLIC BLOOD PRESSURE: 74 MMHG | WEIGHT: 174 LBS | SYSTOLIC BLOOD PRESSURE: 115 MMHG

## 2024-01-05 DIAGNOSIS — M54.40 LOW BACK PAIN WITH SCIATICA, SCIATICA LATERALITY UNSPECIFIED, UNSPECIFIED BACK PAIN LATERALITY, UNSPECIFIED CHRONICITY: Primary | ICD-10-CM

## 2024-01-05 DIAGNOSIS — M47.816 LUMBAR SPONDYLOSIS: ICD-10-CM

## 2024-01-05 PROCEDURE — 99244 OFF/OP CNSLTJ NEW/EST MOD 40: CPT | Performed by: ANESTHESIOLOGY

## 2024-01-05 RX ORDER — HYDROXYZINE PAMOATE 50 MG/1
CAPSULE ORAL
COMMUNITY
Start: 2023-12-20

## 2024-01-05 RX ORDER — GABAPENTIN 300 MG/1
300 CAPSULE ORAL 3 TIMES DAILY
Qty: 90 CAPSULE | Refills: 1 | Status: SHIPPED | OUTPATIENT
Start: 2024-01-05

## 2024-01-05 NOTE — PROGRESS NOTES
Assessment  1. Lumbar spondylosis    2. Low back pain with sciatica, sciatica laterality unspecified, unspecified back pain laterality, unspecified chronicity        Plan  58-year-old male with a history of diabetes, hypertension, CKD, HCV, history of heroin abuse in remission, referred by Dr. Jimenez, presenting for initial consultation regarding lumbosacral back pain that radiates into bilateral lower extremities to the feet with associated numbness, paresthesias, and subjective weakness for the past 4 months.  He denies any trauma or inciting event.  Last x-ray of the lumbar spine was from 2020 demonstrating facet arthrosis in the lower lumbar spine and mild to moderate degenerative changes in the endplates.  Last CT of the chest, abdomen, and pelvis from December 2022 shows normal alignment without any evidence of fractures.  Mild to moderate spondylitic change in the lower lumbar spine.  The patient has not done any recent formal physical therapy or chiropractic treatment.  He has tried Tylenol, NSAIDs, oral steroids, and topical lidocaine patches without any significant relief.  The patient does have myofascial component and likely a facet mediated component contributing to his low back pain.  No evidence of sacroiliitis.  Lower extremity symptoms may be radicular in nature.  No neurologic deficits on exam.    1.  I will order physical therapy as I feel he would benefit from Mian-based exercises  2.  If the patient fails 6 weeks of conservative treatment we will order an MRI of the lumbar spine without contrast  3.  I will initiate gabapentin 300 mg nightly and will titrate up to 3 times daily for his neuropathic complaints.  The patient was apprised of the most common side effects of gabapentin and he was given a titration schedule at today's office visit  4.  I will follow-up with the patient in 6 we    My impressions and treatment recommendations were discussed in detail with the patient who verbalized  understanding and had no further questions.  Discharge instructions were provided. I personally saw and examined the patient and I agree with the above discussed plan of care.    No orders of the defined types were placed in this encounter.    No orders of the defined types were placed in this encounter.      History of Present Illness    Barrett Teran is a 58 y.o. male with a history of diabetes, hypertension, CKD, HCV, history of heroin abuse in remission, referred by Dr. Jimenez, presenting for initial consultation regarding lumbosacral back pain that radiates into bilateral lower extremities to the feet with associated numbness, paresthesias, and subjective weakness for the past 4 months.  He denies any trauma or inciting event.  He denies any bladder or bowel incontinence or saddle anesthesia.  Last x-ray of the lumbar spine was from 2020 demonstrating facet arthrosis in the lower lumbar spine and mild to moderate degenerative changes in the endplates.  Last CT of the chest, abdomen, and pelvis from December 2022 shows normal alignment without any evidence of fractures.  Mild to moderate spondylitic change in the lower lumbar spine.  The patient has not done any recent formal physical therapy or chiropractic treatment.  He has tried Tylenol, NSAIDs, oral steroids, and topical lidocaine patches without any significant relief.  The patient rates his pain a 9 out of 10 and the pain is nearly constant.  The pain does not follow any particular pattern throughout the day.  The pain is described as pins-and-needles and numbness.  The pain is increased with standing, sitting, and walking.  He denies any specific alleviating factors.    Other than as stated above, the patient denies any interval changes in medications, medical condition, mental condition, symptoms, or allergies since the last office visit.    I have personally reviewed and/or updated the patient's past medical history, past surgical history, family  history, social history, current medications, allergies, and vital signs today.     Review of Systems   Constitutional:  Negative for fever and unexpected weight change.   HENT:  Positive for sore throat. Negative for trouble swallowing.    Eyes:  Negative for visual disturbance.   Respiratory:  Negative for shortness of breath and wheezing.    Cardiovascular:  Negative for chest pain and palpitations.   Gastrointestinal:  Negative for constipation, diarrhea, nausea and vomiting.   Endocrine: Positive for polydipsia. Negative for cold intolerance and heat intolerance.   Genitourinary:  Negative for difficulty urinating and frequency.   Musculoskeletal:  Positive for myalgias. Negative for arthralgias, gait problem and joint swelling.   Skin:  Negative for rash.   Neurological:  Positive for dizziness and numbness. Negative for seizures, syncope, weakness and headaches.   Hematological:  Does not bruise/bleed easily.   Psychiatric/Behavioral:  Positive for decreased concentration. Negative for dysphoric mood.         Anxiety, depression   All other systems reviewed and are negative.      Patient Active Problem List   Diagnosis    Recurrent major depressive disorder, in remission (HCC)    Back pain    Tobacco abuse    Abnormal liver enzymes    PTSD (post-traumatic stress disorder)    Opioid dependence in remission (HCC)    TEO (acute kidney injury) (HCC)    DM (diabetes mellitus), type 2 (HCC)    Hypertension    Bradycardia    Thrombocytopenia (HCC)    Anemia    Gross hematuria    CKD (chronic kidney disease)    Medical clearance for psychiatric admission    Psychiatric disorder    Severe episode of recurrent major depressive disorder, with psychotic features (HCC)    COVID-19    Mild protein-calorie malnutrition (HCC)    Allergic rhinitis    Chronic constipation    Chronic hepatitis C virus infection (HCC)    Affective psychosis, bipolar (HCC)    Depression    Erosive esophagitis    GERD (gastroesophageal reflux  disease)    Hemorrhoids, external    Heroin use disorder, mild, in sustained remission, abuse (HCC)    Hyperlipidemia associated with type 2 diabetes mellitus        Past Medical History:   Diagnosis Date    Abdominal pain     Allergic rhinitis     Cancer (HCC)     Chronic pain     Colon polyps     Depression     Fatigue     Head injury     Homeless     Hypertension     Insomnia     Liver disease     Loss of appetite     Numbness and tingling of right arm     Palpitations     Psychiatric disorder     bipolar    PTSD (post-traumatic stress disorder)     Seizures (HCC)     Shortness of breath     Substance abuse (HCC)     Swallowing difficulty        Past Surgical History:   Procedure Laterality Date    ABDOMINAL SURGERY      COLONOSCOPY      EYE SURGERY      NECK SURGERY      ORCHIECTOMY Right 5/19/2016    Procedure: ORCHIECTOMY INGUINAL biopsy of testicular mass, ;  Surgeon: Jose Lara MD;  Location:  MAIN OR;  Service:     SD COLONOSCOPY FLX DX W/COLLJ SPEC WHEN PFRMD N/A 2/13/2017    Procedure: EGD AND COLONOSCOPY;  Surgeon: Hernesto Perez MD;  Location: [facility] GI [facility];  Service: Gastroenterology    SD ESOPHAGOGASTRODUODENOSCOPY TRANSORAL DIAGNOSTIC N/A 11/16/2016    Procedure: EGD AND COLONOSCOPY;  Surgeon: Hernesto Perez MD;  Location:  GI LAB;  Service: Gastroenterology       Family History   Problem Relation Age of Onset    Diabetes Mother     Hypertension Brother        Social History     Occupational History    Not on file   Tobacco Use    Smoking status: Every Day     Current packs/day: 1.00     Average packs/day: 1 pack/day for 41.0 years (41.0 ttl pk-yrs)     Types: Cigarettes    Smokeless tobacco: Current   Vaping Use    Vaping status: Never Used   Substance and Sexual Activity    Alcohol use: No    Drug use: Yes     Types: Heroin    Sexual activity: Not Currently       Current Outpatient Medications on File Prior to Visit   Medication Sig    atorvastatin (LIPITOR) 10 mg tablet Take 1 tablet (10  "mg total) by mouth daily with dinner    fluticasone (FLONASE) 50 mcg/act nasal spray     hydrOXYzine HCL (ATARAX) 25 mg tablet     Lidocaine 4 % PTCH Apply 1 patch topically daily As needed for back pain, remove the patch after 12 hours    lisinopril (ZESTRIL) 5 mg tablet Take 1 tablet (5 mg total) by mouth daily    nicotine (NICODERM CQ) 21 mg/24 hr TD 24 hr patch Place 1 patch on the skin over 24 hours every 24 hours    nicotine (NICOTROL) 10 MG inhaler Inhale 1 puff as needed for smoking cessation    Nicotine 10 MG/ML SOLN Instill 1 to 2 doses per hour in each nostril. Each dose = 2 sprays, one in each nostril.    pantoprazole (PROTONIX) 40 mg tablet Take 1 tablet (40 mg total) by mouth in the morning    prazosin (MINIPRESS) 2 mg capsule Take 1 capsule (2 mg total) by mouth daily at bedtime    predniSONE 10 mg tablet Take 4 tablets for 2 days, 3 tablets for 2 days, 2 tablets for 2 days, 1 tablet for 2 days.    QUEtiapine (SEROquel) 100 mg tablet Take 1 tablet (100 mg total) by mouth daily at bedtime    sertraline (ZOLOFT) 100 mg tablet Take 1 tablet (100 mg total) by mouth daily    venlafaxine (EFFEXOR-XR) 37.5 mg 24 hr capsule     venlafaxine (EFFEXOR-XR) 75 mg 24 hr capsule     Vraylar 1.5 MG capsule      No current facility-administered medications on file prior to visit.       Allergies   Allergen Reactions    Oxycodone Swelling     Tongue swelling       Physical Exam    /74   Pulse 64   Ht 5' 6\" (1.676 m)   Wt 78.9 kg (174 lb)   BMI 28.08 kg/m²     Constitutional: normal, well developed, well nourished, alert, in no distress and non-toxic and no overt pain behavior.  Eyes: anicteric  HEENT: grossly intact  Neck: supple, symmetric, trachea midline and no masses   Pulmonary:even and unlabored  Cardiovascular:No edema or pitting edema present  Skin:Normal without rashes or lesions and well hydrated  Psychiatric:Mood and affect appropriate  Neurologic:Cranial Nerves II-XII grossly " intact  Musculoskeletal:antalgic gait.  Bilateral lumbar paraspinals tender to palpation from L4-S1.  Bilateral SI joints nontender to palpation.  Bilateral patellar and Achilles reflexes were 2 out of 4 and symmetrical.  No clonus noted bilaterally.  Bilateral lower extremity strength 5 out of muscular's.  Sensation intact to light touch in L3-S1 dermatomes bilaterally.  Negative straight leg raise bilaterally.  Negative Mekhi's test bilaterally.    Imaging    PACS Images     Show images for XR lumbar spine 2 or 3 views  Study Result    Narrative & Impression   LUMBAR SPINE     INDICATION:   lumbar pain; left.     COMPARISON:  None     VIEWS:  XR SPINE LUMBAR 2 OR 3 VIEWS INJURY        FINDINGS:     There are 5 non rib bearing lumbar vertebral bodies.      There is no evidence of acute fracture or destructive osseous lesion.     Alignment is unremarkable.      Mild multilevel disc degenerative changes are seen.     The pedicles appear intact.     There are atherosclerotic calcifications. Soft tissues are otherwise unremarkable.     IMPRESSION:     No acute osseous abnormality.       Degenerative changes as described.        Workstation performed: [id number]       XR SPINE LUMBAR MINIMUM 4 VIEWS NON INJURY  Order: [id number]  Impression    Impression:  1. No change.  2. Moderate spondylosis.              Workstation:[id number]  Narrative    History: Pain. Chronic low back pain    Exam: Spine - lumbar    Technique: 5 views. lumbar spine    Findings: No change compared to prior exam of 6/18/2019.    Vertebral bodies are in anatomic alignment. There are mild to moderate  degenerative changes in endplates. There are hypertrophic changes in lower facet  joints.    No lytic lesion. No acute fracture.  Exam End: 08/03/20  4:38 PM    Specimen Collected: 08/03/20  4:50 PM Last Resulted: 08/03/20  4:52 PM   Received From: [facility]  Result Received: 01/03/24  2:05 PM

## 2024-01-24 ENCOUNTER — EVALUATION (OUTPATIENT)
Dept: PHYSICAL THERAPY | Facility: REHABILITATION | Age: 59
End: 2024-01-24
Payer: MEDICARE

## 2024-01-24 DIAGNOSIS — M47.816 LUMBAR SPONDYLOSIS: ICD-10-CM

## 2024-01-24 DIAGNOSIS — M54.40 LOW BACK PAIN WITH SCIATICA, SCIATICA LATERALITY UNSPECIFIED, UNSPECIFIED BACK PAIN LATERALITY, UNSPECIFIED CHRONICITY: Primary | ICD-10-CM

## 2024-01-24 PROCEDURE — 97162 PT EVAL MOD COMPLEX 30 MIN: CPT

## 2024-01-24 PROCEDURE — 97112 NEUROMUSCULAR REEDUCATION: CPT

## 2024-01-24 PROCEDURE — 97110 THERAPEUTIC EXERCISES: CPT

## 2024-01-24 NOTE — PROGRESS NOTES
PT Evaluation     Today's date: 2024  Patient name: Barrett Teran  : 1965  MRN: [id number]  Referring provider: Adan Toledo DO  Dx:   Encounter Diagnosis     ICD-10-CM    1. Lumbar spondylosis  M47.816 Ambulatory referral to Physical Therapy      2. Low back pain with sciatica, sciatica laterality unspecified, unspecified back pain laterality, unspecified chronicity  M54.40 Ambulatory referral to Physical Therapy          Start Time: 161  Stop Time: 171  Total time in clinic (min): 59 minutes    Assessment  Assessment details: Barrett Teran is a 58 y.o. male referred to physical therapy for umbar spondylosis and low back pain with sciatica. Primary impairments include increased pain with functional activities, decreased BLE strength, thoracic/lumbar AROM dysfunction, core/paraspinal motor control dysfunction, and impaired static/dynamic balance which is limiting his ability to perform ADLs and recreational activities without pain or functional restrictions. Pt displayed negative SLR and slump test which support initial dx. He shows decreased lumbopelvic control during lifting movements as well as decreased core/paraspinal strength which may be contributing to ongoing symptoms. Pt was provided with a basic HEP which will be reviewed in the upcoming session. Pt was educated on anatomy and physiology of diagnosis and demonstrated verbal understanding. Pt would benefit from skilled PT interventions to increase functional lower extremity and core/paraspinal strength, increase pain free ROM, and facilitate return to recreational activities and ADL management/independence with less limitations and pain. 1:1 with David Amaya DPT entirety of tx.  Impairments: abnormal gait, abnormal or restricted ROM, abnormal movement, activity intolerance, impaired physical strength, lacks appropriate home exercise program, pain with function, poor posture  and poor body mechanics    Symptom irritability:  moderateBarriers to therapy: Kinyarwanda speaking only  Understanding of Dx/Px/POC: excellent   Prognosis: good    Goals  Short term goals:   STGs to be met in 3-4 weeks:  1.  Increase BLE and core/paraspinal strength by half grade or more to increase functional strength.  2.  Decrease subjective report of pain by 25% or more to improve QoL.   3.  Sit/Stand for work, recreational activities, or ADLs for >/=30 minutes with minimal to no pain.  4.  Demonstrate improved lumbopelvic control during lifting movements with minimal to no Vcs for technique.   5.  Independent with basic HEP.     Long term goals:  LTG's to be met by DC:  1.  Ambulate community distances with little to no pain or difficulty.  2.  Perform all transfers without pain and difficulty.  3.  Perform recreational and work activities / ADLs with little pain or difficulty.  4.  Increase strength to 4+/5 or better in BLEs and core/paraspinal musculature.   5.  Independent with advanced HEP.   6.  Increase FOTO to predicted value by DC.    Plan  Patient would benefit from: skilled physical therapy and PT eval  Referral necessary: No  Planned therapy interventions: abdominal trunk stabilization, balance/weight bearing training, body mechanics training, functional ROM exercises, home exercise program, gait training, joint mobilization, manual therapy, massage, neuromuscular re-education, patient education, postural training, strengthening, therapeutic activities, therapeutic exercise, graded activity, graded exercise, graded motor, behavior modification and stretching  Frequency: 1x week  Duration in weeks: 12  Plan of Care beginning date: 1/24/2024  Plan of Care expiration date: 4/17/2024  Treatment plan discussed with: patient        Subjective  HPI: Pt referred to physical therapy for lumbar spondylosis and low back pain with sciatica. Pt mentioned that his back pain started to hurt him more 3 months ago after he was performing a lifting movements. Pt  "describes his pain as a \"tightness\" in the middle lower back which can radiate down BLEs. Pt mentioned that he has pain with lifting at times and can perform the tasks but has a lot of pain following. Pt notes that he also has pain with performing steps and walking longer distances. Pt mentioned that his pain in the lower back can wake him up at times.   Pain Location: LBP  Pain Intensity: Current: 7/10, Worst: 9/10, Best: 3/10  ANG: Insidious  DOI: Chronic  Aggravating Factors: Lifting movements, bending over  Alleviating Factors: Rest in sitting  Living Situation: Apartment complex with elevator  Goals: To be able to perform lifting movements without as much pain.   PLOF: Sedentary (works out at home 1-2x/week)    Objective  Vitals: BP- 118/82     Postural Findings:     Head Position X Protracted   Neutral   Retracted   Scapular Position  Protracted  X Neutral   Retracted   Thoracic Spine   Inc Kyphosis X Neutral       Lumbar Spine   Inc Lordosis  X Neutral  Dec Lordosis   Pelvis   Anterior Tilt X Neutral   Posterior Tilt   Iliac Crest   L elevated X Neutral   R elevated   Feet   Pronated X Neutral   Supinated   Lateral Shift   Right   Left X None      Strength and ROM evaluated B from a regional biomechanical perspective and values relevant to this episode recorded in tables below.     ROM:   Joint / Motion  Right: 1/24/2024 Left: 1/24/2024   Lumbar Flexion  25% (p!)     Lumbar Extension  15     Lumbar Sidebending  15 10 (p!)   Hip ER  40 50   Hip IR  20 20         Strength: MMT revealed the following findings.  Joint Motion Right: 1/24/2024 Left: 1/24/2024   Hip Flexion 4-/5 4/5   Hip Abduction 4-/5 4/5   Hip Adduction 4/5 4/5   Hip Extension 3+/5 4/5   Knee Extension 4-/5 4/5   Knee Flexion 4-/5 4/5   Ankle Plantarflexion 4/5 4/5   Ankle Dorsiflexion 5/5 5/5         Repeated Movements:     Standing Baseline:                                                   DURING MVMT.                     RESPONSE " AFTER  Standing Flexion Increased pain Increased pain   Standing Extension No change No change   *excessive movement at lower lumbar spine during standing flexion     Lying Baseline:                                                   DURING MVMT.                     RESPONSE AFTER  Lying Flexion No change No change   Lying Extension Increased pain No change        Light Touch Sensation:  RLE - 5/5  LLE - 5/5    Deep Tendon Reflexes:  Patellar Reflex - normal BL  Ankle Reflex - diminished BL      Additional Assessments:  Pain with palpation noted: At lower lumbar spine along paraspinals  Passive intervertebral motion: decreased segmental mobility at thoracic spine with PA glide  Supine to Sit: WFL  Gait Assessment: Antalgic gait, decreased step length BL     Special Tests:  Lumbar Specific and Neural Tension                                                                            Test / Measure  1/24/2024   Straight Leg raise Neg BL   Crossed straight leg raise Neg BL   Slump test Neg BL   Prone instability test Neg BL   Sural n (invert), tibial (sid) Neg BL        SI Joint Screen:   Test / Measure  1/24/2024   Sacral Compression (SL) Neg BL   Sacral Distraction Neg BL   Thigh Thrust Neg BL   [place] Neg BL   Sacral Thrust Neg BL        Access Code: [email or web address]  URL: [email or web address]  Date: 01/24/2024  Prepared by: David Amaya    Exercises  - Supine Pelvic Tilt  - 1 x daily - 7 x weekly - 3 sets - 10 reps  - Supine Lower Trunk Rotation  - 1 x daily - 7 x weekly - 2 sets - 10 reps  - Hip Flexion  - 1 x daily - 7 x weekly - 2 sets - 10 reps - 3 segundos hold  - Sidelying Open Book Thoracic Lumbar Rotation and Extension  - 1 x daily - 7 x weekly - 1 sets - 8 reps - 10 segundos hold  - Seated Thoracic Flexion and Extension  - 1 x daily - 7 x weekly - 3 sets - 10 reps         Precautions: PMH includes cancer, chronic pain, depression, head injury, HTN, liver disease, bipolar psychiatric disorder,  PTSD, substance abuse.      POC expires Unit limit Auth Expiration date PT/OT + Visit Limit?   4/17/24 BOMN TBD BOMN         Visit/Unit Tracking  AUTH Status:  Date 1/24        TBD Used 1         Remaining             Pertinent Findings:      POC End Date: 4/17/24                                                                                          Test / Measure  1/24/2024   FOTO (Predicted 64) 50   Lumbar ROM Decreased and painful all ranges   BLE Strength 3+ to 4/5     Visit Number: 1            Manuals 1/24            BL Hip PROM             Paraspinal STM             Thoracic/Lumbar Mobilization                          Neuro Re-Ed             Supine Pelvic Tilt 10x            TA Recruitment + Bridge NV            Supine Tball Press             Modified Dead Bug Legs only, 10x            Tball Press             Tball Press + Hip flex/abd             [place] Hyperext w/ Bosu             Sidelying Open Book 5x ea            Ther Ex             HEP Review + Pt Edu 10 min            NuStep             Seated Thoracic/Lumbar Ext 5x            LTRs 10x            Eccentric STS             Modified KB DL             [place] Press ABCs             Standing Tband/[place] Rotation             Side Steps w/ TB             Fwd Step Ups             [place] Fwd/Bwd Ambulation             Suitcase Carry                                       Ther Activity                                       Gait Training                                       Modalities

## 2024-02-02 ENCOUNTER — APPOINTMENT (OUTPATIENT)
Dept: PHYSICAL THERAPY | Facility: REHABILITATION | Age: 59
End: 2024-02-02
Payer: MEDICARE

## 2024-02-06 ENCOUNTER — OFFICE VISIT (OUTPATIENT)
Dept: PHYSICAL THERAPY | Facility: REHABILITATION | Age: 59
End: 2024-02-06
Payer: MEDICARE

## 2024-02-06 DIAGNOSIS — M47.816 LUMBAR SPONDYLOSIS: Primary | ICD-10-CM

## 2024-02-06 DIAGNOSIS — M54.40 LOW BACK PAIN WITH SCIATICA, SCIATICA LATERALITY UNSPECIFIED, UNSPECIFIED BACK PAIN LATERALITY, UNSPECIFIED CHRONICITY: ICD-10-CM

## 2024-02-06 PROCEDURE — 97110 THERAPEUTIC EXERCISES: CPT

## 2024-02-06 PROCEDURE — 97140 MANUAL THERAPY 1/> REGIONS: CPT

## 2024-02-06 PROCEDURE — 97112 NEUROMUSCULAR REEDUCATION: CPT

## 2024-02-06 NOTE — PROGRESS NOTES
Daily Note     Today's date: 2024  Patient name: Barrett Teran  : 1965  MRN: [id number]  Referring provider: Aadn Toledo DO  Dx:   Encounter Diagnosis     ICD-10-CM    1. Lumbar spondylosis  M47.816       2. Low back pain with sciatica, sciatica laterality unspecified, unspecified back pain laterality, unspecified chronicity  M54.40           Start Time: 1400  Stop Time: 1438  Total time in clinic (min): 38 minutes    Subjective: Pt notes that his back has been feeling okay but he continues to have pain at the R lower back. He mentioned that the muscles relaxers he has been taking have been helping.       Objective: See treatment diary below      Assessment: Tolerated treatment well. Patient would benefit from continued PT. Pt was introduced to further thoracic/lumbar ROM and core stability exercises and responded well without excessive increase in pain or soreness. He responded well to paraspinal STM and sitting thoracic flex/ext facilitation as he demonstrated improved mobility following. He continues to be most limited with ROM at the thoracic spine. He responded well to Vcs for lumbopelvic control and core recruitment during STS and modified DL but showed good technique following cues. Continue to progress as tolerated, 1:1 with David Amaya DPT entirety of tx.      Plan: Continue per plan of care.      Precautions: PMH includes cancer, chronic pain, depression, head injury, HTN, liver disease, bipolar psychiatric disorder, PTSD, substance abuse.      POC expires Unit limit Auth Expiration date PT/OT + Visit Limit?   24 BOMN TBD BOMN         Visit/Unit Tracking  AUTH Status:  Date         TBD Used 1         Remaining             Pertinent Findings:      POC End Date: 24                                                                                          Test / Measure  2024   FOTO (Predicted 64) 50   Lumbar ROM Decreased and painful all ranges   BLE Strength 3+ to 4/5  "    Visit Number: 1 2           Manuals 1/24 2/6           BL Hip PROM  MC           Paraspinal STM  MC           Thoracic/Lumbar Mobilization  Sitting t-spine MC                        Neuro Re-Ed             Prone Press Up  10x           Supine Pelvic Tilt 10x 20x           TA Recruitment + Bridge NV            Supine Tball Press             Modified Dead Bug Legs only, 10x Legs only 2x5           Tball Press  5x, 5s hold           Tball Press + Hip flex/abd  10x ea           [place] Hyperext w/ Bosu             Sidelying Open Book 5x ea 10x ea           Ther Ex             HEP Review + Pt Edu 10 min            NuStep  6', lvl 5           Seated Thoracic/Lumbar Ext 5x 15x w/ roll           LTRs 10x            Eccentric STS  3x8, no wt           Modified KB DL  9\" step, 3x8 15#           [place] Press ABCs             Standing Tband/[place] Rotation             Side Steps w/ TB             Fwd Step Ups             [place] Fwd/Bwd Ambulation             Suitcase Carry                                       Ther Activity                                       Gait Training                                       Modalities                                              "

## 2024-02-07 DIAGNOSIS — K21.9 GERD (GASTROESOPHAGEAL REFLUX DISEASE): ICD-10-CM

## 2024-02-07 DIAGNOSIS — I10 PRIMARY HYPERTENSION: ICD-10-CM

## 2024-02-07 DIAGNOSIS — E11.9 TYPE 2 DIABETES MELLITUS WITHOUT COMPLICATION, WITHOUT LONG-TERM CURRENT USE OF INSULIN (HCC): ICD-10-CM

## 2024-02-07 DIAGNOSIS — E78.5 HYPERLIPIDEMIA: ICD-10-CM

## 2024-02-07 RX ORDER — PANTOPRAZOLE SODIUM 40 MG/1
40 TABLET, DELAYED RELEASE ORAL DAILY
Qty: 30 TABLET | Refills: 4 | Status: SHIPPED | OUTPATIENT
Start: 2024-02-07

## 2024-02-07 RX ORDER — ATORVASTATIN CALCIUM 10 MG/1
10 TABLET, FILM COATED ORAL
Qty: 30 TABLET | Refills: 4 | Status: SHIPPED | OUTPATIENT
Start: 2024-02-07

## 2024-02-07 RX ORDER — LISINOPRIL 5 MG/1
5 TABLET ORAL DAILY
Qty: 30 TABLET | Refills: 4 | Status: SHIPPED | OUTPATIENT
Start: 2024-02-07

## 2024-02-13 ENCOUNTER — APPOINTMENT (OUTPATIENT)
Dept: PHYSICAL THERAPY | Facility: REHABILITATION | Age: 59
End: 2024-02-13
Payer: MEDICARE

## 2024-02-20 ENCOUNTER — OFFICE VISIT (OUTPATIENT)
Dept: PHYSICAL THERAPY | Facility: REHABILITATION | Age: 59
End: 2024-02-20
Payer: MEDICARE

## 2024-02-20 DIAGNOSIS — M47.816 LUMBAR SPONDYLOSIS: Primary | ICD-10-CM

## 2024-02-20 DIAGNOSIS — M54.40 LOW BACK PAIN WITH SCIATICA, SCIATICA LATERALITY UNSPECIFIED, UNSPECIFIED BACK PAIN LATERALITY, UNSPECIFIED CHRONICITY: ICD-10-CM

## 2024-02-20 PROCEDURE — 97140 MANUAL THERAPY 1/> REGIONS: CPT

## 2024-02-20 PROCEDURE — 97110 THERAPEUTIC EXERCISES: CPT

## 2024-02-20 PROCEDURE — 97112 NEUROMUSCULAR REEDUCATION: CPT

## 2024-02-20 NOTE — PROGRESS NOTES
Daily Note     Today's date: 2024  Patient name: Barrett Teran  : 1965  MRN: [id number]  Referring provider: Adan Toledo DO  Dx:   Encounter Diagnosis     ICD-10-CM    1. Lumbar spondylosis  M47.816       2. Low back pain with sciatica, sciatica laterality unspecified, unspecified back pain laterality, unspecified chronicity  M54.40           Start Time: 1400  Stop Time: 1445  Total time in clinic (min): 45 minutes    Subjective: Pt notes that he was unable to attend PT these last couple of weeks due to being sick and having bad headaches. He mentioned he was unable to perform many exercises at home due to being sick. He feels his back is getting slightly better.       Objective: See treatment diary below      Assessment: Tolerated treatment well. Patient would benefit from continued PT. Pt continues to respond well to MT focused on lumbar ext with overpressure and low level lumbar mobilization as he notes decreased pain following. He has been responding well to thoracic mobility exercises as he shows decreased pain at the lower back and improved mobility following. He was introduced to standing tband rotation to improve core/paraspinal neuromuscular control and responded well without excessive increase in pain or soreness. He continues to respond well to Vcs for lumbopelvic control during STS and modified DL as he shows improved technique following. Continue to progress as tolerated, 1:1 with David Amaya DPT entirety of tx.      Plan: Continue per plan of care.      Precautions: PMH includes cancer, chronic pain, depression, head injury, HTN, liver disease, bipolar psychiatric disorder, PTSD, substance abuse.      POC expires Unit limit Auth Expiration date PT/OT + Visit Limit?   24 BOMN 3/22/24 BOMN         Visit/Unit Tracking  AUTH Status:  Date       Approved Used 1 2 3       Remaining  9 8 7         Pertinent Findings:      POC End Date: 24                                "                                                           Test / Measure  1/24/2024   FOTO (Predicted 64) 50   Lumbar ROM Decreased and painful all ranges   BLE Strength 3+ to 4/5     Visit Number: 1 2 3          Manuals 1/24 2/6 2/20          BL Hip PROM  [facility]          Paraspinal STM   Lumbar ext w/ overpressure          Thoracic/Lumbar Mobilization  Sitting t-spine  Sitting t-spine                        Neuro Re-Ed             Prone Press Up  10x 10x          Supine Pelvic Tilt 10x 20x           TA Recruitment + Bridge NV            Supine Tball Press             Modified Dead Bug Legs only, 10x Legs only 2x5 Legs only 2x6          Tball Press  5x, 5s hold 5x, 5s hold          Tball Press + Hip flex/abd  10x ea 10x ea          [place] Hyperext w/ Bosu             Sidelying Open Book 5x ea 10x ea 10x ea          Quad Thread Needle   8x ea, 3s hold                       Ther Ex             HEP Review + Pt Edu 10 min            NuStep  6', lvl 5 6', lvl 5          Seated Thoracic/Lumbar Ext 5x 15x w/ roll 15x w/ roll          LTRs 10x            Eccentric STS  3x8, no wt 3x8, no wt          Modified KB DL  9\" step, 3x8 15# 9\" step, 3x8 15#          [place] Press ABCs             Standing Tband/[place] Rotation   Black Band, 2x8          Side Steps w/ TB             Fwd Step Ups             [place] Fwd/Bwd Ambulation             Suitcase Carry                                       Ther Activity                                       Gait Training                                       Modalities                                                "

## 2024-02-22 ENCOUNTER — OFFICE VISIT (OUTPATIENT)
Dept: FAMILY MEDICINE CLINIC | Facility: CLINIC | Age: 59
End: 2024-02-22
Payer: MEDICARE

## 2024-02-22 VITALS
BODY MASS INDEX: 28.95 KG/M2 | WEIGHT: 180.13 LBS | RESPIRATION RATE: 18 BRPM | TEMPERATURE: 98.2 F | HEIGHT: 66 IN | DIASTOLIC BLOOD PRESSURE: 80 MMHG | OXYGEN SATURATION: 96 % | HEART RATE: 78 BPM | SYSTOLIC BLOOD PRESSURE: 130 MMHG

## 2024-02-22 DIAGNOSIS — I10 PRIMARY HYPERTENSION: ICD-10-CM

## 2024-02-22 DIAGNOSIS — E11.9 TYPE 2 DIABETES MELLITUS WITHOUT COMPLICATION, WITHOUT LONG-TERM CURRENT USE OF INSULIN (HCC): Primary | ICD-10-CM

## 2024-02-22 DIAGNOSIS — M47.816 LUMBAR SPONDYLOSIS: ICD-10-CM

## 2024-02-22 PROBLEM — Z00.8 MEDICAL CLEARANCE FOR PSYCHIATRIC ADMISSION: Status: RESOLVED | Noted: 2022-12-19 | Resolved: 2024-02-22

## 2024-02-22 LAB — SL AMB POCT HEMOGLOBIN AIC: 6.4 (ref ?–6.5)

## 2024-02-22 PROCEDURE — 83036 HEMOGLOBIN GLYCOSYLATED A1C: CPT | Performed by: FAMILY MEDICINE

## 2024-02-22 PROCEDURE — 99214 OFFICE O/P EST MOD 30 MIN: CPT | Performed by: FAMILY MEDICINE

## 2024-02-22 NOTE — PROGRESS NOTES
FAMILY PRACTICE OFFICE VISIT       NAME: Barrett Teran  AGE: 58 y.o. SEX: male       : 1965        MRN: [id number]    DATE: 2024  TIME: 12:57 PM    Assessment and Plan     Problem List Items Addressed This Visit       DM (diabetes mellitus), type 2 (HCC) - Primary     Diabetes.  A1c stable at 6.4.  Patient will be more mindful of his intake of candy.  Lab Results   Component Value Date    HGBA1C 6.4 2024            Relevant Orders    POCT hemoglobin A1c (Completed)    IRIS Diabetic eye exam    Hypertension     Hypertension.  The patient's blood pressure is stable at this time and he will continue current regimen of medications         Lumbar spondylosis     Back pain.  Patient will make follow-up appointment with pain management to consider MRI for further evaluation of his back pain.          Patient was given application to obtain a permanent parking handicap placard for his car due to his chronic condition of back pain from lumbar spondylosis.      Chief Complaint     Chief Complaint   Patient presents with    Follow-up     6 month        History of Present Illness     Patient in the office accompanied by his sister.  His biggest complaint today is of his needing a letter for his housing authority apartment building to state that due to his chronic back pain he requires a parking space close to his apartment entrance.  He states he had to cancel his last appointment with pain management due to weather conditions.  He is in the process of rescheduling appointment so that he could consider obtaining an MRI and possible future therapeutic epidural injection.  Tried a course of physical therapy without relief in symptoms.  He denies any recent illness.  His A1c in the office today was 6.4 however patient does admit to eating candy several days a week.        Review of Systems   Review of Systems   Constitutional: Negative.    HENT: Negative.     Respiratory: Negative.     Cardiovascular: Negative.     Gastrointestinal: Negative.    Musculoskeletal:  Positive for arthralgias, back pain and gait problem.   Psychiatric/Behavioral:  Positive for agitation and dysphoric mood. The patient is nervous/anxious.        Active Problem List     Patient Active Problem List   Diagnosis    Recurrent major depressive disorder, in remission (HCC)    Tobacco abuse    Abnormal liver enzymes    PTSD (post-traumatic stress disorder)    Opioid dependence in remission (HCC)    TEO (acute kidney injury) (HCC)    DM (diabetes mellitus), type 2 (HCC)    Hypertension    Bradycardia    Thrombocytopenia (HCC)    Anemia    Gross hematuria    CKD (chronic kidney disease)    Severe episode of recurrent major depressive disorder, with psychotic features (HCC)    COVID-19    Mild protein-calorie malnutrition (HCC)    Allergic rhinitis    Chronic constipation    Chronic hepatitis C virus infection (HCC)    Affective psychosis, bipolar (HCC)    Depression    Erosive esophagitis    GERD (gastroesophageal reflux disease)    Hemorrhoids, external    Heroin use disorder, mild, in sustained remission, abuse (HCC)    Hyperlipidemia associated with type 2 diabetes mellitus     Lumbar spondylosis       Past Medical History:  Past Medical History:   Diagnosis Date    Abdominal pain     Allergic rhinitis     Cancer (HCC)     Chronic pain     Colon polyps     Depression     Fatigue     Head injury     Homeless     Hypertension     Insomnia     Liver disease     Loss of appetite     Numbness and tingling of right arm     Palpitations     Psychiatric disorder     bipolar    PTSD (post-traumatic stress disorder)     Seizures (HCC)     Shortness of breath     Substance abuse (HCC)     Swallowing difficulty        Past Surgical History:  Past Surgical History:   Procedure Laterality Date    ABDOMINAL SURGERY      COLONOSCOPY      EYE SURGERY      NECK SURGERY      ORCHIECTOMY Right 5/19/2016    Procedure: ORCHIECTOMY INGUINAL biopsy of testicular mass, ;   Surgeon: Jose Lara MD;  Location:  MAIN OR;  Service:     GA COLONOSCOPY FLX DX W/COLLJ SPEC WHEN PFRMD N/A 2/13/2017    Procedure: EGD AND COLONOSCOPY;  Surgeon: Hernesto Perez MD;  Location: [facility] GI LAB;  Service: Gastroenterology    GA ESOPHAGOGASTRODUODENOSCOPY TRANSORAL DIAGNOSTIC N/A 11/16/2016    Procedure: EGD AND COLONOSCOPY;  Surgeon: Hernesto Perez MD;  Location:  GI LAB;  Service: Gastroenterology       Family History:  Family History   Problem Relation Age of Onset    Diabetes Mother     Hypertension Brother        Social History:  Social History     Socioeconomic History    Marital status:      Spouse name: Not on file    Number of children: Not on file    Years of education: Not on file    Highest education level: Not on file   Occupational History    Not on file   Tobacco Use    Smoking status: Every Day     Current packs/day: 1.00     Average packs/day: 1 pack/day for 41.0 years (41.0 ttl pk-yrs)     Types: Cigarettes    Smokeless tobacco: Current   Vaping Use    Vaping status: Never Used   Substance and Sexual Activity    Alcohol use: No    Drug use: Yes     Types: Heroin    Sexual activity: Not Currently   Other Topics Concern    Not on file   Social History Narrative    Not on file     Social Determinants of Health     Financial Resource Strain: Not on file   Food Insecurity: Not on file   Transportation Needs: Not on file   Physical Activity: Not on file   Stress: Not on file   Social Connections: Not on file   Intimate Partner Violence: Not on file   Housing Stability: Not on file       Objective     Vitals:    02/22/24 0953   BP: 130/80   Pulse: 78   Resp: 18   Temp: 98.2 °F (36.8 °C)   SpO2: 96%     Wt Readings from Last 3 Encounters:   02/22/24 81.7 kg (180 lb 2 oz)   01/05/24 78.9 kg (174 lb)   11/01/23 74.8 kg (165 lb)       Physical Exam  Constitutional:       General: He is not in acute distress.     Appearance: Normal appearance. He is not ill-appearing.   HENT:       "Head: Normocephalic and atraumatic.   Eyes:      General:         Right eye: No discharge.         Left eye: No discharge.      Extraocular Movements: Extraocular movements intact.      Conjunctiva/sclera: Conjunctivae normal.      Pupils: Pupils are equal, round, and reactive to light.   Neck:      Vascular: No carotid bruit.   Cardiovascular:      Rate and Rhythm: Normal rate and regular rhythm.      Heart sounds: Normal heart sounds. No murmur heard.  Pulmonary:      Effort: Pulmonary effort is normal.      Breath sounds: Normal breath sounds. No wheezing, rhonchi or rales.   Musculoskeletal:      Right lower leg: No edema.      Left lower leg: No edema.   Lymphadenopathy:      Cervical: No cervical adenopathy.   Skin:     Findings: No rash.   Neurological:      General: No focal deficit present.      Mental Status: He is alert and oriented to person, place, and time.      Cranial Nerves: No cranial nerve deficit.   Psychiatric:         Mood and Affect: Mood normal.         Behavior: Behavior normal.         Thought Content: Thought content normal.         Judgment: Judgment normal.         Pertinent Laboratory/Diagnostic Studies:  Lab Results   Component Value Date    BUN 14 10/02/2023    CREATININE 0.80 10/02/2023    CALCIUM 8.7 10/02/2023    K 3.7 10/02/2023    CO2 25 10/02/2023     10/02/2023     Lab Results   Component Value Date    ALT 16 10/02/2023    AST 18 10/02/2023     (H) 03/01/2017    ALKPHOS 57 10/02/2023       Lab Results   Component Value Date    WBC 6.56 10/02/2023    HGB 13.8 10/02/2023    HCT 40.9 10/02/2023    MCV 92 10/02/2023     10/02/2023       Lab Results   Component Value Date    TSH 2.59 10/14/2022       No results found for: \"CHOL\"  Lab Results   Component Value Date    TRIG 257 (H) 06/12/2023     Lab Results   Component Value Date    HDL 38 (L) 06/12/2023     Lab Results   Component Value Date    LDLCALC 114 (H) 06/12/2023     Lab Results   Component Value Date    " HGBA1C 6.4 02/22/2024       Results for orders placed or performed in visit on 02/22/24   POCT hemoglobin A1c   Result Value Ref Range    Hemoglobin A1C 6.4 6.5       Orders Placed This Encounter   Procedures    IRIS Diabetic eye exam    POCT hemoglobin A1c       ALLERGIES:  Allergies   Allergen Reactions    Oxycodone Swelling     Tongue swelling       Current Medications     Current Outpatient Medications   Medication Sig Dispense Refill    atorvastatin (LIPITOR) 10 mg tablet TAKE 1 TABLET (10 MG TOTAL) BY MOUTH DAILY WITH DINNER 30 tablet 4    hydrOXYzine pamoate (VISTARIL) 50 mg capsule       lisinopril (ZESTRIL) 5 mg tablet TAKE 1 TABLET (5 MG TOTAL) BY MOUTH DAILY 30 tablet 4    pantoprazole (PROTONIX) 40 mg tablet TAKE 1 TABLET (40 MG TOTAL) BY MOUTH IN THE MORNING 30 tablet 4    sertraline (ZOLOFT) 100 mg tablet Take 1 tablet (100 mg total) by mouth daily 30 tablet 1    Vraylar 1.5 MG capsule       fluticasone (FLONASE) 50 mcg/act nasal spray  (Patient not taking: Reported on 2/22/2024)      gabapentin (NEURONTIN) 300 mg capsule Take 1 capsule (300 mg total) by mouth 3 (three) times a day (Patient not taking: Reported on 2/22/2024) 90 capsule 1    hydrOXYzine HCL (ATARAX) 25 mg tablet  (Patient not taking: Reported on 1/5/2024)      Lidocaine 4 % PTCH Apply 1 patch topically daily As needed for back pain, remove the patch after 12 hours (Patient not taking: Reported on 1/5/2024) 30 patch 1    nicotine (NICODERM CQ) 21 mg/24 hr TD 24 hr patch Place 1 patch on the skin over 24 hours every 24 hours (Patient not taking: Reported on 1/5/2024) 14 patch 1    nicotine (NICOTROL) 10 MG inhaler Inhale 1 puff as needed for smoking cessation (Patient not taking: Reported on 1/5/2024) 42 each 2    Nicotine 10 MG/ML SOLN Instill 1 to 2 doses per hour in each nostril. Each dose = 2 sprays, one in each nostril. (Patient not taking: Reported on 1/5/2024) 10 mL 2    prazosin (MINIPRESS) 2 mg capsule Take 1 capsule (2 mg total)  by mouth daily at bedtime (Patient not taking: Reported on 2/22/2024) 30 capsule 5    predniSONE 10 mg tablet Take 4 tablets for 2 days, 3 tablets for 2 days, 2 tablets for 2 days, 1 tablet for 2 days. (Patient not taking: Reported on 1/5/2024) 20 tablet 0    QUEtiapine (SEROquel) 100 mg tablet Take 1 tablet (100 mg total) by mouth daily at bedtime (Patient not taking: Reported on 1/5/2024) 30 tablet 1    venlafaxine (EFFEXOR-XR) 37.5 mg 24 hr capsule  (Patient not taking: Reported on 1/5/2024)      venlafaxine (EFFEXOR-XR) 75 mg 24 hr capsule  (Patient not taking: Reported on 1/5/2024)       No current facility-administered medications for this visit.         Health Maintenance     Health Maintenance   Topic Date Due    DM Eye Exam  Never done    Annual Physical  Never done    Hepatitis A Vaccine (1 of 2 - Risk 2-dose series) Never done    Zoster Vaccine (1 of 2) Never done    Pneumococcal Vaccine: Pediatrics (0 to 5 Years) and At-Risk Patients (6 to 64 Years) (2 of 2 - PCV) 05/22/2018    Colorectal Cancer Screening  02/13/2020    COVID-19 Vaccine (1 - 2023-24 season) Never done    Lung Cancer Screening  12/17/2023    PT PLAN OF CARE  02/23/2024    Influenza Vaccine (1) 06/30/2024 (Originally 9/1/2023)    Kidney Health Evaluation: Albumin/Creatinine Ratio  06/12/2024    Diabetic Foot Exam  08/17/2024    HEMOGLOBIN A1C  08/22/2024    Kidney Health Evaluation: GFR  10/02/2024    DTaP,Tdap,and Td Vaccines (2 - Td or Tdap) 10/11/2027    HIV Screening  Completed    HIB Vaccine  Aged Out    IPV Vaccine  Aged Out    Meningococcal ACWY Vaccine  Aged Out    HPV Vaccine  Aged Out    Hepatitis C Screening  Discontinued     Immunization History   Administered Date(s) Administered    INFLUENZA 11/18/2004, 10/11/2017, 11/29/2018, 11/30/2020, 10/29/2021, 10/14/2022    Pneumococcal Polysaccharide PPV23 05/22/2017    Tdap 10/11/2017    Tuberculin Skin Test-PPD Intradermal 10/11/2017       Dick Jimenez MD    I spent 30 minutes with  this patient of which greater than 50% was spent counseling or reviewing chart

## 2024-02-22 NOTE — ASSESSMENT & PLAN NOTE
Back pain.  Patient will make follow-up appointment with pain management to consider MRI for further evaluation of his back pain.

## 2024-02-22 NOTE — ASSESSMENT & PLAN NOTE
Diabetes.  A1c stable at 6.4.  Patient will be more mindful of his intake of candy.  Lab Results   Component Value Date    HGBA1C 6.4 02/22/2024

## 2024-02-27 ENCOUNTER — CLINICAL SUPPORT (OUTPATIENT)
Dept: FAMILY MEDICINE CLINIC | Facility: CLINIC | Age: 59
End: 2024-02-27
Payer: MEDICARE

## 2024-02-27 ENCOUNTER — APPOINTMENT (OUTPATIENT)
Dept: PHYSICAL THERAPY | Facility: REHABILITATION | Age: 59
End: 2024-02-27
Payer: MEDICARE

## 2024-02-27 DIAGNOSIS — E11.9 TYPE 2 DIABETES MELLITUS WITHOUT COMPLICATION, WITHOUT LONG-TERM CURRENT USE OF INSULIN (HCC): Primary | ICD-10-CM

## 2024-02-27 LAB
LEFT EYE DIABETIC RETINOPATHY: NORMAL
LEFT EYE IMAGE QUALITY: NORMAL
LEFT EYE MACULAR EDEMA: NORMAL
LEFT EYE OTHER RETINOPATHY: NORMAL
RIGHT EYE DIABETIC RETINOPATHY: NORMAL
RIGHT EYE IMAGE QUALITY: NORMAL
RIGHT EYE MACULAR EDEMA: NORMAL
RIGHT EYE OTHER RETINOPATHY: NORMAL
SEVERITY (EYE EXAM): NORMAL

## 2024-02-27 PROCEDURE — 99211 OFF/OP EST MAY X REQ PHY/QHP: CPT

## 2024-02-28 ENCOUNTER — TELEPHONE (OUTPATIENT)
Dept: FAMILY MEDICINE CLINIC | Facility: CLINIC | Age: 59
End: 2024-02-28

## 2024-02-28 NOTE — TELEPHONE ENCOUNTER
----- Message from JOSIAH Santiago sent at 2/28/2024  3:04 PM EST -----  Iris eye exam is normal. Please repeat in one year.

## 2024-03-20 ENCOUNTER — EVALUATION (OUTPATIENT)
Dept: PHYSICAL THERAPY | Facility: REHABILITATION | Age: 59
End: 2024-03-20
Payer: MEDICARE

## 2024-03-20 DIAGNOSIS — M47.816 LUMBAR SPONDYLOSIS: Primary | ICD-10-CM

## 2024-03-20 DIAGNOSIS — M54.40 LOW BACK PAIN WITH SCIATICA, SCIATICA LATERALITY UNSPECIFIED, UNSPECIFIED BACK PAIN LATERALITY, UNSPECIFIED CHRONICITY: ICD-10-CM

## 2024-03-20 PROCEDURE — 97112 NEUROMUSCULAR REEDUCATION: CPT

## 2024-03-20 PROCEDURE — 97140 MANUAL THERAPY 1/> REGIONS: CPT

## 2024-03-20 PROCEDURE — 97110 THERAPEUTIC EXERCISES: CPT

## 2024-03-20 PROCEDURE — 97164 PT RE-EVAL EST PLAN CARE: CPT

## 2024-03-20 NOTE — PROGRESS NOTES
PT Re-Evaluation    Today's date: 3/20/2024  Patient name: Barrett Teran  : 1965  MRN: [id number]  Referring provider: Aadn Toledo DO  Dx:   Encounter Diagnosis     ICD-10-CM    1. Lumbar spondylosis  M47.816       2. Low back pain with sciatica, sciatica laterality unspecified, unspecified back pain laterality, unspecified chronicity  M54.40           Start Time: 1545  Stop Time: 1630  Total time in clinic (min): 45 minutes    Assessment  Assessment details: Barrett Teran is a 58 y.o. male attending physical therapy for lumbar spondylosis and low back pain with sciatica. Pt has been unable to attend PT this past month due to being busy and not having transportation. He continues to display low back pain at the R lower back that affects his ability to perform ADLs and recreational activities. Primary impairments continue to include increased pain with functional activities, decreased BLE strength, thoracic/lumbar AROM dysfunction, core/paraspinal motor control dysfunction, and impaired static/dynamic balance which is limiting his ability to perform ADLs and recreational activities without pain or functional restrictions. Pt displayed positive SLR at the R side this visit which was different upon IE. Pt was educated on compliancy with HEP and PT visits for successful outcomes and noted verbal understanding. Pt would benefit from skilled PT interventions to increase functional lower extremity and core/paraspinal strength, increase pain free ROM, and facilitate return to recreational activities and ADL management/independence with less limitations and pain. 1:1 with David Amaya DPT entirety of tx.  Impairments: abnormal gait, abnormal or restricted ROM, abnormal movement, activity intolerance, impaired physical strength, lacks appropriate home exercise program, pain with function, poor posture  and poor body mechanics    Symptom irritability: moderateBarriers to therapy: Qatari speaking only  Understanding  "of Dx/Px/POC: excellent   Prognosis: good    Goals  Short term goals:   STGs to be met in 3-4 weeks:  1.  Increase BLE and core/paraspinal strength by half grade or more to increase functional strength. (Paritally Met)  2.  Decrease subjective report of pain by 25% or more to improve QoL. (Not Met)  3.  Sit/Stand for work, recreational activities, or ADLs for >/=30 minutes with minimal to no pain. (Not Met)  4.  Demonstrate improved lumbopelvic control during lifting movements with minimal to no Vcs for technique. (Not Met)  5.  Independent with basic HEP. (Not Met)    Long term goals:  LTG's to be met by DC:  1.  Ambulate community distances with little to no pain or difficulty. (Not Met)  2.  Perform all transfers without pain and difficulty. (Not Met)  3.  Perform recreational and work activities / ADLs with little pain or difficulty. (Not Met)  4.  Increase strength to 4+/5 or better in BLEs and core/paraspinal musculature. (Not Met)  5.  Independent with advanced HEP. (Not Met)  6.  Increase FOTO to predicted value by DC. (Not Met)    Plan  Patient would benefit from: skilled physical therapy and PT eval  Referral necessary: No  Planned therapy interventions: abdominal trunk stabilization, balance/weight bearing training, body mechanics training, functional ROM exercises, home exercise program, gait training, joint mobilization, manual therapy, massage, neuromuscular re-education, patient education, postural training, strengthening, therapeutic activities, therapeutic exercise, graded activity, graded exercise, graded motor, behavior modification and stretching  Frequency: 1x week  Duration in weeks: 8  Plan of Care expiration date: 5/15/2024  Treatment plan discussed with: patient        Subjective  Pt was responding well to physical therapy earlier but was unable to come in the past month due to his pain and being limited with transportation. He continues to have a \"tightness\" sensation in the R lower back " that bothers him with lifting and performing ADLs. Steps and walking longer distances continue to affect him due to pain at the lower back. He notes his current pain is a 7/10.     Objective:  Head Position X Protracted   Neutral   Retracted   Scapular Position  Protracted  X Neutral   Retracted   Thoracic Spine   Inc Kyphosis X Neutral       Lumbar Spine   Inc Lordosis  X Neutral  Dec Lordosis   Pelvis   Anterior Tilt X Neutral   Posterior Tilt   Iliac Crest   L elevated X Neutral   R elevated   Feet   Pronated X Neutral   Supinated   Lateral Shift   Right   Left X None      Strength and ROM evaluated B from a regional biomechanical perspective and values relevant to this episode recorded in tables below.     ROM:   Joint / Motion  Right: 3/20/2024 Left: 3/20/2024   Lumbar Flexion  25% (p!)     Lumbar Extension  15     Lumbar Sidebending  15 10 (p!)   Hip ER  40 50   Hip IR  20 20         Strength: MMT revealed the following findings.  Joint Motion Right: 3/20/2024 Left: 3/20/2024   Hip Flexion 4-/5 4/5   Hip Abduction 4-/5 4/5   Hip Adduction 4/5 4/5   Hip Extension 3+/5 4/5   Knee Extension 4-/5 4/5   Knee Flexion 4-/5 4/5   Ankle Plantarflexion 4/5 4/5   Ankle Dorsiflexion 5/5 5/5         Additional Assessments:  Pain with palpation noted: At lower lumbar spine along paraspinals  Passive intervertebral motion: decreased segmental mobility at thoracic spine with PA glide  Supine to Sit: WFL  Gait Assessment: Antalgic gait, decreased step length BL     Special Tests:  Lumbar Specific and Neural Tension                                                                            Test / Measure  3/20/2024   Straight Leg raise Pos RLE   Crossed straight leg raise Neg BL   Slump test Neg BL   Prone instability test Neg BL   Sural n (invert), tibial (sid) Neg BL        SI Joint Screen:   Test / Measure  3/20/2024   Sacral Compression (SL) Neg BL   Sacral Distraction Neg BL   Thigh Thrust Neg BL   [place] Neg BL  "  Sacral Thrust Neg BL          Precautions: PMH includes cancer, chronic pain, depression, head injury, HTN, liver disease, bipolar psychiatric disorder, PTSD, substance abuse.      POC expires Unit limit Auth Expiration date PT/OT + Visit Limit?   4/17/24 BOMN 3/22/24 BOMN         Visit/Unit Tracking  AUTH Status:  Date 1/24 2/6 2/20 3/20     Approved Used 1 2 3 4      Remaining  9 8 7 6        Pertinent Findings:      POC End Date: 4/17/24                                                                                          Test / Measure  1/24/2024 3/20/2024   FOTO (Predicted 64) 50    Lumbar ROM Decreased and painful all ranges Decreased and painful all ranges   BLE Strength 3+ to 4/5 3+ to 4/5     Visit Number: 1 2 3 4 - MC         Manuals 1/24 2/6 2/20 3/20         BL Hip PROM  [facility]         Paraspinal STM   Lumbar ext w/ overpressure Lumbar ext w/ overpressure         Thoracic/Lumbar Mobilization  Sitting t-spine  Sitting t-spine                        Neuro Re-Ed             Prone Press Up  10x 10x 10x         Supine Pelvic Tilt 10x 20x           TA Recruitment + Bridge NV            Supine Tball Press             Modified Dead Bug Legs only, 10x Legs only 2x5 Legs only 2x6 Legs only 2x6         Tball Press  5x, 5s hold 5x, 5s hold 5x, 5s hold         Tball Press + Hip flex/abd  10x ea 10x ea          [place] Hyperext w/ Bosu             Sidelying Open Book 5x ea 10x ea 10x ea 10x ea         Quad Thread Needle   8x ea, 3s hold 8x ea, 3s hold                      Ther Ex             HEP Review + Pt Edu 10 min   RE - MC         NuStep  6', lvl 5 6', lvl 5 6', lvl 5         Seated Thoracic/Lumbar Ext 5x 15x w/ roll 15x w/ roll 15x w/ roll         LTRs 10x   20x         Eccentric STS  3x8, no wt 3x8, no wt 3x8, no wt         Modified KB DL  9\" step, 3x8 15# 9\" step, 3x8 15#          [place] Press ABCs             Standing Tband/[place] Rotation   Black Band, 2x8          Side Steps w/ TB           "   Fwd Step Ups             Charles Hoffd/Bwd Ambulation             Suitcase Carry                                       Ther Activity                                       Gait Training                                       Modalities

## 2024-03-26 ENCOUNTER — OFFICE VISIT (OUTPATIENT)
Dept: PHYSICAL THERAPY | Facility: REHABILITATION | Age: 59
End: 2024-03-26
Payer: MEDICARE

## 2024-03-26 DIAGNOSIS — M47.816 LUMBAR SPONDYLOSIS: Primary | ICD-10-CM

## 2024-03-26 DIAGNOSIS — M54.40 LOW BACK PAIN WITH SCIATICA, SCIATICA LATERALITY UNSPECIFIED, UNSPECIFIED BACK PAIN LATERALITY, UNSPECIFIED CHRONICITY: ICD-10-CM

## 2024-03-26 PROCEDURE — 97140 MANUAL THERAPY 1/> REGIONS: CPT

## 2024-03-26 PROCEDURE — 97110 THERAPEUTIC EXERCISES: CPT

## 2024-03-26 PROCEDURE — 97112 NEUROMUSCULAR REEDUCATION: CPT

## 2024-03-26 NOTE — PROGRESS NOTES
Daily Note     Today's date: 3/26/2024  Patient name: Barrett Teran  : 1965  MRN: [id number]  Referring provider: Adan Toledo DO  Dx:   Encounter Diagnosis     ICD-10-CM    1. Lumbar spondylosis  M47.816       2. Low back pain with sciatica, sciatica laterality unspecified, unspecified back pain laterality, unspecified chronicity  M54.40           Start Time: 1030  Stop Time: 1108  Total time in clinic (min): 38 minutes    Subjective: Pt notes that his back is feeling okay and he has been trying to get moving more as he brought out his motorcycle this past weekend.       Objective: See treatment diary below      Assessment: Tolerated treatment well. Patient would benefit from continued PT. Pt was re-introduced to STS and modified DL with increased resistance and responded well without excessive increase in pain or soreness. He continues to show increased in pain at the R lower back that lessens with bedlevel exercises and MT focused on hip PROM and STM. He was most challenged by modified DL as he continues to show decreased lumbopelvic control and core stability when fatigued. He continues to respond well to Vcs for core stability and lumbopelvic control during eccentric descent during STS and modified DL as he shows improved technique with cueing. Continue to progress as tolerated, 1:1 with David Amaya DPT entirety of tx.      Plan: Continue per plan of care.      Precautions: PMH includes cancer, chronic pain, depression, head injury, HTN, liver disease, bipolar psychiatric disorder, PTSD, substance abuse.      POC expires Unit limit Auth Expiration date PT/OT + Visit Limit?   24 BOMN 5/15/24 BOMN         Visit/Unit Tracking  AUTH Status:  Date  2/6 2/20 3/20 3/26    Approved Used 1 2 3 4 5     Remaining  9 8 7 6 5       Pertinent Findings:      POC End Date: 24                                                                                          Test / Measure  2024 3/20/2024  "  FOTO (Predicted 64) 50    Lumbar ROM Decreased and painful all ranges Decreased and painful all ranges   BLE Strength 3+ to 4/5 3+ to 4/5     Visit Number: 1 2 3 4 -  5        Manuals 1/24 2/6 2/20 3/20 3/26        BL Hip PROM  [facility]        Paraspinal STM   Lumbar ext w/ overpressure Lumbar ext w/ overpressure         Thoracic/Lumbar Mobilization  Sitting t-spine  Sitting t-spine                        Neuro Re-Ed             Prone Press Up  10x 10x 10x         Supine Pelvic Tilt 10x 20x           TA Recruitment + Bridge NV            Supine Tball Press             Modified Dead Bug Legs only, 10x Legs only 2x5 Legs only 2x6 Legs only 2x6 Legs only 2x6        Tball Press  5x, 5s hold 5x, 5s hold 5x, 5s hold 5x, 5s hold        Tball Press + Hip flex/abd  10x ea 10x ea          [place] Hyperext w/ Bosu             Sidelying Open Book 5x ea 10x ea 10x ea 10x ea         Quad Thread Needle   8x ea, 3s hold 8x ea, 3s hold                      Ther Ex             HEP Review + Pt Edu 10 min   RE - MC         NuStep  6', lvl 5 6', lvl 5 6', lvl 5 6', lvl 5        Seated Thoracic/Lumbar Ext 5x 15x w/ roll 15x w/ roll 15x w/ roll 15x w/ roll        LTRs 10x   20x 20x        Eccentric STS  3x8, no wt 3x8, no wt 3x8, no wt 3x8, 15#        Modified KB DL  9\" step, 3x8 15# 9\" step, 3x8 15#  9\" step, 3x8 20#        Palloff Press ABCs             Standing Tband/[place] Rotation   Black Band, 2x8          Side Steps w/ TB             Fwd Step Ups             Charles Fwd/Bwd Ambulation             Suitcase Carry                                       Ther Activity                                       Gait Training                                       Modalities                                                    "

## 2024-03-28 ENCOUNTER — OFFICE VISIT (OUTPATIENT)
Dept: PAIN MEDICINE | Facility: CLINIC | Age: 59
End: 2024-03-28
Payer: MEDICARE

## 2024-03-28 VITALS
BODY MASS INDEX: 28.93 KG/M2 | SYSTOLIC BLOOD PRESSURE: 146 MMHG | HEART RATE: 97 BPM | HEIGHT: 66 IN | WEIGHT: 180 LBS | DIASTOLIC BLOOD PRESSURE: 95 MMHG

## 2024-03-28 DIAGNOSIS — M54.9 BACK PAIN, UNSPECIFIED BACK LOCATION, UNSPECIFIED BACK PAIN LATERALITY, UNSPECIFIED CHRONICITY: ICD-10-CM

## 2024-03-28 DIAGNOSIS — M47.816 LUMBAR SPONDYLOSIS: Primary | ICD-10-CM

## 2024-03-28 PROCEDURE — 99214 OFFICE O/P EST MOD 30 MIN: CPT | Performed by: NURSE PRACTITIONER

## 2024-03-28 RX ORDER — LIDOCAINE 4 G/G
1 PATCH TOPICAL DAILY
Qty: 30 PATCH | Refills: 1 | Status: SHIPPED | OUTPATIENT
Start: 2024-03-28

## 2024-03-28 NOTE — PROGRESS NOTES
Assessment:  1. Lumbar spondylosis    2. Back pain, unspecified back location, unspecified back pain laterality, unspecified chronicity        Plan:  Patient to complete3 more weeks of physical therapy.  If no relief, will order MRI lumbar spine without contrast  Patient's gabapentin discontinued by psychiatry.  Will defer medication management at this time  Patient may continue topical lidocaine patches.  These refilled today  Will avoid opioids secondary to history of substance abuse  Follow-up in 4 weeks or sooner if needed    History of Present Illness:    The patient is a 58 y.o. male with a history of diabetes, hypertension, CKD, HCV, history of heroin abuse in remission last seen on 1/5/2024 who presents for a follow up office visit in regards to chronic low back pain that radiates to the lower extremities with associated numbness and paresthesias.  He denies bowel or bladder incontinence or saddle anesthesia.  Patient has completed 3 weeks of physical therapy without any improvement in his symptoms.  He has tried Tylenol, NSAIDs, oral steroids and topical lidocaine.  He only finds relief with topical lidocaine patches    The patient rates his pain a 7 out of 10 on the numeric pain rating scale.  Pain is constant in the morning and in the evening and it is described as cramping, numbness and pins-and-needles    I have personally reviewed and/or updated the patient's past medical history, past surgical history, family history, social history, current medications, allergies, and vital signs today.       Review of Systems:    Review of Systems   Respiratory:  Negative for shortness of breath.    Cardiovascular:  Negative for chest pain.   Gastrointestinal:  Positive for constipation. Negative for diarrhea, nausea and vomiting.   Musculoskeletal:  Positive for back pain and gait problem. Negative for arthralgias, joint swelling and myalgias.   Skin:  Negative for rash.   Neurological:  Negative for dizziness,  seizures and weakness.   All other systems reviewed and are negative.        Past Medical History:   Diagnosis Date    Abdominal pain     Allergic rhinitis     Cancer (HCC)     Chronic pain     Colon polyps     Depression     Fatigue     Head injury     Homeless     Hypertension     Insomnia     Liver disease     Loss of appetite     Numbness and tingling of right arm     Palpitations     Psychiatric disorder     bipolar    PTSD (post-traumatic stress disorder)     Seizures (HCC)     Shortness of breath     Substance abuse (HCC)     Swallowing difficulty        Past Surgical History:   Procedure Laterality Date    ABDOMINAL SURGERY      COLONOSCOPY      EYE SURGERY      NECK SURGERY      ORCHIECTOMY Right 5/19/2016    Procedure: ORCHIECTOMY INGUINAL biopsy of testicular mass, ;  Surgeon: Jose Lara MD;  Location:  MAIN OR;  Service:     KS COLONOSCOPY FLX DX W/COLLJ SPEC WHEN PFRMD N/A 2/13/2017    Procedure: EGD AND COLONOSCOPY;  Surgeon: Hernesto Perez MD;  Location: [facility] GI LAB;  Service: Gastroenterology    KS ESOPHAGOGASTRODUODENOSCOPY TRANSORAL DIAGNOSTIC N/A 11/16/2016    Procedure: EGD AND COLONOSCOPY;  Surgeon: Hernesto Perez MD;  Location:  GI LAB;  Service: Gastroenterology       Family History   Problem Relation Age of Onset    Diabetes Mother     Hypertension Brother        Social History     Occupational History    Not on file   Tobacco Use    Smoking status: Every Day     Current packs/day: 1.00     Average packs/day: 1 pack/day for 41.0 years (41.0 ttl pk-yrs)     Types: Cigarettes    Smokeless tobacco: Current   Vaping Use    Vaping status: Never Used   Substance and Sexual Activity    Alcohol use: No    Drug use: Yes     Types: Heroin    Sexual activity: Not Currently         Current Outpatient Medications:     atorvastatin (LIPITOR) 10 mg tablet, TAKE 1 TABLET (10 MG TOTAL) BY MOUTH DAILY WITH DINNER, Disp: 30 tablet, Rfl: 4    hydrOXYzine pamoate (VISTARIL) 50 mg capsule, , Disp: , Rfl:      Lidocaine 4 % PTCH, Apply 1 patch topically daily As needed for back pain, remove the patch after 12 hours, Disp: 30 patch, Rfl: 1    lisinopril (ZESTRIL) 5 mg tablet, TAKE 1 TABLET (5 MG TOTAL) BY MOUTH DAILY, Disp: 30 tablet, Rfl: 4    pantoprazole (PROTONIX) 40 mg tablet, TAKE 1 TABLET (40 MG TOTAL) BY MOUTH IN THE MORNING, Disp: 30 tablet, Rfl: 4    sertraline (ZOLOFT) 100 mg tablet, Take 1 tablet (100 mg total) by mouth daily, Disp: 30 tablet, Rfl: 1    Vraylar 1.5 MG capsule, , Disp: , Rfl:     fluticasone (FLONASE) 50 mcg/act nasal spray, , Disp: , Rfl:     gabapentin (NEURONTIN) 300 mg capsule, Take 1 capsule (300 mg total) by mouth 3 (three) times a day (Patient not taking: Reported on 2/22/2024), Disp: 90 capsule, Rfl: 1    hydrOXYzine HCL (ATARAX) 25 mg tablet, , Disp: , Rfl:     nicotine (NICODERM CQ) 21 mg/24 hr TD 24 hr patch, Place 1 patch on the skin over 24 hours every 24 hours (Patient not taking: Reported on 1/5/2024), Disp: 14 patch, Rfl: 1    nicotine (NICOTROL) 10 MG inhaler, Inhale 1 puff as needed for smoking cessation (Patient not taking: Reported on 1/5/2024), Disp: 42 each, Rfl: 2    Nicotine 10 MG/ML SOLN, Instill 1 to 2 doses per hour in each nostril. Each dose = 2 sprays, one in each nostril. (Patient not taking: Reported on 1/5/2024), Disp: 10 mL, Rfl: 2    prazosin (MINIPRESS) 2 mg capsule, Take 1 capsule (2 mg total) by mouth daily at bedtime (Patient not taking: Reported on 2/22/2024), Disp: 30 capsule, Rfl: 5    predniSONE 10 mg tablet, Take 4 tablets for 2 days, 3 tablets for 2 days, 2 tablets for 2 days, 1 tablet for 2 days. (Patient not taking: Reported on 1/5/2024), Disp: 20 tablet, Rfl: 0    QUEtiapine (SEROquel) 100 mg tablet, Take 1 tablet (100 mg total) by mouth daily at bedtime (Patient not taking: Reported on 1/5/2024), Disp: 30 tablet, Rfl: 1    venlafaxine (EFFEXOR-XR) 37.5 mg 24 hr capsule, , Disp: , Rfl:     venlafaxine (EFFEXOR-XR) 75 mg 24 hr capsule, , Disp: ,  "Rfl:     Allergies   Allergen Reactions    Oxycodone Swelling     Tongue swelling       Physical Exam:    /95   Pulse 97   Ht 5' 6\" (1.676 m)   Wt 81.6 kg (180 lb)   BMI 29.05 kg/m²     Constitutional:normal, well developed, well nourished, alert, in no distress and non-toxic and no overt pain behavior.  Eyes:anicteric  HEENT:grossly intact  Neck:supple, symmetric, trachea midline and no masses   Pulmonary:even and unlabored  Cardiovascular:No edema or pitting edema present  Skin:Normal without rashes or lesions and well hydrated  Psychiatric:Mood and affect appropriate  Neurologic:Cranial Nerves II-XII grossly intact  Musculoskeletal:antalgic gait      Imaging  No orders to display         No orders of the defined types were placed in this encounter.      "

## 2024-04-01 ENCOUNTER — APPOINTMENT (OUTPATIENT)
Dept: PHYSICAL THERAPY | Facility: REHABILITATION | Age: 59
End: 2024-04-01
Payer: MEDICARE

## 2024-04-01 DIAGNOSIS — M54.40 LOW BACK PAIN WITH SCIATICA, SCIATICA LATERALITY UNSPECIFIED, UNSPECIFIED BACK PAIN LATERALITY, UNSPECIFIED CHRONICITY: ICD-10-CM

## 2024-04-01 DIAGNOSIS — M47.816 LUMBAR SPONDYLOSIS: Primary | ICD-10-CM

## 2024-04-05 ENCOUNTER — OFFICE VISIT (OUTPATIENT)
Dept: PHYSICAL THERAPY | Facility: REHABILITATION | Age: 59
End: 2024-04-05
Payer: MEDICARE

## 2024-04-05 DIAGNOSIS — M54.40 LOW BACK PAIN WITH SCIATICA, SCIATICA LATERALITY UNSPECIFIED, UNSPECIFIED BACK PAIN LATERALITY, UNSPECIFIED CHRONICITY: ICD-10-CM

## 2024-04-05 DIAGNOSIS — M47.816 LUMBAR SPONDYLOSIS: Primary | ICD-10-CM

## 2024-04-05 PROCEDURE — 97140 MANUAL THERAPY 1/> REGIONS: CPT

## 2024-04-05 PROCEDURE — 97112 NEUROMUSCULAR REEDUCATION: CPT

## 2024-04-05 PROCEDURE — 97110 THERAPEUTIC EXERCISES: CPT

## 2024-04-05 NOTE — PROGRESS NOTES
Daily Note     Today's date: 2024  Patient name: Barrett Teran  : 1965  MRN: [id number]  Referring provider: Adan Toledo DO  Dx:   Encounter Diagnosis     ICD-10-CM    1. Lumbar spondylosis  M47.816       2. Low back pain with sciatica, sciatica laterality unspecified, unspecified back pain laterality, unspecified chronicity  M54.40           Start Time: 1100  Stop Time: 1145  Total time in clinic (min): 45 minutes    Subjective: Pt notes that he has increased soreness at his lower back today as he recently stopped taking a medication that was helping with his pain.       Objective: See treatment diary below      Assessment: Tolerated treatment fair. Patient demonstrated fatigue post treatment and would benefit from continued PT. Modified DL was held this visit due to increased soreness at the lower back. Pt responded well to MT focused on paraspinal STM and hip PROM as he noted decreased pain following. He noted slight decrease in pain following tx session. He continues to respond well to sitting lumbar ext as he notes decreased pain following. Continue to progress as tolerated, 1:1 with David Amaya DPT entirety of tx.      Plan: Continue per plan of care.      Precautions: PMH includes cancer, chronic pain, depression, head injury, HTN, liver disease, bipolar psychiatric disorder, PTSD, substance abuse.      POC expires Unit limit Auth Expiration date PT/OT + Visit Limit?   24 BOMN 5/15/24 BOMN         Visit/Unit Tracking  AUTH Status:  Date 1/24 2/6 2/20 3/20 3/26 4/5   Approved Used 1 2 3 4 5 6    Remaining  9 8 7 6 5 4      Pertinent Findings:      POC End Date: 24                                                                                          Test / Measure  2024 3/20/2024   FOTO (Predicted 64) 50    Lumbar ROM Decreased and painful all ranges Decreased and painful all ranges   BLE Strength 3+ to 4/5 3+ to 4/5     Visit Number: 1 2 3 4 - MC 5 6       Manuals   "2/20 3/20 3/26 4/5       BL Hip PROM  [facility]       Paraspinal STM   Lumbar ext w/ overpressure Lumbar ext w/ overpressure  Lumbar ext w/ overpressure       Thoracic/Lumbar Mobilization  Sitting t-spine  Sitting t-spine                        Neuro Re-Ed             Prone Press Up  10x 10x 10x  15x       Supine Pelvic Tilt 10x 20x           TA Recruitment + Bridge NV            Supine Tball Press             Modified Dead Bug Legs only, 10x Legs only 2x5 Legs only 2x6 Legs only 2x6 Legs only 2x6 Legs only 2x6       Tball Press  5x, 5s hold 5x, 5s hold 5x, 5s hold 5x, 5s hold        Tball Press + Hip flex/abd  10x ea 10x ea          [place] Hyperext w/ Bosu             Sidelying Open Book 5x ea 10x ea 10x ea 10x ea  10x ea       Quad Thread Needle   8x ea, 3s hold 8x ea, 3s hold                      Ther Ex             HEP Review + Pt Edu 10 min   RE -          NuStep  6', lvl 5 6', lvl 5 6', lvl 5 6', lvl 5 6', lvl 5       Seated Thoracic/Lumbar Ext 5x 15x w/ roll 15x w/ roll 15x w/ roll 15x w/ roll 6', lvl 5       LTRs 10x   20x 20x 20x       Eccentric STS  3x8, no wt 3x8, no wt 3x8, no wt 3x8, 15# 3x8, 15#       Modified KB DL  9\" step, 3x8 15# 9\" step, 3x8 15#  Held        Tball Roll Out      10x ea direction       Palloff Press ABCs             Standing Tband/[place] Rotation   Black Band, 2x8          Side Steps w/ TB             Fwd Step Ups             [place] Fwd/Bwd Ambulation             Suitcase Carry                                       Ther Activity                                       Gait Training                                       Modalities                                                      "

## 2024-06-04 ENCOUNTER — HOSPITAL ENCOUNTER (EMERGENCY)
Facility: HOSPITAL | Age: 59
Discharge: HOME | End: 2024-06-04
Attending: EMERGENCY MEDICINE
Payer: MEDICARE

## 2024-06-04 ENCOUNTER — APPOINTMENT (EMERGENCY)
Dept: RADIOLOGY | Facility: HOSPITAL | Age: 59
End: 2024-06-04
Payer: MEDICARE

## 2024-06-04 VITALS
OXYGEN SATURATION: 95 % | DIASTOLIC BLOOD PRESSURE: 100 MMHG | TEMPERATURE: 98.6 F | RESPIRATION RATE: 19 BRPM | HEART RATE: 99 BPM | SYSTOLIC BLOOD PRESSURE: 164 MMHG

## 2024-06-04 DIAGNOSIS — R07.89 ATYPICAL CHEST PAIN: Primary | ICD-10-CM

## 2024-06-04 LAB
ALBUMIN SERPL BCP-MCNC: 4.7 G/DL (ref 3.5–5)
ALP SERPL-CCNC: 73 U/L (ref 34–104)
ALT SERPL W P-5'-P-CCNC: 37 U/L (ref 7–52)
ANION GAP SERPL CALCULATED.3IONS-SCNC: 9 MMOL/L (ref 4–13)
AST SERPL W P-5'-P-CCNC: 65 U/L (ref 13–39)
ATRIAL RATE: 97 BPM
BACTERIA UR QL AUTO: ABNORMAL /HPF
BASOPHILS # BLD AUTO: 0.02 THOUSANDS/ÂΜL (ref 0–0.1)
BASOPHILS NFR BLD AUTO: 0 % (ref 0–1)
BILIRUB SERPL-MCNC: 0.54 MG/DL (ref 0.2–1)
BILIRUB UR QL STRIP: NEGATIVE
BUN SERPL-MCNC: 14 MG/DL (ref 5–25)
CALCIUM SERPL-MCNC: 9.1 MG/DL (ref 8.4–10.2)
CARDIAC TROPONIN I PNL SERPL HS: 5 NG/L
CHLORIDE SERPL-SCNC: 100 MMOL/L (ref 96–108)
CLARITY UR: CLEAR
CO2 SERPL-SCNC: 25 MMOL/L (ref 21–32)
COLOR UR: YELLOW
CREAT SERPL-MCNC: 1.41 MG/DL (ref 0.6–1.3)
EOSINOPHIL # BLD AUTO: 0.13 THOUSAND/ÂΜL (ref 0–0.61)
EOSINOPHIL NFR BLD AUTO: 1 % (ref 0–6)
ERYTHROCYTE [DISTWIDTH] IN BLOOD BY AUTOMATED COUNT: 13.2 % (ref 11.6–15.1)
GFR SERPL CREATININE-BSD FRML MDRD: 54 ML/MIN/1.73SQ M
GLUCOSE SERPL-MCNC: 162 MG/DL (ref 65–140)
GLUCOSE UR STRIP-MCNC: NEGATIVE MG/DL
HCT VFR BLD AUTO: 40.2 % (ref 36.5–49.3)
HGB BLD-MCNC: 13.7 G/DL (ref 12–17)
HGB UR QL STRIP.AUTO: NEGATIVE
HYALINE CASTS #/AREA URNS LPF: ABNORMAL /LPF
IMM GRANULOCYTES # BLD AUTO: 0.05 THOUSAND/UL (ref 0–0.2)
IMM GRANULOCYTES NFR BLD AUTO: 1 % (ref 0–2)
KETONES UR STRIP-MCNC: NEGATIVE MG/DL
LEUKOCYTE ESTERASE UR QL STRIP: NEGATIVE
LIPASE SERPL-CCNC: 41 U/L (ref 11–82)
LYMPHOCYTES # BLD AUTO: 3.36 THOUSANDS/ÂΜL (ref 0.6–4.47)
LYMPHOCYTES NFR BLD AUTO: 32 % (ref 14–44)
MCH RBC QN AUTO: 29.9 PG (ref 26.8–34.3)
MCHC RBC AUTO-ENTMCNC: 34.1 G/DL (ref 31.4–37.4)
MCV RBC AUTO: 88 FL (ref 82–98)
MONOCYTES # BLD AUTO: 0.62 THOUSAND/ÂΜL (ref 0.17–1.22)
MONOCYTES NFR BLD AUTO: 6 % (ref 4–12)
MUCOUS THREADS UR QL AUTO: ABNORMAL
NEUTROPHILS # BLD AUTO: 6.25 THOUSANDS/ÂΜL (ref 1.85–7.62)
NEUTS SEG NFR BLD AUTO: 60 % (ref 43–75)
NITRITE UR QL STRIP: NEGATIVE
NON-SQ EPI CELLS URNS QL MICRO: ABNORMAL /HPF
NRBC BLD AUTO-RTO: 0 /100 WBCS
P AXIS: 47 DEGREES
PH UR STRIP.AUTO: 5.5 [PH]
PLATELET # BLD AUTO: 244 THOUSANDS/UL (ref 149–390)
PMV BLD AUTO: 9.4 FL (ref 8.9–12.7)
POTASSIUM SERPL-SCNC: 3.9 MMOL/L (ref 3.5–5.3)
PR INTERVAL: 146 MS
PROT SERPL-MCNC: 8.2 G/DL (ref 6.4–8.4)
PROT UR STRIP-MCNC: ABNORMAL MG/DL
QRS AXIS: 17 DEGREES
QRSD INTERVAL: 82 MS
QT INTERVAL: 354 MS
QTC INTERVAL: 449 MS
RBC # BLD AUTO: 4.58 MILLION/UL (ref 3.88–5.62)
RBC #/AREA URNS AUTO: ABNORMAL /HPF
SODIUM SERPL-SCNC: 134 MMOL/L (ref 135–147)
SP GR UR STRIP.AUTO: 1.02 (ref 1–1.03)
T WAVE AXIS: 20 DEGREES
UROBILINOGEN UR STRIP-ACNC: 2 MG/DL
VENTRICULAR RATE: 97 BPM
WBC # BLD AUTO: 10.43 THOUSAND/UL (ref 4.31–10.16)
WBC #/AREA URNS AUTO: ABNORMAL /HPF

## 2024-06-04 PROCEDURE — 85025 COMPLETE CBC W/AUTO DIFF WBC: CPT

## 2024-06-04 PROCEDURE — 93005 ELECTROCARDIOGRAM TRACING: CPT

## 2024-06-04 PROCEDURE — 80053 COMPREHEN METABOLIC PANEL: CPT

## 2024-06-04 PROCEDURE — 84484 ASSAY OF TROPONIN QUANT: CPT

## 2024-06-04 PROCEDURE — 76775 US EXAM ABDO BACK WALL LIM: CPT | Performed by: EMERGENCY MEDICINE

## 2024-06-04 PROCEDURE — 99285 EMERGENCY DEPT VISIT HI MDM: CPT | Performed by: EMERGENCY MEDICINE

## 2024-06-04 PROCEDURE — 83690 ASSAY OF LIPASE: CPT

## 2024-06-04 PROCEDURE — 36415 COLL VENOUS BLD VENIPUNCTURE: CPT

## 2024-06-04 PROCEDURE — 71045 X-RAY EXAM CHEST 1 VIEW: CPT

## 2024-06-04 PROCEDURE — 81001 URINALYSIS AUTO W/SCOPE: CPT

## 2024-06-04 PROCEDURE — 99285 EMERGENCY DEPT VISIT HI MDM: CPT

## 2024-06-04 NOTE — ED PROCEDURE NOTE
Procedure  POC AAA US    Date/Time: 6/4/2024 3:42 PM    Performed by: Gustavo Khan DO  Authorized by: Gustavo Khan DO    Patient location:  ED  Performing Provider:  Resident  Other Assisting Provider: Yes (comment) (Dr. Olivas)    Procedure details:     Exam Type:  Educational    Indications: chest pain      Views Obtained:  Transverse proximal, transverse mid view, transverse distal view and sagittal (longitudinal) view    Image quality: diagnostic      Image availability:  Images available in PACS  Findings:     Abdominal Aorta Findings: normal      Intra-abdominal fluid: not identified    Interpretation:     Aortic ultrasound impression: aorta normal                     Gustavo Khan DO  06/04/24 1543

## 2024-06-04 NOTE — ED ATTENDING ATTESTATION
6/4/2024  I, Nikita Wilson MD, saw and evaluated the patient. I have discussed the patient with the resident/non-physician practitioner and agree with the resident's/non-physician practitioner's findings, Plan of Care, and MDM as documented in the resident's/non-physician practitioner's note, except where noted. All available labs and Radiology studies were reviewed.  I was present for key portions of any procedure(s) performed by the resident/non-physician practitioner and I was immediately available to provide assistance.       At this point I agree with the current assessment done in the Emergency Department.  I have conducted an independent evaluation of this patient a history and physical is as follows:    ED Course     Patient presents for evaluation after a syncopal episode outside of his apartment today.  Patient reported having chest pain.  On EMS arrival, patient was found to be visibly diaphoretic.  Additionally, patient reports feeling as if his stomach is distended.  No additional complaints.  Exam: AAOx3, moderate distress, RRR, CTA, mild abdominal patient had tenderness over lower abdomen. A/P: Chest pain, abdominal pain, syncope.  Will check cardiac labs, EKG, and chest x-ray.    Critical Care Time  Procedures

## 2024-06-05 ENCOUNTER — VBI (OUTPATIENT)
Dept: ADMINISTRATIVE | Facility: OTHER | Age: 59
End: 2024-06-05

## 2024-06-05 NOTE — TELEPHONE ENCOUNTER
06/05/24 3:11 PM    Patient contacted post ED visit, first outreach attempt made. Message was left for patient to return a call to the VBI Department at [facility]: Phone [phone].    Thank you.  Giulia Decker  [facility]

## 2024-06-06 NOTE — TELEPHONE ENCOUNTER
06/06/24 3:44 PM    Patient contacted post ED visit, VBI department spoke with patient/caregiver and outreach was successful.    Thank you.  Giulia Decker  [facility]

## 2024-06-08 NOTE — ED PROVIDER NOTES
History  Chief Complaint   Patient presents with    Chest Pain     Patient reports having chest pain that feels like pressure that started today. Patient visibly diaphoretic. Denies n/v/d. Patient reports his stomach feels distended and pressure. Reports last BM was yesterday.      Patient is a 58-year-old male presenting to the emergency department for evaluation after syncopal episode at his apartment.  Patient presents via EMS.  Patient complaining of some chest pain.  Patient diaphoretic upon arrival.  An  was used for the entirety of this encounter given that the patient is Czech-speaking only.  He denies any headache dizziness tinnitus vision change neck pain back pain numbness tingling in any of his extremities.  He denies this ever happening before.  Patient notes he was working outside earlier and may not have stayed as hydrated as he should have.  Patient states he feels nauseous and feels like his stomach is distended.  No additional complaints at this time.  He denies any drug or alcohol use.          Prior to Admission Medications   Prescriptions Last Dose Informant Patient Reported? Taking?   Lidocaine 4 % PTCH   No No   Sig: Apply 1 patch topically daily As needed for back pain, remove the patch after 12 hours   Nicotine 10 MG/ML SOLN   No No   Sig: Instill 1 to 2 doses per hour in each nostril. Each dose = 2 sprays, one in each nostril.   Patient not taking: Reported on 1/5/2024   QUEtiapine (SEROquel) 100 mg tablet   No No   Sig: Take 1 tablet (100 mg total) by mouth daily at bedtime   Patient not taking: Reported on 1/5/2024   Vraylar 1.5 MG capsule   Yes No   atorvastatin (LIPITOR) 10 mg tablet   No No   Sig: TAKE 1 TABLET (10 MG TOTAL) BY MOUTH DAILY WITH DINNER   fluticasone (FLONASE) 50 mcg/act nasal spray   Yes No   Patient not taking: Reported on 2/22/2024   gabapentin (NEURONTIN) 300 mg capsule   No No   Sig: Take 1 capsule (300 mg total) by mouth 3 (three) times a day   Patient  not taking: Reported on 2/22/2024   hydrOXYzine HCL (ATARAX) 25 mg tablet   Yes No   Patient not taking: Reported on 1/5/2024   hydrOXYzine pamoate (VISTARIL) 50 mg capsule   Yes No   lisinopril (ZESTRIL) 5 mg tablet   No No   Sig: TAKE 1 TABLET (5 MG TOTAL) BY MOUTH DAILY   nicotine (NICODERM CQ) 21 mg/24 hr TD 24 hr patch   No No   Sig: Place 1 patch on the skin over 24 hours every 24 hours   Patient not taking: Reported on 1/5/2024   nicotine (NICOTROL) 10 MG inhaler   No No   Sig: Inhale 1 puff as needed for smoking cessation   Patient not taking: Reported on 1/5/2024   pantoprazole (PROTONIX) 40 mg tablet   No No   Sig: TAKE 1 TABLET (40 MG TOTAL) BY MOUTH IN THE MORNING   prazosin (MINIPRESS) 2 mg capsule   No No   Sig: Take 1 capsule (2 mg total) by mouth daily at bedtime   Patient not taking: Reported on 2/22/2024   predniSONE 10 mg tablet   No No   Sig: Take 4 tablets for 2 days, 3 tablets for 2 days, 2 tablets for 2 days, 1 tablet for 2 days.   Patient not taking: Reported on 1/5/2024   sertraline (ZOLOFT) 100 mg tablet   No No   Sig: Take 1 tablet (100 mg total) by mouth daily   venlafaxine (EFFEXOR-XR) 37.5 mg 24 hr capsule   Yes No   Patient not taking: Reported on 1/5/2024   venlafaxine (EFFEXOR-XR) 75 mg 24 hr capsule   Yes No   Patient not taking: Reported on 1/5/2024      Facility-Administered Medications: None       Past Medical History:   Diagnosis Date    Abdominal pain     Allergic rhinitis     Cancer (HCC)     Chronic pain     Colon polyps     Depression     Fatigue     Head injury     Homeless     Hypertension     Insomnia     Liver disease     Loss of appetite     Numbness and tingling of right arm     Palpitations     Psychiatric disorder     bipolar    PTSD (post-traumatic stress disorder)     Seizures (HCC)     Shortness of breath     Substance abuse (HCC)     Swallowing difficulty        Past Surgical History:   Procedure Laterality Date    ABDOMINAL SURGERY      COLONOSCOPY      EYE  SURGERY      NECK SURGERY      ORCHIECTOMY Right 5/19/2016    Procedure: ORCHIECTOMY INGUINAL biopsy of testicular mass, ;  Surgeon: Jose Lara MD;  Location:  MAIN OR;  Service:     TX COLONOSCOPY FLX DX W/COLLJ SPEC WHEN PFRMD N/A 2/13/2017    Procedure: EGD AND COLONOSCOPY;  Surgeon: Hernesto Perez MD;  Location: [facility] GI LAB;  Service: Gastroenterology    TX ESOPHAGOGASTRODUODENOSCOPY TRANSORAL DIAGNOSTIC N/A 11/16/2016    Procedure: EGD AND COLONOSCOPY;  Surgeon: Hernesto Perez MD;  Location:  GI LAB;  Service: Gastroenterology       Family History   Problem Relation Age of Onset    Diabetes Mother     Hypertension Brother      I have reviewed and agree with the history as documented.    E-Cigarette/Vaping    E-Cigarette Use Never User      E-Cigarette/Vaping Substances    Nicotine No     THC No     CBD No     Flavoring No     Other No     Unknown No      Social History     Tobacco Use    Smoking status: Every Day     Current packs/day: 1.00     Average packs/day: 1 pack/day for 41.0 years (41.0 ttl pk-yrs)     Types: Cigarettes    Smokeless tobacco: Current   Vaping Use    Vaping status: Never Used   Substance Use Topics    Alcohol use: No    Drug use: Yes     Types: Heroin        Review of Systems   Constitutional:  Positive for activity change, diaphoresis and fatigue. Negative for chills and fever.   HENT:  Negative for congestion, ear pain and sore throat.    Eyes:  Negative for pain and visual disturbance.   Respiratory:  Negative for cough and shortness of breath.    Cardiovascular:  Negative for chest pain and palpitations.   Gastrointestinal:  Positive for abdominal pain and nausea. Negative for vomiting.   Genitourinary:  Negative for dysuria and hematuria.   Musculoskeletal:  Negative for arthralgias and back pain.   Skin:  Negative for color change and rash.   Neurological:  Positive for syncope. Negative for dizziness, seizures, weakness, light-headedness and headaches.    Psychiatric/Behavioral:  Negative for agitation and behavioral problems.    All other systems reviewed and are negative.      Physical Exam  ED Triage Vitals   Temperature Pulse Respirations Blood Pressure SpO2   06/04/24 1437 06/04/24 1436 06/04/24 1436 06/04/24 1436 06/04/24 1436   98.6 °F (37 °C) 99 19 164/100 95 %      Temp Source Heart Rate Source Patient Position - Orthostatic VS BP Location FiO2 (%)   06/04/24 1437 06/04/24 1436 06/04/24 1436 06/04/24 1436 --   Oral Monitor Sitting Right arm       Pain Score       06/04/24 1436       6             Orthostatic Vital Signs  Vitals:    06/04/24 1436   BP: 164/100   Pulse: 99   Patient Position - Orthostatic VS: Sitting       Physical Exam  Vitals and nursing note reviewed.   Constitutional:       General: He is not in acute distress.     Appearance: He is well-developed.   HENT:      Head: Normocephalic and atraumatic.   Eyes:      Conjunctiva/sclera: Conjunctivae normal.      Pupils: Pupils are equal, round, and reactive to light.   Cardiovascular:      Rate and Rhythm: Normal rate and regular rhythm.      Heart sounds: No murmur heard.  Pulmonary:      Effort: Pulmonary effort is normal. No respiratory distress.      Breath sounds: Normal breath sounds.   Abdominal:      Palpations: Abdomen is soft.      Tenderness: There is no abdominal tenderness.   Musculoskeletal:         General: No swelling. Normal range of motion.      Cervical back: Normal range of motion and neck supple.      Right lower leg: No tenderness. No edema.      Left lower leg: No tenderness. No edema.   Skin:     General: Skin is warm and dry.      Capillary Refill: Capillary refill takes less than 2 seconds.   Neurological:      General: No focal deficit present.      Mental Status: He is alert and oriented to person, place, and time.   Psychiatric:         Mood and Affect: Mood normal.         ED Medications  Medications - No data to display    Diagnostic Studies  Results Reviewed        Procedure Component Value Units Date/Time    HS Troponin 0hr (reflex protocol) [[id number]]  (Normal) Collected: 06/04/24 1639    Lab Status: Final result Specimen: Blood from Arm, Left Updated: 06/04/24 1708     hs TnI 0hr 5 ng/L     Urine Microscopic [[id number]]  (Abnormal) Collected: 06/04/24 1535    Lab Status: Final result Specimen: Urine, Clean Catch Updated: 06/04/24 1615     RBC, UA None Seen /hpf      WBC, UA None Seen /hpf      Epithelial Cells Occasional /hpf      Bacteria, UA None Seen /hpf      MUCUS THREADS Innumerable     Hyaline Casts, UA Innumerable /lpf     UA w Reflex to Microscopic w Reflex to Culture [[id number]]  (Abnormal) Collected: 06/04/24 1535    Lab Status: Final result Specimen: Urine, Clean Catch Updated: 06/04/24 1552     Color, UA Yellow     Clarity, UA Clear     Specific Gravity, UA 1.023     pH, UA 5.5     Leukocytes, UA Negative     Nitrite, UA Negative     Protein, UA 50 (1+) mg/dl      Glucose, UA Negative mg/dl      Ketones, UA Negative mg/dl      Urobilinogen, UA 2.0 mg/dl      Bilirubin, UA Negative     Occult Blood, UA Negative    Comprehensive metabolic panel [[id number]]  (Abnormal) Collected: 06/04/24 1441    Lab Status: Final result Specimen: Blood from Arm, Left Updated: 06/04/24 1510     Sodium 134 mmol/L      Potassium 3.9 mmol/L      Chloride 100 mmol/L      CO2 25 mmol/L      ANION GAP 9 mmol/L      BUN 14 mg/dL      Creatinine 1.41 mg/dL      Glucose 162 mg/dL      Calcium 9.1 mg/dL      AST 65 U/L      ALT 37 U/L      Alkaline Phosphatase 73 U/L      Total Protein 8.2 g/dL      Albumin 4.7 g/dL      Total Bilirubin 0.54 mg/dL      eGFR 54 ml/min/1.73sq m     Narrative:      National Kidney Disease Foundation guidelines for Chronic Kidney Disease (CKD):     Stage 1 with normal or high GFR (GFR > 90 mL/min/1.73 square meters)    Stage 2 Mild CKD (GFR = 60-89 mL/min/1.73 square meters)    Stage 3A Moderate CKD (GFR = 45-59 mL/min/1.73 square meters)    Stage 3B Moderate  CKD (GFR = 30-44 mL/min/1.73 square meters)    Stage 4 Severe CKD (GFR = 15-29 mL/min/1.73 square meters)    Stage 5 End Stage CKD (GFR <15 mL/min/1.73 square meters)  Note: GFR calculation is accurate only with a steady state creatinine    Lipase [[id number]]  (Normal) Collected: 06/04/24 1441    Lab Status: Final result Specimen: Blood from Arm, Left Updated: 06/04/24 1510     Lipase 41 u/L     CBC and differential [[id number]]  (Abnormal) Collected: 06/04/24 1441    Lab Status: Final result Specimen: Blood from Arm, Left Updated: 06/04/24 1452     WBC 10.43 Thousand/uL      RBC 4.58 Million/uL      Hemoglobin 13.7 g/dL      Hematocrit 40.2 %      MCV 88 fL      MCH 29.9 pg      MCHC 34.1 g/dL      RDW 13.2 %      MPV 9.4 fL      Platelets 244 Thousands/uL      nRBC 0 /100 WBCs      Segmented % 60 %      Immature Grans % 1 %      Lymphocytes % 32 %      Monocytes % 6 %      Eosinophils Relative 1 %      Basophils Relative 0 %      Absolute Neutrophils 6.25 Thousands/µL      Absolute Immature Grans 0.05 Thousand/uL      Absolute Lymphocytes 3.36 Thousands/µL      Absolute Monocytes 0.62 Thousand/µL      Eosinophils Absolute 0.13 Thousand/µL      Basophils Absolute 0.02 Thousands/µL                    XR chest 1 view portable   Final Result by Isa Brock MD (06/05 0733)      No acute cardiopulmonary disease.            Workstation performed: [id number]         US bedside procedure   Final Result by Interface, Externalimages (06/04 1458)            Procedures  ECG 12 Lead Documentation Only    Date/Time: 6/4/2024 2:40 PM    Performed by: Annette Maria Palladino, DO  Authorized by: Annette Maria Palladino, DO    Indications / Diagnosis:  Cp  ECG reviewed by me, the ED Provider: yes    Patient location:  ED  Previous ECG:     Previous ECG:  Unavailable  Interpretation:     Interpretation: normal    Rate:     ECG rate:  98    ECG rate assessment: normal    Rhythm:     Rhythm: sinus rhythm    Ectopy:     Ectopy:  none    QRS:     QRS axis:  Normal    QRS intervals:  Normal  Conduction:     Conduction: normal    ST segments:     ST segments:  Normal  T waves:     T waves: normal          ED Course  ED Course as of 06/08/24 1253   Tue Jun 04, 2024   1440 - - given the presentation, will check CBC for marked leukocytosis  - CMP for liver enzyme elevation that could signal cholecystitis, biliary obstructive disease. Check RFTs for TEO / markers of dehydration.  - Lipase given abdominal pain to evaluate specifically for pancreatitis.  - Given age, will do EKG/Troponin as perhaps an atypical presentation of ACS.   - HEART score:  - Urine: will check for UTI or signs of pyelonephritis.   - Bedside US  - Disposition per workup.      1512 Creatinine(!): 1.41             HEART Risk Score      Flowsheet Row Most Recent Value   Heart Score Risk Calculator    History 0 Filed at: 06/04/2024 1639   ECG 1 Filed at: 06/04/2024 1639   Age 1 Filed at: 06/04/2024 1639   Risk Factors 1 Filed at: 06/04/2024 1639   Troponin 0 Filed at: 06/04/2024 1639   HEART Score 3 Filed at: 06/04/2024 1639                                  Medical Decision Making  Pt re-examined and evaluated after testing and treatment. Spoke with the patient and feeling improved and sxs have resolved. Will discharge home with close f/u with pcp and instructed to return to the ED if sxs worsen or continue. Pt agrees with the plan for discharge and feels comfortable to go home with proper f/u. Advised to return for worsening or additional problems. Diagnostic tests were reviewed and questions answered. Diagnosis, care plan and treatment options were discussed. The patient understand instructions and will follow up as directed.    Counseling:there was a  detailed discussion with the patient and/or guardian regarding: the historical points, exam findings, and any diagnostic results supporting the discharge diagnosis, lab results, radiology results, discharge instructions reviewed  with patient and/or family/caregiver and understanding was verbalized. Instructions given to return to the emergency department if symptoms worsen or persist, or if there are any questions or concerns that arise at home.     All labs reviewed and utilized in the medical decision making process    All radiology studies independently viewed by me and interpreted by the radiologist.        Amount and/or Complexity of Data Reviewed  Labs: ordered. Decision-making details documented in ED Course.  Radiology: ordered.          Disposition  Final diagnoses:   Atypical chest pain     Time reflects when diagnosis was documented in both MDM as applicable and the Disposition within this note       Time User Action Codes Description Comment    6/4/2024  5:09 PM Palladino, Annette Add [R07.89] Atypical chest pain           ED Disposition       ED Disposition   Discharge    Condition   Stable    Date/Time   Tue Jun 4, 2024 1709    Comment   Barrett Teran discharge to home/self care.                   Follow-up Information       Follow up With Specialties Details Why Contact Info Additional Information    Dick Jimenez MD Family Medicine Schedule an appointment as soon as possible for a visit  for follow up [address] [place] [address] [phone]       [facility] Emergency Department Emergency Medicine Go to  As needed, If symptoms worsen [address] [place] [id number] [phone] [facility] Emergency Department, [address] [place] [address], [id number]   [phone]            Discharge Medication List as of 6/4/2024  5:17 PM        CONTINUE these medications which have NOT CHANGED    Details   atorvastatin (LIPITOR) 10 mg tablet TAKE 1 TABLET (10 MG TOTAL) BY MOUTH DAILY WITH DINNER, Starting Wed 2/7/2024, Normal      fluticasone (FLONASE) 50 mcg/act nasal spray Historical Med      gabapentin (NEURONTIN) 300 mg capsule Take 1 capsule (300 mg total)  by mouth 3 (three) times a day, Starting Fri 1/5/2024, Normal      hydrOXYzine HCL (ATARAX) 25 mg tablet Historical Med      hydrOXYzine pamoate (VISTARIL) 50 mg capsule Historical Med      Lidocaine 4 % PTCH Apply 1 patch topically daily As needed for back pain, remove the patch after 12 hours, Starting Thu 3/28/2024, Normal      lisinopril (ZESTRIL) 5 mg tablet TAKE 1 TABLET (5 MG TOTAL) BY MOUTH DAILY, Starting Wed 2/7/2024, Normal      nicotine (NICODERM CQ) 21 mg/24 hr TD 24 hr patch Place 1 patch on the skin over 24 hours every 24 hours, Starting Thu 8/17/2023, Normal      nicotine (NICOTROL) 10 MG inhaler Inhale 1 puff as needed for smoking cessation, Starting Fri 11/10/2023, Normal      Nicotine 10 MG/ML SOLN Instill 1 to 2 doses per hour in each nostril. Each dose = 2 sprays, one in each nostril., Normal      pantoprazole (PROTONIX) 40 mg tablet TAKE 1 TABLET (40 MG TOTAL) BY MOUTH IN THE MORNING, Starting Wed 2/7/2024, Normal      prazosin (MINIPRESS) 2 mg capsule Take 1 capsule (2 mg total) by mouth daily at bedtime, Starting Thu 8/17/2023, Normal      predniSONE 10 mg tablet Take 4 tablets for 2 days, 3 tablets for 2 days, 2 tablets for 2 days, 1 tablet for 2 days., Normal      QUEtiapine (SEROquel) 100 mg tablet Take 1 tablet (100 mg total) by mouth daily at bedtime, Starting Tue 12/27/2022, Normal      sertraline (ZOLOFT) 100 mg tablet Take 1 tablet (100 mg total) by mouth daily, Starting Tue 12/27/2022, Normal      !! venlafaxine (EFFEXOR-XR) 37.5 mg 24 hr capsule Historical Med      !! venlafaxine (EFFEXOR-XR) 75 mg 24 hr capsule Historical Med      Vraylar 1.5 MG capsule Historical Med       !! - Potential duplicate medications found. Please discuss with provider.        No discharge procedures on file.    PDMP Review         Value Time User    PDMP Reviewed  Yes 12/19/2022  2:25 PM Dov Obando MD             ED Provider  Attending physically available and evaluated Barrett Teran. ALFREDA  managed the patient along with the ED Attending.    Electronically Signed by           Annette Maria Palladino,   06/08/24 9345

## 2024-06-10 LAB
ATRIAL RATE: 97 BPM
P AXIS: 47 DEGREES
PR INTERVAL: 146 MS
QRS AXIS: 17 DEGREES
QRSD INTERVAL: 82 MS
QT INTERVAL: 354 MS
QTC INTERVAL: 449 MS
T WAVE AXIS: 20 DEGREES
VENTRICULAR RATE: 97 BPM

## 2024-06-10 PROCEDURE — 93010 ELECTROCARDIOGRAM REPORT: CPT | Performed by: INTERNAL MEDICINE

## 2024-06-26 DIAGNOSIS — K21.9 GERD (GASTROESOPHAGEAL REFLUX DISEASE): ICD-10-CM

## 2024-06-26 RX ORDER — PANTOPRAZOLE SODIUM 40 MG/1
40 TABLET, DELAYED RELEASE ORAL DAILY
Qty: 30 TABLET | Refills: 5 | Status: SHIPPED | OUTPATIENT
Start: 2024-06-26

## 2024-06-28 ENCOUNTER — TELEPHONE (OUTPATIENT)
Dept: FAMILY MEDICINE CLINIC | Facility: CLINIC | Age: 59
End: 2024-06-28

## 2024-06-28 DIAGNOSIS — I10 PRIMARY HYPERTENSION: ICD-10-CM

## 2024-06-28 DIAGNOSIS — E78.5 HYPERLIPIDEMIA: ICD-10-CM

## 2024-06-28 DIAGNOSIS — E11.9 TYPE 2 DIABETES MELLITUS WITHOUT COMPLICATION, WITHOUT LONG-TERM CURRENT USE OF INSULIN (HCC): ICD-10-CM

## 2024-06-28 DIAGNOSIS — Z12.5 PROSTATE CANCER SCREENING: Primary | ICD-10-CM

## 2024-06-28 RX ORDER — ATORVASTATIN CALCIUM 10 MG/1
10 TABLET, FILM COATED ORAL
Qty: 30 TABLET | Refills: 0 | Status: SHIPPED | OUTPATIENT
Start: 2024-06-28

## 2024-06-28 RX ORDER — LISINOPRIL 5 MG/1
5 TABLET ORAL DAILY
Qty: 30 TABLET | Refills: 0 | Status: SHIPPED | OUTPATIENT
Start: 2024-06-28

## 2024-06-29 NOTE — TELEPHONE ENCOUNTER
The patient is due for blood work  and  annual physical with . I refilled his Rx. Lab.orders placed.  Thank you

## 2024-07-27 DIAGNOSIS — I10 PRIMARY HYPERTENSION: ICD-10-CM

## 2024-07-29 RX ORDER — LISINOPRIL 5 MG/1
5 TABLET ORAL DAILY
Qty: 90 TABLET | Refills: 1 | Status: SHIPPED | OUTPATIENT
Start: 2024-07-29

## 2024-08-03 DIAGNOSIS — E11.9 TYPE 2 DIABETES MELLITUS WITHOUT COMPLICATION, WITHOUT LONG-TERM CURRENT USE OF INSULIN (HCC): ICD-10-CM

## 2024-08-03 DIAGNOSIS — E78.5 HYPERLIPIDEMIA: ICD-10-CM

## 2024-08-05 RX ORDER — ATORVASTATIN CALCIUM 10 MG/1
10 TABLET, FILM COATED ORAL
Qty: 30 TABLET | Refills: 5 | Status: SHIPPED | OUTPATIENT
Start: 2024-08-05

## 2024-08-29 ENCOUNTER — HOSPITAL ENCOUNTER (EMERGENCY)
Facility: HOSPITAL | Age: 59
Discharge: HOME | End: 2024-08-29
Attending: EMERGENCY MEDICINE
Payer: MEDICARE

## 2024-08-29 VITALS
HEART RATE: 76 BPM | OXYGEN SATURATION: 94 % | SYSTOLIC BLOOD PRESSURE: 129 MMHG | RESPIRATION RATE: 20 BRPM | TEMPERATURE: 98.2 F | DIASTOLIC BLOOD PRESSURE: 63 MMHG

## 2024-08-29 DIAGNOSIS — R41.82 ALTERED MENTAL STATUS: Primary | ICD-10-CM

## 2024-08-29 DIAGNOSIS — N17.9 ACUTE KIDNEY INJURY (HCC): ICD-10-CM

## 2024-08-29 LAB
ALBUMIN SERPL BCG-MCNC: 4.2 G/DL (ref 3.5–5)
ALBUMIN SERPL BCG-MCNC: 4.4 G/DL (ref 3.5–5)
ALP SERPL-CCNC: 69 U/L (ref 34–104)
ALP SERPL-CCNC: 72 U/L (ref 34–104)
ALT SERPL W P-5'-P-CCNC: 16 U/L (ref 7–52)
ALT SERPL W P-5'-P-CCNC: 17 U/L (ref 7–52)
ANION GAP SERPL CALCULATED.3IONS-SCNC: 10 MMOL/L (ref 4–13)
ANION GAP SERPL CALCULATED.3IONS-SCNC: 8 MMOL/L (ref 4–13)
APAP SERPL-MCNC: <2 UG/ML (ref 10–20)
AST SERPL W P-5'-P-CCNC: 18 U/L (ref 13–39)
AST SERPL W P-5'-P-CCNC: 20 U/L (ref 13–39)
ATRIAL RATE: 65 BPM
BASE EX.OXY STD BLDV CALC-SCNC: 41.3 % (ref 60–80)
BASE EX.OXY STD BLDV CALC-SCNC: 91 % (ref 60–80)
BASE EXCESS BLDV CALC-SCNC: -5.1 MMOL/L
BASE EXCESS BLDV CALC-SCNC: -7.1 MMOL/L
BASOPHILS # BLD AUTO: 0.02 THOUSANDS/ÂΜL (ref 0–0.1)
BASOPHILS NFR BLD AUTO: 0 % (ref 0–1)
BILIRUB SERPL-MCNC: 0.4 MG/DL (ref 0.2–1)
BILIRUB SERPL-MCNC: 0.44 MG/DL (ref 0.2–1)
BUN SERPL-MCNC: 33 MG/DL (ref 5–25)
BUN SERPL-MCNC: 37 MG/DL (ref 5–25)
CALCIUM SERPL-MCNC: 8.4 MG/DL (ref 8.4–10.2)
CALCIUM SERPL-MCNC: 8.7 MG/DL (ref 8.4–10.2)
CHLORIDE SERPL-SCNC: 100 MMOL/L (ref 96–108)
CHLORIDE SERPL-SCNC: 98 MMOL/L (ref 96–108)
CO2 SERPL-SCNC: 22 MMOL/L (ref 21–32)
CO2 SERPL-SCNC: 23 MMOL/L (ref 21–32)
CREAT SERPL-MCNC: 1.59 MG/DL (ref 0.6–1.3)
CREAT SERPL-MCNC: 1.98 MG/DL (ref 0.6–1.3)
EOSINOPHIL # BLD AUTO: 0.14 THOUSAND/ÂΜL (ref 0–0.61)
EOSINOPHIL NFR BLD AUTO: 2 % (ref 0–6)
ERYTHROCYTE [DISTWIDTH] IN BLOOD BY AUTOMATED COUNT: 12.9 % (ref 11.6–15.1)
ETHANOL SERPL-MCNC: <10 MG/DL
GFR SERPL CREATININE-BSD FRML MDRD: 36 ML/MIN/1.73SQ M
GFR SERPL CREATININE-BSD FRML MDRD: 47 ML/MIN/1.73SQ M
GLUCOSE SERPL-MCNC: 112 MG/DL (ref 65–140)
GLUCOSE SERPL-MCNC: 121 MG/DL (ref 65–140)
GLUCOSE SERPL-MCNC: 128 MG/DL (ref 65–140)
HCO3 BLDV-SCNC: 18.6 MMOL/L (ref 24–30)
HCO3 BLDV-SCNC: 22.4 MMOL/L (ref 24–30)
HCT VFR BLD AUTO: 38.2 % (ref 36.5–49.3)
HGB BLD-MCNC: 12.4 G/DL (ref 12–17)
IMM GRANULOCYTES # BLD AUTO: 0.02 THOUSAND/UL (ref 0–0.2)
IMM GRANULOCYTES NFR BLD AUTO: 0 % (ref 0–2)
LYMPHOCYTES # BLD AUTO: 3.47 THOUSANDS/ÂΜL (ref 0.6–4.47)
LYMPHOCYTES NFR BLD AUTO: 37 % (ref 14–44)
MCH RBC QN AUTO: 29.1 PG (ref 26.8–34.3)
MCHC RBC AUTO-ENTMCNC: 32.5 G/DL (ref 31.4–37.4)
MCV RBC AUTO: 90 FL (ref 82–98)
MONOCYTES # BLD AUTO: 0.78 THOUSAND/ÂΜL (ref 0.17–1.22)
MONOCYTES NFR BLD AUTO: 8 % (ref 4–12)
NEUTROPHILS # BLD AUTO: 4.87 THOUSANDS/ÂΜL (ref 1.85–7.62)
NEUTS SEG NFR BLD AUTO: 53 % (ref 43–75)
NRBC BLD AUTO-RTO: 0 /100 WBCS
O2 CT BLDV-SCNC: 7.9 ML/DL
O2 CT BLDV-SCNC: 9.2 ML/DL
P AXIS: 73 DEGREES
PCO2 BLDV: 38.4 MM HG (ref 42–50)
PCO2 BLDV: 51.7 MM HG (ref 42–50)
PH BLDV: 7.25 [PH] (ref 7.3–7.4)
PH BLDV: 7.3 [PH] (ref 7.3–7.4)
PLATELET # BLD AUTO: 212 THOUSANDS/UL (ref 149–390)
PMV BLD AUTO: 10 FL (ref 8.9–12.7)
PO2 BLDV: 24.4 MM HG (ref 35–45)
PO2 BLDV: 71.2 MM HG (ref 35–45)
POTASSIUM SERPL-SCNC: 4.5 MMOL/L (ref 3.5–5.3)
POTASSIUM SERPL-SCNC: 4.6 MMOL/L (ref 3.5–5.3)
PR INTERVAL: 160 MS
PROT SERPL-MCNC: 7.2 G/DL (ref 6.4–8.4)
PROT SERPL-MCNC: 7.4 G/DL (ref 6.4–8.4)
QRS AXIS: 56 DEGREES
QRSD INTERVAL: 78 MS
QT INTERVAL: 414 MS
QTC INTERVAL: 430 MS
RBC # BLD AUTO: 4.26 MILLION/UL (ref 3.88–5.62)
SALICYLATES SERPL-MCNC: <5 MG/DL (ref 3–20)
SODIUM SERPL-SCNC: 130 MMOL/L (ref 135–147)
SODIUM SERPL-SCNC: 131 MMOL/L (ref 135–147)
T WAVE AXIS: 46 DEGREES
VENTRICULAR RATE: 65 BPM
WBC # BLD AUTO: 9.3 THOUSAND/UL (ref 4.31–10.16)

## 2024-08-29 PROCEDURE — 99284 EMERGENCY DEPT VISIT MOD MDM: CPT

## 2024-08-29 PROCEDURE — 99291 CRITICAL CARE FIRST HOUR: CPT | Performed by: EMERGENCY MEDICINE

## 2024-08-29 PROCEDURE — 80179 DRUG ASSAY SALICYLATE: CPT

## 2024-08-29 PROCEDURE — 93010 ELECTROCARDIOGRAM REPORT: CPT | Performed by: INTERNAL MEDICINE

## 2024-08-29 PROCEDURE — 96365 THER/PROPH/DIAG IV INF INIT: CPT

## 2024-08-29 PROCEDURE — 96375 TX/PRO/DX INJ NEW DRUG ADDON: CPT

## 2024-08-29 PROCEDURE — 80053 COMPREHEN METABOLIC PANEL: CPT

## 2024-08-29 PROCEDURE — 80143 DRUG ASSAY ACETAMINOPHEN: CPT

## 2024-08-29 PROCEDURE — 85025 COMPLETE CBC W/AUTO DIFF WBC: CPT

## 2024-08-29 PROCEDURE — 82948 REAGENT STRIP/BLOOD GLUCOSE: CPT

## 2024-08-29 PROCEDURE — 36415 COLL VENOUS BLD VENIPUNCTURE: CPT

## 2024-08-29 PROCEDURE — 93005 ELECTROCARDIOGRAM TRACING: CPT

## 2024-08-29 PROCEDURE — 96366 THER/PROPH/DIAG IV INF ADDON: CPT

## 2024-08-29 PROCEDURE — 82805 BLOOD GASES W/O2 SATURATION: CPT

## 2024-08-29 PROCEDURE — 82077 ASSAY SPEC XCP UR&BREATH IA: CPT

## 2024-08-29 RX ORDER — NALOXONE HYDROCHLORIDE 0.4 MG/ML
0.5 INJECTION, SOLUTION INTRAMUSCULAR; INTRAVENOUS; SUBCUTANEOUS ONCE
Status: COMPLETED | OUTPATIENT
Start: 2024-08-29 | End: 2024-08-29

## 2024-08-29 RX ORDER — SODIUM CHLORIDE, SODIUM GLUCONATE, SODIUM ACETATE, POTASSIUM CHLORIDE, MAGNESIUM CHLORIDE, SODIUM PHOSPHATE, DIBASIC, AND POTASSIUM PHOSPHATE .53; .5; .37; .037; .03; .012; .00082 G/100ML; G/100ML; G/100ML; G/100ML; G/100ML; G/100ML; G/100ML
1000 INJECTION, SOLUTION INTRAVENOUS ONCE
Status: COMPLETED | OUTPATIENT
Start: 2024-08-29 | End: 2024-08-29

## 2024-08-29 RX ADMIN — SODIUM CHLORIDE, SODIUM GLUCONATE, SODIUM ACETATE, POTASSIUM CHLORIDE, MAGNESIUM CHLORIDE, SODIUM PHOSPHATE, DIBASIC, AND POTASSIUM PHOSPHATE 1000 ML: .53; .5; .37; .037; .03; .012; .00082 INJECTION, SOLUTION INTRAVENOUS at 16:48

## 2024-08-29 RX ADMIN — NALOXONE HYDROCHLORIDE 0.5 MG: 0.4 INJECTION, SOLUTION INTRAMUSCULAR; INTRAVENOUS; SUBCUTANEOUS at 15:12

## 2024-08-29 NOTE — ED PROVIDER NOTES
History  Chief Complaint   Patient presents with    Dizziness     Pt coming by ems, witnesses saw pt passed out in his pickup truck when ems arrived had pt on nonrebreather, diaphoretic, pinpoint pupils - no drug use per pt, pt is complaining of headache & dizziness      58-year-old male with past medical history substance abuse, hypertension, homelessness presents ED for evaluation of altered mental status.  Per EMS, the patient was found unresponsive in his car.  EMS noted the patient to be hypoxic with pinpoint pupils.  He was placed on nonrebreather.  Patient was not given any medications prior to arrival.  Patient states that he was fixing the radio in his car.  He denies drug or alcohol use.  Patient states his car was not running at the time        Prior to Admission Medications   Prescriptions Last Dose Informant Patient Reported? Taking?   Lidocaine 4 % PTCH   No No   Sig: Apply 1 patch topically daily As needed for back pain, remove the patch after 12 hours   Nicotine 10 MG/ML SOLN   No No   Sig: Instill 1 to 2 doses per hour in each nostril. Each dose = 2 sprays, one in each nostril.   Patient not taking: Reported on 1/5/2024   QUEtiapine (SEROquel) 100 mg tablet   No No   Sig: Take 1 tablet (100 mg total) by mouth daily at bedtime   Patient not taking: Reported on 1/5/2024   Vraylar 1.5 MG capsule   Yes No   atorvastatin (LIPITOR) 10 mg tablet   No No   Sig: TAKE 1 TABLET (10 MG TOTAL) BY MOUTH DAILY WITH DINNER   fluticasone (FLONASE) 50 mcg/act nasal spray   Yes No   Patient not taking: Reported on 2/22/2024   gabapentin (NEURONTIN) 300 mg capsule   No No   Sig: Take 1 capsule (300 mg total) by mouth 3 (three) times a day   Patient not taking: Reported on 2/22/2024   hydrOXYzine HCL (ATARAX) 25 mg tablet   Yes No   Patient not taking: Reported on 1/5/2024   hydrOXYzine pamoate (VISTARIL) 50 mg capsule   Yes No   lisinopril (ZESTRIL) 5 mg tablet   No No   Sig: TAKE 1 TABLET (5 MG TOTAL) BY MOUTH DAILY    naloxone (NARCAN) 4 mg/0.1 mL nasal spray   Yes No   nicotine (NICODERM CQ) 21 mg/24 hr TD 24 hr patch   No No   Sig: Place 1 patch on the skin over 24 hours every 24 hours   Patient not taking: Reported on 1/5/2024   nicotine (NICOTROL) 10 MG inhaler   No No   Sig: Inhale 1 puff as needed for smoking cessation   Patient not taking: Reported on 1/5/2024   pantoprazole (PROTONIX) 40 mg tablet   No No   Sig: TAKE 1 TABLET (40 MG TOTAL) BY MOUTH IN THE MORNING   prazosin (MINIPRESS) 2 mg capsule   No No   Sig: Take 1 capsule (2 mg total) by mouth daily at bedtime   Patient not taking: Reported on 2/22/2024   predniSONE 10 mg tablet   No No   Sig: Take 4 tablets for 2 days, 3 tablets for 2 days, 2 tablets for 2 days, 1 tablet for 2 days.   Patient not taking: Reported on 1/5/2024   sertraline (ZOLOFT) 100 mg tablet   No No   Sig: Take 1 tablet (100 mg total) by mouth daily   traZODone (DESYREL) 150 mg tablet   Yes No   venlafaxine (EFFEXOR-XR) 37.5 mg 24 hr capsule   Yes No   Patient not taking: Reported on 1/5/2024   venlafaxine (EFFEXOR-XR) 75 mg 24 hr capsule   Yes No   Patient not taking: Reported on 1/5/2024      Facility-Administered Medications: None       Past Medical History:   Diagnosis Date    Abdominal pain     Allergic rhinitis     Cancer (HCC)     Chronic pain     Colon polyps     Depression     Fatigue     Head injury     Homeless     Hypertension     Insomnia     Liver disease     Loss of appetite     Numbness and tingling of right arm     Palpitations     Psychiatric disorder     bipolar    PTSD (post-traumatic stress disorder)     Seizures (HCC)     Shortness of breath     Substance abuse (HCC)     Swallowing difficulty        Past Surgical History:   Procedure Laterality Date    ABDOMINAL SURGERY      COLONOSCOPY      EYE SURGERY      NECK SURGERY      ORCHIECTOMY Right 5/19/2016    Procedure: ORCHIECTOMY INGUINAL biopsy of testicular mass, ;  Surgeon: Jose Lara MD;  Location: [facility];   Service:     HI COLONOSCOPY FLX DX W/COLLJ SPEC WHEN PFRMD N/A 2/13/2017    Procedure: EGD AND COLONOSCOPY;  Surgeon: Hernesto Perez MD;  Location: [facility];  Service: Gastroenterology    HI ESOPHAGOGASTRODUODENOSCOPY TRANSORAL DIAGNOSTIC N/A 11/16/2016    Procedure: EGD AND COLONOSCOPY;  Surgeon: Hernesto Perez MD;  Location:  GI LAB;  Service: Gastroenterology       Family History   Problem Relation Age of Onset    Diabetes Mother     Hypertension Brother      I have reviewed and agree with the history as documented.    E-Cigarette/Vaping    E-Cigarette Use Never User      E-Cigarette/Vaping Substances    Nicotine No     THC No     CBD No     Flavoring No     Other No     Unknown No      Social History     Tobacco Use    Smoking status: Every Day     Current packs/day: 1.00     Average packs/day: 1 pack/day for 41.0 years (41.0 ttl pk-yrs)     Types: Cigarettes    Smokeless tobacco: Current   Vaping Use    Vaping status: Never Used   Substance Use Topics    Alcohol use: No    Drug use: Yes     Types: Heroin        Review of Systems   Unable to perform ROS: Mental status change   Constitutional:  Negative for fever.   Respiratory:  Positive for shortness of breath.    Cardiovascular:  Negative for chest pain.   Gastrointestinal:  Negative for vomiting.   Neurological:  Negative for headaches.       Physical Exam  ED Triage Vitals [08/29/24 1415]   Temperature Pulse Respirations Blood Pressure SpO2   98.2 °F (36.8 °C) 81 20 110/63 97 %      Temp Source Heart Rate Source Patient Position - Orthostatic VS BP Location FiO2 (%)   Oral Monitor Lying Right arm --      Pain Score       4             Orthostatic Vital Signs  Vitals:    08/29/24 1415 08/29/24 1530 08/29/24 1645 08/29/24 1800   BP: 110/63 117/64 126/73 129/63   Pulse: 81 60 64 76   Patient Position - Orthostatic VS: Lying          Physical Exam  Vitals and nursing note reviewed.   Constitutional:       General: He is not in acute distress.      Appearance: He is normal weight. He is not ill-appearing, toxic-appearing or diaphoretic.   HENT:      Head: Normocephalic and atraumatic.      Mouth/Throat:      Mouth: Mucous membranes are moist.   Eyes:      Extraocular Movements: Extraocular movements intact.      Conjunctiva/sclera: Conjunctivae normal.      Comments: Bilateral pinpoint pupils   Cardiovascular:      Rate and Rhythm: Normal rate and regular rhythm.      Pulses: Normal pulses.      Heart sounds: Normal heart sounds.   Pulmonary:      Effort: Pulmonary effort is normal.      Breath sounds: Normal breath sounds.      Comments: Patient is hypoxic at 89% on room air  Abdominal:      General: Abdomen is flat.      Palpations: Abdomen is soft.      Tenderness: There is no abdominal tenderness.   Musculoskeletal:      Cervical back: Neck supple.   Skin:     General: Skin is warm and dry.      Capillary Refill: Capillary refill takes less than 2 seconds.      Findings: No rash.   Neurological:      Mental Status: He is lethargic.      GCS: GCS eye subscore is 3. GCS verbal subscore is 5. GCS motor subscore is 6.         ED Medications  Medications   naloxone (NARCAN) injection 0.5 mg (0.5 mg Intravenous Given 8/29/24 1512)   multi-electrolyte (ISOLYTE-S PH 7.4) bolus 1,000 mL (0 mL Intravenous Stopped 8/29/24 2058)       Diagnostic Studies  Results Reviewed       Procedure Component Value Units Date/Time    Comprehensive metabolic panel [[id number]]  (Abnormal) Collected: 08/29/24 1932    Lab Status: Final result Specimen: Blood from Arm, Right Updated: 08/29/24 2003     Sodium 130 mmol/L      Potassium 4.6 mmol/L      Chloride 100 mmol/L      CO2 22 mmol/L      ANION GAP 8 mmol/L      BUN 33 mg/dL      Creatinine 1.59 mg/dL      Glucose 112 mg/dL      Calcium 8.4 mg/dL      AST 18 U/L      ALT 16 U/L      Alkaline Phosphatase 69 U/L      Total Protein 7.2 g/dL      Albumin 4.2 g/dL      Total Bilirubin 0.40 mg/dL      eGFR 47 ml/min/1.73sq m      Narrative:      National Kidney Disease Foundation guidelines for Chronic Kidney Disease (CKD):     Stage 1 with normal or high GFR (GFR > 90 mL/min/1.73 square meters)    Stage 2 Mild CKD (GFR = 60-89 mL/min/1.73 square meters)    Stage 3A Moderate CKD (GFR = 45-59 mL/min/1.73 square meters)    Stage 3B Moderate CKD (GFR = 30-44 mL/min/1.73 square meters)    Stage 4 Severe CKD (GFR = 15-29 mL/min/1.73 square meters)    Stage 5 End Stage CKD (GFR <15 mL/min/1.73 square meters)  Note: GFR calculation is accurate only with a steady state creatinine    Blood gas, venous [[id number]]  (Abnormal) Collected: 08/29/24 1932    Lab Status: Final result Specimen: Blood from Arm, Right Updated: 08/29/24 1956     pH, Jeramy 7.303     pCO2, Jeramy 38.4 mm Hg      pO2, Jeramy 71.2 mm Hg      HCO3, Jeramy 18.6 mmol/L      Base Excess, Jeramy -7.1 mmol/L      O2 Content, Jeramy 9.2 ml/dL      O2 HGB, VENOUS 91.0 %     Salicylate level [[id number]]  (Normal) Collected: 08/29/24 1546    Lab Status: Final result Specimen: Blood from Arm, Right Updated: 08/29/24 1648     Salicylate Lvl <5 mg/dL     Comprehensive metabolic panel [[id number]]  (Abnormal) Collected: 08/29/24 1546    Lab Status: Final result Specimen: Blood from Arm, Right Updated: 08/29/24 1628     Sodium 131 mmol/L      Potassium 4.5 mmol/L      Chloride 98 mmol/L      CO2 23 mmol/L      ANION GAP 10 mmol/L      BUN 37 mg/dL      Creatinine 1.98 mg/dL      Glucose 121 mg/dL      Calcium 8.7 mg/dL      AST 20 U/L      ALT 17 U/L      Alkaline Phosphatase 72 U/L      Total Protein 7.4 g/dL      Albumin 4.4 g/dL      Total Bilirubin 0.44 mg/dL      eGFR 36 ml/min/1.73sq m     Narrative:      National Kidney Disease Foundation guidelines for Chronic Kidney Disease (CKD):     Stage 1 with normal or high GFR (GFR > 90 mL/min/1.73 square meters)    Stage 2 Mild CKD (GFR = 60-89 mL/min/1.73 square meters)    Stage 3A Moderate CKD (GFR = 45-59 mL/min/1.73 square meters)    Stage 3B Moderate CKD (GFR  = 30-44 mL/min/1.73 square meters)    Stage 4 Severe CKD (GFR = 15-29 mL/min/1.73 square meters)    Stage 5 End Stage CKD (GFR <15 mL/min/1.73 square meters)  Note: GFR calculation is accurate only with a steady state creatinine    Acetaminophen level-If concentration is detectable, please discuss with medical  on call. [[id number]]  (Abnormal) Collected: 08/29/24 1546    Lab Status: Final result Specimen: Blood from Arm, Right Updated: 08/29/24 1628     Acetaminophen Level <2 ug/mL     Ethanol [[id number]]  (Normal) Collected: 08/29/24 1546    Lab Status: Final result Specimen: Blood from Arm, Right Updated: 08/29/24 1618     Ethanol Lvl <10 mg/dL     Blood gas, venous [[id number]]  (Abnormal) Collected: 08/29/24 1546    Lab Status: Final result Specimen: Blood from Arm, Right Updated: 08/29/24 1610     pH, Jeramy 7.255     pCO2, Jeramy 51.7 mm Hg      pO2, Jeramy 24.4 mm Hg      HCO3, Jeramy 22.4 mmol/L      Base Excess, Jeramy -5.1 mmol/L      O2 Content, Jeramy 7.9 ml/dL      O2 HGB, VENOUS 41.3 %     CBC and differential [[id number]] Collected: 08/29/24 1546    Lab Status: Final result Specimen: Blood from Arm, Right Updated: 08/29/24 1605     WBC 9.30 Thousand/uL      RBC 4.26 Million/uL      Hemoglobin 12.4 g/dL      Hematocrit 38.2 %      MCV 90 fL      MCH 29.1 pg      MCHC 32.5 g/dL      RDW 12.9 %      MPV 10.0 fL      Platelets 212 Thousands/uL      nRBC 0 /100 WBCs      Segmented % 53 %      Immature Grans % 0 %      Lymphocytes % 37 %      Monocytes % 8 %      Eosinophils Relative 2 %      Basophils Relative 0 %      Absolute Neutrophils 4.87 Thousands/µL      Absolute Immature Grans 0.02 Thousand/uL      Absolute Lymphocytes 3.47 Thousands/µL      Absolute Monocytes 0.78 Thousand/µL      Eosinophils Absolute 0.14 Thousand/µL      Basophils Absolute 0.02 Thousands/µL     Fingerstick Glucose (POCT) [[id number]]  (Normal) Collected: 08/29/24 1503    Lab Status: Final result Specimen: Blood Updated: 08/29/24 1504      POC Glucose 128 mg/dl                    No orders to display         Procedures  Procedures      ED Course  ED Course as of 08/29/24 2120   u Aug 29, 2024   1515 POC Glucose: 128   1515 Patient is now awake after Narcan   1615 pH, Jeramy(!): 7.255   1615 pCO2, Jeramy(!): 51.7   1615 CBC and differential   1642 Creatinine(!): 1.98   1642 BUN(!): 37   2009 pH, Jeramy: 7.303   2009 Creatinine(!): 1.59  improving   2009 BUN(!): 33  improving   2038 Patient states that he would like to go home.  Will discharge to home.  Return precautions discussed with patient.  I instructed the patient to follow-up with a primary care doctor.                             SBIRT 20yo+      Flowsheet Row Most Recent Value   Initial Alcohol Screen: US AUDIT-C     1. How often do you have a drink containing alcohol? 0 Filed at: 08/29/2024 1619   2. How many drinks containing alcohol do you have on a typical day you are drinking?  0 Filed at: 08/29/2024 1619   3a. Male UNDER 65: How often do you have five or more drinks on one occasion? 0 Filed at: 08/29/2024 1619   Audit-C Score 0 Filed at: 08/29/2024 1619   [place]: How many times in the past year have you...    Used an illegal drug or used a prescription medication for non-medical reasons? Once or Twice Filed at: 08/29/2024 1619                  Medical Decision Making  58-year-old male presents ED for evaluation of altered mental status.  Upon arrival, patient GCS 14, lethargic, but arousable.  Patient arrives hypoxic in the upper 80s/lower 90s on room air.  No increased respiratory effort.  Patient is not tachycardic.  Patient has pinpoint pupils.  Differential diagnosis: Given patient's history of substance abuse, I suspect opioid overdose.  I do not suspect acute stroke, carbon monoxide poisoning, infection or sepsis.  Plan: CBC, CMP, VBG, blood glucose normal, will give the patient Narcan.  Reassessment: After Narcan was given, patient became alert.  His oxygen saturation improved on room air  to the upper 90s.  Given these findings, I highly suspect opioid overdose, even though patient continues to deny substance use.  Patient states that he would like to go home.  Will give the patient IV fluids and repeat VBG.  If acidosis improves, will discharge to home.    Amount and/or Complexity of Data Reviewed  Labs: ordered. Decision-making details documented in ED Course.    Risk  Prescription drug management.          Disposition  Final diagnoses:   Altered mental status   Acute kidney injury (HCC)     Time reflects when diagnosis was documented in both MDM as applicable and the Disposition within this note       Time User Action Codes Description Comment    8/29/2024  8:38 PM Tommy Jorgensen [R41.82] Altered mental status     8/29/2024  8:38 PM Tommy Jorgensen [N17.9] Acute kidney injury (HCC)           ED Disposition       ED Disposition   Discharge    Condition   Stable    Date/Time   Thu Aug 29, 2024 2010    Comment   Barrett Teran discharge to home/self care.                   Follow-up Information       Follow up With Specialties Details Why Contact Info    Dick Jimenez MD Family Medicine Schedule an appointment as soon as possible for a visit   [address] [place] [address]  [phone]              Discharge Medication List as of 8/29/2024  8:38 PM        CONTINUE these medications which have NOT CHANGED    Details   atorvastatin (LIPITOR) 10 mg tablet TAKE 1 TABLET (10 MG TOTAL) BY MOUTH DAILY WITH DINNER, Starting Mon 8/5/2024, Normal      fluticasone (FLONASE) 50 mcg/act nasal spray Historical Med      gabapentin (NEURONTIN) 300 mg capsule Take 1 capsule (300 mg total) by mouth 3 (three) times a day, Starting Fri 1/5/2024, Normal      hydrOXYzine HCL (ATARAX) 25 mg tablet Historical Med      hydrOXYzine pamoate (VISTARIL) 50 mg capsule Historical Med      Lidocaine 4 % PTCH Apply 1 patch topically daily As needed for back pain, remove the patch after 12 hours, Starting Thu 3/28/2024,  Normal      lisinopril (ZESTRIL) 5 mg tablet TAKE 1 TABLET (5 MG TOTAL) BY MOUTH DAILY, Starting Mon 7/29/2024, Normal      naloxone (NARCAN) 4 mg/0.1 mL nasal spray Historical Med      nicotine (NICODERM CQ) 21 mg/24 hr TD 24 hr patch Place 1 patch on the skin over 24 hours every 24 hours, Starting Thu 8/17/2023, Normal      nicotine (NICOTROL) 10 MG inhaler Inhale 1 puff as needed for smoking cessation, Starting Fri 11/10/2023, Normal      Nicotine 10 MG/ML SOLN Instill 1 to 2 doses per hour in each nostril. Each dose = 2 sprays, one in each nostril., Normal      pantoprazole (PROTONIX) 40 mg tablet TAKE 1 TABLET (40 MG TOTAL) BY MOUTH IN THE MORNING, Starting Wed 6/26/2024, Normal      prazosin (MINIPRESS) 2 mg capsule Take 1 capsule (2 mg total) by mouth daily at bedtime, Starting Thu 8/17/2023, Normal      predniSONE 10 mg tablet Take 4 tablets for 2 days, 3 tablets for 2 days, 2 tablets for 2 days, 1 tablet for 2 days., Normal      QUEtiapine (SEROquel) 100 mg tablet Take 1 tablet (100 mg total) by mouth daily at bedtime, Starting Tue 12/27/2022, Normal      sertraline (ZOLOFT) 100 mg tablet Take 1 tablet (100 mg total) by mouth daily, Starting Tue 12/27/2022, Normal      traZODone (DESYREL) 150 mg tablet Historical Med      !! venlafaxine (EFFEXOR-XR) 37.5 mg 24 hr capsule Historical Med      !! venlafaxine (EFFEXOR-XR) 75 mg 24 hr capsule Historical Med      Vraylar 1.5 MG capsule Historical Med       !! - Potential duplicate medications found. Please discuss with provider.        No discharge procedures on file.    PDMP Review         Value Time User    PDMP Reviewed  Yes 12/19/2022  2:25 PM Dov Obando MD             ED Provider  Attending physically available and evaluated Barrett Teran. I managed the patient along with the ED Attending.    Electronically Signed by           Tommy Jorgensen DO  08/29/24 2120

## 2024-08-30 ENCOUNTER — VBI (OUTPATIENT)
Dept: FAMILY MEDICINE CLINIC | Facility: CLINIC | Age: 59
End: 2024-08-30

## 2024-08-30 NOTE — DISCHARGE INSTRUCTIONS
Fue evaluado en el departamento de emergencias después de que se encontró inconsciente.  En los análisis de david, se descubrió que tenía rosalia lesión renal aguda, que puede ocurrir con deshidratación.  Es importante que te hidrates roddy kira los próximos días.  Es importante que jac un seguimiento con un médico de atención primaria.  Regrese a la ronna de emergencias si comienza a sentirse peor.

## 2024-08-30 NOTE — ED ATTENDING ATTESTATION
"8/29/2024  I, Mekhi Morse MD, saw and evaluated the patient. I have discussed the patient with the resident and agree with the resident's findings, Plan of Care, and MDM as documented in the resident's note, except where noted. All available labs and Radiology studies were reviewed.  I was present for key portions of any procedure(s) performed by the resident and I was immediately available to provide assistance.    At this point I agree with the current assessment done in the Emergency Department.  I have conducted an independent evaluation of this patient a history and physical is as follows:    59 yo male with a history of HTN, major depressive disorder, PTSD, DM, HTN, CKD, hyperlipidemia, HCV, and opioid abuse disorder brought to the ED by EMS for evaluation of a mental status change. Bystanders called 9-1-1 when the patient was found unresponsive in his truck. Per medics he was hypoxic and diaphoretic with pinpoint pupils in the field. They administered oxygen via a NRB mask and transported him to the ED. On arrival the patient is complaining of a generalized headache, dizziness, and some chronic low back pain. He claims he \"doesn't remember\" what happened earlier today but he denies drug and alcohol abuse. No chest pain, shortness of breath, nausea, vomiting, or abdominal discomfort. No other specific complaints. Fingerstick glucose normal in the field.    ROS: per resident physician note    Gen: (+) somnolent, ill appearing  HEENT: PERRL, EOMI  Neck: supple  CV: RRR  Lungs: CTA B/L  Abdomen: soft, NT/ND  Ext: no swelling or deformity  Neuro: 5/5 strength all extremities, sensation grossly intact, (+) slurred speech  Skin: (+) diaphoretic    ED Course  The patient is ill appearing, diaphoretic, and somnolent on arrival. He is arousable but quickly falls asleep. (+) Slurred speech, pinpoint pupils, and hypoxia. Presentation is most consistent with an opioid toxidrome. IV Narcan administered with " immediate improvement in mental status and oxygenation. Will check EKG, basic labs, coma panel, and VBG. Will continue to monitor in the ED. Disposition per workup and reassessment.    1715 Patient reassessed. He remains alert and oriented. Abnormal lab results discussed. He does not want to be admitted to the hospital at this time. IVFs in progress. Plan to recheck metabolic panel and VBG after IVFs completed. If values improve then plan for discharge to home.    Critical Care Time  CriticalCare Time    Date/Time: 8/29/2024 4:25 PM    Performed by: Mekhi Morse MD  Authorized by: Mekhi Morse MD    Critical care provider statement:     Critical care time (minutes):  45    Critical care start time:  8/29/2024 3:10 PM    Critical care end time:  8/29/2024 3:55 PM    Critical care time was exclusive of:  Separately billable procedures and treating other patients and teaching time    Critical care was necessary to treat or prevent imminent or life-threatening deterioration of the following conditions:  Toxidrome, respiratory failure and CNS failure or compromise    Critical care was time spent personally by me on the following activities:  Obtaining history from patient or surrogate, development of treatment plan with patient or surrogate, discussions with primary provider, evaluation of patient's response to treatment, examination of patient, interpretation of cardiac output measurements, ordering and performing treatments and interventions, ordering and review of laboratory studies, re-evaluation of patient's condition and review of old charts    I assumed direction of critical care for this patient from another provider in my specialty: no    Comments:      Patient with apparent opioid toxidrome --> hypoxic with poor mental status. IV Narcan administered with rapid improvement. Supplemental oxygen and cardiac monitoring in place.

## 2024-08-30 NOTE — TELEPHONE ENCOUNTER
08/30/24 9:34 AM    Patient contacted post ED visit, first outreach attempt made. Message was left for patient to return a call to the VBI Department at [facility]: Phone [phone].    Thank you.  Bharti Mccarthy MA  [facility]

## 2024-09-03 NOTE — TELEPHONE ENCOUNTER
09/03/24 9:22 AM    Patient contacted post ED visit, second outreach attempt made. Message was left for patient to return a call to the VBI Department at [facility]: Phone [phone].    Thank you.  Bharti Mccarthy MA  [facility]

## 2024-09-04 NOTE — TELEPHONE ENCOUNTER
09/04/24 9:40 AM    Patient contacted post ED visit, phone outreaches were unsuccessful; patient does not have MyChart, a MyChart letter has not been sent.     Thank you.  Bharti Mccarthy MA  [facility]  09/04/24 9:40 AM

## 2024-09-05 NOTE — TELEPHONE ENCOUNTER
09/05/24 11:39 AM    Patient contacted post ED visit, VBI department spoke with patient/caregiver and outreach was successful.    Thank you.  Bharti Mccarthy MA  [facility]

## 2024-09-13 ENCOUNTER — OFFICE VISIT (OUTPATIENT)
Dept: FAMILY MEDICINE CLINIC | Facility: CLINIC | Age: 59
End: 2024-09-13
Payer: MEDICARE

## 2024-09-13 VITALS
OXYGEN SATURATION: 98 % | BODY MASS INDEX: 25.39 KG/M2 | HEIGHT: 66 IN | WEIGHT: 158 LBS | HEART RATE: 59 BPM | RESPIRATION RATE: 17 BRPM | SYSTOLIC BLOOD PRESSURE: 120 MMHG | DIASTOLIC BLOOD PRESSURE: 70 MMHG | TEMPERATURE: 98 F

## 2024-09-13 DIAGNOSIS — M47.816 LUMBAR SPONDYLOSIS: ICD-10-CM

## 2024-09-13 DIAGNOSIS — I10 PRIMARY HYPERTENSION: ICD-10-CM

## 2024-09-13 DIAGNOSIS — J30.9 ALLERGIC RHINITIS, UNSPECIFIED SEASONALITY, UNSPECIFIED TRIGGER: ICD-10-CM

## 2024-09-13 DIAGNOSIS — E11.9 TYPE 2 DIABETES MELLITUS WITHOUT COMPLICATION, WITHOUT LONG-TERM CURRENT USE OF INSULIN (HCC): ICD-10-CM

## 2024-09-13 DIAGNOSIS — M54.40 LOW BACK PAIN WITH SCIATICA, SCIATICA LATERALITY UNSPECIFIED, UNSPECIFIED BACK PAIN LATERALITY, UNSPECIFIED CHRONICITY: ICD-10-CM

## 2024-09-13 DIAGNOSIS — Z23 FLU VACCINE NEED: Primary | ICD-10-CM

## 2024-09-13 LAB
CREAT UR-MCNC: 282.9 MG/DL
MICROALBUMIN UR-MCNC: 7.8 MG/L
MICROALBUMIN/CREAT 24H UR: 3 MG/G CREATININE (ref 0–30)
SL AMB POCT HEMOGLOBIN AIC: 6.2 (ref ?–6.5)

## 2024-09-13 PROCEDURE — 90673 RIV3 VACCINE NO PRESERV IM: CPT

## 2024-09-13 PROCEDURE — 83036 HEMOGLOBIN GLYCOSYLATED A1C: CPT | Performed by: FAMILY MEDICINE

## 2024-09-13 PROCEDURE — 90471 IMMUNIZATION ADMIN: CPT

## 2024-09-13 PROCEDURE — 82570 ASSAY OF URINE CREATININE: CPT | Performed by: FAMILY MEDICINE

## 2024-09-13 PROCEDURE — 99214 OFFICE O/P EST MOD 30 MIN: CPT | Performed by: FAMILY MEDICINE

## 2024-09-13 PROCEDURE — 82043 UR ALBUMIN QUANTITATIVE: CPT | Performed by: FAMILY MEDICINE

## 2024-09-13 RX ORDER — FLUTICASONE PROPIONATE 50 MCG
2 SPRAY, SUSPENSION (ML) NASAL DAILY
Qty: 9.9 ML | Refills: 5 | Status: SHIPPED | OUTPATIENT
Start: 2024-09-13

## 2024-09-13 RX ORDER — GABAPENTIN 300 MG/1
300 CAPSULE ORAL 3 TIMES DAILY
Qty: 90 CAPSULE | Refills: 5 | Status: SHIPPED | OUTPATIENT
Start: 2024-09-13

## 2024-09-13 NOTE — ASSESSMENT & PLAN NOTE
Diabetes.  A1c stable at 6.2.  Patient was encouraged to continue to watch his intake of sweets.  His eye and foot exams are up-to-date  Lab Results   Component Value Date    HGBA1C 6.2 09/13/2024

## 2024-09-13 NOTE — ASSESSMENT & PLAN NOTE
Lumbar spondylosis.  Patient was given prescription refill for gabapentin 300 mg.  He will initiate with 1 tablet daily for the first week and if needed increase to twice daily dosing after 1 week and 3 times daily dosing after 1 week later if needed.  Due to his chronic back pain patient has had difficulties cleaning around his home or moving furniture as needed.  Patient is requesting evaluation with  for home health aide

## 2024-09-13 NOTE — PROGRESS NOTES
FAMILY PRACTICE OFFICE VISIT       NAME: Barrett Teran  AGE: 58 y.o. SEX: male       : 1965        MRN: [id number]    DATE: 2024  TIME: 12:27 PM    Assessment and Plan     Problem List Items Addressed This Visit       DM (diabetes mellitus), type 2 (HCC)     Diabetes.  A1c stable at 6.2.  Patient was encouraged to continue to watch his intake of sweets.  His eye and foot exams are up-to-date  Lab Results   Component Value Date    HGBA1C 6.2 2024            Relevant Orders    POCT hemoglobin A1c (Completed)    Albumin / creatinine urine ratio    Hypertension     Hypertension.  The patient's blood pressure is stable at this time and he will continue current regimen of medications         Relevant Orders    Comprehensive metabolic panel    Allergic rhinitis    Relevant Medications    fluticasone (FLONASE) 50 mcg/act nasal spray    Lumbar spondylosis     Lumbar spondylosis.  Patient was given prescription refill for gabapentin 300 mg.  He will initiate with 1 tablet daily for the first week and if needed increase to twice daily dosing after 1 week and 3 times daily dosing after 1 week later if needed.  Due to his chronic back pain patient has had difficulties cleaning around his home or moving furniture as needed.  Patient is requesting evaluation with  for home health aide          Other Visit Diagnoses       Flu vaccine need    -  Primary    Relevant Orders    influenza vaccine, recombinant, PF, 0.5 mL IM (Flublok) (Completed)    Low back pain with sciatica, sciatica laterality unspecified, unspecified back pain laterality, unspecified chronicity        Relevant Medications    gabapentin (NEURONTIN) 300 mg capsule                Chief Complaint     Chief Complaint   Patient presents with    Follow-up     6 month     Diabetes     Foot exam shoes & socks remove        History of Present Illness     Patient in the office to review chronic medical conditions.  He is accompanied by his  friend, Jacqueline Almanzar.  He states he is unsure of why his gabapentin was discontinued for his chronic back pain earlier this year.  He reports having increased episodes of chronic low back pain.  He denies any recent injuries.  Patient was also requesting assistance for home health with cleaning around his house due to his medical conditions.  Patient does live in a rate controlled senior citizen housing in [place].    Patient admits to not following a specific diet and often will eat candy throughout the day.  Fortunately his A1c is stable at 6.2.  Although patient obtains a pill packs from his pharmacy there is a question of his compliance with taking them in the appropriate manner.    Diabetes  Hypoglycemia symptoms include nervousness/anxiousness. Associated symptoms include fatigue.       Review of Systems   Review of Systems   Constitutional:  Positive for fatigue.   Respiratory: Negative.     Cardiovascular: Negative.    Gastrointestinal: Negative.    Genitourinary: Negative.    Musculoskeletal:  Positive for arthralgias and back pain.   Neurological: Negative.    Psychiatric/Behavioral:  The patient is nervous/anxious.        Active Problem List     Patient Active Problem List   Diagnosis    Recurrent major depressive disorder, in remission (HCC)    Tobacco abuse    Abnormal liver enzymes    PTSD (post-traumatic stress disorder)    Opioid dependence in remission (HCC)    TEO (acute kidney injury) (HCC)    DM (diabetes mellitus), type 2 (HCC)    Hypertension    Bradycardia    Thrombocytopenia (HCC)    Anemia    Gross hematuria    CKD (chronic kidney disease)    Severe episode of recurrent major depressive disorder, with psychotic features (HCC)    COVID-19    Mild protein-calorie malnutrition (HCC)    Allergic rhinitis    Chronic constipation    Chronic hepatitis C virus infection (HCC)    Affective psychosis, bipolar (HCC)    Depression    Erosive esophagitis    GERD (gastroesophageal reflux disease)     Hemorrhoids, external    Heroin use disorder, mild, in sustained remission, abuse (HCC)    Hyperlipidemia associated with type 2 diabetes mellitus  (HCC)    Lumbar spondylosis       Past Medical History:  Past Medical History:   Diagnosis Date    Abdominal pain     Allergic rhinitis     Cancer (HCC)     Chronic pain     Colon polyps     Depression     Fatigue     Head injury     Homeless     Hypertension     Insomnia     Liver disease     Loss of appetite     Numbness and tingling of right arm     Palpitations     Psychiatric disorder     bipolar    PTSD (post-traumatic stress disorder)     Seizures (HCC)     Shortness of breath     Substance abuse (HCC)     Swallowing difficulty        Past Surgical History:  Past Surgical History:   Procedure Laterality Date    ABDOMINAL SURGERY      COLONOSCOPY      EYE SURGERY      NECK SURGERY      ORCHIECTOMY Right 5/19/2016    Procedure: ORCHIECTOMY INGUINAL biopsy of testicular mass, ;  Surgeon: Jose Lara MD;  Location:  MAIN OR;  Service:     MS COLONOSCOPY FLX DX W/COLLJ SPEC WHEN PFRMD N/A 2/13/2017    Procedure: EGD AND COLONOSCOPY;  Surgeon: Hernesto Perez MD;  Location: [facility];  Service: Gastroenterology    MS ESOPHAGOGASTRODUODENOSCOPY TRANSORAL DIAGNOSTIC N/A 11/16/2016    Procedure: EGD AND COLONOSCOPY;  Surgeon: Hernesto Perez MD;  Location:  GI LAB;  Service: Gastroenterology       Family History:  Family History   Problem Relation Age of Onset    Diabetes Mother     Hypertension Brother        Social History:  Social History     Socioeconomic History    Marital status:      Spouse name: Not on file    Number of children: Not on file    Years of education: Not on file    Highest education level: Not on file   Occupational History    Not on file   Tobacco Use    Smoking status: Every Day     Current packs/day: 1.00     Average packs/day: 1 pack/day for 41.0 years (41.0 ttl pk-yrs)     Types: Cigarettes    Smokeless tobacco: Current   Vaping  Use    Vaping status: Never Used   Substance and Sexual Activity    Alcohol use: No    Drug use: Yes     Types: Heroin    Sexual activity: Not Currently   Other Topics Concern    Not on file   Social History Narrative    Not on file     Social Determinants of Health     Financial Resource Strain: Not on file   Food Insecurity: Not on file   Transportation Needs: Not on file   Physical Activity: Not on file   Stress: Not on file   Social Connections: Not on file   Intimate Partner Violence: Not on file   Housing Stability: Not on file       Objective     Vitals:    09/13/24 1225   BP: 120/70   Pulse:    Resp:    Temp:    SpO2:      Wt Readings from Last 3 Encounters:   09/13/24 71.7 kg (158 lb)   03/28/24 81.6 kg (180 lb)   02/22/24 81.7 kg (180 lb 2 oz)       Physical Exam  Constitutional:       General: He is not in acute distress.     Appearance: Normal appearance. He is not ill-appearing.   HENT:      Head: Normocephalic and atraumatic.   Eyes:      General:         Right eye: No discharge.         Left eye: No discharge.      Extraocular Movements: Extraocular movements intact.      Conjunctiva/sclera: Conjunctivae normal.      Pupils: Pupils are equal, round, and reactive to light.   Neck:      Vascular: No carotid bruit.   Cardiovascular:      Rate and Rhythm: Normal rate and regular rhythm.      Heart sounds: Normal heart sounds. No murmur heard.  Pulmonary:      Effort: Pulmonary effort is normal.      Breath sounds: Normal breath sounds. No wheezing, rhonchi or rales.   Musculoskeletal:      Right lower leg: No edema.      Left lower leg: No edema.   Lymphadenopathy:      Cervical: No cervical adenopathy.   Skin:     Findings: No rash.   Neurological:      General: No focal deficit present.      Mental Status: He is alert and oriented to person, place, and time.      Cranial Nerves: No cranial nerve deficit.   Psychiatric:         Mood and Affect: Mood normal.         Behavior: Behavior normal.          "Thought Content: Thought content normal.         Judgment: Judgment normal.     I spent 30 minutes with this patient of which greater than 50% was spent counseling or reviewing chart    Pertinent Laboratory/Diagnostic Studies:  Lab Results   Component Value Date    BUN 33 (H) 08/29/2024    CREATININE 1.59 (H) 08/29/2024    CALCIUM 8.4 08/29/2024    K 4.6 08/29/2024    CO2 22 08/29/2024     08/29/2024     Lab Results   Component Value Date    ALT 16 08/29/2024    AST 18 08/29/2024     (H) 03/01/2017    ALKPHOS 69 08/29/2024       Lab Results   Component Value Date    WBC 9.30 08/29/2024    HGB 12.4 08/29/2024    HCT 38.2 08/29/2024    MCV 90 08/29/2024     08/29/2024       Lab Results   Component Value Date    TSH 2.59 10/14/2022       No results found for: \"CHOL\"  Lab Results   Component Value Date    TRIG 257 (H) 06/12/2023     Lab Results   Component Value Date    HDL 38 (L) 06/12/2023     Lab Results   Component Value Date    LDLCALC 114 (H) 06/12/2023     Lab Results   Component Value Date    HGBA1C 6.2 09/13/2024       Results for orders placed or performed in visit on 09/13/24   POCT hemoglobin A1c   Result Value Ref Range    Hemoglobin A1C 6.2 6.5       Orders Placed This Encounter   Procedures    influenza vaccine, recombinant, PF, 0.5 mL IM (Flublok)    Albumin / creatinine urine ratio    Comprehensive metabolic panel    POCT hemoglobin A1c       ALLERGIES:  Allergies   Allergen Reactions    Oxycodone Swelling     Tongue swelling       Current Medications     Current Outpatient Medications   Medication Sig Dispense Refill    atorvastatin (LIPITOR) 10 mg tablet TAKE 1 TABLET (10 MG TOTAL) BY MOUTH DAILY WITH DINNER 30 tablet 5    fluticasone (FLONASE) 50 mcg/act nasal spray 2 sprays into each nostril daily 9.9 mL 5    gabapentin (NEURONTIN) 300 mg capsule Take 1 capsule (300 mg total) by mouth 3 (three) times a day 90 capsule 5    hydrOXYzine pamoate (VISTARIL) 50 mg capsule       " Lidocaine 4 % PTCH Apply 1 patch topically daily As needed for back pain, remove the patch after 12 hours 30 patch 1    lisinopril (ZESTRIL) 5 mg tablet TAKE 1 TABLET (5 MG TOTAL) BY MOUTH DAILY 90 tablet 1    pantoprazole (PROTONIX) 40 mg tablet TAKE 1 TABLET (40 MG TOTAL) BY MOUTH IN THE MORNING 30 tablet 5    sertraline (ZOLOFT) 100 mg tablet Take 1 tablet (100 mg total) by mouth daily 30 tablet 1    Vraylar 1.5 MG capsule       hydrOXYzine HCL (ATARAX) 25 mg tablet  (Patient not taking: Reported on 1/5/2024)      naloxone (NARCAN) 4 mg/0.1 mL nasal spray  (Patient not taking: Reported on 9/13/2024)      nicotine (NICODERM CQ) 21 mg/24 hr TD 24 hr patch Place 1 patch on the skin over 24 hours every 24 hours (Patient not taking: Reported on 1/5/2024) 14 patch 1    nicotine (NICOTROL) 10 MG inhaler Inhale 1 puff as needed for smoking cessation (Patient not taking: Reported on 1/5/2024) 42 each 2    Nicotine 10 MG/ML SOLN Instill 1 to 2 doses per hour in each nostril. Each dose = 2 sprays, one in each nostril. (Patient not taking: Reported on 1/5/2024) 10 mL 2    prazosin (MINIPRESS) 2 mg capsule Take 1 capsule (2 mg total) by mouth daily at bedtime (Patient not taking: Reported on 2/22/2024) 30 capsule 5    predniSONE 10 mg tablet Take 4 tablets for 2 days, 3 tablets for 2 days, 2 tablets for 2 days, 1 tablet for 2 days. (Patient not taking: Reported on 1/5/2024) 20 tablet 0    QUEtiapine (SEROquel) 100 mg tablet Take 1 tablet (100 mg total) by mouth daily at bedtime (Patient not taking: Reported on 1/5/2024) 30 tablet 1    traZODone (DESYREL) 150 mg tablet       venlafaxine (EFFEXOR-XR) 37.5 mg 24 hr capsule  (Patient not taking: Reported on 1/5/2024)      venlafaxine (EFFEXOR-XR) 75 mg 24 hr capsule  (Patient not taking: Reported on 1/5/2024)       No current facility-administered medications for this visit.         Health Maintenance     Health Maintenance   Topic Date Due    Annual Physical  Never done     Hepatitis A Vaccine (1 of 2 - Risk 2-dose series) Never done    Zoster Vaccine (1 of 2) Never done    Pneumococcal Vaccine: Pediatrics (0 to 5 Years) and At-Risk Patients (6 to 64 Years) (2 of 2 - PCV) 05/22/2018    Colorectal Cancer Screening  02/13/2020    Lung Cancer Screening  12/17/2023    Kidney Health Evaluation: Albumin/Creatinine Ratio  06/12/2024    Diabetic Foot Exam  08/17/2024    COVID-19 Vaccine (1 - 2023-24 season) Never done    DM Eye Exam  02/27/2025    HEMOGLOBIN A1C  03/13/2025    Kidney Health Evaluation: GFR  08/29/2025    RSV Vaccine Age 60+ Years (1 - 1-dose 60+ series) 10/14/2025    DTaP,Tdap,and Td Vaccines (2 - Td or Tdap) 10/11/2027    HIV Screening  Completed    Influenza Vaccine  Completed    RSV Vaccine age 0-20 Months  Aged Out    HIB Vaccine  Aged Out    IPV Vaccine  Aged Out    Meningococcal ACWY Vaccine  Aged Out    HPV Vaccine  Aged Out    Hepatitis C Screening  Discontinued     Immunization History   Administered Date(s) Administered    INFLUENZA 11/18/2004, 10/11/2017, 11/29/2018, 11/30/2020, 10/29/2021, 10/14/2022    Influenza Recombinant Preservative Free Im 09/13/2024    Pneumococcal Polysaccharide PPV23 05/22/2017    Tdap 10/11/2017    Tuberculin Skin Test-PPD Intradermal 10/11/2017       Dick Jimenez MD

## 2024-10-02 ENCOUNTER — TELEPHONE (OUTPATIENT)
Age: 59
End: 2024-10-02

## 2024-10-02 NOTE — TELEPHONE ENCOUNTER
We did not receive forms yet.  We have nothing from the 27th and will keep eye out and have Dr fill them out

## 2024-10-02 NOTE — TELEPHONE ENCOUNTER
Pt applying for home health care. Sabrina from [facility] checking on status of physician's certification form faxed to office 9/27; advised nothing received as of yet. Provided fax# and requested she re-fax.

## 2024-10-28 ENCOUNTER — TELEPHONE (OUTPATIENT)
Age: 59
End: 2024-10-28

## 2024-12-11 DIAGNOSIS — K21.9 GERD (GASTROESOPHAGEAL REFLUX DISEASE): ICD-10-CM

## 2024-12-12 RX ORDER — PANTOPRAZOLE SODIUM 40 MG/1
40 TABLET, DELAYED RELEASE ORAL DAILY
Qty: 30 TABLET | Refills: 5 | Status: SHIPPED | OUTPATIENT
Start: 2024-12-12

## 2025-02-06 DIAGNOSIS — K21.9 GERD (GASTROESOPHAGEAL REFLUX DISEASE): ICD-10-CM

## 2025-02-06 DIAGNOSIS — I10 PRIMARY HYPERTENSION: ICD-10-CM

## 2025-02-06 DIAGNOSIS — E11.9 TYPE 2 DIABETES MELLITUS WITHOUT COMPLICATION, WITHOUT LONG-TERM CURRENT USE OF INSULIN (HCC): ICD-10-CM

## 2025-02-06 DIAGNOSIS — E78.5 HYPERLIPIDEMIA: ICD-10-CM

## 2025-02-06 DIAGNOSIS — M54.40 LOW BACK PAIN WITH SCIATICA, SCIATICA LATERALITY UNSPECIFIED, UNSPECIFIED BACK PAIN LATERALITY, UNSPECIFIED CHRONICITY: ICD-10-CM

## 2025-02-07 RX ORDER — PANTOPRAZOLE SODIUM 40 MG/1
40 TABLET, DELAYED RELEASE ORAL DAILY
Qty: 30 TABLET | Refills: 5 | Status: SHIPPED | OUTPATIENT
Start: 2025-02-07

## 2025-02-07 RX ORDER — ATORVASTATIN CALCIUM 10 MG/1
TABLET, FILM COATED ORAL
Qty: 30 TABLET | Refills: 0 | Status: SHIPPED | OUTPATIENT
Start: 2025-02-07

## 2025-02-07 RX ORDER — LISINOPRIL 5 MG/1
5 TABLET ORAL DAILY
Qty: 90 TABLET | Refills: 5 | Status: SHIPPED | OUTPATIENT
Start: 2025-02-07

## 2025-02-07 RX ORDER — GABAPENTIN 300 MG/1
CAPSULE ORAL
Qty: 90 CAPSULE | Refills: 5 | Status: SHIPPED | OUTPATIENT
Start: 2025-02-07

## 2025-02-10 ENCOUNTER — OFFICE VISIT (OUTPATIENT)
Dept: FAMILY MEDICINE CLINIC | Facility: CLINIC | Age: 60
End: 2025-02-10
Payer: COMMERCIAL

## 2025-02-10 ENCOUNTER — APPOINTMENT (OUTPATIENT)
Dept: LAB | Facility: CLINIC | Age: 60
End: 2025-02-10
Payer: COMMERCIAL

## 2025-02-10 VITALS
HEIGHT: 66 IN | WEIGHT: 168.13 LBS | RESPIRATION RATE: 18 BRPM | OXYGEN SATURATION: 100 % | TEMPERATURE: 98.2 F | DIASTOLIC BLOOD PRESSURE: 100 MMHG | HEART RATE: 80 BPM | SYSTOLIC BLOOD PRESSURE: 160 MMHG | BODY MASS INDEX: 27.02 KG/M2

## 2025-02-10 DIAGNOSIS — M54.9 BACK PAIN, UNSPECIFIED BACK LOCATION, UNSPECIFIED BACK PAIN LATERALITY, UNSPECIFIED CHRONICITY: ICD-10-CM

## 2025-02-10 DIAGNOSIS — E11.9 TYPE 2 DIABETES MELLITUS WITHOUT COMPLICATION, WITHOUT LONG-TERM CURRENT USE OF INSULIN (HCC): ICD-10-CM

## 2025-02-10 DIAGNOSIS — R55 SYNCOPE, UNSPECIFIED SYNCOPE TYPE: ICD-10-CM

## 2025-02-10 DIAGNOSIS — E78.5 HYPERLIPIDEMIA: ICD-10-CM

## 2025-02-10 DIAGNOSIS — Z12.5 PROSTATE CANCER SCREENING: ICD-10-CM

## 2025-02-10 DIAGNOSIS — R55 SYNCOPE, UNSPECIFIED SYNCOPE TYPE: Primary | ICD-10-CM

## 2025-02-10 DIAGNOSIS — I10 PRIMARY HYPERTENSION: ICD-10-CM

## 2025-02-10 LAB
BACTERIA UR QL AUTO: NORMAL /HPF
BILIRUB UR QL STRIP: NEGATIVE
CLARITY UR: CLEAR
COLOR UR: YELLOW
CREAT UR-MCNC: 153.3 MG/DL
ERYTHROCYTE [DISTWIDTH] IN BLOOD BY AUTOMATED COUNT: 12.6 % (ref 11.6–15.1)
EST. AVERAGE GLUCOSE BLD GHB EST-MCNC: 140 MG/DL
GLUCOSE UR STRIP-MCNC: ABNORMAL MG/DL
HBA1C MFR BLD: 6.5 %
HCT VFR BLD AUTO: 41.5 % (ref 36.5–49.3)
HGB BLD-MCNC: 13.6 G/DL (ref 12–17)
HGB UR QL STRIP.AUTO: NEGATIVE
KETONES UR STRIP-MCNC: NEGATIVE MG/DL
LEUKOCYTE ESTERASE UR QL STRIP: NEGATIVE
MCH RBC QN AUTO: 29.4 PG (ref 26.8–34.3)
MCHC RBC AUTO-ENTMCNC: 32.8 G/DL (ref 31.4–37.4)
MCV RBC AUTO: 90 FL (ref 82–98)
MICROALBUMIN UR-MCNC: 12.9 MG/L
MICROALBUMIN/CREAT 24H UR: 8 MG/G CREATININE (ref 0–30)
NITRITE UR QL STRIP: NEGATIVE
NON-SQ EPI CELLS URNS QL MICRO: NORMAL /HPF
PH UR STRIP.AUTO: 5.5 [PH]
PLATELET # BLD AUTO: 216 THOUSANDS/UL (ref 149–390)
PMV BLD AUTO: 10.6 FL (ref 8.9–12.7)
PROT UR STRIP-MCNC: ABNORMAL MG/DL
PSA SERPL-MCNC: 0.68 NG/ML (ref 0–4)
RBC # BLD AUTO: 4.62 MILLION/UL (ref 3.88–5.62)
RBC #/AREA URNS AUTO: NORMAL /HPF
SP GR UR STRIP.AUTO: 1.02 (ref 1–1.03)
TSH SERPL DL<=0.05 MIU/L-ACNC: 1.61 UIU/ML (ref 0.45–4.5)
UROBILINOGEN UR STRIP-ACNC: <2 MG/DL
WBC # BLD AUTO: 7.99 THOUSAND/UL (ref 4.31–10.16)
WBC #/AREA URNS AUTO: NORMAL /HPF

## 2025-02-10 PROCEDURE — 80053 COMPREHEN METABOLIC PANEL: CPT

## 2025-02-10 PROCEDURE — G0103 PSA SCREENING: HCPCS

## 2025-02-10 PROCEDURE — 84443 ASSAY THYROID STIM HORMONE: CPT

## 2025-02-10 PROCEDURE — 99214 OFFICE O/P EST MOD 30 MIN: CPT | Performed by: FAMILY MEDICINE

## 2025-02-10 PROCEDURE — 80061 LIPID PANEL: CPT

## 2025-02-10 PROCEDURE — 82570 ASSAY OF URINE CREATININE: CPT

## 2025-02-10 PROCEDURE — 82043 UR ALBUMIN QUANTITATIVE: CPT

## 2025-02-10 PROCEDURE — 83036 HEMOGLOBIN GLYCOSYLATED A1C: CPT

## 2025-02-10 PROCEDURE — 36415 COLL VENOUS BLD VENIPUNCTURE: CPT

## 2025-02-10 PROCEDURE — 81001 URINALYSIS AUTO W/SCOPE: CPT

## 2025-02-10 PROCEDURE — 85027 COMPLETE CBC AUTOMATED: CPT

## 2025-02-10 RX ORDER — PREDNISONE 20 MG/1
TABLET ORAL
Qty: 11 TABLET | Refills: 0 | Status: SHIPPED | OUTPATIENT
Start: 2025-02-10

## 2025-02-10 RX ORDER — LIDOCAINE 4 G/G
1 PATCH TOPICAL DAILY
Qty: 30 PATCH | Refills: 1 | Status: SHIPPED | OUTPATIENT
Start: 2025-02-10
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
## 2025-02-11 ENCOUNTER — RESULTS FOLLOW-UP (OUTPATIENT)
Dept: FAMILY MEDICINE CLINIC | Facility: CLINIC | Age: 60
End: 2025-02-11

## 2025-02-11 PROBLEM — R55 SYNCOPE: Status: ACTIVE | Noted: 2025-02-11

## 2025-02-11 PROBLEM — M54.9 BACK PAIN: Status: ACTIVE | Noted: 2025-02-11

## 2025-02-11 LAB
ALBUMIN SERPL BCG-MCNC: 4.7 G/DL (ref 3.5–5)
ALP SERPL-CCNC: 65 U/L (ref 34–104)
ALT SERPL W P-5'-P-CCNC: 16 U/L (ref 7–52)
ANION GAP SERPL CALCULATED.3IONS-SCNC: 8 MMOL/L (ref 4–13)
AST SERPL W P-5'-P-CCNC: 18 U/L (ref 13–39)
BILIRUB SERPL-MCNC: 0.36 MG/DL (ref 0.2–1)
BUN SERPL-MCNC: 12 MG/DL (ref 5–25)
CALCIUM SERPL-MCNC: 9.4 MG/DL (ref 8.4–10.2)
CHLORIDE SERPL-SCNC: 103 MMOL/L (ref 96–108)
CHOLEST SERPL-MCNC: 191 MG/DL (ref ?–200)
CO2 SERPL-SCNC: 27 MMOL/L (ref 21–32)
CREAT SERPL-MCNC: 1.1 MG/DL (ref 0.6–1.3)
GFR SERPL CREATININE-BSD FRML MDRD: 73 ML/MIN/1.73SQ M
GLUCOSE P FAST SERPL-MCNC: 130 MG/DL (ref 65–99)
HDLC SERPL-MCNC: 40 MG/DL
LDLC SERPL CALC-MCNC: 104 MG/DL (ref 0–100)
POTASSIUM SERPL-SCNC: 4.3 MMOL/L (ref 3.5–5.3)
PROT SERPL-MCNC: 8.2 G/DL (ref 6.4–8.4)
SODIUM SERPL-SCNC: 138 MMOL/L (ref 135–147)
TRIGL SERPL-MCNC: 236 MG/DL (ref ?–150)

## 2025-02-11 NOTE — ASSESSMENT & PLAN NOTE
Syncope.  Patient with vague symptoms of syncope.  He will obtain blood work as ordered for further evaluation.  He will obtain CAT scan of his head for further evaluation.  We will make further recommendations pending results of test.

## 2025-02-11 NOTE — ASSESSMENT & PLAN NOTE
Neck pain.  Patient was given prescription for trial of prednisone tapering dose as ordered.  Previous x-rays have shown patient to have degenerative joint disease of his spine.  Patient will call if symptoms persist after medication completed

## 2025-02-14 ENCOUNTER — TELEPHONE (OUTPATIENT)
Age: 60
End: 2025-02-14

## 2025-02-14 DIAGNOSIS — R55 SYNCOPE, UNSPECIFIED SYNCOPE TYPE: ICD-10-CM

## 2025-02-14 DIAGNOSIS — T67.1XXA HEAT SYNCOPE, INITIAL ENCOUNTER: Primary | ICD-10-CM

## 2025-02-21 ENCOUNTER — TELEPHONE (OUTPATIENT)
Age: 60
End: 2025-02-21

## 2025-02-21 NOTE — TELEPHONE ENCOUNTER
Patient called states came into office last week and dropped paperwork off regarding needing licensing. Requesting update.      Please advise.  Thank you

## 2025-03-10 ENCOUNTER — HOSPITAL ENCOUNTER (EMERGENCY)
Facility: HOSPITAL | Age: 60
Discharge: HOME | DRG: 710 | End: 2025-03-10
Attending: EMERGENCY MEDICINE | Admitting: EMERGENCY MEDICINE
Payer: COMMERCIAL

## 2025-03-10 VITALS
OXYGEN SATURATION: 97 % | HEART RATE: 108 BPM | RESPIRATION RATE: 18 BRPM | DIASTOLIC BLOOD PRESSURE: 114 MMHG | SYSTOLIC BLOOD PRESSURE: 187 MMHG | TEMPERATURE: 98.5 F

## 2025-03-10 DIAGNOSIS — L03.90 CELLULITIS: Primary | ICD-10-CM

## 2025-03-10 PROCEDURE — 99283 EMERGENCY DEPT VISIT LOW MDM: CPT

## 2025-03-10 PROCEDURE — 99284 EMERGENCY DEPT VISIT MOD MDM: CPT | Performed by: EMERGENCY MEDICINE

## 2025-03-10 RX ORDER — SULFAMETHOXAZOLE AND TRIMETHOPRIM 800; 160 MG/1; MG/1
1 TABLET ORAL ONCE
Status: COMPLETED | OUTPATIENT
Start: 2025-03-10 | End: 2025-03-10

## 2025-03-10 RX ORDER — SULFAMETHOXAZOLE AND TRIMETHOPRIM 800; 160 MG/1; MG/1
1 TABLET ORAL 2 TIMES DAILY
Qty: 14 TABLET | Refills: 0 | Status: SHIPPED | OUTPATIENT
Start: 2025-03-10 | End: 2025-03-14

## 2025-03-10 RX ADMIN — SULFAMETHOXAZOLE AND TRIMETHOPRIM 1 TABLET: 800; 160 TABLET ORAL at 13:25

## 2025-03-10 NOTE — DISCHARGE INSTRUCTIONS
Please take your prescribed antibiotic twice daily for the next 7 days. Please follow up with your doctor in one week if symptoms do not improve. Please return if you develop worsening swelling, fevers, chills.

## 2025-03-10 NOTE — ED ATTENDING ATTESTATION
3/10/2025  I, Jaden Garcia DO, saw and evaluated the patient. I have discussed the patient with the resident/non-physician practitioner and agree with the resident's/non-physician practitioner's findings, Plan of Care, and MDM as documented in the resident's/non-physician practitioner's note, except where noted. All available labs and Radiology studies were reviewed.  I was present for key portions of any procedure(s) performed by the resident/non-physician practitioner and I was immediately available to provide assistance.       At this point I agree with the current assessment done in the Emergency Department.  I have conducted an independent evaluation of this patient a history and physical is as follows:      58 yo man presents for evaluation of swelling to dorsum of R hand for past 3 weeks. No fever, no focal weakness/numbness/tingling, no reported injury.    At risk for MRSA    Imp: hand swelling, pain. Plan: TMP/SMX. If symptoms persist, RTED for repeat evaluation      ED Course         Critical Care Time  Procedures       None

## 2025-03-10 NOTE — ED PROVIDER NOTES
Time reflects when diagnosis was documented in both MDM as applicable and the Disposition within this note       Time User Action Codes Description Comment    3/10/2025  1:17 PM Fredrick Wilde Add [L03.90] Cellulitis           ED Disposition       ED Disposition   Discharge    Condition   Stable    Date/Time   Mon Mar 10, 2025  1:17 PM    Comment   Barrett Silvermanque discharge to home/self care.                   Assessment & Plan       Medical Decision Making  ASSESSMENT: Patient is a 59 y.o. male who presents with swelling to the right dorsal wrist and hand.  Patient's presentation consistent with cellulitis of the right hand and wrist.  Patient is at risk for MRSA infection.  Plan to treat the infection with a 7-day course of Bactrim.  Patient provided initial dose here.  Patient encouraged to take the antibiotic course until completion.  Provided on precautions.  Patient provided recommendation for outpatient follow-up if symptoms persist.  Patient is understanding and agreeable to plan.  Patient is stable for discharge.    Risk  Prescription drug management.             Medications   sulfamethoxazole-trimethoprim (BACTRIM DS) 800-160 mg per tablet 1 tablet (1 tablet Oral Given 3/10/25 1325)       ED Risk Strat Scores                                                History of Present Illness       Chief Complaint   Patient presents with    Hand Pain     Pt c/o R hand swelling pain x 3 weeks.       Past Medical History:   Diagnosis Date    Abdominal pain     Allergic rhinitis     Cancer (HCC)     Chronic pain     Colon polyps     Depression     Fatigue     Head injury     Homeless     Hypertension     Insomnia     Liver disease     Loss of appetite     Numbness and tingling of right arm     Palpitations     Psychiatric disorder     bipolar    PTSD (post-traumatic stress disorder)     Seizures (HCC)     Shortness of breath     Substance abuse (HCC)     Swallowing difficulty       Past Surgical History:   Procedure  Laterality Date    ABDOMINAL SURGERY      COLONOSCOPY      EYE SURGERY      NECK SURGERY      ORCHIECTOMY Right 5/19/2016    Procedure: ORCHIECTOMY INGUINAL biopsy of testicular mass, ;  Surgeon: Jose Lara MD;  Location:  MAIN OR;  Service:     AZ COLONOSCOPY FLX DX W/COLLJ SPEC WHEN PFRMD N/A 2/13/2017    Procedure: EGD AND COLONOSCOPY;  Surgeon: Hernesto Perez MD;  Location: [facility] GI [facility];  Service: Gastroenterology    AZ ESOPHAGOGASTRODUODENOSCOPY TRANSORAL DIAGNOSTIC N/A 11/16/2016    Procedure: EGD AND COLONOSCOPY;  Surgeon: Hernesto Perez MD;  Location:  GI LAB;  Service: Gastroenterology      Family History   Problem Relation Age of Onset    Diabetes Mother     Hypertension Brother       Social History     Tobacco Use    Smoking status: Every Day     Current packs/day: 1.00     Average packs/day: 1 pack/day for 41.0 years (41.0 ttl pk-yrs)     Types: Cigarettes    Smokeless tobacco: Current   Vaping Use    Vaping status: Never Used   Substance Use Topics    Alcohol use: No    Drug use: Yes     Types: Heroin      E-Cigarette/Vaping    E-Cigarette Use Never User       E-Cigarette/Vaping Substances    Nicotine No     THC No     CBD No     Flavoring No     Other No     Unknown No       I have reviewed and agree with the history as documented.     Patient is a 59-year-old male with past medical history of diabetes, chronic hepatitis C, prior substance abuse presenting to the emergency department for redness and swelling to the dorsum of the right hand and wrist for the past 3 weeks.  He states the swelling has been progressively increasing in size over the past 3 weeks.  He states it is warm, painful to touch.  Denies any known injury or trauma plan.  He denies fevers, focal weakness, numbness, tingling in the associated hand.  He denies taking any medication for the pain or swelling.  He denies nausea, vomiting, diarrhea, abdominal pain, chest pain, shortness of breath, any other lesions or  shayna.          Review of Systems        Objective       ED Triage Vitals   Temperature Pulse Blood Pressure Respirations SpO2 Patient Position - Orthostatic VS   03/10/25 1139 03/10/25 1139 03/10/25 1141 03/10/25 1139 03/10/25 1139 --   98.5 °F (36.9 °C) (!) 108 (!) 187/114 18 97 %       Temp Source Heart Rate Source BP Location FiO2 (%) Pain Score    03/10/25 1139 03/10/25 1139 -- -- 03/10/25 1139    Temporal Monitor   10 - Worst Possible Pain      Vitals      Date and Time Temp Pulse SpO2 Resp BP Pain Score FACES Pain Rating User   03/10/25 1141 -- -- -- -- 187/114 -- -- KV   03/10/25 1139 98.5 °F (36.9 °C) 108 97 % 18 -- 10 - Worst Possible Pain -- KV            Physical Exam  Vitals reviewed.   Constitutional:       General: He is not in acute distress.     Appearance: He is not ill-appearing.   HENT:      Head: Normocephalic and atraumatic.      Nose: Nose normal.   Eyes:      General: No scleral icterus.     Conjunctiva/sclera: Conjunctivae normal.      Pupils: Pupils are equal, round, and reactive to light.   Cardiovascular:      Rate and Rhythm: Normal rate and regular rhythm.      Pulses: Normal pulses.      Heart sounds: Normal heart sounds.   Pulmonary:      Effort: Pulmonary effort is normal.      Breath sounds: Normal breath sounds.   Abdominal:      General: Abdomen is flat. Bowel sounds are normal.      Palpations: Abdomen is soft.      Tenderness: There is no abdominal tenderness. There is no guarding or rebound.   Musculoskeletal:         General: Normal range of motion.      Cervical back: Normal range of motion.   Skin:     General: Skin is warm and dry.      Capillary Refill: Capillary refill takes less than 2 seconds.      Comments: Significant swelling over the dorsal surface of his right hand, right wrist that is red, touch, with marked edema.  Distal sensation and motor function is intact.  See the associated image.   Neurological:      General: No focal deficit present.      Mental Status:  He is alert and oriented to person, place, and time.      Sensory: No sensory deficit.      Motor: No weakness.   Psychiatric:         Mood and Affect: Mood normal.         Behavior: Behavior normal.         Results Reviewed       None            No orders to display       Procedures    ED Medication and Procedure Management   Prior to Admission Medications   Prescriptions Last Dose Informant Patient Reported? Taking?   Lidocaine 4 % PTCH   No No   Sig: Apply 1 patch topically daily As needed for back pain, remove the patch after 12 hours   Nicotine 10 MG/ML SOLN   No No   Sig: Instill 1 to 2 doses per hour in each nostril. Each dose = 2 sprays, one in each nostril.   Patient not taking: Reported on 2/10/2025   QUEtiapine (SEROquel) 100 mg tablet   No No   Sig: Take 1 tablet (100 mg total) by mouth daily at bedtime   Patient not taking: Reported on 2/10/2025   Vraylar 1.5 MG capsule   Yes No   Patient not taking: Reported on 2/10/2025   atorvastatin (LIPITOR) 10 mg tablet   No No   Sig: TAKE ONE TABLET BY MOUTH DAILY WITH dinner   fluticasone (FLONASE) 50 mcg/act nasal spray   No No   Si sprays into each nostril daily   Patient not taking: Reported on 2/10/2025   gabapentin (NEURONTIN) 300 mg capsule   No No   Sig: TAKE 1 CAPSULE (300 MG TOTAL) BY MOUTH THREE (THREE) TIMES A DAY   Patient not taking: Reported on 2/10/2025   hydrOXYzine HCL (ATARAX) 25 mg tablet   Yes No   hydrOXYzine pamoate (VISTARIL) 50 mg capsule   Yes No   lisinopril (ZESTRIL) 5 mg tablet   No No   Sig: TAKE ONE TABLET BY MOUTH DAILY   naloxone (NARCAN) 4 mg/0.1 mL nasal spray   Yes No   Patient not taking: Reported on 2/10/2025   nicotine (NICODERM CQ) 21 mg/24 hr TD 24 hr patch   No No   Sig: Place 1 patch on the skin over 24 hours every 24 hours   Patient not taking: Reported on 2/10/2025   nicotine (NICOTROL) 10 MG inhaler   No No   Sig: Inhale 1 puff as needed for smoking cessation   Patient not taking: Reported on 2/10/2025    pantoprazole (PROTONIX) 40 mg tablet   No No   Sig: TAKE 1 TABLET (40 MG TOTAL) BY MOUTH IN THE MORNING   prazosin (MINIPRESS) 2 mg capsule   No No   Sig: Take 1 capsule (2 mg total) by mouth daily at bedtime   Patient not taking: Reported on 2/10/2025   predniSONE 10 mg tablet   No No   Sig: Take 4 tablets for 2 days, 3 tablets for 2 days, 2 tablets for 2 days, 1 tablet for 2 days.   Patient not taking: Reported on 2/10/2025   predniSONE 20 mg tablet   No No   Si tab daily X 3 days, 1 tab daily X 3 days, 1/2 tab daily X 4 days   sertraline (ZOLOFT) 100 mg tablet   No No   Sig: Take 1 tablet (100 mg total) by mouth daily   traZODone (DESYREL) 150 mg tablet   Yes No   venlafaxine (EFFEXOR-XR) 37.5 mg 24 hr capsule   Yes No   Patient not taking: Reported on 2/10/2025   venlafaxine (EFFEXOR-XR) 75 mg 24 hr capsule   Yes No   Patient not taking: Reported on 2/10/2025      Facility-Administered Medications: None     Discharge Medication List as of 3/10/2025  1:25 PM        START taking these medications    Details   sulfamethoxazole-trimethoprim (BACTRIM DS) 800-160 mg per tablet Take 1 tablet by mouth 2 (two) times a day for 7 days smx-tmp DS (BACTRIM) 800-160 mg tabs (1tab q12 D10), Starting Mon 3/10/2025, Until Mon 3/17/2025, Normal           CONTINUE these medications which have NOT CHANGED    Details   atorvastatin (LIPITOR) 10 mg tablet TAKE ONE TABLET BY MOUTH DAILY WITH dinner, Normal      fluticasone (FLONASE) 50 mcg/act nasal spray 2 sprays into each nostril daily, Starting 2024, Normal      gabapentin (NEURONTIN) 300 mg capsule TAKE 1 CAPSULE (300 MG TOTAL) BY MOUTH THREE (THREE) TIMES A DAY, Normal      hydrOXYzine HCL (ATARAX) 25 mg tablet Historical Med      hydrOXYzine pamoate (VISTARIL) 50 mg capsule Historical Med      Lidocaine 4 % PTCH Apply 1 patch topically daily As needed for back pain, remove the patch after 12 hours, Starting Mon 2/10/2025, Normal      lisinopril (ZESTRIL) 5 mg  tablet TAKE ONE TABLET BY MOUTH DAILY, Starting Fri 2/7/2025, Normal      naloxone (NARCAN) 4 mg/0.1 mL nasal spray Historical Med      nicotine (NICODERM CQ) 21 mg/24 hr TD 24 hr patch Place 1 patch on the skin over 24 hours every 24 hours, Starting Thu 8/17/2023, Normal      nicotine (NICOTROL) 10 MG inhaler Inhale 1 puff as needed for smoking cessation, Starting Fri 11/10/2023, Normal      Nicotine 10 MG/ML SOLN Instill 1 to 2 doses per hour in each nostril. Each dose = 2 sprays, one in each nostril., Normal      pantoprazole (PROTONIX) 40 mg tablet TAKE 1 TABLET (40 MG TOTAL) BY MOUTH IN THE MORNING, Starting Fri 2/7/2025, Normal      prazosin (MINIPRESS) 2 mg capsule Take 1 capsule (2 mg total) by mouth daily at bedtime, Starting Thu 8/17/2023, Normal      !! predniSONE 10 mg tablet Take 4 tablets for 2 days, 3 tablets for 2 days, 2 tablets for 2 days, 1 tablet for 2 days., Normal      !! predniSONE 20 mg tablet 2 tab daily X 3 days, 1 tab daily X 3 days, 1/2 tab daily X 4 days, Normal      QUEtiapine (SEROquel) 100 mg tablet Take 1 tablet (100 mg total) by mouth daily at bedtime, Starting Tue 12/27/2022, Normal      sertraline (ZOLOFT) 100 mg tablet Take 1 tablet (100 mg total) by mouth daily, Starting Tue 12/27/2022, Normal      traZODone (DESYREL) 150 mg tablet Historical Med      !! venlafaxine (EFFEXOR-XR) 37.5 mg 24 hr capsule Historical Med      !! venlafaxine (EFFEXOR-XR) 75 mg 24 hr capsule Historical Med      Vraylar 1.5 MG capsule Historical Med       !! - Potential duplicate medications found. Please discuss with provider.        No discharge procedures on file.  ED SEPSIS DOCUMENTATION   Time reflects when diagnosis was documented in both MDM as applicable and the Disposition within this note       Time User Action Codes Description Comment    3/10/2025  1:17 PM Fredrick Wilde Add [L03.90] Cellulitis                  Fredrick Wilde DO  03/11/25 0939

## 2025-03-12 ENCOUNTER — APPOINTMENT (EMERGENCY)
Dept: RADIOLOGY | Facility: HOSPITAL | Age: 60
DRG: 710 | End: 2025-03-12
Payer: COMMERCIAL

## 2025-03-12 ENCOUNTER — OFFICE VISIT (OUTPATIENT)
Dept: FAMILY MEDICINE CLINIC | Facility: CLINIC | Age: 60
End: 2025-03-12
Payer: COMMERCIAL

## 2025-03-12 ENCOUNTER — HOSPITAL ENCOUNTER (INPATIENT)
Facility: HOSPITAL | Age: 60
LOS: 2 days | Discharge: HOME | DRG: 710 | End: 2025-03-14
Attending: EMERGENCY MEDICINE | Admitting: INTERNAL MEDICINE
Payer: COMMERCIAL

## 2025-03-12 VITALS
WEIGHT: 160 LBS | HEIGHT: 66 IN | SYSTOLIC BLOOD PRESSURE: 110 MMHG | DIASTOLIC BLOOD PRESSURE: 80 MMHG | HEART RATE: 109 BPM | BODY MASS INDEX: 25.71 KG/M2 | TEMPERATURE: 99.1 F | OXYGEN SATURATION: 94 % | RESPIRATION RATE: 16 BRPM

## 2025-03-12 DIAGNOSIS — L03.90 CELLULITIS: Primary | ICD-10-CM

## 2025-03-12 DIAGNOSIS — L02.519 ABSCESS OF HAND: ICD-10-CM

## 2025-03-12 DIAGNOSIS — L03.113 CELLULITIS OF RIGHT UPPER EXTREMITY: Primary | ICD-10-CM

## 2025-03-12 DIAGNOSIS — L02.91 ABSCESS: ICD-10-CM

## 2025-03-12 DIAGNOSIS — L02.511 ABSCESS OF HAND, RIGHT: ICD-10-CM

## 2025-03-12 LAB
ALBUMIN SERPL BCG-MCNC: 4.3 G/DL (ref 3.5–5)
ALP SERPL-CCNC: 80 U/L (ref 34–104)
ALT SERPL W P-5'-P-CCNC: 25 U/L (ref 7–52)
ANION GAP SERPL CALCULATED.3IONS-SCNC: 11 MMOL/L (ref 4–13)
AST SERPL W P-5'-P-CCNC: 25 U/L (ref 13–39)
BASOPHILS # BLD AUTO: 0.03 THOUSANDS/ÂΜL (ref 0–0.1)
BASOPHILS NFR BLD AUTO: 0 % (ref 0–1)
BILIRUB SERPL-MCNC: 0.35 MG/DL (ref 0.2–1)
BUN SERPL-MCNC: 16 MG/DL (ref 5–25)
CALCIUM SERPL-MCNC: 9.4 MG/DL (ref 8.4–10.2)
CHLORIDE SERPL-SCNC: 97 MMOL/L (ref 96–108)
CO2 SERPL-SCNC: 26 MMOL/L (ref 21–32)
CREAT SERPL-MCNC: 1.11 MG/DL (ref 0.6–1.3)
CRP SERPL QL: 244.9 MG/L
EOSINOPHIL # BLD AUTO: 0.07 THOUSAND/ÂΜL (ref 0–0.61)
EOSINOPHIL NFR BLD AUTO: 1 % (ref 0–6)
ERYTHROCYTE [DISTWIDTH] IN BLOOD BY AUTOMATED COUNT: 12.7 % (ref 11.6–15.1)
ERYTHROCYTE [SEDIMENTATION RATE] IN BLOOD: 92 MM/HOUR (ref 0–19)
GFR SERPL CREATININE-BSD FRML MDRD: 72 ML/MIN/1.73SQ M
GLUCOSE SERPL-MCNC: 144 MG/DL (ref 65–140)
GLUCOSE SERPL-MCNC: 95 MG/DL (ref 65–140)
HCT VFR BLD AUTO: 41.8 % (ref 36.5–49.3)
HGB BLD-MCNC: 13.7 G/DL (ref 12–17)
IMM GRANULOCYTES # BLD AUTO: 0.08 THOUSAND/UL (ref 0–0.2)
IMM GRANULOCYTES NFR BLD AUTO: 1 % (ref 0–2)
LYMPHOCYTES # BLD AUTO: 2.22 THOUSANDS/ÂΜL (ref 0.6–4.47)
LYMPHOCYTES NFR BLD AUTO: 16 % (ref 14–44)
MCH RBC QN AUTO: 29.3 PG (ref 26.8–34.3)
MCHC RBC AUTO-ENTMCNC: 32.8 G/DL (ref 31.4–37.4)
MCV RBC AUTO: 89 FL (ref 82–98)
MONOCYTES # BLD AUTO: 0.9 THOUSAND/ÂΜL (ref 0.17–1.22)
MONOCYTES NFR BLD AUTO: 6 % (ref 4–12)
NEUTROPHILS # BLD AUTO: 10.83 THOUSANDS/ÂΜL (ref 1.85–7.62)
NEUTS SEG NFR BLD AUTO: 76 % (ref 43–75)
NRBC BLD AUTO-RTO: 0 /100 WBCS
PLATELET # BLD AUTO: 290 THOUSANDS/UL (ref 149–390)
PMV BLD AUTO: 9 FL (ref 8.9–12.7)
POTASSIUM SERPL-SCNC: 4.1 MMOL/L (ref 3.5–5.3)
PROT SERPL-MCNC: 8.3 G/DL (ref 6.4–8.4)
RBC # BLD AUTO: 4.68 MILLION/UL (ref 3.88–5.62)
SODIUM SERPL-SCNC: 134 MMOL/L (ref 135–147)
WBC # BLD AUTO: 14.13 THOUSAND/UL (ref 4.31–10.16)

## 2025-03-12 PROCEDURE — NC001 PR NO CHARGE: Performed by: SURGERY

## 2025-03-12 PROCEDURE — 87075 CULTR BACTERIA EXCEPT BLOOD: CPT

## 2025-03-12 PROCEDURE — 87077 CULTURE AEROBIC IDENTIFY: CPT

## 2025-03-12 PROCEDURE — 99284 EMERGENCY DEPT VISIT MOD MDM: CPT

## 2025-03-12 PROCEDURE — 73130 X-RAY EXAM OF HAND: CPT

## 2025-03-12 PROCEDURE — 87040 BLOOD CULTURE FOR BACTERIA: CPT | Performed by: INTERNAL MEDICINE

## 2025-03-12 PROCEDURE — 99213 OFFICE O/P EST LOW 20 MIN: CPT | Performed by: NURSE PRACTITIONER

## 2025-03-12 PROCEDURE — 82948 REAGENT STRIP/BLOOD GLUCOSE: CPT

## 2025-03-12 PROCEDURE — 85025 COMPLETE CBC W/AUTO DIFF WBC: CPT

## 2025-03-12 PROCEDURE — 0J9J0ZZ DRAINAGE OF RIGHT HAND SUBCUTANEOUS TISSUE AND FASCIA, OPEN APPROACH: ICD-10-PCS | Performed by: SURGERY

## 2025-03-12 PROCEDURE — 87070 CULTURE OTHR SPECIMN AEROBIC: CPT

## 2025-03-12 PROCEDURE — 80053 COMPREHEN METABOLIC PANEL: CPT

## 2025-03-12 PROCEDURE — 99285 EMERGENCY DEPT VISIT HI MDM: CPT | Performed by: EMERGENCY MEDICINE

## 2025-03-12 PROCEDURE — 85652 RBC SED RATE AUTOMATED: CPT

## 2025-03-12 PROCEDURE — 96367 TX/PROPH/DG ADDL SEQ IV INF: CPT

## 2025-03-12 PROCEDURE — 96365 THER/PROPH/DIAG IV INF INIT: CPT

## 2025-03-12 PROCEDURE — 36415 COLL VENOUS BLD VENIPUNCTURE: CPT | Performed by: INTERNAL MEDICINE

## 2025-03-12 PROCEDURE — 99223 1ST HOSP IP/OBS HIGH 75: CPT | Performed by: INTERNAL MEDICINE

## 2025-03-12 PROCEDURE — 87205 SMEAR GRAM STAIN: CPT

## 2025-03-12 PROCEDURE — 86140 C-REACTIVE PROTEIN: CPT

## 2025-03-12 RX ORDER — SODIUM CHLORIDE 9 MG/ML
75 INJECTION, SOLUTION INTRAVENOUS CONTINUOUS
Status: DISPENSED | OUTPATIENT
Start: 2025-03-12 | End: 2025-03-13

## 2025-03-12 RX ORDER — LISINOPRIL 5 MG/1
5 TABLET ORAL DAILY
Status: DISCONTINUED | OUTPATIENT
Start: 2025-03-13 | End: 2025-03-14 | Stop reason: HOSPADM

## 2025-03-12 RX ORDER — MAGNESIUM HYDROXIDE/ALUMINUM HYDROXICE/SIMETHICONE 120; 1200; 1200 MG/30ML; MG/30ML; MG/30ML
30 SUSPENSION ORAL EVERY 6 HOURS PRN
Status: DISCONTINUED | OUTPATIENT
Start: 2025-03-12 | End: 2025-03-14 | Stop reason: HOSPADM

## 2025-03-12 RX ORDER — INSULIN LISPRO 100 [IU]/ML
1-5 INJECTION, SOLUTION INTRAVENOUS; SUBCUTANEOUS
Status: DISCONTINUED | OUTPATIENT
Start: 2025-03-12 | End: 2025-03-14 | Stop reason: HOSPADM

## 2025-03-12 RX ORDER — POLYETHYLENE GLYCOL 3350 17 G/17G
17 POWDER, FOR SOLUTION ORAL DAILY
Status: DISCONTINUED | OUTPATIENT
Start: 2025-03-13 | End: 2025-03-14 | Stop reason: HOSPADM

## 2025-03-12 RX ORDER — LIDOCAINE HYDROCHLORIDE 10 MG/ML
30 INJECTION, SOLUTION EPIDURAL; INFILTRATION; INTRACAUDAL; PERINEURAL ONCE
Status: COMPLETED | OUTPATIENT
Start: 2025-03-12 | End: 2025-03-12

## 2025-03-12 RX ORDER — ONDANSETRON 2 MG/ML
4 INJECTION INTRAMUSCULAR; INTRAVENOUS EVERY 6 HOURS PRN
Status: DISCONTINUED | OUTPATIENT
Start: 2025-03-12 | End: 2025-03-14 | Stop reason: HOSPADM

## 2025-03-12 RX ORDER — NICOTINE 21 MG/24HR
1 PATCH, TRANSDERMAL 24 HOURS TRANSDERMAL EVERY 24 HOURS
Status: DISCONTINUED | OUTPATIENT
Start: 2025-03-12 | End: 2025-03-12

## 2025-03-12 RX ORDER — ATORVASTATIN CALCIUM 10 MG/1
10 TABLET, FILM COATED ORAL
Status: DISCONTINUED | OUTPATIENT
Start: 2025-03-12 | End: 2025-03-14 | Stop reason: HOSPADM

## 2025-03-12 RX ORDER — ENOXAPARIN SODIUM 100 MG/ML
40 INJECTION SUBCUTANEOUS DAILY
Status: DISCONTINUED | OUTPATIENT
Start: 2025-03-13 | End: 2025-03-14 | Stop reason: HOSPADM

## 2025-03-12 RX ORDER — HYDROMORPHONE HCL IN WATER/PF 6 MG/30 ML
0.2 PATIENT CONTROLLED ANALGESIA SYRINGE INTRAVENOUS EVERY 2 HOUR PRN
Refills: 0 | Status: DISCONTINUED | OUTPATIENT
Start: 2025-03-12 | End: 2025-03-14 | Stop reason: HOSPADM

## 2025-03-12 RX ORDER — CEFAZOLIN SODIUM 2 G/50ML
2000 SOLUTION INTRAVENOUS ONCE
Status: COMPLETED | OUTPATIENT
Start: 2025-03-12 | End: 2025-03-12

## 2025-03-12 RX ORDER — SERTRALINE HYDROCHLORIDE 100 MG/1
100 TABLET, FILM COATED ORAL DAILY
Status: DISCONTINUED | OUTPATIENT
Start: 2025-03-13 | End: 2025-03-14 | Stop reason: HOSPADM

## 2025-03-12 RX ORDER — ACETAMINOPHEN 325 MG/1
650 TABLET ORAL EVERY 6 HOURS PRN
Status: DISCONTINUED | OUTPATIENT
Start: 2025-03-12 | End: 2025-03-13

## 2025-03-12 RX ORDER — NICOTINE 21 MG/24HR
1 PATCH, TRANSDERMAL 24 HOURS TRANSDERMAL DAILY
Status: DISCONTINUED | OUTPATIENT
Start: 2025-03-13 | End: 2025-03-14 | Stop reason: HOSPADM

## 2025-03-12 RX ORDER — PANTOPRAZOLE SODIUM 40 MG/1
40 TABLET, DELAYED RELEASE ORAL DAILY
Status: DISCONTINUED | OUTPATIENT
Start: 2025-03-12 | End: 2025-03-14 | Stop reason: HOSPADM

## 2025-03-12 RX ORDER — VANCOMYCIN HYDROCHLORIDE 1 G/200ML
15 INJECTION, SOLUTION INTRAVENOUS ONCE
Status: COMPLETED | OUTPATIENT
Start: 2025-03-12 | End: 2025-03-12

## 2025-03-12 RX ORDER — QUETIAPINE FUMARATE 100 MG/1
100 TABLET, FILM COATED ORAL
Status: DISCONTINUED | OUTPATIENT
Start: 2025-03-12 | End: 2025-03-14 | Stop reason: HOSPADM

## 2025-03-12 RX ADMIN — HYDROMORPHONE HYDROCHLORIDE 0.2 MG: 0.2 INJECTION, SOLUTION INTRAMUSCULAR; INTRAVENOUS; SUBCUTANEOUS at 16:37

## 2025-03-12 RX ADMIN — CEFAZOLIN SODIUM 2000 MG: 2 SOLUTION INTRAVENOUS at 12:15

## 2025-03-12 RX ADMIN — SODIUM CHLORIDE 3 G: 9 INJECTION, SOLUTION INTRAVENOUS at 22:13

## 2025-03-12 RX ADMIN — PANTOPRAZOLE SODIUM 40 MG: 40 TABLET, DELAYED RELEASE ORAL at 19:51

## 2025-03-12 RX ADMIN — LIDOCAINE HYDROCHLORIDE 30 ML: 10 INJECTION, SOLUTION EPIDURAL; INFILTRATION; INTRACAUDAL; PERINEURAL at 13:31

## 2025-03-12 RX ADMIN — QUETIAPINE FUMARATE 100 MG: 100 TABLET ORAL at 21:16

## 2025-03-12 RX ADMIN — TRAZODONE HYDROCHLORIDE 150 MG: 100 TABLET ORAL at 21:16

## 2025-03-12 RX ADMIN — VANCOMYCIN HYDROCHLORIDE 1000 MG: 1 INJECTION, SOLUTION INTRAVENOUS at 13:03

## 2025-03-12 RX ADMIN — HYDROMORPHONE HYDROCHLORIDE 0.2 MG: 0.2 INJECTION, SOLUTION INTRAMUSCULAR; INTRAVENOUS; SUBCUTANEOUS at 19:50

## 2025-03-12 RX ADMIN — ATORVASTATIN CALCIUM 10 MG: 10 TABLET, FILM COATED ORAL at 19:51

## 2025-03-12 RX ADMIN — VANCOMYCIN HYDROCHLORIDE 1500 MG: 10 INJECTION, POWDER, LYOPHILIZED, FOR SOLUTION INTRAVENOUS at 19:46

## 2025-03-12 RX ADMIN — SODIUM CHLORIDE 75 ML/HR: 0.9 INJECTION, SOLUTION INTRAVENOUS at 19:40

## 2025-03-12 NOTE — ASSESSMENT & PLAN NOTE
Patient with right hand swelling pain evaluated in the ED and placed on a week of Bactrim course, he continues to have pain and swelling evaluate by primary care physician and referred to the hospital for operative intervention  He is now status post I&D with orthopedics in the ER  Received vancomycin and cefazolin in the ED  Hand x-ray reveals marked soft tissue swelling of the dorsum aspect of right hand, no radiographic evidence of osteomyelitis  Follow-up on culture studies  Will have him on IV Unasyn  Orthopedics following  Will request infectious disease evaluation  Discussed with infectious disease via secure chat  Monitor counts temperatures

## 2025-03-12 NOTE — ASSESSMENT & PLAN NOTE
Lab Results   Component Value Date    HGBA1C 6.5 (H) 02/10/2025         Blood Sugar Average: Last 72 hrs:    Diabetes mellitus type 2 controlled  Monitor Accu-Cheks  Avoid hypoglycemia

## 2025-03-12 NOTE — ED PROVIDER NOTES
Time reflects when diagnosis was documented in both MDM as applicable and the Disposition within this note       Time User Action Codes Description Comment    3/12/2025 12:04 PM Harry Chow Add [L03.90] Cellulitis     3/12/2025  3:29 PM Harry Chow Add [L02.91] Abscess           ED Disposition       ED Disposition   Admit    Condition   Stable    Date/Time   Wed Mar 12, 2025  3:29 PM    Comment   Case was discussed with slim and the patient's admission status was agreed to be Admission Status: observation status to the service of Dr. Hoyos .               Assessment & Plan       Medical Decision Making  Patient is generally well-appearing, in no acute distress.  Denies any systemic symptoms.  No trauma to the area.  Hand is notably swollen, abscess palpable on physical exam.  Ultrasound at bedside shows abscess on hand with surrounding cobblestoning however does not have involvement to depth of extensor tendons.  Will do basic labs and start with IV antibiotics and consult hand surgery.    Differential diagnosis includes but is not limited to: Cellulitis, abscess    Doubt septic arthritis given history and physical exam.  Do not feel that further imaging of the wrist is needed.  He does not have significant pain in the joint when actively and passively flexed.  The pain that he has in his hand seems to be due to the thickened skin and cellulitis when he is flexing and extending his wrist.  Pressure on the joint does not cause any additional pain.  The abscess location is over the middle of his distal dorsal hand.  Hand surgery was consulted who saw him at the bedside and performed bedside I&D recommended inpatient admission with IV antibiotics.    Patient denies any additional symptoms on direct questioning except those explicitly noted in the HPI and ROS.     Triage note was reviewed and patient asked directly about concerns mentioned in triage note.     I will order appropriate testing to narrow my  differential    Unless otherwise noted:  - There is no language barrier  - Chart was reviewed   - Labs and imaging were reviewed    Amount and/or Complexity of Data Reviewed  Labs: ordered.  Radiology: independent interpretation performed.    Risk  Prescription drug management.  Decision regarding hospitalization.             Medications   HYDROmorphone HCl (DILAUDID) injection 0.2 mg (has no administration in time range)   vancomycin (VANCOCIN) 1500 mg in sodium chloride 0.9% 250 mL IVPB (has no administration in time range)   ceFAZolin (ANCEF) IVPB (premix in dextrose) 2,000 mg 50 mL (0 mg Intravenous Stopped 3/12/25 1255)   vancomycin (VANCOCIN) IVPB (premix in dextrose) 1,000 mg 200 mL (0 mg Intravenous Stopped 3/12/25 1406)   lidocaine (PF) (XYLOCAINE-MPF) 1 % injection 30 mL (30 mL Infiltration Given 3/12/25 1331)       ED Risk Strat Scores                            SBIRT 20yo+      Flowsheet Row Most Recent Value   Initial Alcohol Screen: US AUDIT-C     1. How often do you have a drink containing alcohol? 0 Filed at: 03/12/2025 1044   2. How many drinks containing alcohol do you have on a typical day you are drinking?  0 Filed at: 03/12/2025 1044   3a. Male UNDER 65: How often do you have five or more drinks on one occasion? 0 Filed at: 03/12/2025 1044   Audit-C Score 0 Filed at: 03/12/2025 1044   [place]: How many times in the past year have you...    Used an illegal drug or used a prescription medication for non-medical reasons? Never Filed at: 03/12/2025 1044                            History of Present Illness       Chief Complaint   Patient presents with    Arm Swelling     Per pt comes in with right arm swelling and redness x2 weeks, was seen here recently sent home on antibiotics but has gotten worse.        Past Medical History:   Diagnosis Date    Abdominal pain     Allergic rhinitis     Cancer (HCC)     Chronic pain     Colon polyps     Depression     Fatigue     Head injury     Homeless      Hypertension     Insomnia     Liver disease     Loss of appetite     Numbness and tingling of right arm     Palpitations     Psychiatric disorder     bipolar    PTSD (post-traumatic stress disorder)     Seizures (HCC)     Shortness of breath     Substance abuse (HCC)     Swallowing difficulty       Past Surgical History:   Procedure Laterality Date    ABDOMINAL SURGERY      COLONOSCOPY      EYE SURGERY      NECK SURGERY      ORCHIECTOMY Right 5/19/2016    Procedure: ORCHIECTOMY INGUINAL biopsy of testicular mass, ;  Surgeon: Jose Lara MD;  Location:  MAIN OR;  Service:     OR COLONOSCOPY FLX DX W/COLLJ SPEC WHEN PFRMD N/A 2/13/2017    Procedure: EGD AND COLONOSCOPY;  Surgeon: Hernesto Perez MD;  Location: [facility] GI LAB;  Service: Gastroenterology    OR ESOPHAGOGASTRODUODENOSCOPY TRANSORAL DIAGNOSTIC N/A 11/16/2016    Procedure: EGD AND COLONOSCOPY;  Surgeon: Hernesto Perez MD;  Location:  GI LAB;  Service: Gastroenterology      Family History   Problem Relation Age of Onset    Diabetes Mother     Hypertension Brother       Social History     Tobacco Use    Smoking status: Every Day     Current packs/day: 1.00     Average packs/day: 1 pack/day for 41.0 years (41.0 ttl pk-yrs)     Types: Cigarettes    Smokeless tobacco: Current   Vaping Use    Vaping status: Never Used   Substance Use Topics    Alcohol use: No    Drug use: Not Currently     Types: Heroin      E-Cigarette/Vaping    E-Cigarette Use Never User       E-Cigarette/Vaping Substances    Nicotine No     THC No     CBD No     Flavoring No     Other No     Unknown No       I have reviewed and agree with the history as documented.     Patient is a 59-year-old [place]-speaking male.  An  was used for the entirety of this counter via [employer].  He says about 3 weeks ago he started with some swelling in his right hand.  This has progressively gotten worse.  He was seen on Monday of this week and given p.o. antibiotics which has been taking.  He  denies any fevers or chills.  No numbness or weakness in his hand.  He says he has a history of IV drug use but has not used IV drugs in several years.  He does not have diabetes.  No injury to the area.  Never diagnosed with an STD.        Review of Systems   Constitutional:  Negative for chills, fatigue and fever.   Respiratory:  Negative for cough and shortness of breath.    Cardiovascular:  Negative for chest pain.   Gastrointestinal:  Negative for abdominal pain.   Neurological:  Negative for seizures and syncope.   All other systems reviewed and are negative.          Objective       ED Triage Vitals [03/12/25 1042]   Temperature Pulse Blood Pressure Respirations SpO2 Patient Position - Orthostatic VS   (!) 97.4 °F (36.3 °C) 103 142/86 18 97 % Sitting      Temp Source Heart Rate Source BP Location FiO2 (%) Pain Score    Temporal Monitor Right arm -- 10 - Worst Possible Pain      Vitals      Date and Time Temp Pulse SpO2 Resp BP Pain Score FACES Pain Rating User   03/12/25 1535 98.8 °F (37.1 °C) 80 97 % 16 148/68 -- -- ST   03/12/25 1043 -- -- 97 % -- -- -- -- AM   03/12/25 1042 97.4 °F (36.3 °C) 103 97 % 18 142/86 10 - Worst Possible Pain -- AM            Physical Exam  Constitutional:       General: He is not in acute distress.     Appearance: Normal appearance. He is normal weight. He is not ill-appearing, toxic-appearing or diaphoretic.   HENT:      Head: Normocephalic and atraumatic.      Nose: Nose normal.      Mouth/Throat:      Mouth: Mucous membranes are dry.      Pharynx: Oropharynx is clear. No oropharyngeal exudate or posterior oropharyngeal erythema.   Eyes:      General: No scleral icterus.        Right eye: No discharge.         Left eye: No discharge.      Extraocular Movements: Extraocular movements intact.      Conjunctiva/sclera: Conjunctivae normal.      Pupils: Pupils are equal, round, and reactive to light.   Cardiovascular:      Rate and Rhythm: Normal rate and regular rhythm.       Pulses: Normal pulses.      Heart sounds: Normal heart sounds. No murmur heard.     No friction rub. No gallop.   Pulmonary:      Effort: Pulmonary effort is normal. No respiratory distress.      Breath sounds: Normal breath sounds. No stridor. No wheezing, rhonchi or rales.   Chest:      Chest wall: No tenderness.   Abdominal:      General: Abdomen is flat. Bowel sounds are normal. There is no distension.      Palpations: Abdomen is soft. There is no mass.      Tenderness: There is no abdominal tenderness. There is no guarding or rebound.      Hernia: No hernia is present.   Musculoskeletal:        Arms:       Cervical back: Normal range of motion and neck supple. No rigidity.   Lymphadenopathy:      Cervical: No cervical adenopathy.   Skin:     General: Skin is warm and dry.      Capillary Refill: Capillary refill takes less than 2 seconds.   Neurological:      General: No focal deficit present.      Mental Status: He is alert and oriented to person, place, and time.         Results Reviewed       Procedure Component Value Units Date/Time    Wound culture and Gram stain [[id number]] Collected: 03/12/25 1531    Lab Status: In process Specimen: Wound from Arm, Right Updated: 03/12/25 1541    Wound culture and Gram stain [[id number]] Collected: 03/12/25 1531    Lab Status: In process Specimen: Wound from Arm, Right Updated: 03/12/25 1541    Anaerobic culture and Gram stain [[id number]] Collected: 03/12/25 1531    Lab Status: In process Specimen: Body Fluid from Abscess Updated: 03/12/25 1540    Body fluid culture and Gram stain [[id number]]     Lab Status: No result Specimen: Body Fluid from Abscess     Sedimentation rate, automated [[id number]]  (Abnormal) Collected: 03/12/25 1208    Lab Status: Final result Specimen: Blood from Arm, Left Updated: 03/12/25 1340     Sed Rate 92 mm/hour     Comprehensive metabolic panel [[id number]]  (Abnormal) Collected: 03/12/25 1208    Lab Status: Final result Specimen: Blood from Arm,  Left Updated: 03/12/25 1329     Sodium 134 mmol/L      Potassium 4.1 mmol/L      Chloride 97 mmol/L      CO2 26 mmol/L      ANION GAP 11 mmol/L      BUN 16 mg/dL      Creatinine 1.11 mg/dL      Glucose 95 mg/dL      Calcium 9.4 mg/dL      AST 25 U/L      ALT 25 U/L      Alkaline Phosphatase 80 U/L      Total Protein 8.3 g/dL      Albumin 4.3 g/dL      Total Bilirubin 0.35 mg/dL      eGFR 72 ml/min/1.73sq m     Narrative:      National Kidney Disease Foundation guidelines for Chronic Kidney Disease (CKD):     Stage 1 with normal or high GFR (GFR > 90 mL/min/1.73 square meters)    Stage 2 Mild CKD (GFR = 60-89 mL/min/1.73 square meters)    Stage 3A Moderate CKD (GFR = 45-59 mL/min/1.73 square meters)    Stage 3B Moderate CKD (GFR = 30-44 mL/min/1.73 square meters)    Stage 4 Severe CKD (GFR = 15-29 mL/min/1.73 square meters)    Stage 5 End Stage CKD (GFR <15 mL/min/1.73 square meters)  Note: GFR calculation is accurate only with a steady state creatinine    C-reactive protein [[id number]]  (Abnormal) Collected: 03/12/25 1208    Lab Status: Final result Specimen: Blood from Arm, Left Updated: 03/12/25 1329     .9 mg/L     CBC and differential [[id number]]  (Abnormal) Collected: 03/12/25 1208    Lab Status: Final result Specimen: Blood from Arm, Left Updated: 03/12/25 1220     WBC 14.13 Thousand/uL      RBC 4.68 Million/uL      Hemoglobin 13.7 g/dL      Hematocrit 41.8 %      MCV 89 fL      MCH 29.3 pg      MCHC 32.8 g/dL      RDW 12.7 %      MPV 9.0 fL      Platelets 290 Thousands/uL      nRBC 0 /100 WBCs      Segmented % 76 %      Immature Grans % 1 %      Lymphocytes % 16 %      Monocytes % 6 %      Eosinophils Relative 1 %      Basophils Relative 0 %      Absolute Neutrophils 10.83 Thousands/µL      Absolute Immature Grans 0.08 Thousand/uL      Absolute Lymphocytes 2.22 Thousands/µL      Absolute Monocytes 0.90 Thousand/µL      Eosinophils Absolute 0.07 Thousand/µL      Basophils Absolute 0.03 Thousands/µL              XR hand 3+ vw right   ED Interpretation by Harry Chow MD ( [id number])   No acute osseous abnormality      Final Interpretation by Lola Damico MD ( [id number])      1) Marked soft tissue swelling over the dorsal aspect of the right hand, also extending into the proximal digits, however no subcutaneous emphysema or radiographic evidence of osteomyelitis.      2) No other acute osseous abnormality.      3) 1 mm radiopaque foreign body projecting over the dorsal soft tissues of the right 3rd digit at the level of the PIP joint, unchanged from . No retained radiopaque foreign bodies elsewhere.      Findings are concordant with preliminary interpretation provided in the Emergency Department.         Workstation performed: [id number]             Procedures    ED Medication and Procedure Management   Prior to Admission Medications   Prescriptions Last Dose Informant Patient Reported? Taking?   Lidocaine 4 % PTCH   No No   Sig: Apply 1 patch topically daily As needed for back pain, remove the patch after 12 hours   Nicotine 10 MG/ML SOLN   No No   Sig: Instill 1 to 2 doses per hour in each nostril. Each dose = 2 sprays, one in each nostril.   QUEtiapine (SEROquel) 100 mg tablet   No No   Sig: Take 1 tablet (100 mg total) by mouth daily at bedtime   Vraylar 1.5 MG capsule   Yes No   Patient not taking: Reported on 2/10/2025   atorvastatin (LIPITOR) 10 mg tablet   No No   Sig: TAKE ONE TABLET BY MOUTH DAILY WITH dinner   fluticasone (FLONASE) 50 mcg/act nasal spray   No No   Si sprays into each nostril daily   Patient not taking: Reported on 2/10/2025   gabapentin (NEURONTIN) 300 mg capsule   No No   Sig: TAKE 1 CAPSULE (300 MG TOTAL) BY MOUTH THREE (THREE) TIMES A DAY   Patient not taking: Reported on 2/10/2025   hydrOXYzine HCL (ATARAX) 25 mg tablet   Yes No   hydrOXYzine pamoate (VISTARIL) 50 mg capsule   Yes No   lisinopril (ZESTRIL) 5 mg tablet   No No   Sig: TAKE ONE TABLET BY MOUTH DAILY    naloxone (NARCAN) 4 mg/0.1 mL nasal spray   Yes No   Patient not taking: Reported on 9/13/2024   nicotine (NICODERM CQ) 21 mg/24 hr TD 24 hr patch   No No   Sig: Place 1 patch on the skin over 24 hours every 24 hours   nicotine (NICOTROL) 10 MG inhaler   No No   Sig: Inhale 1 puff as needed for smoking cessation   Patient not taking: Reported on 1/5/2024   pantoprazole (PROTONIX) 40 mg tablet   No No   Sig: TAKE 1 TABLET (40 MG TOTAL) BY MOUTH IN THE MORNING   prazosin (MINIPRESS) 2 mg capsule   No No   Sig: Take 1 capsule (2 mg total) by mouth daily at bedtime   Patient not taking: Reported on 2/22/2024   sertraline (ZOLOFT) 100 mg tablet   No No   Sig: Take 1 tablet (100 mg total) by mouth daily   sulfamethoxazole-trimethoprim (BACTRIM DS) 800-160 mg per tablet   No No   Sig: Take 1 tablet by mouth 2 (two) times a day for 7 days smx-tmp DS (BACTRIM) 800-160 mg tabs (1tab q12 D10)   traZODone (DESYREL) 150 mg tablet   Yes No   venlafaxine (EFFEXOR-XR) 37.5 mg 24 hr capsule   Yes No   Patient not taking: Reported on 1/5/2024   venlafaxine (EFFEXOR-XR) 75 mg 24 hr capsule   Yes No   Patient not taking: Reported on 1/5/2024      Facility-Administered Medications: None     Patient's Medications   Discharge Prescriptions    No medications on file     No discharge procedures on file.  ED SEPSIS DOCUMENTATION   Time reflects when diagnosis was documented in both MDM as applicable and the Disposition within this note       Time User Action Codes Description Comment    3/12/2025 12:04 PM Harry Chow [L03.90] Cellulitis     3/12/2025  3:29 PM Harry Chow [L02.91] Abscess                  Harry Chow MD  03/12/25 5982

## 2025-03-12 NOTE — CONSULTS
Consultation - Orthopedics   Name: Barrett Teran 59 y.o. male I MRN: [id number]  Unit/Bed#: ED 20 I Date of Admission: 3/12/2025   Date of Service: 3/12/2025 I Hospital Day: 0   Inpatient consult to Hand Surgery  Consult performed by: Markus Bean MD  Consult ordered by: Julia Olivas MD        Physician Requesting Evaluation: Julia Olivas MD   Reason for Evaluation / Principal Problem: right hand abscess     Assessment & Plan  Abscess of hand, right  59 year-old male with right hand cellulitis and abscess now s/p bedside irrigation and debridement. Abscess tracking to 1.5 cm deep with packing in place. Plan for wound check in AM.     Plan:  NWB right hand in volar slab splint for comfort  Appreciate admission to internal medicine and recommendations  Continue IV antibiotics  PT/OT  NPO midnight  Pain and nausea control as needed  Orthopedics will continue to follow     Please contact the SecureChat role for the Orthopedics service with any questions/concerns.    History of Present Illness   HPI: Barrett Teran is a 59 y.o. male right-hand dominant who presents with three weeks of right hand edema, erythema, and pain. Patient associates the swelling starting after he hit his hand on the bed during a nightmare. Denies any other trauma or injury to the right hand. He initially presented to the ED two days prior to this visit, was prescribed Bactrim and instructed to return if symptoms persisted. Of note, patient has history of IV drug use in past. He has never had anything like this before. At present, denies fever, nausea, vomiting, abdominal pain, and shortness of breath.     Review of Systems  I have reviewed the patient's available PMH, PSH, Social History, Family History, Meds, and Allergies  Historical Information   Past Medical History:   Diagnosis Date    Abdominal pain     Allergic rhinitis     Cancer (HCC)     Chronic pain     Colon polyps     Depression     Fatigue     Head injury     Homeless      Hypertension     Insomnia     Liver disease     Loss of appetite     Numbness and tingling of right arm     Palpitations     Psychiatric disorder     bipolar    PTSD (post-traumatic stress disorder)     Seizures (HCC)     Shortness of breath     Substance abuse (HCC)     Swallowing difficulty      Past Surgical History:   Procedure Laterality Date    ABDOMINAL SURGERY      COLONOSCOPY      EYE SURGERY      NECK SURGERY      ORCHIECTOMY Right 5/19/2016    Procedure: ORCHIECTOMY INGUINAL biopsy of testicular mass, ;  Surgeon: Jose Lara MD;  Location:  MAIN OR;  Service:     UT COLONOSCOPY FLX DX W/COLLJ SPEC WHEN PFRMD N/A 2/13/2017    Procedure: EGD AND COLONOSCOPY;  Surgeon: Hernesto Perez MD;  Location: [facility] GI LAB;  Service: Gastroenterology    UT ESOPHAGOGASTRODUODENOSCOPY TRANSORAL DIAGNOSTIC N/A 11/16/2016    Procedure: EGD AND COLONOSCOPY;  Surgeon: Hernesto Perez MD;  Location:  GI LAB;  Service: Gastroenterology     Social History     Tobacco Use    Smoking status: Every Day     Current packs/day: 1.00     Average packs/day: 1 pack/day for 41.0 years (41.0 ttl pk-yrs)     Types: Cigarettes    Smokeless tobacco: Current   Vaping Use    Vaping status: Never Used   Substance and Sexual Activity    Alcohol use: No    Drug use: Not Currently     Types: Heroin    Sexual activity: Not Currently     E-Cigarette/Vaping    E-Cigarette Use Never User      E-Cigarette/Vaping Substances    Nicotine No     THC No     CBD No     Flavoring No     Other No     Unknown No      Family history non-contributory  Social History     Tobacco Use    Smoking status: Every Day     Current packs/day: 1.00     Average packs/day: 1 pack/day for 41.0 years (41.0 ttl pk-yrs)     Types: Cigarettes    Smokeless tobacco: Current   Vaping Use    Vaping status: Never Used   Substance and Sexual Activity    Alcohol use: No    Drug use: Not Currently     Types: Heroin    Sexual activity: Not Currently       Current  Facility-Administered Medications:     ceFAZolin (ANCEF) IVPB (premix in dextrose) 2,000 mg 50 mL, Once    vancomycin (VANCOCIN) IVPB (premix in dextrose) 1,000 mg 200 mL, Once  Prior to Admission Medications   Prescriptions Last Dose Informant Patient Reported? Taking?   Lidocaine 4 % PTCH   No No   Sig: Apply 1 patch topically daily As needed for back pain, remove the patch after 12 hours   Nicotine 10 MG/ML SOLN   No No   Sig: Instill 1 to 2 doses per hour in each nostril. Each dose = 2 sprays, one in each nostril.   QUEtiapine (SEROquel) 100 mg tablet   No No   Sig: Take 1 tablet (100 mg total) by mouth daily at bedtime   Vraylar 1.5 MG capsule   Yes No   Patient not taking: Reported on 2/10/2025   atorvastatin (LIPITOR) 10 mg tablet   No No   Sig: TAKE ONE TABLET BY MOUTH DAILY WITH dinner   fluticasone (FLONASE) 50 mcg/act nasal spray   No No   Si sprays into each nostril daily   Patient not taking: Reported on 2/10/2025   gabapentin (NEURONTIN) 300 mg capsule   No No   Sig: TAKE 1 CAPSULE (300 MG TOTAL) BY MOUTH THREE (THREE) TIMES A DAY   Patient not taking: Reported on 2/10/2025   hydrOXYzine HCL (ATARAX) 25 mg tablet   Yes No   hydrOXYzine pamoate (VISTARIL) 50 mg capsule   Yes No   lisinopril (ZESTRIL) 5 mg tablet   No No   Sig: TAKE ONE TABLET BY MOUTH DAILY   naloxone (NARCAN) 4 mg/0.1 mL nasal spray   Yes No   Patient not taking: Reported on 2024   nicotine (NICODERM CQ) 21 mg/24 hr TD 24 hr patch   No No   Sig: Place 1 patch on the skin over 24 hours every 24 hours   nicotine (NICOTROL) 10 MG inhaler   No No   Sig: Inhale 1 puff as needed for smoking cessation   Patient not taking: Reported on 2024   pantoprazole (PROTONIX) 40 mg tablet   No No   Sig: TAKE 1 TABLET (40 MG TOTAL) BY MOUTH IN THE MORNING   prazosin (MINIPRESS) 2 mg capsule   No No   Sig: Take 1 capsule (2 mg total) by mouth daily at bedtime   Patient not taking: Reported on 2024   sertraline (ZOLOFT) 100 mg tablet   No  "No   Sig: Take 1 tablet (100 mg total) by mouth daily   sulfamethoxazole-trimethoprim (BACTRIM DS) 800-160 mg per tablet   No No   Sig: Take 1 tablet by mouth 2 (two) times a day for 7 days smx-tmp DS (BACTRIM) 800-160 mg tabs (1tab q12 D10)   traZODone (DESYREL) 150 mg tablet   Yes No   venlafaxine (EFFEXOR-XR) 37.5 mg 24 hr capsule   Yes No   Patient not taking: Reported on 1/5/2024   venlafaxine (EFFEXOR-XR) 75 mg 24 hr capsule   Yes No   Patient not taking: Reported on 1/5/2024      Facility-Administered Medications: None     Oxycodone    Objective      Temp:  [97.4 °F (36.3 °C)-99.1 °F (37.3 °C)] 97.4 °F (36.3 °C)  HR:  [103-109] 103  BP: (110-142)/(80-86) 142/86  Resp:  [16-18] 18  SpO2:  [94 %-97 %] 97 %  O2 Device: None (Room air)  O2 Device: None (Room air)          I/O       None            Physical Exam   Ortho Exam   Musculoskeletal: Right Upper Extremity  Skin intact. No open wounds. Diffuse edema and erythema present over dorsal aspect of right hand.   Area of fluctuance palpated over the base of the first and second metacarpals (see clinical image below).  TTP diffusely  Sensation intact to light touch m/r/u  Motor intact m/r/u/ain/pin  No micromotion tenderness present at wrist, DIP, PIP, or MCP joints of all digits   No flexor tendon sheath tenderness   No pain with passive extension   Able to make 30% of composite fist  2+ rad pulse  Digits warm and well-perfused.    Tertiary exam was negative for additional palpable stepoffs or additional bony tenderness to palpation.    Imaging:  X Rays of right hand demonstrate no acute fracture or dislocation.      Lab Results: I have reviewed the following results:   No results for input(s): \"WBC\", \"HGB\", \"HCT\", \"PLT\", \"BANDSPCT\", \"BUN\", \"CREATININE\", \"PTT\", \"INR\", \"ESR\", \"CRP\" in the last 72 hours.  Blood Culture: No results found for: \"BLOODCX\"  Wound Culture: No results found for: \"WOUNDCULT\"     Procedure: Incision and drainage of right hand  A local block " "of 10cc 1% lidocaine was given to the dorsal aspect of the right hand . The area was then sterilely prepped and draped. Once adequate anesthesia was obtained, an incision was made over the abscess, then extended proximally and distally and purulent drainage was expressed. A hemostat was inserted into the wound and expanded to break up loculations. The wound was then irrigated with NS. 1/4\" packing was inserted into the wound. The wound was dressed with xeroform, 4x4, and ava wrap. Pt tolerated the procedure well and was neurovascularly intact pre and post procedure.   " 0

## 2025-03-12 NOTE — PROGRESS NOTES
"Name: Barrett Teran      : 1965      MRN: [id number]  Encounter Provider: JOSIAH Santiago  Encounter Date: 3/12/2025   Encounter department: [facility]  :  Assessment & Plan  Cellulitis of right upper extremity  Cellulitis and abscess of right hand.   Started Bactrim 2 days ago, pain is still severe, with severe swelling, erythema, abscess.   Advised to return to the emergency for possible IV antibiotics, and consider I&D of abscess.   He is agreeable and will go to [facility].     Orders:    Transfer to other facility    Abscess of hand    Orders:    Transfer to other facility           History of Present Illness   Barrett Teran is a 59 year old male presenting today for right hand pain and swelling.     Started 3-4 weeks ago.   Went to the emergency room 2 days ago prescribed Bactrim.     States swelling is a little better, but pain is significant.   He is asking for pain medication today.       [employer] phone  used for office visit-- number [phone].        Review of Systems   Constitutional: Negative.    Skin:         Right hand swelling, pain       Objective   /80   Pulse (!) 109   Temp 99.1 °F (37.3 °C) (Temporal)   Resp 16   Ht 5' 6\" (1.676 m)   Wt 72.6 kg (160 lb)   SpO2 94%   BMI 25.82 kg/m²      Physical Exam  Vitals and nursing note reviewed.   Constitutional:       General: He is not in acute distress.     Appearance: Normal appearance. He is not ill-appearing.   Cardiovascular:      Rate and Rhythm: Tachycardia present.      Pulses: no weak pulses.           Dorsalis pedis pulses are 2+ on the right side and 2+ on the left side.        Posterior tibial pulses are 2+ on the right side and 2+ on the left side.   Pulmonary:      Effort: Pulmonary effort is normal.   Feet:      Right foot:      Skin integrity: Dry skin present. No ulcer, skin breakdown, erythema, warmth or callus.      Left foot:      Skin integrity: Dry skin present. No " ulcer, skin breakdown, erythema, warmth or callus.   Skin:     Comments: Right hand with severe edema, erythema, warmth, abscess.      Neurological:      Mental Status: He is alert.     Diabetic Foot Exam    Patient's shoes and socks removed.    Right Foot/Ankle   Right Foot Inspection  Skin Exam: skin normal, skin intact and dry skin. No warmth, no callus, no erythema, no maceration, no abnormal color, no pre-ulcer, no ulcer and no callus.     Toe Exam: ROM and strength within normal limits.     Sensory   Proprioception: intact  Monofilament testing: intact    Vascular  Capillary refills: < 3 seconds  The right DP pulse is 2+. The right PT pulse is 2+.     Left Foot/Ankle  Left Foot Inspection  Skin Exam: skin normal, skin intact and dry skin. No warmth, no erythema, no maceration, normal color, no pre-ulcer, no ulcer and no callus.     Toe Exam: ROM and strength within normal limits.     Sensory   Proprioception: intact  Monofilament testing: intact    Vascular  Capillary refills: < 3 seconds  The left DP pulse is 2+. The left PT pulse is 2+.     Assign Risk Category  No deformity present  No loss of protective sensation  No weak pulses  Risk: 0      Current Outpatient Medications:     atorvastatin (LIPITOR) 10 mg tablet, TAKE ONE TABLET BY MOUTH DAILY WITH dinner, Disp: 30 tablet, Rfl: 0    hydrOXYzine HCL (ATARAX) 25 mg tablet, , Disp: , Rfl:     hydrOXYzine pamoate (VISTARIL) 50 mg capsule, , Disp: , Rfl:     Lidocaine 4 % PTCH, Apply 1 patch topically daily As needed for back pain, remove the patch after 12 hours, Disp: 30 patch, Rfl: 1    lisinopril (ZESTRIL) 5 mg tablet, TAKE ONE TABLET BY MOUTH DAILY, Disp: 90 tablet, Rfl: 5    nicotine (NICODERM CQ) 21 mg/24 hr TD 24 hr patch, Place 1 patch on the skin over 24 hours every 24 hours, Disp: 14 patch, Rfl: 1    Nicotine 10 MG/ML SOLN, Instill 1 to 2 doses per hour in each nostril. Each dose = 2 sprays, one in each nostril., Disp: 10 mL, Rfl: 2    pantoprazole  (PROTONIX) 40 mg tablet, TAKE 1 TABLET (40 MG TOTAL) BY MOUTH IN THE MORNING, Disp: 30 tablet, Rfl: 5    QUEtiapine (SEROquel) 100 mg tablet, Take 1 tablet (100 mg total) by mouth daily at bedtime, Disp: 30 tablet, Rfl: 1    sertraline (ZOLOFT) 100 mg tablet, Take 1 tablet (100 mg total) by mouth daily, Disp: 30 tablet, Rfl: 1    sulfamethoxazole-trimethoprim (BACTRIM DS) 800-160 mg per tablet, Take 1 tablet by mouth 2 (two) times a day for 7 days smx-tmp DS (BACTRIM) 800-160 mg tabs (1tab q12 D10), Disp: 14 tablet, Rfl: 0    traZODone (DESYREL) 150 mg tablet, , Disp: , Rfl:     fluticasone (FLONASE) 50 mcg/act nasal spray, 2 sprays into each nostril daily (Patient not taking: Reported on 2/10/2025), Disp: 9.9 mL, Rfl: 5    gabapentin (NEURONTIN) 300 mg capsule, TAKE 1 CAPSULE (300 MG TOTAL) BY MOUTH THREE (THREE) TIMES A DAY (Patient not taking: Reported on 2/10/2025), Disp: 90 capsule, Rfl: 5    naloxone (NARCAN) 4 mg/0.1 mL nasal spray, , Disp: , Rfl:     nicotine (NICOTROL) 10 MG inhaler, Inhale 1 puff as needed for smoking cessation (Patient not taking: Reported on 1/5/2024), Disp: 42 each, Rfl: 2    prazosin (MINIPRESS) 2 mg capsule, Take 1 capsule (2 mg total) by mouth daily at bedtime (Patient not taking: Reported on 2/22/2024), Disp: 30 capsule, Rfl: 5    venlafaxine (EFFEXOR-XR) 37.5 mg 24 hr capsule, , Disp: , Rfl:     venlafaxine (EFFEXOR-XR) 75 mg 24 hr capsule, , Disp: , Rfl:     Vraylar 1.5 MG capsule, , Disp: , Rfl:

## 2025-03-12 NOTE — H&P
H&P - Hospitalist   Name: Barrett Teran 59 y.o. male I MRN: [id number]  Unit/Bed#: ED 20 I Date of Admission: 3/12/2025   Date of Service: 3/12/2025 I Hospital Day: 0     Assessment & Plan  Abscess of hand, right  Patient with right hand swelling pain evaluated in the ED and placed on a week of Bactrim course, he continues to have pain and swelling evaluate by primary care physician and referred to the hospital for operative intervention  He is now status post I&D with orthopedics in the ER  Received vancomycin and cefazolin in the ED  Hand x-ray reveals marked soft tissue swelling of the dorsum aspect of right hand, no radiographic evidence of osteomyelitis  Follow-up on culture studies  Will have him on IV Unasyn  Orthopedics following  Will request infectious disease evaluation  Discussed with infectious disease via secure chat  Monitor counts temperatures    Tobacco abuse  Tobacco cessation encouraged    DM (diabetes mellitus), type 2 (HCC)  Lab Results   Component Value Date    HGBA1C 6.5 (H) 02/10/2025         Blood Sugar Average: Last 72 hrs:    Diabetes mellitus type 2 controlled  Monitor Accu-Cheks  Avoid hypoglycemia    Chronic hepatitis C virus infection (HCC)  Noted  Outpatient follow-up  Affective psychosis, bipolar (HCC)  Continue Seroquel sertraline trazodone  Outpatient follow-up      VTE Pharmacologic Prophylaxis: VTE Score: 3 Moderate Risk (Score 3-4) - Pharmacological DVT Prophylaxis Ordered: enoxaparin (Lovenox).  Code Status: Level 1 - Full Code     Discussion with family: Discussed with the patient, offered to call family reports he is keeping them updated    Anticipated Length of Stay: Patient will be admitted on an inpatient basis with an anticipated length of stay of greater than 2 midnights secondary to right hand abscess for management as outlined.    History of Present Illness   Chief Complaint:     Right hand pain and swelling    Barrett Teran is a 59 y.o. male with a PMH of bipolar disorder,  chronic hepatitis C, tobacco abuse, diabetes mellitus type 2 who presents with right hand pain and swelling.      Patient is German-speaking  Language line used,  #[id number]    Patient reports since 3 weeks he has noticed right hand swelling and pain.  He was evaluated in the ED and placed on Bactrim course.  He completed the course of Bactrim however continues to have persistent pain and swelling, he was seen by primary care physician and referred to the ED for further management.  In the ED he received vancomycin and cefazolin, evaluated by orthopedics and underwent I&D.  He is being admitted for further management.      He denies fevers reports some chills.  Appetite fair to good.  No history of abdominal pain nausea vomiting diarrhea.  Denies urinary symptoms.  Denies cough chest tightness wheezing.  Denies chest pain shortness of breath palpitations presyncope syncope.    Outpatient notes reviewed in [facility]  History chart labs medications reviewed in [facility]    Review of Systems   All other systems reviewed and are negative.      Historical Information   Past Medical History:   Diagnosis Date    Abdominal pain     Allergic rhinitis     Cancer (HCC)     Chronic pain     Colon polyps     Depression     Fatigue     Head injury     Homeless     Hypertension     Insomnia     Liver disease     Loss of appetite     Numbness and tingling of right arm     Palpitations     Psychiatric disorder     bipolar    PTSD (post-traumatic stress disorder)     Seizures (HCC)     Shortness of breath     Substance abuse (HCC)     Swallowing difficulty      Past Surgical History:   Procedure Laterality Date    ABDOMINAL SURGERY      COLONOSCOPY      EYE SURGERY      NECK SURGERY      ORCHIECTOMY Right 5/19/2016    Procedure: ORCHIECTOMY INGUINAL biopsy of testicular mass, ;  Surgeon: Jose Lara MD;  Location: [facility];  Service:     KS COLONOSCOPY FLX DX W/COLLJ SPEC WHEN PFRMD N/A 2/13/2017    Procedure: EGD AND  COLONOSCOPY;  Surgeon: Hernesto Perez MD;  Location: [facility];  Service: Gastroenterology    RI ESOPHAGOGASTRODUODENOSCOPY TRANSORAL DIAGNOSTIC N/A 11/16/2016    Procedure: EGD AND COLONOSCOPY;  Surgeon: Hernesto Perez MD;  Location:  GI LAB;  Service: Gastroenterology     Social History     Tobacco Use    Smoking status: Every Day     Current packs/day: 1.00     Average packs/day: 1 pack/day for 41.0 years (41.0 ttl pk-yrs)     Types: Cigarettes    Smokeless tobacco: Current   Vaping Use    Vaping status: Never Used   Substance and Sexual Activity    Alcohol use: No    Drug use: Not Currently     Types: Heroin    Sexual activity: Not Currently     E-Cigarette/Vaping    E-Cigarette Use Never User      E-Cigarette/Vaping Substances    Nicotine No     THC No     CBD No     Flavoring No     Other No     Unknown No      Family History   Problem Relation Age of Onset    Diabetes Mother     Hypertension Brother      Social History:  Marital Status:    Occupation:   Patient Pre-hospital Living Situation: Home  Patient Pre-hospital Level of Mobility: walks  Patient Pre-hospital Diet Restrictions: no    Meds/Allergies   I have reviewed home medications using recent Epic encounter.  Prior to Admission medications    Medication Sig Start Date End Date Taking? Authorizing Provider   atorvastatin (LIPITOR) 10 mg tablet TAKE ONE TABLET BY MOUTH DAILY WITH dinner 2/7/25   Dick Jimenez MD   fluticasone (FLONASE) 50 mcg/act nasal spray 2 sprays into each nostril daily  Patient not taking: Reported on 2/10/2025 9/13/24   Dick Jimenze MD   gabapentin (NEURONTIN) 300 mg capsule TAKE 1 CAPSULE (300 MG TOTAL) BY MOUTH THREE (THREE) TIMES A DAY  Patient not taking: Reported on 2/10/2025 2/7/25   Dick Jimenez MD   hydrOXYzine HCL (ATARAX) 25 mg tablet  10/17/23   Historical Provider, MD   hydrOXYzine pamoate (VISTARIL) 50 mg capsule  12/20/23   Historical Provider, MD   Lidocaine 4 % PTCH Apply 1 patch topically daily As  needed for back pain, remove the patch after 12 hours 2/10/25   Dick Jimenez MD   lisinopril (ZESTRIL) 5 mg tablet TAKE ONE TABLET BY MOUTH DAILY 2/7/25   Dick Jimenez MD   naloxone (NARCAN) 4 mg/0.1 mL nasal spray  5/30/24   Historical Provider, MD   nicotine (NICODERM CQ) 21 mg/24 hr TD 24 hr patch Place 1 patch on the skin over 24 hours every 24 hours 8/17/23   Dick Jimenez MD   nicotine (NICOTROL) 10 MG inhaler Inhale 1 puff as needed for smoking cessation  Patient not taking: Reported on 1/5/2024 11/10/23   Dick Jimenez MD   Nicotine 10 MG/ML SOLN Instill 1 to 2 doses per hour in each nostril. Each dose = 2 sprays, one in each nostril. 11/10/23   Dick Jimenez MD   pantoprazole (PROTONIX) 40 mg tablet TAKE 1 TABLET (40 MG TOTAL) BY MOUTH IN THE MORNING 2/7/25   Dick Jimenez MD   prazosin (MINIPRESS) 2 mg capsule Take 1 capsule (2 mg total) by mouth daily at bedtime  Patient not taking: Reported on 2/22/2024 8/17/23   Dick Jimenez MD   QUEtiapine (SEROquel) 100 mg tablet Take 1 tablet (100 mg total) by mouth daily at bedtime 12/27/22   Dov Obando MD   sertraline (ZOLOFT) 100 mg tablet Take 1 tablet (100 mg total) by mouth daily 12/27/22   Dov Obando MD   sulfamethoxazole-trimethoprim (BACTRIM DS) 800-160 mg per tablet Take 1 tablet by mouth 2 (two) times a day for 7 days smx-tmp DS (BACTRIM) 800-160 mg tabs (1tab q12 D10) 3/10/25 3/17/25  Fredrick Wilde DO   traZODone (DESYREL) 150 mg tablet  7/16/24   Historical Provider, MD   venlafaxine (EFFEXOR-XR) 37.5 mg 24 hr capsule  8/22/23   Historical Provider, MD   venlafaxine (EFFEXOR-XR) 75 mg 24 hr capsule  9/18/23   Historical Provider, MD   Vraylar 1.5 MG capsule  10/27/23   Historical Provider, MD   predniSONE 10 mg tablet Take 4 tablets for 2 days, 3 tablets for 2 days, 2 tablets for 2 days, 1 tablet for 2 days. 11/1/23 3/12/25  JOSIAH Santiago   predniSONE 20 mg tablet 2 tab daily X 3 days, 1 tab daily X 3 days, 1/2 tab  daily X 4 days 2/10/25 3/12/25  Dick Jimenez MD     Allergies   Allergen Reactions    Oxycodone Swelling     Tongue swelling       Objective :  Temp:  [97.4 °F (36.3 °C)-99.1 °F (37.3 °C)] 98.8 °F (37.1 °C)  HR:  [] 80  BP: (110-148)/(68-86) 148/68  Resp:  [16-18] 16  SpO2:  [94 %-97 %] 97 %  O2 Device: None (Room air)    Physical Exam     Comfortably in bed  Neck supple  Lungs diminished breath sounds  Vesicular breath sounds  Heart sounds S1-S2 noted  Abdomen soft  Awake follows commands  Right upper extremity bandaged  Media tab reviewed for right hand image which reveals features suggestive of cellulitis  No pedal edema  No rash    Lines/Drains:            Lab Results: I have reviewed the following results:  Results from last 7 days   Lab Units 03/12/25  1208   WBC Thousand/uL 14.13*   HEMOGLOBIN g/dL 13.7   HEMATOCRIT % 41.8   PLATELETS Thousands/uL 290   SEGS PCT % 76*   LYMPHO PCT % 16   MONO PCT % 6   EOS PCT % 1     Results from last 7 days   Lab Units 03/12/25  1208   SODIUM mmol/L 134*   POTASSIUM mmol/L 4.1   CHLORIDE mmol/L 97   CO2 mmol/L 26   BUN mg/dL 16   CREATININE mg/dL 1.11   ANION GAP mmol/L 11   CALCIUM mg/dL 9.4   ALBUMIN g/dL 4.3   TOTAL BILIRUBIN mg/dL 0.35   ALK PHOS U/L 80   ALT U/L 25   AST U/L 25   GLUCOSE RANDOM mg/dL 95             Lab Results   Component Value Date    HGBA1C 6.5 (H) 02/10/2025    HGBA1C 6.2 09/13/2024    HGBA1C 6.4 02/22/2024           Imaging Results Review: I personally reviewed the following image studies/reports in PACS and discussed pertinent findings with Radiology: xray(s) and procedure reports. My interpretation of the radiology images/reports is: Lab results reviewed.    ** Please Note: This note has been constructed using a voice recognition system. **

## 2025-03-12 NOTE — ASSESSMENT & PLAN NOTE
59 year-old male with right hand cellulitis and abscess now s/p bedside irrigation and debridement. Abscess tracking to 1.5 cm deep with packing in place. Plan for wound check in AM.     Plan:  NWB right hand in volar slab splint for comfort  Appreciate admission to internal medicine and recommendations  Continue IV antibiotics  PT/OT  NPO midnight  Pain and nausea control as needed  Orthopedics will continue to follow

## 2025-03-12 NOTE — ED ATTENDING ATTESTATION
3/12/2025  I, Julia Olivas MD, saw and evaluated the patient. I have discussed the patient with the resident/non-physician practitioner and agree with the resident's/non-physician practitioner's findings, Plan of Care, and MDM as documented in the resident's/non-physician practitioner's note, except where noted. All available labs and Radiology studies were reviewed.  I was present for key portions of any procedure(s) performed by the resident/non-physician practitioner and I was immediately available to provide assistance.       At this point I agree with the current assessment done in the Emergency Department.  I have conducted an independent evaluation of this patient a history and physical is as follows:  Right-hand-dominant male here with right hand swelling.  Patient states that he has had swelling to his right hand that is been going on for 3-1/2 weeks.  Patient was seen in the emergency department and placed on antibiotics on 10 March, and has been taking those antibiotics, but the swelling is still present.  He has not had fevers, but the hand feels warm.  He has limited mobility secondary to the swelling.  Patient is prediabetic.  He is a former IV drug user, but has not used IV drugs for the last year or 2.  Patient states he has no history of HIV disease or underlying immunosuppression.  Review of systems otherwise -12 systems reviewed.  On exam the patient has marked cellulitis involving his entire right hand with a 3 to 4 cm area of abscess as well on the dorsum of the hand.  On bedside ultrasound, the abscess appears relatively superficial, and I do not see tenderness involvement.  The patient has limited range of motion in the joints of his hand secondary to swelling, but does not have frozen joints.  Impression: Hand infection.  Patient has been on oral antibiotics.  Will plan to give IV antibiotics, consult hand surgery, anticipate incision and drainage of abscess  ED Course          Critical Care Time  Procedures

## 2025-03-13 PROBLEM — A41.9 SEPSIS (HCC): Status: ACTIVE | Noted: 2025-03-13

## 2025-03-13 PROBLEM — B18.2 CHRONIC HEPATITIS C VIRUS INFECTION (HCC): Status: RESOLVED | Noted: 2017-04-10 | Resolved: 2025-03-13

## 2025-03-13 LAB
ANION GAP SERPL CALCULATED.3IONS-SCNC: 7 MMOL/L (ref 4–13)
BASOPHILS # BLD AUTO: 0.02 THOUSANDS/ÂΜL (ref 0–0.1)
BASOPHILS NFR BLD AUTO: 0 % (ref 0–1)
BUN SERPL-MCNC: 12 MG/DL (ref 5–25)
CALCIUM SERPL-MCNC: 8.2 MG/DL (ref 8.4–10.2)
CHLORIDE SERPL-SCNC: 102 MMOL/L (ref 96–108)
CO2 SERPL-SCNC: 26 MMOL/L (ref 21–32)
CREAT SERPL-MCNC: 0.94 MG/DL (ref 0.6–1.3)
EOSINOPHIL # BLD AUTO: 0.14 THOUSAND/ÂΜL (ref 0–0.61)
EOSINOPHIL NFR BLD AUTO: 2 % (ref 0–6)
ERYTHROCYTE [DISTWIDTH] IN BLOOD BY AUTOMATED COUNT: 12.7 % (ref 11.6–15.1)
GFR SERPL CREATININE-BSD FRML MDRD: 88 ML/MIN/1.73SQ M
GLUCOSE SERPL-MCNC: 114 MG/DL (ref 65–140)
GLUCOSE SERPL-MCNC: 128 MG/DL (ref 65–140)
GLUCOSE SERPL-MCNC: 156 MG/DL (ref 65–140)
GLUCOSE SERPL-MCNC: 159 MG/DL (ref 65–140)
GLUCOSE SERPL-MCNC: 284 MG/DL (ref 65–140)
HCT VFR BLD AUTO: 36.5 % (ref 36.5–49.3)
HGB BLD-MCNC: 12.1 G/DL (ref 12–17)
IMM GRANULOCYTES # BLD AUTO: 0.04 THOUSAND/UL (ref 0–0.2)
IMM GRANULOCYTES NFR BLD AUTO: 0 % (ref 0–2)
LYMPHOCYTES # BLD AUTO: 1.97 THOUSANDS/ÂΜL (ref 0.6–4.47)
LYMPHOCYTES NFR BLD AUTO: 22 % (ref 14–44)
MAGNESIUM SERPL-MCNC: 2.1 MG/DL (ref 1.9–2.7)
MCH RBC QN AUTO: 29.4 PG (ref 26.8–34.3)
MCHC RBC AUTO-ENTMCNC: 33.2 G/DL (ref 31.4–37.4)
MCV RBC AUTO: 89 FL (ref 82–98)
MONOCYTES # BLD AUTO: 0.63 THOUSAND/ÂΜL (ref 0.17–1.22)
MONOCYTES NFR BLD AUTO: 7 % (ref 4–12)
NEUTROPHILS # BLD AUTO: 6.22 THOUSANDS/ÂΜL (ref 1.85–7.62)
NEUTS SEG NFR BLD AUTO: 69 % (ref 43–75)
NRBC BLD AUTO-RTO: 0 /100 WBCS
PLATELET # BLD AUTO: 276 THOUSANDS/UL (ref 149–390)
PMV BLD AUTO: 9.2 FL (ref 8.9–12.7)
POTASSIUM SERPL-SCNC: 4.2 MMOL/L (ref 3.5–5.3)
RBC # BLD AUTO: 4.12 MILLION/UL (ref 3.88–5.62)
SODIUM SERPL-SCNC: 135 MMOL/L (ref 135–147)
WBC # BLD AUTO: 9.02 THOUSAND/UL (ref 4.31–10.16)

## 2025-03-13 PROCEDURE — 83735 ASSAY OF MAGNESIUM: CPT | Performed by: INTERNAL MEDICINE

## 2025-03-13 PROCEDURE — 85025 COMPLETE CBC W/AUTO DIFF WBC: CPT | Performed by: INTERNAL MEDICINE

## 2025-03-13 PROCEDURE — 99254 IP/OBS CNSLTJ NEW/EST MOD 60: CPT | Performed by: INTERNAL MEDICINE

## 2025-03-13 PROCEDURE — 82948 REAGENT STRIP/BLOOD GLUCOSE: CPT

## 2025-03-13 PROCEDURE — G0545 PR INHERENT VISIT TO INPT: HCPCS | Performed by: INTERNAL MEDICINE

## 2025-03-13 PROCEDURE — 99232 SBSQ HOSP IP/OBS MODERATE 35: CPT | Performed by: PHYSICIAN ASSISTANT

## 2025-03-13 PROCEDURE — 80048 BASIC METABOLIC PNL TOTAL CA: CPT | Performed by: INTERNAL MEDICINE

## 2025-03-13 PROCEDURE — NC001 PR NO CHARGE: Performed by: SURGERY

## 2025-03-13 RX ORDER — HYDROMORPHONE HCL IN WATER/PF 6 MG/30 ML
0.2 PATIENT CONTROLLED ANALGESIA SYRINGE INTRAVENOUS ONCE
Status: COMPLETED | OUTPATIENT
Start: 2025-03-13 | End: 2025-03-13

## 2025-03-13 RX ORDER — ACETAMINOPHEN 325 MG/1
975 TABLET ORAL EVERY 8 HOURS SCHEDULED
Status: DISCONTINUED | OUTPATIENT
Start: 2025-03-13 | End: 2025-03-14 | Stop reason: HOSPADM

## 2025-03-13 RX ORDER — LIDOCAINE HYDROCHLORIDE 10 MG/ML
30 INJECTION, SOLUTION EPIDURAL; INFILTRATION; INTRACAUDAL; PERINEURAL ONCE
Status: COMPLETED | OUTPATIENT
Start: 2025-03-13 | End: 2025-03-13

## 2025-03-13 RX ORDER — LIDOCAINE HYDROCHLORIDE 10 MG/ML
INJECTION, SOLUTION EPIDURAL; INFILTRATION; INTRACAUDAL; PERINEURAL
Status: COMPLETED
Start: 2025-03-13 | End: 2025-03-13

## 2025-03-13 RX ADMIN — VANCOMYCIN HYDROCHLORIDE 750 MG: 10 INJECTION, POWDER, LYOPHILIZED, FOR SOLUTION INTRAVENOUS at 22:25

## 2025-03-13 RX ADMIN — LIDOCAINE HYDROCHLORIDE 30 ML: 10 INJECTION, SOLUTION EPIDURAL; INFILTRATION; INTRACAUDAL at 12:01

## 2025-03-13 RX ADMIN — ACETAMINOPHEN 650 MG: 325 TABLET, FILM COATED ORAL at 09:33

## 2025-03-13 RX ADMIN — HYDROMORPHONE HYDROCHLORIDE 0.2 MG: 0.2 INJECTION, SOLUTION INTRAMUSCULAR; INTRAVENOUS; SUBCUTANEOUS at 10:47

## 2025-03-13 RX ADMIN — ATORVASTATIN CALCIUM 10 MG: 10 TABLET, FILM COATED ORAL at 16:49

## 2025-03-13 RX ADMIN — INSULIN LISPRO 2 UNITS: 100 INJECTION, SOLUTION INTRAVENOUS; SUBCUTANEOUS at 10:48

## 2025-03-13 RX ADMIN — INSULIN LISPRO 1 UNITS: 100 INJECTION, SOLUTION INTRAVENOUS; SUBCUTANEOUS at 07:44

## 2025-03-13 RX ADMIN — NICOTINE 1 PATCH: 14 PATCH, EXTENDED RELEASE TRANSDERMAL at 09:33

## 2025-03-13 RX ADMIN — TRAZODONE HYDROCHLORIDE 150 MG: 100 TABLET ORAL at 22:29

## 2025-03-13 RX ADMIN — LIDOCAINE HYDROCHLORIDE 30 ML: 10 INJECTION, SOLUTION EPIDURAL; INFILTRATION; INTRACAUDAL; PERINEURAL at 12:01

## 2025-03-13 RX ADMIN — VANCOMYCIN HYDROCHLORIDE 750 MG: 10 INJECTION, POWDER, LYOPHILIZED, FOR SOLUTION INTRAVENOUS at 10:08

## 2025-03-13 RX ADMIN — ACETAMINOPHEN 975 MG: 325 TABLET, FILM COATED ORAL at 14:31

## 2025-03-13 RX ADMIN — INSULIN LISPRO 1 UNITS: 100 INJECTION, SOLUTION INTRAVENOUS; SUBCUTANEOUS at 16:49

## 2025-03-13 RX ADMIN — HYDROMORPHONE HYDROCHLORIDE 0.2 MG: 0.2 INJECTION, SOLUTION INTRAMUSCULAR; INTRAVENOUS; SUBCUTANEOUS at 13:18

## 2025-03-13 RX ADMIN — ALUMINUM HYDROXIDE, MAGNESIUM HYDROXIDE, AND SIMETHICONE 30 ML: 200; 200; 20 SUSPENSION ORAL at 10:47

## 2025-03-13 RX ADMIN — SODIUM CHLORIDE 3 G: 9 INJECTION, SOLUTION INTRAVENOUS at 07:44

## 2025-03-13 RX ADMIN — SODIUM CHLORIDE 3 G: 9 INJECTION, SOLUTION INTRAVENOUS at 02:24

## 2025-03-13 RX ADMIN — QUETIAPINE FUMARATE 100 MG: 100 TABLET ORAL at 22:29

## 2025-03-13 RX ADMIN — PANTOPRAZOLE SODIUM 40 MG: 40 TABLET, DELAYED RELEASE ORAL at 09:33

## 2025-03-13 RX ADMIN — SERTRALINE 100 MG: 100 TABLET, FILM COATED ORAL at 09:33

## 2025-03-13 RX ADMIN — LISINOPRIL 5 MG: 5 TABLET ORAL at 09:34

## 2025-03-13 RX ADMIN — ACETAMINOPHEN 975 MG: 325 TABLET, FILM COATED ORAL at 22:29

## 2025-03-13 RX ADMIN — SODIUM CHLORIDE 75 ML/HR: 0.9 INJECTION, SOLUTION INTRAVENOUS at 16:49

## 2025-03-13 NOTE — CONSULTS
Consultation - Infectious Disease   Name: Barrett Teran 59 y.o. male I MRN: [id number]  Unit/Bed#: -01 I Date of Admission: 3/12/2025   Date of Service: 3/13/2025 I Hospital Day: 1   Inpatient consult to Infectious Diseases  Consult performed by: Mayte Espinoza MD  Consult ordered by: Cam Hoyos MD      Physician Requesting Evaluation: Enrico Sood MD   Reason for Evaluation / Principal Problem: Hand abscess    Assessment & Plan  Sepsis ([facility])  WBC, HR.  Due to hand infection as below.  Blood cultures negative.  Improving.    Continue antibiotics as below  Follow temperatures closely  Recheck WBC in AM to monitor infection  Supportive care as per the primary service  Abscess of hand, right  Unclear precipitating cause.  Has remote history of DREA but denies recent use.  Status post I&D.  Appears superficial.  Wound Gram stain with GPCs, cultures pending.  Suspect skin sita.  Failure with Bactrim likely due to need for I&D.     Continue vancomycin for now  Stop Unasyn  Pharmacy consult for vancomycin dosing  Follow up wound cultures and tailor antibiotics as indicated   LWC per Ortho  DM (diabetes mellitus), type 2 ([facility])  Lab Results   Component Value Date    HGBA1C 6.5 (H) 02/10/2025   Risk factor for infection  Affective psychosis, bipolar ([facility])        Antibiotics:  Vancomycin #2  Unasyn #2    History of Present Illness   Barrett Teran is a 59 y.o. year old male who initally presented to the ED 3/10 with 3 weeks of redness and swelling to the dorsum of the left hand.  Prescribed Bactrim.  Pain and redness persisted and he was referred back to the ED by his PCP yesterday.  He was seen by Ortho and underwent bedside I&D.  Cultures pending.  We are asked to comment on further evaluation and management.    Says hand feels much better after I&D.    A complete review of systems is negative other than that noted in the HPI.    Medical History Review: I have reviewed the patient's PMH, PSH, Social History,  Family History, Meds, and Allergies     Objective :  Temp:  [98.6 °F (37 °C)-99.4 °F (37.4 °C)] 98.6 °F (37 °C)  HR:  [80-92] 92  BP: (130-148)/(68-88) 137/69  Resp:  [16] 16  SpO2:  [94 %-97 %] 94 %  O2 Device: None (Room air)    General:  No acute distress  Psychiatric:  Awake and alert  Pulmonary:  Normal respiratory excursion without accessory muscle use  Abdomen:  Soft, nontender  Extremities:  Edema right dorsal hand, no malodor or purulence  Skin:  No rashes      Lab Results: I have reviewed the following results:  Results from last 7 days   Lab Units 03/13/25  0458 03/12/25  1208   WBC Thousand/uL 9.02 14.13*   HEMOGLOBIN g/dL 12.1 13.7   PLATELETS Thousands/uL 276 290     Results from last 7 days   Lab Units 03/13/25  0458 03/12/25  1208   SODIUM mmol/L 135 134*   POTASSIUM mmol/L 4.2 4.1   CHLORIDE mmol/L 102 97   CO2 mmol/L 26 26   BUN mg/dL 12 16   CREATININE mg/dL 0.94 1.11   EGFR ml/min/1.73sq m 88 72   CALCIUM mg/dL 8.2* 9.4   AST U/L  --  25   ALT U/L  --  25   ALK PHOS U/L  --  80   ALBUMIN g/dL  --  4.3     Results from last 7 days   Lab Units 03/12/25  1813 03/12/25  1531   BLOOD CULTURE  Received in Microbiology Lab. Culture in Progress.  Received in Microbiology Lab. Culture in Progress.  --    GRAM STAIN RESULT   --  2+ Polys*  2+ Gram positive cocci in pairs*         Results from last 7 days   Lab Units 03/12/25  1208   CRP mg/L 244.9*               Imaging Results Review: I personally reviewed the following image studies in PACS and associated radiology reports: CT hand. My interpretation of the radiology images/reports is: soft tissue swelling, no OM.

## 2025-03-13 NOTE — PROGRESS NOTES
Progress Note - Hospitalist   Name: Barrett Teran 59 y.o. male I MRN: [id number]  Unit/Bed#: -01 I Date of Admission: 3/12/2025   Date of Service: 3/13/2025 I Hospital Day: 1    Assessment & Plan  Abscess of hand, right  Pt presented with right hand swelling and pain despite taking PO Bactrim as an outpatient after ED visit on 3/10  Pt with right hand cellulitis and abscess   Orthopedics following,  S/p bedside I&D on 3/12, abscess tracked to 1.5 cm deep  S/p additional bedside I&D today, 3/13 as well   NWB right hand in volar slab splint   PT/OT consultations   NPO after midnight pending wound check tomorrow   Wound culture with pre johnson 2+ growth of gram positive cocci in pairs   ID consulted for abx management, currently on IV Vancomycin   PRN pain management with PRN IV dilaudid 0.2 mg Q2H PRN, pt with documented allergy to oxycodone. Schedule tylenol     Sepsis (HCC)  POA, as evidenced by leukocytosis and tachycardia  In setting of hand infection as above   ID following,  Continue IV Vancomycin, unasyn discontinued   Blood cultures pending  Wound cultures with gram positive cocci in pairs and chains noted, continue to follow up  Overall improving, leukocytosis resolved   Tobacco abuse  Tobacco cessation encouraged  Nicotine patch while inpatient     DM (diabetes mellitus), type 2 (HCC)  Lab Results   Component Value Date    HGBA1C 6.5 (H) 02/10/2025         Blood Sugar Average: Last 72 hrs:  (P) [id number]  Diabetes mellitus type 2 controlled  Continue SSI coverage while inpatient   QID glucose checks  Monitor and adjust regimen as needed    Affective psychosis, bipolar (HCC)  Mood stable  Continue Seroquel 100 mg daily, sertraline 100 mg daily and trazodone 150 mg daily   Outpatient follow-up  Hypertension  BP stable on review   Continue lisinopril 5 mg daily   Monitor     VTE Pharmacologic Prophylaxis: VTE Score: 3 Moderate Risk (Score 3-4) - Pharmacological DVT Prophylaxis Ordered: enoxaparin  (Lovenox).    Mobility:   Basic Mobility Inpatient Raw Score: 24  JH-HLM Goal: 8: Walk 250 feet or more  JH-HLM Achieved: 8: Walk 250 feet ot more  JH-HLM Goal achieved. Continue to encourage appropriate mobility.    Patient Centered Rounds: I evaluated the patient without nursing staff present due to speaking to nurse outside patient's room     Discussions with Specialists or Other Care Team Provider: Discussed with RN, CM, ortho and reviewed previous notes     Education and Discussions with Family / Patient: Patient declined call to .     Current Length of Stay: 1 day(s)  Current Patient Status: Inpatient   Certification Statement: The patient will continue to require additional inpatient hospital stay due to IV abx, ongoing orthopedics evaluation and recommendations  Discharge Plan: Anticipate discharge in 48-72 hrs to home.    Code Status: Level 1 - Full Code    Subjective   Pt reports that his pain is currently at a 7. Denies any chest pain or shortness of breath. Reports appetite is stable, discussed that ortho cleared him for a diet order this afternoon. No other complaints at this time. Pt is mainly Kiswahili speaking, history obtained with interpretation service on the ipad at bedside.     Objective :  Temp:  [98.6 °F (37 °C)-99.4 °F (37.4 °C)] 98.6 °F (37 °C)  HR:  [80-92] 92  BP: (130-148)/(68-88) 137/69  Resp:  [16] 16  SpO2:  [94 %-97 %] 94 %  O2 Device: None (Room air)    Body mass index is 25.82 kg/m².     Input and Output Summary (last 24 hours):     Intake/Output Summary (Last 24 hours) at 3/13/2025 1127  Last data filed at 3/12/2025 1406  Gross per 24 hour   Intake 200 ml   Output --   Net 200 ml       Physical Exam  Vitals reviewed.   Constitutional:       General: He is not in acute distress.     Comments: Pt is in no acute distress lying in his hospital bed resting comfortably   Right hand with edema, erythema, ace wrapping/dressing in place    HENT:      Head: Normocephalic and  atraumatic.   Cardiovascular:      Rate and Rhythm: Normal rate and regular rhythm.   Pulmonary:      Effort: No respiratory distress.      Breath sounds: Normal breath sounds. No wheezing.   Abdominal:      General: Bowel sounds are normal.      Palpations: Abdomen is soft.   Skin:     General: Skin is warm and dry.   Neurological:      Mental Status: He is alert.   Psychiatric:         Mood and Affect: Mood normal.           Lines/Drains:              Lab Results: I have reviewed the following results:   Results from last 7 days   Lab Units 03/13/25  0458   WBC Thousand/uL 9.02   HEMOGLOBIN g/dL 12.1   HEMATOCRIT % 36.5   PLATELETS Thousands/uL 276   SEGS PCT % 69   LYMPHO PCT % 22   MONO PCT % 7   EOS PCT % 2     Results from last 7 days   Lab Units 03/13/25  0458 03/12/25  1208   SODIUM mmol/L 135 134*   POTASSIUM mmol/L 4.2 4.1   CHLORIDE mmol/L 102 97   CO2 mmol/L 26 26   BUN mg/dL 12 16   CREATININE mg/dL 0.94 1.11   ANION GAP mmol/L 7 11   CALCIUM mg/dL 8.2* 9.4   ALBUMIN g/dL  --  4.3   TOTAL BILIRUBIN mg/dL  --  0.35   ALK PHOS U/L  --  80   ALT U/L  --  25   AST U/L  --  25   GLUCOSE RANDOM mg/dL 128 95         Results from last 7 days   Lab Units 03/13/25  1047 03/13/25  0608 03/12/25  1643   POC GLUCOSE mg/dl 284* 159* 144*               Recent Cultures (last 7 days):   Results from last 7 days   Lab Units 03/12/25  1813 03/12/25  1531   BLOOD CULTURE  Received in Microbiology Lab. Culture in Progress.  Received in Microbiology Lab. Culture in Progress.  --    GRAM STAIN RESULT   --  2+ Polys*  2+ Gram positive cocci in pairs*   WOUND CULTURE   --  No growth  No growth       Imaging Results Review: I reviewed radiology reports from this admission including: xray(s).  Other Study Results Review: No additional pertinent studies reviewed.    Last 24 Hours Medication List:     Current Facility-Administered Medications:     acetaminophen (TYLENOL) tablet 650 mg, Q6H PRN    aluminum-magnesium  hydroxide-simethicone (MAALOX) oral suspension 30 mL, Q6H PRN    ampicillin-sulbactam (UNASYN) 3 g in sodium chloride 0.9 % 100 mL IVPB, Q6H, Last Rate: 3 g (03/13/25 0744)    atorvastatin (LIPITOR) tablet 10 mg, Daily With Dinner    enoxaparin (LOVENOX) subcutaneous injection 40 mg, Daily    HYDROmorphone HCl (DILAUDID) injection 0.2 mg, Q2H PRN    insulin lispro (HumALOG/ADMELOG) 100 units/mL subcutaneous injection 1-5 Units, TID AC **AND** Fingerstick Glucose (POCT), TID AC    lidocaine (PF) (XYLOCAINE-MPF) 1 % injection 30 mL, Once    lisinopril (ZESTRIL) tablet 5 mg, Daily    nicotine (NICODERM CQ) 14 mg/24hr TD 24 hr patch 1 patch, Daily    ondansetron (ZOFRAN) injection 4 mg, Q6H PRN    pantoprazole (PROTONIX) EC tablet 40 mg, Daily    polyethylene glycol (MIRALAX) packet 17 g, Daily    QUEtiapine (SEROquel) tablet 100 mg, HS    sertraline (ZOLOFT) tablet 100 mg, Daily    sodium chloride 0.9 % infusion, Continuous, Last Rate: 75 mL/hr (03/12/25 1940)    traZODone (DESYREL) tablet 150 mg, HS    vancomycin 750 mg in sodium chloride 0.9% 250 mL, Q12H, Last Rate: 750 mg (03/13/25 1008)    Administrative Statements   Today, Patient Was Seen By: Nupur Ramos PA-C    **Please Note: This note may have been constructed using a voice recognition system.**

## 2025-03-13 NOTE — ASSESSMENT & PLAN NOTE
WBC, HR.  Due to hand infection as below.  Blood cultures negative.  Improving.    Continue antibiotics as below  Follow temperatures closely  Recheck WBC in AM to monitor infection  Supportive care as per the primary service

## 2025-03-13 NOTE — PROGRESS NOTES
Progress Note - Orthopedics   Name: Barrett Teran 59 y.o. male I MRN: [id number]  Unit/Bed#: -01 I Date of Admission: 3/12/2025   Date of Service: 3/13/2025 I Hospital Day: 1    Assessment & Plan  Abscess of hand, right  59 year-old male with right hand cellulitis and abscess now s/p bedside irrigation and debridement. Abscess tracking to 1.5 cm deep with packing in place. Wound check performed this morning, clinically improving.    Plan:  NWB right hand in volar slab splint for comfort  Appreciate internal medicine recommendations  Continue IV antibiotics  PT/OT  Pain and nausea control as needed  Orthopedics will continue to follow     Please contact the SecureChat role for the Orthopedics service with any questions/concerns.    Subjective   No acute events overnight. Patient has much better pain control this morning. He was able to sleep last night. Subjectively, the patient feels motion has improving. Denies any numbness or tingling.     Objective      Temp:  [97.4 °F (36.3 °C)-99.4 °F (37.4 °C)] 98.6 °F (37 °C)  HR:  [] 92  BP: (110-148)/(68-88) 137/69  Resp:  [16-18] 16  SpO2:  [94 %-97 %] 94 %  O2 Device: None (Room air)  O2 Device: None (Room air)          I/O         03/11 0701  03/12 0700 03/12 0701  03/13 0700    IV Piggyback  200    Total Intake(mL/kg)  200 (2.8)    Net  +200                  Physical Exam   Musculoskeletal: Hand  Skin intact. Diffuse edema and erythema present over dorsal aspect of right hand. Incision present with packing in place.  Area of fluctuance palpated over the base of the first and second metacarpals  TTP diffusely  Sensation intact to light touch m/r/u  Motor intact m/r/u/ain/pin  No micromotion tenderness present at wrist, DIP, PIP, or MCP joints of all digits   No flexor tendon sheath tenderness   No pain with passive extension   Able to make 30% of composite fist  2+ rad pulse  Digits warm and well-perfused.                      Lab Results: I have reviewed the  "following results:  Recent Labs     03/12/25  1208   WBC 14.13*   HGB 13.7   HCT 41.8      BUN 16   CREATININE 1.11   ESR 92*   .9*     Blood Culture:    Lab Results   Component Value Date    BLOODCX Received in Microbiology Lab. Culture in Progress. 03/12/2025    BLOODCX Received in Microbiology Lab. Culture in Progress. 03/12/2025     Wound Culture: No results found for: \"WOUNDCULT\"   "

## 2025-03-13 NOTE — ASSESSMENT & PLAN NOTE
59 year-old male with right hand cellulitis and abscess now s/p bedside irrigation and debridement. Abscess tracking to 1.5 cm deep with packing in place. Wound check performed this morning, clinically improving.    Plan:  NWB right hand in volar slab splint for comfort  Appreciate internal medicine recommendations  Continue IV antibiotics  PT/OT  Pain and nausea control as needed  Orthopedics will continue to follow

## 2025-03-13 NOTE — ASSESSMENT & PLAN NOTE
Pt presented with right hand swelling and pain despite taking PO Bactrim as an outpatient after ED visit on 3/10  Pt with right hand cellulitis and abscess   Orthopedics following,  S/p bedside I&D on 3/12, abscess tracked to 1.5 cm deep  S/p additional bedside I&D today, 3/13 as well   NWB right hand in volar slab splint   PT/OT consultations   NPO after midnight pending wound check tomorrow   Wound culture with pre johnson 2+ growth of gram positive cocci in pairs   ID consulted for abx management, currently on IV Vancomycin   PRN pain management with PRN IV dilaudid 0.2 mg Q2H PRN, pt with documented allergy to oxycodone. Schedule tylenol

## 2025-03-13 NOTE — ASSESSMENT & PLAN NOTE
Unclear precipitating cause.  Has remote history of DREA but denies recent use.  Status post I&D.  Appears superficial.  Wound Gram stain with GPCs, cultures pending.  Suspect skin sita.  Failure with Bactrim likely due to need for I&D.     Continue vancomycin for now  Stop Unasyn  Pharmacy consult for vancomycin dosing  Follow up wound cultures and tailor antibiotics as indicated   LWC per Ortho

## 2025-03-13 NOTE — PLAN OF CARE
Problem: PAIN - ADULT  Goal: Verbalizes/displays adequate comfort level or baseline comfort level  Description: Interventions:  - Encourage patient to monitor pain and request assistance  - Assess pain using appropriate pain scale  - Administer analgesics based on type and severity of pain and evaluate response  - Implement non-pharmacological measures as appropriate and evaluate response  - Consider cultural and social influences on pain and pain management  - Notify physician/advanced practitioner if interventions unsuccessful or patient reports new pain  Outcome: Progressing     Problem: INFECTION - ADULT  Goal: Absence or prevention of progression during hospitalization  Description: INTERVENTIONS:  - Assess and monitor for signs and symptoms of infection  - Monitor lab/diagnostic results  - Monitor all insertion sites, i.e. indwelling lines, tubes, and drains  - Monitor endotracheal if appropriate and nasal secretions for changes in amount and color  - [place] appropriate cooling/warming therapies per order  - Administer medications as ordered  - Instruct and encourage patient and family to use good hand hygiene technique  - Identify and instruct in appropriate isolation precautions for identified infection/condition  Outcome: Progressing     Problem: SAFETY ADULT  Goal: Patient will remain free of falls  Description: INTERVENTIONS:  - Educate patient/family on patient safety including physical limitations  - Instruct patient to call for assistance with activity   - Consult OT/PT to assist with strengthening/mobility   - Keep Call bell within reach  - Keep bed low and locked with side rails adjusted as appropriate  - Keep care items and personal belongings within reach  - Initiate and maintain comfort rounds  - Make Fall Risk Sign visible to staff  - Offer Toileting every 2 Hours, in advance of need  - Initiate/Maintain bed alarm  - Apply yellow socks and bracelet for high fall risk patients  - Consider  moving patient to room near nurses station  Outcome: Progressing     Problem: SKIN/TISSUE INTEGRITY - ADULT  Goal: Incision(s), wounds(s) or drain site(s) healing without S/S of infection  Description: INTERVENTIONS  - Assess and document dressing, incision, wound bed, drain sites and surrounding tissue  - Provide patient and family education  - Perform skin care/dressing changes every shift  Outcome: Progressing

## 2025-03-13 NOTE — ASSESSMENT & PLAN NOTE
Mood stable  Continue Seroquel 100 mg daily, sertraline 100 mg daily and trazodone 150 mg daily   Outpatient follow-up

## 2025-03-13 NOTE — QUICK NOTE
"Procedure: Incision and drainage of right hand     After informed consent was obtained, a local block of 10cc 1% lidocaine was given to the dorsal aspect of the right hand. The area was then prepped and draped. Once adequate anesthesia was obtained the existing incision was then extended proximally and distally and purulent drainage was expressed. A hemostat was inserted into the wound and expanded to break up loculations. The wound was then irrigated with NS. 1/4\" packing was inserted into the wound. The wound was dressed with xeroform, 4x4, and ava wrap. Pt tolerated the procedure well and was neurovascularly intact pre and post procedure. Plan for soapy soaks QID.       "

## 2025-03-13 NOTE — PROGRESS NOTES
Vancomycin IV Pharmacy-to-Dose Consultation    Barrett Teran is a 59 y.o. male who is currently ordered Vancomycin IV with management by the Pharmacy Consult service.    Relevant clinical data and objective / subjective history reviewed.    Vancomycin Assessment:    Indication and Goal AUC/Trough: abscess of R hand (goal -600, trough >10)    Clinical Status: stable    Micro:   3/12 BC 2 of 2: in process  3/12 wound culture, R arm: 2+ GPC in pairs, 2+ Polys  3/12 anaerobic body fluid from abscess culture: in process   wound culture, R arm: in process    Renal Function:  SCr: 0.94 mg/dL  CrCl: 69 mL/min  Renal replacement: none    Days of Therapy: 2    Current Dose: 1500 mg IV q24h    Vancomycin Plan:    New Dosin mg IV q12h    Estimated AUC: 427 mcg*hr/mL    Estimated Trough: 12.2 mcg/mL    Next Level: 3/15 at 0600    Renal Function Monitoring: Daily BMP and UOP    Pharmacy will continue to follow closely for s/sx of nephrotoxicity, infusion reactions and appropriateness of therapy.  BMP and CBC will be ordered per protocol. We will continue to follow the patient’s culture results and clinical progress daily.    Angelina Sena, PharmD  Pharmacist

## 2025-03-13 NOTE — UTILIZATION REVIEW
Initial Clinical Review    Admission: Date/Time/Statement:   Admission Orders (From admission, onward)       Ordered        03/12/25 1540  INPATIENT ADMISSION  Once                          Orders Placed This Encounter   Procedures    INPATIENT ADMISSION     Standing Status:   Standing     Number of Occurrences:   1     Level of Care:   Med Surg [16]     Estimated length of stay:   More than 2 Midnights     Certification:   I certify that inpatient services are medically necessary for this patient for a duration of greater than two midnights. See H&P and MD Progress Notes for additional information about the patient's course of treatment.     ED Arrival Information       Expected   3/12/2025 09:02    Arrival   3/12/2025 10:37    Acuity   Urgent              Means of arrival   Walk-In    Escorted by   Family Member    Service   Hospitalist    Admission type   Emergency              Arrival complaint   cellulitis of right upper ext             Chief Complaint   Patient presents with    Arm Swelling     Per pt comes in with right arm swelling and redness x2 weeks, was seen here recently sent home on antibiotics but has gotten worse.        Initial Presentation: 59 y.o. male to ED presents for Right hand swelling and pain. Seen in ED on 3/10 and started on Bactrim course. He completed the course of Bactrim however continues to have persistent pain and swelling. Seen by PCP and referred to ED for further management.   In ED, given Iv antibiotics, evaluated by Orthopedic and underwent I&D.   PMH for bipolar disorder, chronic hepatitis C, tobacco abuse, diabetes mellitus type 2   Admit to Inpatient Dx; Abscess of Right Hand, S/p I&D  .9, Sed rate 92  Plan; Iv antibiotics. F/u culture studies. Orthopedic and ID consulted. Monitor temps.     3/12  Orthopedic cons; Right hand cellulitis and abscess, S/p  bedside irrigation and debridement. Abscess tracking to 1.5 cm deep with packing in place.   Plan for wound check in  AM. NWB right hand in volar slab splint for comfort. Continue Iv antibiotics. NPO MN. Pain and nausea control prn.   On exam; No open wounds. Diffuse edema and erythema present over dorsal aspect of right hand.   Area of fluctuance palpated over the base of the first and second metacarpals. TTP diffusely.    3/12      3/13        Anticipated Length of Stay/Certification Statement: Patient will be admitted on an inpatient basis with an anticipated length of stay of greater than 2 midnights secondary to right hand abscess for management      Date: 3/13   Day 2:   Per Orthopedic; Wound check this am, clinically improving.  Continue Iv antibiotics. NWB right hand in volar slab splint for comfort  Pain much better this am per pt.  On exam; Diffuse edema and erythema present over dorsal aspect of right hand. Incision present with packing in place.  Area of fluctuance palpated over the base of the first and second metacarpals    ID cons; Sepsis. Continue Iv antibiotics. F/u Bld cultures. Recheck Wbc in am to monitor infection.  F/u wound cultures and tailor antibiotics as indicated.     Progress notes; 3/12 S/p bedside I&D abscess tracked to 1.5 cm deep  3/13 S/p additional bedside I&D today  Continue Iv antibiotics. Wound culture with pre johnson 2+ growth of gram positive cocci in pairs   Bld cultures pending. NPO after MN pending wound check tomorrow 3/14.   NWB right hand in volar slab splint. Pain control.       ED Treatment-Medication Administration from 03/12/2025 0902 to 03/12/2025 1842         Date/Time Order Dose Route Action     03/12/2025 1215 ceFAZolin (ANCEF) IVPB (premix in dextrose) 2,000 mg 50 mL 2,000 mg Intravenous New Bag     03/12/2025 1303 vancomycin (VANCOCIN) IVPB (premix in dextrose) 1,000 mg 200 mL 1,000 mg Intravenous New Bag     03/12/2025 1331 lidocaine (PF) (XYLOCAINE-MPF) 1 % injection 30 mL 30 mL Infiltration Given     03/12/2025 1637 HYDROmorphone HCl (DILAUDID) injection 0.2 mg 0.2 mg  Intravenous Given            Scheduled Medications:  atorvastatin, 10 mg, Oral, Daily With Dinner  enoxaparin, 40 mg, Subcutaneous, Daily  insulin lispro, 1-5 Units, Subcutaneous, TID AC  lisinopril, 5 mg, Oral, Daily  nicotine, 1 patch, Transdermal, Daily  pantoprazole, 40 mg, Oral, Daily  polyethylene glycol, 17 g, Oral, Daily  QUEtiapine, 100 mg, Oral, HS  sertraline, 100 mg, Oral, Daily  traZODone, 150 mg, Oral, HS  vancomycin, 750 mg, Intravenous, Q12H      Continuous IV Infusions:  sodium chloride, 75 mL/hr, Intravenous, Continuous      PRN Meds:  acetaminophen, 650 mg, Oral, Q6H PRN  aluminum-magnesium hydroxide-simethicone, 30 mL, Oral, Q6H PRN  HYDROmorphone, 0.2 mg, Intravenous, Q2H PRN  ondansetron, 4 mg, Intravenous, Q6H PRN      ED Triage Vitals [03/12/25 1042]   Temperature Pulse Respirations Blood Pressure SpO2 Pain Score   (!) 97.4 °F (36.3 °C) 103 18 142/86 97 % 10 - Worst Possible Pain     Weight (last 2 days)       Date/Time Weight    03/12/25 1042 72.6 (160)            Vital Signs (last 3 days)       Date/Time Temp Pulse Resp BP MAP (mmHg) SpO2 O2 Device Patient Position - Orthostatic VS Pain    03/13/25 1318 -- -- -- -- -- -- -- -- 10 - Worst Possible Pain    03/13/25 1117 -- -- -- -- -- -- None (Room air) -- No Pain    03/13/25 1047 -- -- -- -- -- -- -- -- 10 - Worst Possible Pain    03/13/25 0933 -- -- -- -- -- -- -- -- 8    03/12/25 22:21:17 98.6 °F (37 °C) 92 -- 137/69 92 94 % -- -- --    03/12/25 1950 -- -- -- -- -- -- -- -- 10 - Worst Possible Pain    03/12/25 18:56:49 99.4 °F (37.4 °C) 88 -- 130/88 102 94 % -- -- --    03/12/25 1637 -- -- -- -- -- -- -- -- 9    03/12/25 1535 98.8 °F (37.1 °C) 80 16 148/68 98 97 % None (Room air) Lying --    03/12/25 1043 -- -- -- -- -- 97 % -- -- --    03/12/25 1042 97.4 °F (36.3 °C) 103 18 142/86 95 97 % None (Room air) Sitting 10 - Worst Possible Pain            Pertinent Labs/Diagnostic Test Results:   Radiology:  XR hand 3+ vw right   ED  Interpretation by Harry Chow MD (03/12 1236)   No acute osseous abnormality      Final Interpretation by Lola Damico MD (03/12 1313)      1) Marked soft tissue swelling over the dorsal aspect of the right hand, also extending into the proximal digits, however no subcutaneous emphysema or radiographic evidence of osteomyelitis.      2) No other acute osseous abnormality.      3) 1 mm radiopaque foreign body projecting over the dorsal soft tissues of the right 3rd digit at the level of the PIP joint, unchanged from 2021. No retained radiopaque foreign bodies elsewhere.      Findings are concordant with preliminary interpretation provided in the Emergency Department.         Workstation performed: [id number]           Cardiology:  No orders to display     GI:  No orders to display           Results from last 7 days   Lab Units 03/13/25  0458 03/12/25  1208   WBC Thousand/uL 9.02 14.13*   HEMOGLOBIN g/dL 12.1 13.7   HEMATOCRIT % 36.5 41.8   PLATELETS Thousands/uL 276 290   TOTAL NEUT ABS Thousands/µL 6.22 10.83*         Results from last 7 days   Lab Units 03/13/25  0458 03/12/25  1208   SODIUM mmol/L 135 134*   POTASSIUM mmol/L 4.2 4.1   CHLORIDE mmol/L 102 97   CO2 mmol/L 26 26   ANION GAP mmol/L 7 11   BUN mg/dL 12 16   CREATININE mg/dL 0.94 1.11   EGFR ml/min/1.73sq m 88 72   CALCIUM mg/dL 8.2* 9.4   MAGNESIUM mg/dL 2.1  --      Results from last 7 days   Lab Units 03/12/25  1208   AST U/L 25   ALT U/L 25   ALK PHOS U/L 80   TOTAL PROTEIN g/dL 8.3   ALBUMIN g/dL 4.3   TOTAL BILIRUBIN mg/dL 0.35     Results from last 7 days   Lab Units 03/13/25  1047 03/13/25  0608 03/12/25  1643   POC GLUCOSE mg/dl 284* 159* 144*     Results from last 7 days   Lab Units 03/13/25  0458 03/12/25  1208   GLUCOSE RANDOM mg/dL 128 95           Results from last 7 days   Lab Units 03/12/25  1208   CRP mg/L 244.9*   SED RATE mm/hour 92*       Results from last 7 days   Lab Units 03/12/25  1813 03/12/25  1531   BLOOD CULTURE   Received in Microbiology Lab. Culture in Progress.  Received in Microbiology Lab. Culture in Progress.  --    GRAM STAIN RESULT   --  No polys seen*  Rare Gram positive cocci in pairs and chains*  2+ Polys*  2+ Gram positive cocci in pairs*   WOUND CULTURE   --  No growth  No growth                   Past Medical History:   Diagnosis Date    Abdominal pain     Allergic rhinitis     Cancer (HCC)     Chronic hepatitis C virus infection (HCC) 04/10/2017    Formatting of this note might be different from the original.  Genotype: 1a  IL28B: Not Done  Treatment Status: Past treatment Zepatier The patient's HCV RNA remains undetectable 24 weeks following completion of therapy.  This is a sustained virologic response, which is a cure of HCV infection.   Fibrosis Assessments:  1.- F4 Cirrhosis FibroScan June 2017     Last Assessment & Plan:   Formatting o    Chronic pain     Colon polyps     Depression     Fatigue     Head injury     Homeless     Hypertension     Insomnia     Liver disease     Loss of appetite     Numbness and tingling of right arm     Palpitations     Psychiatric disorder     bipolar    PTSD (post-traumatic stress disorder)     Seizures (HCC)     Shortness of breath     Substance abuse (HCC)     Swallowing difficulty      Present on Admission:   Affective psychosis, bipolar (HCC)   (Resolved) Chronic hepatitis C virus infection (HCC)   DM (diabetes mellitus), type 2 (HCC)   Tobacco abuse   Hypertension      Admitting Diagnosis: Abscess [L02.91]  Cellulitis [L03.90]  Abscess of hand, right [L02.511]  Age/Sex: 59 y.o. male    Network Utilization Review Department  ATTENTION: Please call with any questions or concerns to [phone] and carefully listen to the prompts so that you are directed to the right person. All voicemails are confidential.   For Discharge needs, contact Care Management DC Support Team at [phone] opt. 2  Send all requests for admission clinical reviews, approved or denied  determinations and any other requests to dedicated fax number below belonging to the campus where the patient is receiving treatment. List of dedicated fax numbers for the Facilities:  FACILITY NAME UR FAX NUMBER   ADMISSION DENIALS (Administrative/Medical Necessity) [phone]   DISCHARGE SUPPORT TEAM (NETWORK) [phone]   PARENT CHILD HEALTH (Maternity/NICU/Pediatrics) [phone]   [facility] [phone]   [facility] [phone]   [facility] [phone]   [facility] [phone]   [facility] [phone]   [facility] [phone]   [facility] [phone]   [facility] [phone]   [facility] [phone]   [facility] [phone]   [facility] [phone]   [facility] [phone]

## 2025-03-13 NOTE — UTILIZATION REVIEW
"NOTIFICATION OF INPATIENT ADMISSION   AUTHORIZATION REQUEST   SERVICING FACILITY:   Critical access hospital  Address: [address] [place] [address]  Tax ID: [id number]  NPI: [id number] ATTENDING PROVIDER:  Attending Name and NPI#: Enrico Sood Md [[id number]]  Address: [address] [place] [address]  Phone: [phone]   ADMISSION INFORMATION:  Place of Service: Inpatient [facility]  Place of Service Code: 21  Inpatient Admission Date/Time: 3/12/25  3:40 PM  Discharge Date/Time: No discharge date for patient encounter.  Admitting Diagnosis Code/Description:  Abscess [L02.91]  Cellulitis [L03.90]  Abscess of hand, right [L02.511]     UTILIZATION REVIEW CONTACT:  Blank Canela"Ksenia\"Dilshad Utilization   Network Utilization Review Department  Phone: [phone]  Fax: [phone]  Email: [email or web address]  Contact for approvals/pending authorizations, clinical reviews, and discharge.     PHYSICIAN ADVISORY SERVICES:  Medical Necessity Denial & Iwzh-el-Dqja Review  Phone: [phone]  Fax: [phone]  Email: [email or web address]     DISCHARGE SUPPORT TEAM:  For Patients Discharge Needs & Updates  Phone: [phone] opt. 2 Fax: [phone]  Email: [email or web address]     "

## 2025-03-13 NOTE — ASSESSMENT & PLAN NOTE
NUTRITION ASSESSMENT / 59 [facility] PLAN OF CARE     Larry Mac           80 y.o.              Patient Active Problem List   Diagnosis Code    Chest pain, unspecified R07.9    Shortness of breath R06.02    Angina pectoris ([facility]) I20.9    CAD (coronary artery disease) I25.10    HTN (hypertension) R67    Diastolic congestive heart failure (HCC) I50.30    Hypertensive urgency I16.0    PAM (acute kidney injury) ([facility] [address] N17.9    DM (diabetes mellitus) ([facility]) E11.9    Elevated WBC count D72.829    Hyponatremia E87.1    CKD (chronic kidney disease) stage 4, GFR 15-29 ml/min ([facility]) N18.4    Hyperkalemia E87.5    Stridor R06.1    Localized cancer of lip, 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 Claudio Parker)   * Lantus 50 units every day   * Humalog 8 units qac    * decrease corrective coverage to Normal Insulin Sensitivity Corrective Coverage  - if does not d/c today, may benefit from daily adjustments in regimen to optimize glycemic control  - DM education given per HOSP [facility] RD & RN (see additional note), pt was difficult to keep on track, was very sad and anxious  - encouraged OP DM Self Management Class (schedule given)       ASSESSMENT:   Nutritional Status: obesity (BMI 36%), inconsistent intake, increased nutritional needs r/t cancer, BG out of target r/t uncontrolled Dm and exacerbated by steroids       Diabetes Management:     - TDD: 84 units (55 Lantus, 29 Humalog - 10 mealtime, 19 corrective)  - 36 hr BG range:  mg/dl  - hypo: none  - steroids:  Last dose yesterday    Lab Results   Component POA, as evidenced by leukocytosis and tachycardia  In setting of hand infection as above   ID following,  Continue IV Vancomycin, unasyn discontinued   Blood cultures pending  Wound cultures with gram positive cocci in pairs and chains noted, continue to follow up  Overall improving, leukocytosis resolved    Value Date/Time     (H) 09/15/2017 05:11 AM    GLUCPOC 102 09/15/2017 12:05 PM    GLUCPOC 140 (H) 09/15/2017 10:03 AM    GLUCPOC 184 (H) 09/15/2017 08:46 AM             Within target range (non-ICU: <140; ICU<180): [] Yes   [x]  No    Current Insulin regimen:   - Lantus 55 units daily  - Humalog 10 units qac  - Humalog Normal Insulin Sensitivity Corrective Coverage    Home medication/insulin regimen:   - Lantus 55 units daily  - Humalog sliding scale starting at 12 units qac    HbA1c: equivalent  to ave BGlucose of 206 mg/dl for 2-3 months prior to admission    Lab Results   Component Value Date/Time    Hemoglobin A1c 8.8 09/11/2017 05:16 AM       Adequate glycemic control PTA:  [] Yes  [x] No       SUBJECTIVE/OBJECTIVE:   Information obtained from: pt, chart, IDT; pt very sad and anxious during visit, discussed impact of steroids on glycemic control, A1C & basic nutrition (tips for eating at her facility), notable for cancer and has been undergoing treatment        Medications: [x]                Reviewed        Labs:   Lab Results   Component Value Date/Time    Sodium 138 09/15/2017 05:11 AM    Potassium 3.0 09/15/2017 05:11 AM    Chloride 98 09/15/2017 05:11 AM    CO2 34 09/15/2017 05:11 AM    Anion gap 6 09/15/2017 05:11 AM    Glucose 135 09/15/2017 05:11 AM    BUN 75 09/15/2017 05:11 AM    Creatinine 1.99 09/15/2017 05:11 AM    Calcium 10.5 09/15/2017 05:11 AM    Magnesium 2.3 09/12/2017 05:46 AM    Phosphorus 3.1 09/15/2017 05:11 AM    Albumin 3.6 09/15/2017 05:11 AM       Anthropometrics: IBW : 52.2 kg (115 lb), % IBW (Calculated): 161.99 %, BMI (calculated): 36.4  Wt Readings from Last 1 Encounters:   09/15/17 84.5 kg (186 lb 4.6 oz)    Ht Readings from Last 1 Encounters:   09/15/17 5' (1.524 m)       Estimated Nutrition Needs: 1500 Kcals/day (28 kcal/kg of IBW), Protein (g): 78 g (1.5 g/kg) Fluid (ml): 1500 ml (1 ml/kcal)  Based on:   []          Actual BW    [x]          IBW   []            Adjusted BW        Diet:   Active Orders   Diet    DIET DYSPHAGIA PUREED (NDD1)       Intake:   Patient Vitals for the past 100 hrs:   % Diet Eaten   09/14/17 1747 0 %   09/14/17 1142 50 %   09/14/17 0859 30 %   09/13/17 1120 30 %   09/13/17 0718 20 %   09/12/17 0814 30 %   09/11/17 2115 25 %   09/11/17 1956 50 %   09/11/17 1411 10 %         Nutrition Diagnoses:   1. Nutrition Diagnosis 1: altered nutrition-related lab values Related to: DM Nutrition Diagnosis 1 Evidenced By: A1C of 8.8% and known h/o DM2    2. Nutrition Diagnosis 2: Overweight/obesity Nutrition Diagnosis 2 Related to: h/o excessive energy intake Nutrition Diagnosis 2 Evidenced By: BMI of 36%      Nutrition Interventions:   Intervention :Food/Nutrient Delivery: Yes  Meals/Snacks: General/healthful diet (see diet order)  Intervention: Nutrition Education: Yes  Intervention: Nutrition Counseling: Yes  Recommended Diet/Supplements: Continue current diet    Goal:    - BG will be in target range of  mg/dl (non-ICU) or 140-180 md/dl (ICU) by 9/18  - PO intake will average at least 50% of meals offered by 9/18  - pt will follow-up with OP PCM re: insulin and DM regimen by 10/15 if steroids continue  - pt will maintain current wt/prevent wt gain by 10/15    Nutrition Monitoring and Evaluation      [x]     Monitor po intake on meal rounds  [x]     Continue inpatient monitoring and intervention  []     Other:      Nutrition Risk:  []   High     []  Moderate    [x]  Minimal/Uncompromised    CHARITY Schneider, MPH, RD, CDE

## 2025-03-13 NOTE — ASSESSMENT & PLAN NOTE
Lab Results   Component Value Date    HGBA1C 6.5 (H) 02/10/2025         Blood Sugar Average: Last 72 hrs:  (P) [id number]  Diabetes mellitus type 2 controlled  Continue SSI coverage while inpatient   QID glucose checks  Monitor and adjust regimen as needed

## 2025-03-13 NOTE — UTILIZATION REVIEW
"NOTIFICATION OF INPATIENT ADMISSION   AUTHORIZATION REQUEST   SERVICING FACILITY:   [facility]  Address: [address] [place] [address]  Tax ID: [id number]  NPI: [id number] ATTENDING PROVIDER:  Attending Name and NPI#: Enrico Sood Md [[id number]]  Address: [address] [place] [address]  Phone: [phone]   ADMISSION INFORMATION:  Place of Service: Inpatient [facility]  Place of Service Code: 21  Inpatient Admission Date/Time: 3/12/25  3:40 PM  Discharge Date/Time: No discharge date for patient encounter.  Admitting Diagnosis Code/Description:  Abscess [L02.91]  Cellulitis [L03.90]  Abscess of hand, right [L02.511]     UTILIZATION REVIEW CONTACT:  Blank Canela"Ksenia\"Dilshad Utilization   Network Utilization Review Department  Phone: [phone]  Fax: [phone]  Email: [email or web address]  Contact for approvals/pending authorizations, clinical reviews, and discharge.     PHYSICIAN ADVISORY SERVICES:  Medical Necessity Denial & Xvsv-ik-Bgep Review  Phone: [phone]  Fax: [phone]  Email: [email or web address]     DISCHARGE SUPPORT TEAM:  For Patients Discharge Needs & Updates  Phone: [phone] opt. 2 Fax: [phone]  Email: [email or web address]     "

## 2025-03-14 ENCOUNTER — TRANSITIONAL CARE MANAGEMENT (OUTPATIENT)
Dept: FAMILY MEDICINE CLINIC | Facility: CLINIC | Age: 60
End: 2025-03-14

## 2025-03-14 VITALS
WEIGHT: 160 LBS | DIASTOLIC BLOOD PRESSURE: 80 MMHG | TEMPERATURE: 98.1 F | BODY MASS INDEX: 25.71 KG/M2 | HEART RATE: 75 BPM | RESPIRATION RATE: 16 BRPM | SYSTOLIC BLOOD PRESSURE: 133 MMHG | HEIGHT: 66 IN | OXYGEN SATURATION: 95 %

## 2025-03-14 PROBLEM — A41.9 SEPSIS (HCC): Status: RESOLVED | Noted: 2025-03-13 | Resolved: 2025-03-14

## 2025-03-14 LAB
BACTERIA SPEC ANAEROBE CULT: NORMAL
CRP SERPL QL: 96.8 MG/L
GLUCOSE SERPL-MCNC: 101 MG/DL (ref 65–140)
GLUCOSE SERPL-MCNC: 140 MG/DL (ref 65–140)
HGB BLD-MCNC: 11.6 G/DL (ref 12–17)

## 2025-03-14 PROCEDURE — G0545 PR INHERENT VISIT TO INPT: HCPCS | Performed by: INTERNAL MEDICINE

## 2025-03-14 PROCEDURE — 82948 REAGENT STRIP/BLOOD GLUCOSE: CPT

## 2025-03-14 PROCEDURE — 85018 HEMOGLOBIN: CPT

## 2025-03-14 PROCEDURE — NC001 PR NO CHARGE: Performed by: SURGERY

## 2025-03-14 PROCEDURE — 99232 SBSQ HOSP IP/OBS MODERATE 35: CPT | Performed by: INTERNAL MEDICINE

## 2025-03-14 PROCEDURE — 99239 HOSP IP/OBS DSCHRG MGMT >30: CPT | Performed by: PHYSICIAN ASSISTANT

## 2025-03-14 PROCEDURE — 86140 C-REACTIVE PROTEIN: CPT

## 2025-03-14 RX ORDER — HYDROMORPHONE HCL IN WATER/PF 6 MG/30 ML
0.2 PATIENT CONTROLLED ANALGESIA SYRINGE INTRAVENOUS ONCE
Refills: 0 | Status: COMPLETED | OUTPATIENT
Start: 2025-03-14 | End: 2025-03-14

## 2025-03-14 RX ADMIN — SERTRALINE 100 MG: 100 TABLET, FILM COATED ORAL at 08:39

## 2025-03-14 RX ADMIN — ACETAMINOPHEN 975 MG: 325 TABLET, FILM COATED ORAL at 05:42

## 2025-03-14 RX ADMIN — SODIUM CHLORIDE 3 G: 9 INJECTION, SOLUTION INTRAVENOUS at 14:19

## 2025-03-14 RX ADMIN — NICOTINE 1 PATCH: 14 PATCH, EXTENDED RELEASE TRANSDERMAL at 08:39

## 2025-03-14 RX ADMIN — HYDROMORPHONE HYDROCHLORIDE 0.2 MG: 0.2 INJECTION, SOLUTION INTRAMUSCULAR; INTRAVENOUS; SUBCUTANEOUS at 05:42

## 2025-03-14 RX ADMIN — HYDROMORPHONE HYDROCHLORIDE 0.2 MG: 0.2 INJECTION, SOLUTION INTRAMUSCULAR; INTRAVENOUS; SUBCUTANEOUS at 08:40

## 2025-03-14 RX ADMIN — ACETAMINOPHEN 975 MG: 325 TABLET, FILM COATED ORAL at 14:19

## 2025-03-14 RX ADMIN — ENOXAPARIN SODIUM 40 MG: 40 INJECTION SUBCUTANEOUS at 08:40

## 2025-03-14 RX ADMIN — LISINOPRIL 5 MG: 5 TABLET ORAL at 08:39

## 2025-03-14 RX ADMIN — HYDROMORPHONE HYDROCHLORIDE 0.2 MG: 0.2 INJECTION, SOLUTION INTRAMUSCULAR; INTRAVENOUS; SUBCUTANEOUS at 11:40

## 2025-03-14 RX ADMIN — PANTOPRAZOLE SODIUM 40 MG: 40 TABLET, DELAYED RELEASE ORAL at 08:39

## 2025-03-14 NOTE — CASE MANAGEMENT
Case Management Assessment & Discharge Planning Note    Patient name Barrett Teran  Location /-01 MRN [id number]  : 1965 Date 3/14/2025       Current Admission Date: 3/12/2025  Current Admission Diagnosis:Abscess of hand, right   Patient Active Problem List    Diagnosis Date Noted Date Diagnosed    Abscess of hand, right 2025     Back pain 2025     Syncope 2025     Lumbar spondylosis 2024     Mild protein-calorie malnutrition (HCC) 2022     CKD (chronic kidney disease) 2022     Severe episode of recurrent major depressive disorder, with psychotic features ([facility]) 2022     COVID-19 2022     Gross hematuria 2022     Thrombocytopenia ([facility]) 2022     Anemia 2022     Bradycardia 2022     TEO (acute kidney injury) ([facility]) 2022     DM (diabetes mellitus), type 2 ([facility]) 2022     Hypertension 2022     Heroin use disorder, mild, in sustained remission, abuse ([facility]) 2017    Hyperlipidemia associated with type 2 diabetes mellitus  ([facility]) 2017    GERD (gastroesophageal reflux disease) 2017    Affective psychosis, bipolar ([facility]) 2017    Erosive esophagitis 2017    Hemorrhoids, external 2017    Chronic constipation 2016    Allergic rhinitis 07/15/2016 2023    PTSD (post-traumatic stress disorder) 2016     Opioid dependence in remission ([facility]) 2016     Recurrent major depressive disorder, in remission ([facility]) 2016     Tobacco abuse 2016     Abnormal liver enzymes 2016     Depression 2016      LOS (days): 2  Geometric Mean LOS (GMLOS) (days):   Days to GMLOS:     OBJECTIVE:    Risk of Unplanned Readmission Score: 21.66         Current admission status: Inpatient       Preferred Pharmacy:   [facility] - [place], [place] - [address]  [address]  [address]  [place] [address]  Phone: [phone] Fax: [phone]    Primary Care Provider: Dick Jimenez MD    Primary Insurance: [facility]  Secondary Insurance:     ASSESSMENT:  Active Health Care Proxies       Jennifer Teran Health Care Representative - Sister   Primary Phone: [phone] (Mobile)                 Readmission Root Cause  30 Day Readmission: No    Patient Information  Admitted from:: Home  Mental Status: Alert  During Assessment patient was accompanied by: Other-Comment (Caregiver Rebecca [phone])  Assessment information provided by:: Patient, Other - please comment (Caregiver)  Primary Caregiver: Private caregiver  Caregiver's Name:: Rebecca  Caregiver's Relationship to Patient:: Other (Specify)  Caregiver's Telephone Number:: [phone]  Support Systems: Self, Family members, Private Caregivers  [place]  [place]  Home entry access options. Select all that apply.: Elevator  Type of Current Residence: Apartment  Floor Level: 10  Upon entering residence, is there a bedroom on the main floor (no further steps)?: Yes  Upon entering residence, is there a bathroom on the main floor (no further steps)?: Yes  Living Arrangements: Lives Alone  Is patient a ?: No    Activities of Daily Living Prior to Admission  Functional Status: Independent  Completes ADLs independently?: Yes  Ambulates independently?: Yes  Does patient use assisted devices?: Yes  Assisted Devices (DME) used: Walker  Does patient currently own DME?: Yes  What DME does the patient currently own?: Walker  Does patient have a history of Outpatient Therapy (PT/OT)?: No  Does the patient have a history of Short-Term Rehab?: No  Does patient have a history of HHC?: No  Does patient currently have HHC?: No         Patient Information Continued  Income Source: SSI/SSD  Does patient have prescription coverage?: Yes  Does patient receive dialysis  treatments?: No  Does patient have a history of substance abuse?: No (Pt smokes cigarettes)  Does patient have a history of Mental Health Diagnosis?: Yes (Anxiety, depression.)  Is patient receiving treatment for mental health?: Yes  Has patient received inpatient treatment related to mental health in the last 2 years?: Yes (Pt stated he was IP this past year with [facility].)         Means of Transportation  [facility]:: Family transport    DISCHARGE DETAILS:    Discharge planning discussed with:: Patient, Rebecca (caregiver) and   Jerri [id number]  Freedom of Choice: Yes  Comments - Freedom of Choice: FOC discussed.  Pt states he would like HHC for wound management if appropriate.  Referral sent in Aidin.        Did patient/caregiver verbalize understanding of patient care needs?: Yes       Contacts  Patient Contacts: Rebecca, Caregiver at bedside.  Relationship to Patient:: Other (Comment)  Contact Method: In Person  Reason/Outcome: Continuity of Care, Emergency Contact, Discharge Planning    Requested Home Health Care         Is the patient interested in HHC at discharge?: Yes  Home Health Discipline requested:: Nursing  Home Health Follow-Up Provider:: PCP  Home Health Services Needed:: Wound/Ostomy Care  Homebound Criteria Met:: Requires the Assistance of Another Person for Safe Ambulation or to Leave the Home, Uses an Assist Device (i.e. cane, walker, etc)  Supporting Clincal Findings:: Cognitive Deficit Requiring the Assistance of Others, Limited Endurance, Fatigues Easliy in Short Distances    DME Referral Provided  Referral made for DME?: No    Other Referral/Resources/Interventions Provided:  Interventions: HHC  Referral Comments: Pt anticipated to DC home with HHC SN for right hand abscess if appropriate, referral sent in Aidin.  Pending availability and choice.     CM met with pt and his caregiver at bedside, using interpretor Jerri [id number] I introduced myself and explained my role.   Pt lives in high rise apartment with elevator

## 2025-03-14 NOTE — ASSESSMENT & PLAN NOTE
Unclear precipitating cause.  Has remote history of DREA but denies recent use or injection.  Status post I&D.  Appears superficial.  Wound cultures with alpha-Strep.    Change antibiotics to Unasyn  LWC per Ortho  When ready for D/C from Ortho prespective can change to Augmenin 875 mg PO Q12 with plan to continue through 3/18 to complete 7 days total antibiotics

## 2025-03-14 NOTE — DISCHARGE INSTR - AVS FIRST PAGE
Discharge Instructions - Orthopedics  Barrett Teran 59 y.o. male MRN: [id number]  Unit/Bed#:  -01    Weight Bearing Status:                                           Nonweightbearing to right upper extremity.     Dressing instructions:  Cover incision with Xeroform dressing  Cover Xeroform with 4 x 4 gauze  Wrap hand with Aura wrap and cover with an Ace bandage.    DVT prophylaxis:  None needed from an orthopedic perspective.     Pain:  Continue analgesics as directed    Dressing Instructions:   Please keep clean, dry and intact until follow up     Appt Instructions:   If you do not have your appointment, please call the clinic at [phone]  Otherwise follow up as scheduled.    Contact the office sooner if you experience any increased numbness/tingling in the extremities.      Please follow up with your primary care provider within one week, follow CBC blood work to monitor blood counts  You can take alternating tylenol and NSAIDs at home for the pain as needed   Complete full course of antibiotics, can take a probiotic if needed for any GI upset with antibiotics   If you have any worsening swelling, redness, pain, fevers, chills please come back to the hospital for further evaluation

## 2025-03-14 NOTE — DISCHARGE SUMMARY
Discharge Summary - Hospitalist   Name: Barrett Teran 59 y.o. male I MRN: [id number]  Unit/Bed#: -01 I Date of Admission: 3/12/2025   Date of Service: 3/14/2025 I Hospital Day: 2     Assessment & Plan  Abscess of hand, right  Pt presented with right hand swelling and pain despite taking PO Bactrim as an outpatient after ED visit on 3/10  Pt with right hand cellulitis and abscess   Orthopedics following,  S/p bedside I&D on 3/12, abscess tracked to 1.5 cm deep  S/p additional bedside I&D 3/13 as well   NWB right hand   Continue with soaks with warm soapy water 3-4 x a day at home with dressing of xeroform, gauze, ava wrap and ace bandage  Stable for discharge from ortho standpoint with close outpatient follow up   Wound culture pre johnson with strep intermedius   ID following,  Switched from IV vancomycin to IV unasyn   Plan to transition to PO Augmentin to complete total 7 day course on discharge  Stable from ID standpoint   Blood cultures negative x 24 hours   Pt's pain much improved today, encourage pain management with tylenol/NSAIDs PRN as outpatient, pt in agreement     DM (diabetes mellitus), type 2 (HCC)  Lab Results   Component Value Date    HGBA1C 6.5 (H) 02/10/2025         Blood Sugar Average: Last 72 hrs:  (P) 156.8827696085847638  Diabetes mellitus type 2 controlled  Continue diet controlled as an outpatient   Outpatient follow up with PCP     Tobacco abuse  Tobacco cessation encouraged    Affective psychosis, bipolar (HCC)  Mood stable  Continue Seroquel 100 mg daily, sertraline 100 mg daily and trazodone 150 mg daily   Outpatient follow-up  Hypertension  BP stable on review   Continue lisinopril 5 mg daily      Medical Problems       Resolved Problems  Date Reviewed: 3/12/2025          Resolved    Chronic hepatitis C virus infection (HCC) 3/13/2025     Resolved by  Mayte Espinoza MD    Overview Signed 8/17/2023 10:57 AM by Dick Jimenez MD   Formatting of this note might be different from the  original.  Genotype: 1a  IL28B: Not Done  Treatment Status: Past treatment Zepatier The patient's HCV RNA remains undetectable 24 weeks following completion of therapy.  This is a sustained virologic response, which is a cure of HCV infection.   Fibrosis Assessments:  1.- F4 Cirrhosis FibroScan June 2017    Last Assessment & Plan:   Formatting of this note might be different from the original.  The patient's HCV RNA remains undetectable 24 weeks following completion of therapy.  This is a sustained virologic response, which is the equivalent of a cure.  After this point, we do not see virologic relapse post-completion of direct-acting antiviral therapy. We discussed that his HCV antibody test will always be positive, indicating his prior infection. He can now consider hepatitis C to be a past, resolved infection. He will remain susceptible to reinfection if he has another exposure in the future, so we reviewed the modes of transmission today. He does not currently have any active risk factors that place him at risk for reinfection. HCV RNA testing can be ordered by his PCP every one to 2 years for reassurance. RNA testing would need to be performed if there is ever a concern for reinfection. The antibody result will not be helpful to evaluate that situation.  He should have ongoing improvement in his degree of fibrosis over the next few years. He should continue to avoid alcohol.  I wished the patient the best of luck and we'll discharge him to the care of his primary care clinician.         Sepsis (HCC) 3/14/2025     Resolved by  Nupur Ramos PA-C        Discharging Physician / Practitioner: Nupur Ramos PA-C  PCP: Dick Jimenez MD  Admission Date:   Admission Orders (From admission, onward)       Ordered        03/12/25 1540  INPATIENT ADMISSION  Once                          Discharge Date: 03/14/25    Consultations During Hospital Stay:  ID  Ortho    Procedures Performed:   Xray hand right 3/12 - Marked soft  tissue swelling over the dorsal aspect of the right hand, also extending into the proximal digits. No SQ emphysema or radiographic evidence of OM. No other acute osseous abnormality. 1 mm radiopaque foreign body projecting over the dorsal soft tissues of the right 3rd digit at the level of the PIP joint, unchanged from 2021. No retained radiopaque foreign bodies elsewhere.     Significant Findings / Test Results:   Leukocytosis resolved   Anemia likely in setting of I&D and dilutional from fluids, outpatient CBC recommended   Elevated CRP/sed rate, CRP improved   Blood cultures negative x 24 hours   Wound culture pre johnson with strep intermedius     Incidental Findings:   None    N/A    Test Results Pending at Discharge (will require follow up):   Final blood cultures and wound cultures      Outpatient Tests Requested:  Per PCP, recommend CBC within one week to monitor hgb levels     Complications:  None     Reason for Admission: Right hand swelling and pain     Hospital Course:   Barrett Teran is a 59 y.o. male patient with significant past medical history of tobacco use, DM who originally presented to the hospital on 3/12/2025 due to right hand pain and swelling. Pt had previously been seen for this a few days prior in the ED and was discharged on PO Bactrim. Pt took a few doses but without improvement and returned to the hospital. He underwent I&D with orthopedics on 3/12 and placed on IV abx. Noted to have cellulitis and abscess of the right hand. He underwent additional I&D at bedside by orthopedics on 3/13 with improvement of symptoms. He was seen by ID and noted wound culture with growth of alpha hemolytic strep, recommended to be discharged on PO Augmentin to complete total 7 day course of treatment through 3/18. Pt's symptoms improved throughout hospitalization. He was cleared for discharge from ID and orthopedic perspectives. His blood cultures remained negative. He was discharged in stable condition. For  "additional information please refer to medical records.   Medication changes include: D/c Bactrim, addition of Augmentin 875/125 mg BID to complete total 7 days of treatment     Please see above list of diagnoses and related plan for additional information.     Condition at Discharge: stable    Discharge Day Visit / Exam:   Subjective:  Pt reports that he is doing well today. Reports that his pain is very under control, currently at a 0/10. He is agreeable to conservative pain management as an outpatient. Denies any blood in the stool or additional bleeding. No other complaints at this time, looking forward to going home.   Vitals: Blood Pressure: 133/80 (03/14/25 0727)  Pulse: 75 (03/14/25 0727)  Temperature: 98.1 °F (36.7 °C) (03/14/25 0727)  Temp Source: Oral (03/12/25 1535)  Respirations: 16 (03/12/25 1535)  Height: 5' 6\" (167.6 cm) (03/12/25 1042)  Weight - Scale: 72.6 kg (160 lb) (03/12/25 1042)  SpO2: 95 % (03/14/25 0727)  Physical Exam  Vitals reviewed.   Constitutional:       General: He is not in acute distress.     Comments: Pt is in no acute distress lying in his hospital bed resting comfortably   Right hand in ace wrap, improvement of edema noted    Cardiovascular:      Rate and Rhythm: Normal rate and regular rhythm.   Pulmonary:      Effort: No respiratory distress.      Breath sounds: Normal breath sounds. No wheezing.   Skin:     General: Skin is warm and dry.   Neurological:      Mental Status: He is alert.   Psychiatric:         Mood and Affect: Mood normal.          Discussion with Family: Updated  (friend) at bedside.    Discharge instructions/Information to patient and family:   See after visit summary for information provided to patient and family.      Provisions for Follow-Up Care:  See after visit summary for information related to follow-up care and any pertinent home health orders.      Mobility at time of Discharge:   Basic Mobility Inpatient Raw Score: 24  -[facility] Goal: 8: " Walk 250 feet or more  JH-HLM Achieved: 7: Walk 25 feet or more  HLM Goal achieved. Continue to encourage appropriate mobility.     Disposition:   Home    Planned Readmission: None     Discharge Medications:  See after visit summary for reconciled discharge medications provided to patient and/or family.      Administrative Statements   Discharge Statement:  I have spent a total time of 40 minutes in caring for this patient on the day of the visit/encounter.     **Please Note: This note may have been constructed using a voice recognition system**

## 2025-03-14 NOTE — PLAN OF CARE
Problem: INFECTION - ADULT  Goal: Absence or prevention of progression during hospitalization  Description: INTERVENTIONS:  - Assess and monitor for signs and symptoms of infection  - Monitor lab/diagnostic results  - Monitor all insertion sites, i.e. indwelling lines, tubes, and drains  - Monitor endotracheal if appropriate and nasal secretions for changes in amount and color  - [place] appropriate cooling/warming therapies per order  - Administer medications as ordered  - Instruct and encourage patient and family to use good hand hygiene technique  - Identify and instruct in appropriate isolation precautions for identified infection/condition  Outcome: Progressing     Problem: PAIN - ADULT  Goal: Verbalizes/displays adequate comfort level or baseline comfort level  Description: Interventions:  - Encourage patient to monitor pain and request assistance  - Assess pain using appropriate pain scale  - Administer analgesics based on type and severity of pain and evaluate response  - Implement non-pharmacological measures as appropriate and evaluate response  - Consider cultural and social influences on pain and pain management  - Notify physician/advanced practitioner if interventions unsuccessful or patient reports new pain  Outcome: Progressing

## 2025-03-14 NOTE — ASSESSMENT & PLAN NOTE
59 year-old male with right hand cellulitis and abscess, s/p bedside irrigation and debridement. Abscess tracking to 1.5 cm deep with packing in place. Wound check performed this morning with no evidence of purulence or fluctuance. Patient is clinically improving.    Plan:  NWB right hand  Appreciate internal medicine recommendations  QID soapy soaks  Continue IV antibiotics  PT/OT  Pain and nausea control as needed  Orthopedics will continue to follow

## 2025-03-14 NOTE — PROGRESS NOTES
Progress Note - Orthopedics   Name: Barrett Teran 59 y.o. male I MRN: [id number]  Unit/Bed#: -01 I Date of Admission: 3/12/2025   Date of Service: 3/14/2025 I Hospital Day: 2    Assessment & Plan  Abscess of hand, right  59 year-old male with right hand cellulitis and abscess, s/p bedside irrigation and debridement. Abscess tracking to 1.5 cm deep with packing in place. Wound check performed this morning with no evidence of purulence or fluctuance. Patient is clinically improving.    Plan:  NWB right hand  Appreciate internal medicine recommendations  QID soapy soaks  Continue IV antibiotics  PT/OT  Pain and nausea control as needed  Orthopedics will continue to follow     Medical co-morbidities are being managed per primary team.    Please contact the SecureChat role BE Ortho Floor for any questions/concerns.    Subjective   59 y.o.male, afebrile and hemodynamically stable. No acute events, no new complaints. Pain well controlled. Denies fevers, chills, CP, SOB, N/V, numbness or tingling. Patient states he does not feel worse and may feel improved, but is unsure.      Objective :  Temp:  [97.7 °F (36.5 °C)] 97.7 °F (36.5 °C)  HR:  [72-78] 72  BP: (116-139)/(77-80) 139/80  SpO2:  [94 %] 94 %  O2 Device: None (Room air)    Physical Exam  Musculoskeletal: Hand  Diffuse edema and erythema present over dorsal aspect of right hand. Incision present with packing in place.  Packing saturated with serosangious drainage.   No areas of fluctuance palpated over the base of the first and second metacarpals  TTP diffusely  Sensation intact to light touch m/r/u  Motor intact m/r/u/ain/pin  No micromotion tenderness present at wrist, DIP, PIP, or MCP joints of all digits   No flexor tendon sheath tenderness   No pain with passive extension   Able to make 70% of composite fist  Digits warm and well-perfused with brisk cap refill                        Lab Results: I have reviewed the following results:  Recent Labs      03/12/25  1208 03/13/25  0458   WBC 14.13* 9.02   HGB 13.7 12.1   HCT 41.8 36.5    276   BUN 16 12   CREATININE 1.11 0.94   ESR 92*  --    .9*  --      Blood Culture:    Lab Results   Component Value Date    BLOODCX No Growth at 24 hrs. 03/12/2025    BLOODCX No Growth at 24 hrs. 03/12/2025     Wound Culture:   Lab Results   Component Value Date    WOUNDCULT No growth 03/12/2025    WOUNDCULT No growth 03/12/2025

## 2025-03-14 NOTE — PROGRESS NOTES
Progress Note - Infectious Disease   Name: Barrett Teran 59 y.o. male I MRN: [id number]  Unit/Bed#: -01 I Date of Admission: 3/12/2025   Date of Service: 3/14/2025 I Hospital Day: 2    Assessment & Plan  Sepsis (HCC)  WBC, HR.  Due to hand infection as below.  Blood cultures negative.  Improving.    Continue antibiotics as below  Follow temperatures closely  Supportive care as per the primary service  Abscess of hand, right  Unclear precipitating cause.  Has remote history of DREA but denies recent use or injection.  Status post I&D.  Appears superficial.  Wound cultures with alpha-Strep.    Change antibiotics to Unasyn  LWC per Ortho  When ready for D/C from Ortho prespective can change to Augmenin 875 mg PO Q12 with plan to continue through 3/18 to complete 7 days total antibiotics  DM (diabetes mellitus), type 2 (HCC)  Lab Results   Component Value Date    HGBA1C 6.5 (H) 02/10/2025   Risk factor for infection      The patient is stable from an ID standpoint.    Antibiotics:  Vancomycin #3    Subjective   Patient has no fever, chills, sweats; no nausea, vomiting, diarrhea; no cough, shortness of breath; no pain. No new symptoms.    Objective :  Temp:  [97.7 °F (36.5 °C)-98.1 °F (36.7 °C)] 98.1 °F (36.7 °C)  HR:  [72-78] 75  BP: (116-139)/(77-80) 133/80  SpO2:  [94 %-95 %] 95 %  O2 Device: None (Room air)    General:  No acute distress  Psychiatric:  Sleeping  Pulmonary:  Normal respiratory excursion without accessory muscle use  Abdomen:  Soft, nontender  Extremities:  Right hand in ACE wrap.  Wound photos reviewed:  wound clean without purulence  Skin:  No rashes      Lab Results: I have reviewed the following results:  Results from last 7 days   Lab Units 03/14/25  0623 03/13/25  0458 03/12/25  1208   WBC Thousand/uL  --  9.02 14.13*   HEMOGLOBIN g/dL 11.6* 12.1 13.7   PLATELETS Thousands/uL  --  276 290     Results from last 7 days   Lab Units 03/13/25  0458 03/12/25  1208   SODIUM mmol/L 135 134*   POTASSIUM  mmol/L 4.2 4.1   CHLORIDE mmol/L 102 97   CO2 mmol/L 26 26   BUN mg/dL 12 16   CREATININE mg/dL 0.94 1.11   EGFR ml/min/1.73sq m 88 72   CALCIUM mg/dL 8.2* 9.4   AST U/L  --  25   ALT U/L  --  25   ALK PHOS U/L  --  80   ALBUMIN g/dL  --  4.3     Results from last 7 days   Lab Units 03/12/25  1813 03/12/25  1531   BLOOD CULTURE  No Growth at 24 hrs.  No Growth at 24 hrs.  --    GRAM STAIN RESULT   --  2+ Polys*  2+ Gram positive cocci in pairs*  No polys seen*  Rare Gram positive cocci in pairs and chains*   WOUND CULTURE   --  3+ Growth of Alpha Hemolytic Streptococcus*  2+ Growth of Alpha Hemolytic Streptococcus*         Results from last 7 days   Lab Units 03/14/25  0623 03/12/25  1208   CRP mg/L 96.8* 244.9*

## 2025-03-14 NOTE — ASSESSMENT & PLAN NOTE
Lab Results   Component Value Date    HGBA1C 6.5 (H) 02/10/2025         Blood Sugar Average: Last 72 hrs:  (P) [id number]  Diabetes mellitus type 2 controlled  Continue diet controlled as an outpatient   Outpatient follow up with PCP

## 2025-03-14 NOTE — ASSESSMENT & PLAN NOTE
WBC, HR.  Due to hand infection as below.  Blood cultures negative.  Improving.    Continue antibiotics as below  Follow temperatures closely  Supportive care as per the primary service

## 2025-03-14 NOTE — ASSESSMENT & PLAN NOTE
Pt presented with right hand swelling and pain despite taking PO Bactrim as an outpatient after ED visit on 3/10  Pt with right hand cellulitis and abscess   Orthopedics following,  S/p bedside I&D on 3/12, abscess tracked to 1.5 cm deep  S/p additional bedside I&D 3/13 as well   NWB right hand   Continue with soaks with warm soapy water 3-4 x a day at home with dressing of xeroform, gauze, ava wrap and ace bandage  Stable for discharge from ortho standpoint with close outpatient follow up   Wound culture pre johnson with strep intermedius   ID following,  Switched from IV vancomycin to IV unasyn   Plan to transition to PO Augmentin to complete total 7 day course on discharge  Stable from ID standpoint   Blood cultures negative x 24 hours   Pt's pain much improved today, encourage pain management with tylenol/NSAIDs PRN as outpatient, pt in agreement

## 2025-03-14 NOTE — QUICK NOTE
Quick Note - Orthopedics   Barrett Teran 59 y.o. male MRN: [id number]  Unit/Bed#: [facility] 761-01      Upon discharge, patient advised to continue soaks with warm soapy water for 20 minutes 3-4 times a day for his right hand.  Between soaks patient is advised to cover incision with Xeroform, gauze, Aura wrap, and Ace bandage.      Antibiotic plan established with infectious disease.  Will plan for follow-up med to late next week.      Dressing instructions:  Cover incision with Xeroform dressing  Cover Xeroform with 4 x 4 gauze  Wrap hand with Aura wrap and cover with an Ace bandage.    Teo Crouch MD  Orthopedic surgery, PGY-1

## 2025-03-15 LAB
BACTERIA WND AEROBE CULT: ABNORMAL
BACTERIA WND AEROBE CULT: ABNORMAL
GRAM STN SPEC: ABNORMAL

## 2025-03-16 LAB
BACTERIA BLD CULT: NORMAL
BACTERIA BLD CULT: NORMAL

## 2025-03-17 ENCOUNTER — PATIENT OUTREACH (OUTPATIENT)
Dept: CASE MANAGEMENT | Facility: OTHER | Age: 60
End: 2025-03-17

## 2025-03-17 LAB
BACTERIA BLD CULT: NORMAL
BACTERIA BLD CULT: NORMAL

## 2025-03-17 NOTE — UTILIZATION REVIEW
NOTIFICATION OF ADMISSION DISCHARGE   This is a Notification of Discharge from [facility]. Please be advised that this patient has been discharge from our facility. Below you will find the admission and discharge date and time including the patient’s disposition.   UTILIZATION REVIEW CONTACT:  Tyler Bowens  Utilization   Network Utilization Review Department  Phone: [phone] x carefully listen to the prompts. All voicemails are confidential.  Email: [email or web address]     ADMISSION INFORMATION  PRESENTATION DATE: 3/12/2025 10:47 AM  OBERVATION ADMISSION DATE: N/A  INPATIENT ADMISSION DATE: 3/12/25  3:40 PM   DISCHARGE DATE: 3/14/2025  4:05 PM   DISPOSITION:Home/Self Care    Network Utilization Review Department  ATTENTION: Please call with any questions or concerns to [phone] and carefully listen to the prompts so that you are directed to the right person. All voicemails are confidential.   For Discharge needs, contact Care Management DC Support Team at [phone] opt. 2  Send all requests for admission clinical reviews, approved or denied determinations and any other requests to dedicated fax number below belonging to the campus where the patient is receiving treatment. List of dedicated fax numbers for the Facilities:  FACILITY NAME UR FAX NUMBER   ADMISSION DENIALS (Administrative/Medical Necessity) [phone]   DISCHARGE SUPPORT TEAM ([facility]) [phone]   PARENT CHILD HEALTH (Maternity/NICU/Pediatrics) [phone]   [facility] [phone]   [facility] [phone]   [facility] [phone]   [facility] [phone]   [facility] [phone]   [facility] [phone]   [facility] [phone]   [facility] [phone]    [facility] [phone]   [facility] [phone]   [facility] [phone]   [facility] [phone]

## 2025-03-18 ENCOUNTER — PATIENT OUTREACH (OUTPATIENT)
Dept: CASE MANAGEMENT | Facility: OTHER | Age: 60
End: 2025-03-18

## 2025-03-18 NOTE — PROGRESS NOTES
HRR referral received. Chart reviewed.  Barrett was admitted to [facility] 3/12-3/14 with an abscess of the right hand.He had an incision and drainage on 3/12 and 3/13. He is followed by orthopedic surgeon. He has a past medical history of typr 2 diabetes, bipolar disorder,tobacco abuse and HTN.    With the assistance of a  ID # [id number] I spoke with Barrett who is taking his medication as directed.I advised him to complete the prescription as ordered.He has a follow up appointment with his PCP and orthopedic surgeon scheduled 3/20. He has transportation. He is soaking his hand as directed and reports it is improving. He has no questions at present.The  gave him my contact information. I advised him to call me if he had concerns. He did consent to another outreach.

## 2025-03-20 ENCOUNTER — OFFICE VISIT (OUTPATIENT)
Dept: OBGYN CLINIC | Facility: CLINIC | Age: 60
End: 2025-03-20
Payer: COMMERCIAL

## 2025-03-20 DIAGNOSIS — L02.511 ABSCESS OF HAND, RIGHT: ICD-10-CM

## 2025-03-20 PROCEDURE — 99203 OFFICE O/P NEW LOW 30 MIN: CPT | Performed by: SURGERY

## 2025-03-20 NOTE — PROGRESS NOTES
ORTHOPAEDIC HAND, WRIST, AND ELBOW OFFICE  VISIT       ASSESSMENT/PLAN:      59 y.o. year old male who presents with    Assessment & Plan  Abscess of hand, right  Doing well  Finish antibiotics  Work on ROM of the hand  He may stop soaks and shower normally  He may use moisturizer   NSAIDs as needed for pain    Orders:    Ambulatory referral to Orthopedic Surgery      The patient verbalized understanding of exam findings and treatment plan. We engaged in the shared decision-making process and treatment options were discussed at length with the patient. Surgical and conservative management discussed today along with risks and benefits.        Follow Up:  Return in about 2 weeks (around 4/3/2025) for Recheck.    To Do Next Visit:  Re-evaluation of current issue      _____________________________________________________________________________________________________________________________________      CHIEF COMPLAINT:  Chief Complaint   Patient presents with    Right Hand - Pain       SUBJECTIVE:  Barrett Teran is a 59 y.o. year old  male who presents for evaluation of dorsal Right hand abscess after I and D performed 3/12/2025 in hospital      Patient states he is doing better. He has 5/10 pain due to the swelling  He is still currently on antibiotics and doing hand soaks. He denies any hand numbness    I have personally reviewed all the relevant PMH, PSH, SH, FH, Medications and allergies      PAST MEDICAL HISTORY:  Past Medical History:   Diagnosis Date    Abdominal pain     Allergic rhinitis     Cancer (HCC)     Chronic hepatitis C virus infection (HCC) 04/10/2017    Formatting of this note might be different from the original.  Genotype: 1a  IL28B: Not Done  Treatment Status: Past treatment Zepatier The patient's HCV RNA remains undetectable 24 weeks following completion of therapy.  This is a sustained virologic response, which is a cure of HCV infection.   Fibrosis Assessments:  1.- F4 Cirrhosis FibroScan June  2017     Last Assessment & Plan:   Formatting o    Chronic pain     Colon polyps     Depression     Fatigue     Head injury     Homeless     Hypertension     Insomnia     Liver disease     Loss of appetite     Numbness and tingling of right arm     Palpitations     Psychiatric disorder     bipolar    PTSD (post-traumatic stress disorder)     Seizures (HCC)     Shortness of breath     Substance abuse (HCC)     Swallowing difficulty        PAST SURGICAL HISTORY:  Past Surgical History:   Procedure Laterality Date    ABDOMINAL SURGERY      COLONOSCOPY      EYE SURGERY      NECK SURGERY      ORCHIECTOMY Right 5/19/2016    Procedure: ORCHIECTOMY INGUINAL biopsy of testicular mass, ;  Surgeon: Jose Lara MD;  Location:  MAIN OR;  Service:     [place] COLONOSCOPY FLX DX W/COLLJ SPEC WHEN PFRMD N/A 2/13/2017    Procedure: EGD AND COLONOSCOPY;  Surgeon: Hernesto Perez MD;  Location: [facility] GI LAB;  Service: Gastroenterology    NY ESOPHAGOGASTRODUODENOSCOPY TRANSORAL DIAGNOSTIC N/A 11/16/2016    Procedure: EGD AND COLONOSCOPY;  Surgeon: Hernesto Perez MD;  Location:  GI LAB;  Service: Gastroenterology       FAMILY HISTORY:  Family History   Problem Relation Age of Onset    Diabetes Mother     Hypertension Brother        SOCIAL HISTORY:  Social History     Tobacco Use    Smoking status: Every Day     Current packs/day: 1.00     Average packs/day: 1 pack/day for 41.0 years (41.0 ttl pk-yrs)     Types: Cigarettes    Smokeless tobacco: Current   Vaping Use    Vaping status: Never Used   Substance Use Topics    Alcohol use: No    Drug use: Not Currently     Types: Heroin       MEDICATIONS:    Current Outpatient Medications:     atorvastatin (LIPITOR) 10 mg tablet, TAKE ONE TABLET BY MOUTH DAILY WITH dinner, Disp: 30 tablet, Rfl: 0    hydrOXYzine HCL (ATARAX) 25 mg tablet, , Disp: , Rfl:     hydrOXYzine pamoate (VISTARIL) 50 mg capsule, , Disp: , Rfl:     Lidocaine 4 % PTCH, Apply 1 patch topically daily As needed for back pain,  remove the patch after 12 hours, Disp: 30 patch, Rfl: 1    lisinopril (ZESTRIL) 5 mg tablet, TAKE ONE TABLET BY MOUTH DAILY, Disp: 90 tablet, Rfl: 5    nicotine (NICODERM CQ) 21 mg/24 hr TD 24 hr patch, Place 1 patch on the skin over 24 hours every 24 hours, Disp: 14 patch, Rfl: 1    fluticasone (FLONASE) 50 mcg/act nasal spray, 2 sprays into each nostril daily (Patient not taking: Reported on 3/20/2025), Disp: 9.9 mL, Rfl: 5    gabapentin (NEURONTIN) 300 mg capsule, TAKE 1 CAPSULE (300 MG TOTAL) BY MOUTH THREE (THREE) TIMES A DAY (Patient not taking: Reported on 3/20/2025), Disp: 90 capsule, Rfl: 5    naloxone (NARCAN) 4 mg/0.1 mL nasal spray, , Disp: , Rfl:     Nicotine 10 MG/ML SOLN, Instill 1 to 2 doses per hour in each nostril. Each dose = 2 sprays, one in each nostril., Disp: 10 mL, Rfl: 2    pantoprazole (PROTONIX) 40 mg tablet, TAKE 1 TABLET (40 MG TOTAL) BY MOUTH IN THE MORNING, Disp: 30 tablet, Rfl: 5    QUEtiapine (SEROquel) 100 mg tablet, Take 1 tablet (100 mg total) by mouth daily at bedtime, Disp: 30 tablet, Rfl: 1    sertraline (ZOLOFT) 100 mg tablet, Take 1 tablet (100 mg total) by mouth daily, Disp: 30 tablet, Rfl: 1    traZODone (DESYREL) 150 mg tablet, , Disp: , Rfl:     ALLERGIES:  Allergies   Allergen Reactions    Oxycodone Swelling     Tongue swelling           REVIEW OF SYSTEMS:  Review of Systems   Constitutional:  Negative for chills and fever.   HENT:  Negative for ear pain and sore throat.    Eyes:  Negative for pain and visual disturbance.   Respiratory:  Negative for cough and shortness of breath.    Cardiovascular:  Negative for chest pain and palpitations.   Gastrointestinal:  Negative for abdominal pain and vomiting.   Genitourinary:  Negative for dysuria and hematuria.   Musculoskeletal:  Negative for arthralgias and back pain.   Skin:  Negative for color change and rash.   Neurological:  Negative for seizures and syncope.   All other systems reviewed and are  negative.      VITALS:  There were no vitals filed for this visit.    LABS:  HgA1c:   Lab Results   Component Value Date    HGBA1C 6.5 (H) 02/10/2025     BMP:   Lab Results   Component Value Date    CALCIUM 8.2 (L) 03/13/2025    K 4.2 03/13/2025    CO2 26 03/13/2025     03/13/2025    BUN 12 03/13/2025    CREATININE 0.94 03/13/2025       _____________________________________________________  PHYSICAL EXAMINATION:  General: well developed and well nourished, alert, oriented times 3, and appears comfortable  Psychiatric: Normal  HEENT: Normocephalic, Atraumatic Trachea Midline, No torticollis  Pulmonary: No audible wheezing or respiratory distress   Abdomen/GI: Non tender, non distended   Cardiovascular: No pitting edema, 2+ radial pulse   Skin: No masses, erythema, lacerations, fluctation, ulcerations  Neurovascular: Sensation Intact to the Median, Ulnar, Radial Nerve, Motor Intact to the Median, Ulnar, Radial Nerve, and Pulses Intact  Musculoskeletal: Normal, except as noted in detailed exam and in HPI.      MUSCULOSKELETAL EXAMINATION:  Right hand:  SILT  Composite fist    Healing abscess dorsal hand  No drainage, Mild erythema, no signs of infections  Positive and swelling  Peeling skin around dorsum of hand      Left hand:  SILT  Composite fist    ___________________________________________________  STUDIES REVIEWED:    No new images obtained/reviewed      PROCEDURES PERFORMED:  Procedures  -No procedures performed    _____________________________________________________      Scribe Attestation      I,:  Adrian Diez am acting as a scribe while in the presence of the attending physician.:       I,:  Brandon Ivory MD personally performed the services described in this documentation    as scribed in my presence.:

## 2025-03-20 NOTE — ASSESSMENT & PLAN NOTE
Doing well  Finish antibiotics  Work on ROM of the hand  He may stop soaks and shower normally  He may use moisturizer   NSAIDs as needed for pain    Orders:    Ambulatory referral to Orthopedic Surgery

## 2025-03-24 ENCOUNTER — OFFICE VISIT (OUTPATIENT)
Dept: FAMILY MEDICINE CLINIC | Facility: CLINIC | Age: 60
End: 2025-03-24
Payer: COMMERCIAL

## 2025-03-24 VITALS
OXYGEN SATURATION: 96 % | HEART RATE: 66 BPM | SYSTOLIC BLOOD PRESSURE: 118 MMHG | BODY MASS INDEX: 27.2 KG/M2 | DIASTOLIC BLOOD PRESSURE: 60 MMHG | RESPIRATION RATE: 18 BRPM | HEIGHT: 66 IN | WEIGHT: 169.25 LBS | TEMPERATURE: 97.6 F

## 2025-03-24 DIAGNOSIS — L03.113 CELLULITIS OF RIGHT UPPER EXTREMITY: Primary | ICD-10-CM

## 2025-03-24 DIAGNOSIS — L02.511 ABSCESS OF HAND, RIGHT: ICD-10-CM

## 2025-03-24 DIAGNOSIS — E11.00 TYPE 2 DIABETES MELLITUS WITH HYPEROSMOLARITY WITHOUT COMA, WITHOUT LONG-TERM CURRENT USE OF INSULIN (HCC): ICD-10-CM

## 2025-03-24 PROCEDURE — 99495 TRANSJ CARE MGMT MOD F2F 14D: CPT | Performed by: FAMILY MEDICINE

## 2025-03-24 RX ORDER — IBUPROFEN 800 MG/1
800 TABLET, FILM COATED ORAL EVERY 8 HOURS PRN
Qty: 30 TABLET | Refills: 1 | Status: SHIPPED | OUTPATIENT
Start: 2025-03-24

## 2025-03-24 NOTE — ASSESSMENT & PLAN NOTE
Abscess of right hand.  Patient appears to be recovering well after receiving IV and p.o. antibiotics for an abscess of his right hand.  Incision site appears to be healing well.  He has some mild lingering swelling but normal range of motion of his hand.  Patient given ibuprofen 800 mg to use every 8 hours as needed for pain management until he is seen by orthopedist in 2 weeks.  He may use warm compresses to the affected area.

## 2025-03-24 NOTE — PROGRESS NOTES
FAMILY PRACTICE OFFICE VISIT       NAME: Barrett Teran  AGE: 59 y.o. SEX: male       : 1965        MRN: [id number]    DATE: 3/24/2025  TIME: 1:24 PM    Assessment and Plan     Problem List Items Addressed This Visit       Abscess of hand, right    Abscess of right hand.  Patient appears to be recovering well after receiving IV and p.o. antibiotics for an abscess of his right hand.  Incision site appears to be healing well.  He has some mild lingering swelling but normal range of motion of his hand.  Patient given ibuprofen 800 mg to use every 8 hours as needed for pain management until he is seen by orthopedist in 2 weeks.  He may use warm compresses to the affected area.         Type 2 diabetes mellitus with hyperosmolarity without coma, without long-term current use of insulin (HCC)    Diabetes.  Patient was given a refill as requested of test strips for his [employer] glucose glucometer to use once daily.  Lab Results   Component Value Date    HGBA1C 6.5 (H) 02/10/2025            Relevant Orders    Glucometer test strips     Other Visit Diagnoses         Cellulitis of right upper extremity    -  Primary    Relevant Medications    ibuprofen (MOTRIN) 800 mg tablet            TCM Call (since 3/10/2025)       Date and time call was made  3/14/2025  4:24 PM    Hospital care reviewed  Records reviewed    Patient was hospitialized at  [facility]    Date of Admission  25    Date of discharge  25    Diagnosis  Abscess of hand, right    Disposition  Home    Were the patients medications reviewed and updated  No    Current Symptoms  None          TCM Call (since 3/10/2025)       Post hospital issues  None    Scheduled for follow up?  Yes    Referrals needed  Orthopedic Surgeon    Did you obtain your prescribed medications  Yes    Do you need help managing your prescriptions or medications  No    Is transportation to your appointment needed  No    I have advised the patient to call PCP with any  new or worsening symptoms  Maryann Taylor MA        Hospital Course:   Barrett Teran is a 59 y.o. male patient with significant past medical history of tobacco use, DM who originally presented to the hospital on 3/12/2025 due to right hand pain and swelling. Pt had previously been seen for this a few days prior in the ED and was discharged on PO Bactrim. Pt took a few doses but without improvement and returned to the hospital. He underwent I&D with orthopedics on 3/12 and placed on IV abx. Noted to have cellulitis and abscess of the right hand. He underwent additional I&D at bedside by orthopedics on 3/13 with improvement of symptoms. He was seen by ID and noted wound culture with growth of alpha hemolytic strep, recommended to be discharged on PO Augmentin to complete total 7 day course of treatment through 3/18. Pt's symptoms improved throughout hospitalization. He was cleared for discharge from ID and orthopedic perspectives. His blood cultures remained negative. He was discharged in stable condition. For additional information please refer to medical records.   Medication changes include: D/c Bactrim, addition of Augmentin 875/125 mg BID to complete total 7 days of treatment          Chief Complaint     Chief Complaint   Patient presents with    Transition of Care Management       History of Present Illness     I reviewed the patient's discharge summary from his latest hospitalization.  He completed course of Augmentin medication as an outpatient.  He denies any fevers.  He still has a fair amount of pain in his right hand with over-the-counter Tylenol and ibuprofen.  He is scheduled to see orthopedist in 2 weeks for follow-up.  He states he has had significant decrease in swelling and redness of his right hand.        Review of Systems   Review of Systems   Constitutional: Negative.    Musculoskeletal:  Positive for arthralgias.   Skin:  Positive for wound.       Active Problem List     Patient Active Problem List    Diagnosis    Recurrent major depressive disorder, in remission (HCC)    Tobacco abuse    Abnormal liver enzymes    PTSD (post-traumatic stress disorder)    Opioid dependence in remission (HCC)    TEO (acute kidney injury) (HCC)    DM (diabetes mellitus), type 2 (HCC)    Hypertension    Bradycardia    Thrombocytopenia (HCC)    Anemia    Gross hematuria    CKD (chronic kidney disease)    Severe episode of recurrent major depressive disorder, with psychotic features (HCC)    COVID-19    Mild protein-calorie malnutrition (HCC)    Allergic rhinitis    Chronic constipation    Affective psychosis, bipolar (HCC)    Depression    Erosive esophagitis    GERD (gastroesophageal reflux disease)    Hemorrhoids, external    Heroin use disorder, mild, in sustained remission, abuse (HCC)    Hyperlipidemia associated with type 2 diabetes mellitus  (HCC)    Lumbar spondylosis    Back pain    Syncope    Abscess of hand, right    Type 2 diabetes mellitus with hyperosmolarity without coma, without long-term current use of insulin (HCC)       Past Medical History:  Past Medical History:   Diagnosis Date    Abdominal pain     Allergic rhinitis     Cancer (HCC)     Chronic hepatitis C virus infection (HCC) 04/10/2017    Formatting of this note might be different from the original.  Genotype: 1a  IL28B: Not Done  Treatment Status: Past treatment Zepatier The patient's HCV RNA remains undetectable 24 weeks following completion of therapy.  This is a sustained virologic response, which is a cure of HCV infection.   Fibrosis Assessments:  1.- F4 Cirrhosis FibroScan June 2017     Last Assessment & Plan:   Formatting o    Chronic pain     Colon polyps     Depression     Fatigue     Head injury     Homeless     Hypertension     Insomnia     Liver disease     Loss of appetite     Numbness and tingling of right arm     Palpitations     Psychiatric disorder     bipolar    PTSD (post-traumatic stress disorder)     Seizures (HCC)     Shortness of  breath     Substance abuse (HCC)     Swallowing difficulty        Past Surgical History:  Past Surgical History:   Procedure Laterality Date    ABDOMINAL SURGERY      COLONOSCOPY      EYE SURGERY      NECK SURGERY      ORCHIECTOMY Right 5/19/2016    Procedure: ORCHIECTOMY INGUINAL biopsy of testicular mass, ;  Surgeon: Jose Lara MD;  Location:  MAIN OR;  Service:     DC COLONOSCOPY FLX DX W/COLLJ SPEC WHEN PFRMD N/A 2/13/2017    Procedure: EGD AND COLONOSCOPY;  Surgeon: Hernesto Perez MD;  Location: [facility] GI LAB;  Service: Gastroenterology    DC ESOPHAGOGASTRODUODENOSCOPY TRANSORAL DIAGNOSTIC N/A 11/16/2016    Procedure: EGD AND COLONOSCOPY;  Surgeon: Hernesto Perez MD;  Location:  GI LAB;  Service: Gastroenterology       Family History:  Family History   Problem Relation Age of Onset    Diabetes Mother     Hypertension Brother        Social History:  Social History     Socioeconomic History    Marital status:      Spouse name: Not on file    Number of children: Not on file    Years of education: Not on file    Highest education level: Not on file   Occupational History    Not on file   Tobacco Use    Smoking status: Every Day     Current packs/day: 1.00     Average packs/day: 1 pack/day for 41.0 years (41.0 ttl pk-yrs)     Types: Cigarettes    Smokeless tobacco: Current   Vaping Use    Vaping status: Never Used   Substance and Sexual Activity    Alcohol use: No    Drug use: Not Currently     Types: Heroin    Sexual activity: Not Currently   Other Topics Concern    Not on file   Social History Narrative    Not on file     Social Drivers of Health     Financial Resource Strain: Not on file   Food Insecurity: Patient Declined (3/13/2025)    Nursing - Inadequate Food Risk Classification     Worried About Running Out of Food in the Last Year: Not on file     Ran Out of Food in the Last Year: Not on file     Ran Out of Food in the Last Year: Patient declined   Transportation Needs: Patient Declined  (3/13/2025)    Nursing - Transportation Risk Classification     Lack of Transportation: Not on file     Lack of Transportation: Patient declined   Physical Activity: Not on file   Stress: Not on file   Social Connections: Not on file   Intimate Partner Violence: Patient Declined (3/13/2025)    Nursing IPS     Feels Physically and Emotionally Safe: Not on file     Physically Hurt by Someone: Not on file     Humiliated or Emotionally Abused by Someone: Not on file     Physically Hurt by Someone: Patient declined     Hurt or Threatened by Someone: Patient declined   Housing Stability: Patient Declined (3/13/2025)    Nursing: Inadequate Housing Risk Classification     Has Housing: Not on file     Worried About Losing Housing: Not on file     Unable to Get Utilities: Not on file     Unable to Pay for Housing in the Last Year: Patient declined     Has Housin       Objective     Vitals:    25 1304   BP: 118/60   Pulse: 66   Resp: 18   Temp: 97.6 °F (36.4 °C)   SpO2: 96%     Wt Readings from Last 3 Encounters:   25 76.8 kg (169 lb 4 oz)   25 72.6 kg (160 lb)   25 72.6 kg (160 lb)       Physical Exam  Constitutional:       General: He is not in acute distress.     Appearance: Normal appearance. He is not ill-appearing.   Musculoskeletal:         General: Swelling present.      Comments: Patient with some mild lingering swelling on the dorsal surface of his right hand.  Incision site has a scab formation with no drainage.  There is no warmth to his hand.  Patient with normal flexion and extension of his fingers.  Circulation intact.   Skin:     Findings: Lesion present.   Neurological:      Mental Status: He is alert.         Pertinent Laboratory/Diagnostic Studies:  Lab Results   Component Value Date    BUN 12 2025    CREATININE 0.94 2025    CALCIUM 8.2 (L) 2025    K 4.2 2025    CO2 26 2025     2025     Lab Results   Component Value Date    ALT 25  "03/12/2025    AST 25 03/12/2025     (H) 03/01/2017    ALKPHOS 80 03/12/2025       Lab Results   Component Value Date    WBC 9.02 03/13/2025    HGB 11.6 (L) 03/14/2025    HCT 36.5 03/13/2025    MCV 89 03/13/2025     03/13/2025       Lab Results   Component Value Date    TSH 2.59 10/14/2022       No results found for: \"CHOL\"  Lab Results   Component Value Date    TRIG 236 (H) 02/10/2025     Lab Results   Component Value Date    HDL 40 02/10/2025     Lab Results   Component Value Date    LDLCALC 104 (H) 02/10/2025     Lab Results   Component Value Date    HGBA1C 6.5 (H) 02/10/2025       Results for orders placed or performed during the hospital encounter of 03/12/25   Wound culture and Gram stain    Specimen: Arm, Right; Wound   Result Value Ref Range    Wound Culture 3+ Growth of Streptococcus intermedius (A)     Gram Stain Result 2+ Polys (A)     Gram Stain Result 2+ Gram positive cocci in pairs (A)        Susceptibility    Streptococcus intermedius - SHAKIRA     ZID Performed Yes       Penicillin 0.032 Susceptible ug/ml     Ceftriaxone ($$) 0.03 Susceptible ug/ml     Vancomycin ($) 1.00 Susceptible ug/ml   Anaerobic culture and Gram stain    Specimen: Abscess; Body Fluid   Result Value Ref Range    Anaerobic Culture No anaerobes isolated    Wound culture and Gram stain    Specimen: Arm, Right; Wound   Result Value Ref Range    Wound Culture 2+ Growth of Streptococcus intermedius (A)     Gram Stain Result No polys seen (A)     Gram Stain Result Rare Gram positive cocci in pairs and chains (A)        Susceptibility    Streptococcus intermedius - SHAKIRA     ZID Performed Yes     Blood culture    Specimen: Hand, Left; Blood   Result Value Ref Range    Blood Culture No Growth After 5 Days.    Blood culture    Specimen: Arm, Left; Blood   Result Value Ref Range    Blood Culture No Growth After 5 Days.    CBC and differential   Result Value Ref Range    WBC 14.13 (H) 4.31 - 10.16 Thousand/uL    RBC 4.68 3.88 - 5.62 " Million/uL    Hemoglobin 13.7 12.0 - 17.0 g/dL    Hematocrit 41.8 36.5 - 49.3 %    MCV 89 82 - 98 fL    MCH 29.3 26.8 - 34.3 pg    MCHC 32.8 31.4 - 37.4 g/dL    RDW 12.7 11.6 - 15.1 %    MPV 9.0 8.9 - 12.7 fL    Platelets 290 149 - 390 Thousands/uL    nRBC 0 /100 WBCs    Segmented % 76 (H) 43 - 75 %    Immature Grans % 1 0 - 2 %    Lymphocytes % 16 14 - 44 %    Monocytes % 6 4 - 12 %    Eosinophils Relative 1 0 - 6 %    Basophils Relative 0 0 - 1 %    Absolute Neutrophils 10.83 (H) 1.85 - 7.62 Thousands/µL    Absolute Immature Grans 0.08 0.00 - 0.20 Thousand/uL    Absolute Lymphocytes 2.22 0.60 - 4.47 Thousands/µL    Absolute Monocytes 0.90 0.17 - 1.22 Thousand/µL    Eosinophils Absolute 0.07 0.00 - 0.61 Thousand/µL    Basophils Absolute 0.03 0.00 - 0.10 Thousands/µL   Comprehensive metabolic panel   Result Value Ref Range    Sodium 134 (L) 135 - 147 mmol/L    Potassium 4.1 3.5 - 5.3 mmol/L    Chloride 97 96 - 108 mmol/L    CO2 26 21 - 32 mmol/L    ANION GAP 11 4 - 13 mmol/L    BUN 16 5 - 25 mg/dL    Creatinine 1.11 0.60 - 1.30 mg/dL    Glucose 95 65 - 140 mg/dL    Calcium 9.4 8.4 - 10.2 mg/dL    AST 25 13 - 39 U/L    ALT 25 7 - 52 U/L    Alkaline Phosphatase 80 34 - 104 U/L    Total Protein 8.3 6.4 - 8.4 g/dL    Albumin 4.3 3.5 - 5.0 g/dL    Total Bilirubin 0.35 0.20 - 1.00 mg/dL    eGFR 72 ml/min/1.73sq m   Sedimentation rate, automated   Result Value Ref Range    Sed Rate 92 (H) 0 - 19 mm/hour   C-reactive protein   Result Value Ref Range    .9 (H) <3.0 mg/L   Basic metabolic panel   Result Value Ref Range    Sodium 135 135 - 147 mmol/L    Potassium 4.2 3.5 - 5.3 mmol/L    Chloride 102 96 - 108 mmol/L    CO2 26 21 - 32 mmol/L    ANION GAP 7 4 - 13 mmol/L    BUN 12 5 - 25 mg/dL    Creatinine 0.94 0.60 - 1.30 mg/dL    Glucose 128 65 - 140 mg/dL    Calcium 8.2 (L) 8.4 - 10.2 mg/dL    eGFR 88 ml/min/1.73sq m   Magnesium   Result Value Ref Range    Magnesium 2.1 1.9 - 2.7 mg/dL   CBC and differential   Result  Value Ref Range    WBC 9.02 4.31 - 10.16 Thousand/uL    RBC 4.12 3.88 - 5.62 Million/uL    Hemoglobin 12.1 12.0 - 17.0 g/dL    Hematocrit 36.5 36.5 - 49.3 %    MCV 89 82 - 98 fL    MCH 29.4 26.8 - 34.3 pg    MCHC 33.2 31.4 - 37.4 g/dL    RDW 12.7 11.6 - 15.1 %    MPV 9.2 8.9 - 12.7 fL    Platelets 276 149 - 390 Thousands/uL    nRBC 0 /100 WBCs    Segmented % 69 43 - 75 %    Immature Grans % 0 0 - 2 %    Lymphocytes % 22 14 - 44 %    Monocytes % 7 4 - 12 %    Eosinophils Relative 2 0 - 6 %    Basophils Relative 0 0 - 1 %    Absolute Neutrophils 6.22 1.85 - 7.62 Thousands/µL    Absolute Immature Grans 0.04 0.00 - 0.20 Thousand/uL    Absolute Lymphocytes 1.97 0.60 - 4.47 Thousands/µL    Absolute Monocytes 0.63 0.17 - 1.22 Thousand/µL    Eosinophils Absolute 0.14 0.00 - 0.61 Thousand/µL    Basophils Absolute 0.02 0.00 - 0.10 Thousands/µL   Hemoglobin   Result Value Ref Range    Hemoglobin 11.6 (L) 12.0 - 17.0 g/dL   C-reactive protein   Result Value Ref Range    CRP 96.8 (H) <3.0 mg/L   Fingerstick Glucose (POCT)   Result Value Ref Range    POC Glucose 144 (H) 65 - 140 mg/dl   Fingerstick Glucose (POCT)   Result Value Ref Range    POC Glucose 159 (H) 65 - 140 mg/dl   Fingerstick Glucose (POCT)   Result Value Ref Range    POC Glucose 284 (H) 65 - 140 mg/dl   Fingerstick Glucose (POCT)   Result Value Ref Range    POC Glucose 156 (H) 65 - 140 mg/dl   Fingerstick Glucose (POCT)   Result Value Ref Range    POC Glucose 114 65 - 140 mg/dl   Fingerstick Glucose (POCT)   Result Value Ref Range    POC Glucose 140 65 - 140 mg/dl   Fingerstick Glucose (POCT)   Result Value Ref Range    POC Glucose 101 65 - 140 mg/dl       Orders Placed This Encounter   Procedures    Glucometer test strips       ALLERGIES:  Allergies   Allergen Reactions    Oxycodone Swelling     Tongue swelling       Current Medications     Current Outpatient Medications   Medication Sig Dispense Refill    atorvastatin (LIPITOR) 10 mg tablet TAKE ONE TABLET BY  MOUTH DAILY WITH dinner 30 tablet 0    hydrOXYzine HCL (ATARAX) 25 mg tablet       hydrOXYzine pamoate (VISTARIL) 50 mg capsule       ibuprofen (MOTRIN) 800 mg tablet Take 1 tablet (800 mg total) by mouth every 8 (eight) hours as needed for mild pain 30 tablet 1    Lidocaine 4 % PTCH Apply 1 patch topically daily As needed for back pain, remove the patch after 12 hours 30 patch 1    lisinopril (ZESTRIL) 5 mg tablet TAKE ONE TABLET BY MOUTH DAILY 90 tablet 5    pantoprazole (PROTONIX) 40 mg tablet TAKE 1 TABLET (40 MG TOTAL) BY MOUTH IN THE MORNING 30 tablet 5    QUEtiapine (SEROquel) 100 mg tablet Take 1 tablet (100 mg total) by mouth daily at bedtime 30 tablet 1    sertraline (ZOLOFT) 100 mg tablet Take 1 tablet (100 mg total) by mouth daily 30 tablet 1    traZODone (DESYREL) 150 mg tablet       fluticasone (FLONASE) 50 mcg/act nasal spray 2 sprays into each nostril daily (Patient not taking: Reported on 2/10/2025) 9.9 mL 5    gabapentin (NEURONTIN) 300 mg capsule TAKE 1 CAPSULE (300 MG TOTAL) BY MOUTH THREE (THREE) TIMES A DAY (Patient not taking: Reported on 3/24/2025) 90 capsule 5    naloxone (NARCAN) 4 mg/0.1 mL nasal spray  (Patient not taking: Reported on 9/13/2024)      nicotine (NICODERM CQ) 21 mg/24 hr TD 24 hr patch Place 1 patch on the skin over 24 hours every 24 hours (Patient not taking: Reported on 3/24/2025) 14 patch 1    Nicotine 10 MG/ML SOLN Instill 1 to 2 doses per hour in each nostril. Each dose = 2 sprays, one in each nostril. (Patient not taking: Reported on 3/24/2025) 10 mL 2     No current facility-administered medications for this visit.         Health Maintenance     Health Maintenance   Topic Date Due    Annual Physical  Never done    Hepatitis A Vaccine (1 of 2 - Risk 2-dose series) Never done    Zoster Vaccine (1 of 2) Never done    Pneumococcal Vaccine: Pediatrics (0 to 5 Years) and At-Risk Patients (6 to 64 Years) (2 of 2 - PCV) 05/22/2018    Colorectal Cancer Screening  02/13/2020     Lung Cancer Screening  12/17/2023    COVID-19 Vaccine (1 - 2024-25 season) Never done    HEMOGLOBIN A1C  08/10/2025    Kidney Health Evaluation: Albumin/Creatinine Ratio  02/10/2026    Diabetic Eye Exam  02/27/2026    Diabetic Foot Exam  03/12/2026    Kidney Health Evaluation: GFR  03/13/2026    DTaP,Tdap,and Td Vaccines (2 - Td or Tdap) 10/11/2027    HIV Screening  Completed    Influenza Vaccine  Completed    Meningococcal B Vaccine  Aged Out    RSV Vaccine age 0-20 Months  Aged Out    HIB Vaccine  Aged Out    IPV Vaccine  Aged Out    Meningococcal ACWY Vaccine  Aged Out    HPV Vaccine  Aged Out    Hepatitis C Screening  Discontinued     Immunization History   Administered Date(s) Administered    INFLUENZA 11/18/2004, 10/11/2017, 11/29/2018, 11/30/2020, 10/29/2021, 10/14/2022    Influenza Recombinant Preservative Free Im 09/13/2024    Pneumococcal Polysaccharide PPV23 05/22/2017    Tdap 10/11/2017    Tuberculin Skin Test-PPD Intradermal 10/11/2017       Dick Jimenez MD

## 2025-03-24 NOTE — ASSESSMENT & PLAN NOTE
Diabetes.  Patient was given a refill as requested of test strips for his [employer] glucose glucometer to use once daily.  Lab Results   Component Value Date    HGBA1C 6.5 (H) 02/10/2025

## 2025-03-25 ENCOUNTER — PATIENT OUTREACH (OUTPATIENT)
Dept: CASE MANAGEMENT | Facility: OTHER | Age: 60
End: 2025-03-25

## 2025-03-25 NOTE — PROGRESS NOTES
With the assistance of a  ID#[id number] I spoke with Barrett who reports his hand has improved. He was seen by his orthopedic surgeon and per note the cellulitis has improved and Barrett was advised to finish his antibiotics and return in 2 weeks for a recheck.  Barrett remains on his antibiotic and is taking it as directed.  He was very appreciative of the call but declined care management.

## 2025-05-30 DIAGNOSIS — E78.5 HYPERLIPIDEMIA: ICD-10-CM

## 2025-05-30 DIAGNOSIS — E11.9 TYPE 2 DIABETES MELLITUS WITHOUT COMPLICATION, WITHOUT LONG-TERM CURRENT USE OF INSULIN (HCC): ICD-10-CM

## 2025-05-31 RX ORDER — ATORVASTATIN CALCIUM 10 MG/1
TABLET, FILM COATED ORAL
Qty: 30 TABLET | Refills: 0 | Status: SHIPPED | OUTPATIENT
Start: 2025-05-31

## 2025-06-03 ENCOUNTER — APPOINTMENT (EMERGENCY)
Dept: RADIOLOGY | Facility: HOSPITAL | Age: 60
End: 2025-06-03
Payer: COMMERCIAL

## 2025-06-03 ENCOUNTER — HOSPITAL ENCOUNTER (EMERGENCY)
Facility: HOSPITAL | Age: 60
Discharge: HOME | End: 2025-06-03
Attending: EMERGENCY MEDICINE
Payer: COMMERCIAL

## 2025-06-03 VITALS
SYSTOLIC BLOOD PRESSURE: 184 MMHG | HEART RATE: 96 BPM | TEMPERATURE: 98.6 F | DIASTOLIC BLOOD PRESSURE: 90 MMHG | RESPIRATION RATE: 20 BRPM | OXYGEN SATURATION: 99 %

## 2025-06-03 DIAGNOSIS — R89.9 ABNORMAL LABORATORY TEST: Primary | ICD-10-CM

## 2025-06-03 LAB
2HR DELTA HS TROPONIN: 0 NG/L
ALBUMIN SERPL BCG-MCNC: 4.3 G/DL (ref 3.5–5)
ALP SERPL-CCNC: 69 U/L (ref 34–104)
ALT SERPL W P-5'-P-CCNC: 33 U/L (ref 7–52)
ANION GAP SERPL CALCULATED.3IONS-SCNC: 3 MMOL/L (ref 4–13)
AST SERPL W P-5'-P-CCNC: 58 U/L (ref 13–39)
ATRIAL RATE: 59 BPM
BASOPHILS # BLD AUTO: 0.03 THOUSANDS/ÂΜL (ref 0–0.1)
BASOPHILS NFR BLD AUTO: 0 % (ref 0–1)
BILIRUB SERPL-MCNC: 0.39 MG/DL (ref 0.2–1)
BILIRUB UR QL STRIP: NEGATIVE
BUN SERPL-MCNC: 29 MG/DL (ref 5–25)
CALCIUM SERPL-MCNC: 9.7 MG/DL (ref 8.4–10.2)
CARDIAC TROPONIN I PNL SERPL HS: 6 NG/L (ref ?–50)
CARDIAC TROPONIN I PNL SERPL HS: 6 NG/L (ref ?–50)
CHLORIDE SERPL-SCNC: 101 MMOL/L (ref 96–108)
CLARITY UR: CLEAR
CO2 SERPL-SCNC: 33 MMOL/L (ref 21–32)
COLOR UR: ABNORMAL
CREAT SERPL-MCNC: 1.23 MG/DL (ref 0.6–1.3)
EOSINOPHIL # BLD AUTO: 0.18 THOUSAND/ÂΜL (ref 0–0.61)
EOSINOPHIL NFR BLD AUTO: 2 % (ref 0–6)
ERYTHROCYTE [DISTWIDTH] IN BLOOD BY AUTOMATED COUNT: 12.5 % (ref 11.6–15.1)
GFR SERPL CREATININE-BSD FRML MDRD: 63 ML/MIN/1.73SQ M
GLUCOSE SERPL-MCNC: 102 MG/DL (ref 65–140)
GLUCOSE UR STRIP-MCNC: ABNORMAL MG/DL
HCT VFR BLD AUTO: 38.1 % (ref 36.5–49.3)
HGB BLD-MCNC: 13 G/DL (ref 12–17)
HGB UR QL STRIP.AUTO: NEGATIVE
IMM GRANULOCYTES # BLD AUTO: 0.02 THOUSAND/UL (ref 0–0.2)
IMM GRANULOCYTES NFR BLD AUTO: 0 % (ref 0–2)
KETONES UR STRIP-MCNC: NEGATIVE MG/DL
LEUKOCYTE ESTERASE UR QL STRIP: NEGATIVE
LIPASE SERPL-CCNC: 52 U/L (ref 11–82)
LYMPHOCYTES # BLD AUTO: 2.25 THOUSANDS/ÂΜL (ref 0.6–4.47)
LYMPHOCYTES NFR BLD AUTO: 26 % (ref 14–44)
MCH RBC QN AUTO: 30.4 PG (ref 26.8–34.3)
MCHC RBC AUTO-ENTMCNC: 34.1 G/DL (ref 31.4–37.4)
MCV RBC AUTO: 89 FL (ref 82–98)
MONOCYTES # BLD AUTO: 0.6 THOUSAND/ÂΜL (ref 0.17–1.22)
MONOCYTES NFR BLD AUTO: 7 % (ref 4–12)
NEUTROPHILS # BLD AUTO: 5.72 THOUSANDS/ÂΜL (ref 1.85–7.62)
NEUTS SEG NFR BLD AUTO: 65 % (ref 43–75)
NITRITE UR QL STRIP: NEGATIVE
NRBC BLD AUTO-RTO: 0 /100 WBCS
P AXIS: 46 DEGREES
PH UR STRIP.AUTO: 7 [PH]
PLATELET # BLD AUTO: 250 THOUSANDS/UL (ref 149–390)
PMV BLD AUTO: 9.5 FL (ref 8.9–12.7)
POTASSIUM SERPL-SCNC: 5.3 MMOL/L (ref 3.5–5.3)
PR INTERVAL: 144 MS
PROT SERPL-MCNC: 7.8 G/DL (ref 6.4–8.4)
PROT UR STRIP-MCNC: NEGATIVE MG/DL
QRS AXIS: 51 DEGREES
QRSD INTERVAL: 78 MS
QT INTERVAL: 380 MS
QTC INTERVAL: 376 MS
RBC # BLD AUTO: 4.28 MILLION/UL (ref 3.88–5.62)
SODIUM SERPL-SCNC: 137 MMOL/L (ref 135–147)
SP GR UR STRIP.AUTO: 1.02 (ref 1–1.03)
T WAVE AXIS: 20 DEGREES
UROBILINOGEN UR STRIP-ACNC: <2 MG/DL
VENTRICULAR RATE: 59 BPM
WBC # BLD AUTO: 8.8 THOUSAND/UL (ref 4.31–10.16)

## 2025-06-03 PROCEDURE — 84484 ASSAY OF TROPONIN QUANT: CPT

## 2025-06-03 PROCEDURE — 93005 ELECTROCARDIOGRAM TRACING: CPT

## 2025-06-03 PROCEDURE — 71046 X-RAY EXAM CHEST 2 VIEWS: CPT

## 2025-06-03 PROCEDURE — 80053 COMPREHEN METABOLIC PANEL: CPT

## 2025-06-03 PROCEDURE — 93010 ELECTROCARDIOGRAM REPORT: CPT | Performed by: INTERNAL MEDICINE

## 2025-06-03 PROCEDURE — 83690 ASSAY OF LIPASE: CPT

## 2025-06-03 PROCEDURE — 99285 EMERGENCY DEPT VISIT HI MDM: CPT | Performed by: EMERGENCY MEDICINE

## 2025-06-03 PROCEDURE — 36415 COLL VENOUS BLD VENIPUNCTURE: CPT

## 2025-06-03 PROCEDURE — 99284 EMERGENCY DEPT VISIT MOD MDM: CPT

## 2025-06-03 PROCEDURE — 85025 COMPLETE CBC W/AUTO DIFF WBC: CPT

## 2025-06-03 NOTE — ED ATTENDING ATTESTATION
6/3/2025  I, Angelique Taylor MD, saw and evaluated the patient. I have discussed the patient with the resident/non-physician practitioner and agree with the resident's/non-physician practitioner's findings, Plan of Care, and MDM as documented in the resident's/non-physician practitioner's note, except where noted. All available labs and Radiology studies were reviewed.  I was present for key portions of any procedure(s) performed by the resident/non-physician practitioner and I was immediately available to provide assistance.       At this point I agree with the current assessment done in the Emergency Department.  I have conducted an independent evaluation of this patient a history and physical is as follows:    59-year-old male with history of IVDU as well as type 2 diabetes as well as recent admission for cellulitis who presents to the emergency department with abnormal blood work.  Patient states that he had blood work performed and was called yesterday and advised further evaluation in the hospital.  Initially concerned that it could be his kidneys but he is unsure.  Patient does not have blood work.  Review of records shows no results in our system or at [facility].  He is unsure of who called him.  He has no complaints.  He does state that occasionally he gets discomfort in his epigastric region but otherwise denies any overt chest pain shortness of breath lightheadedness dizziness nausea vomiting abdominal or back pain.  No focal numbness weakness or tingling that is new.  No fevers no chills no cough or congestion.  On exam patient is resting comfortably.  Blood pressure mildly elevated.  HEENT is normocephalic and atraumatic moist mucous members clear sclera conjunctive.  Neck is supple forage motion.  Heart is regular rate.  Lungs clear.  Abdomen soft positive bowel sounds nontender.  Intact pulses.  No lower extremity edema no calf translocation.  Awake alert oriented.  Assessment and plan  59-year-old male who presents to the emergency department with abnormal labs.  No record of abnormal labs.  Given complaints of intermittent epigastric pain will do cardiac evaluation with abdominal labs.  Nontender on exam.  Will hold off on imaging at this time we will continue to attempt to get records.  Will call PCPs office.  Monitor reevaluate and treat accordingly.    Interpretor #[id number]    ED Course     Attempted multiple times to figure out abnormal labs.  Using  phone to discuss with patient and significant other at bedside.  I have asked multiple times why the blood work was performed and who called him.  They continue to state they do not know why the blood work was done they are unsure of what was abnormal and it was at [facility].  I have searched [facility] records I do not see any blood work or visits in May.  They state that it was a female physician patient normally sees [facility].  We did discuss on the phone as well as via epic chat with Dr. Fairchild he has no record of any blood work being done or any abnormals.  He did not call the patient he has not seen the patient since March of this year.  I explained to both the patient and the family that we are attempting to figure out what was abnormal the blood work we have done thus far is relatively within normal limits.  The patient has no complaints at this time.  He is stating that he will once to leave he is taking off all of his EKG leads attempting to remove his IV line.  We have yet to determine what was abnormal and why the patient requires evaluation at this time.  We have asked the patient to calmly stay he states that he will not stay he wants to leave and will sign out AGAINST MEDICAL ADVICE.    Pt left the ED prior to signing any paperwork.     Critical Care Time  Procedures

## 2025-06-03 NOTE — ED PROVIDER NOTES
"  ED Disposition       None          Assessment & Plan   {Hyperlinks  Risk Stratification - NIHSS - HEART SCORE - Fill out sepsis note and make sure you call [id number] if severe or septic shock:[id number]}    Medical Decision Making  Patient is a 59-year-old male who presents today for evaluation of abnormal outpatient lab and right upper quadrant abdominal pain/chest pain.  Vital signs: Patient hypertensive to 184/90.  Afebrile, satting 99% on room air.     Pertinent physical exam: Tenderness to palpation in right upper quadrant.  Abdomen soft nontender nondistended.  No CVA tenderness bilaterally.  Normal rate and rhythm, lungs are clear to auscultation bilaterally.     Differential diagnosis and plan:   ***     View ED course above for further discussion on patient workup.      Review of Previous Medical Records: ***     All labs reviewed and utilized in the medical decision making process  All radiology studies independently viewed by me and interpreted by the radiologist.  I reviewed all testing with the patient.      ED course:  ***     Reevaluation: ***  Disposition: ***     Portions of the record may have been created with voice recognition software. Occasional wrong word or \"sound a like\" substitutions may have occurred due to the inherent limitations of voice recognition software. Read the chart carefully and recognize, using context, where substitutions have occurred.      Amount and/or Complexity of Data Reviewed  Labs: ordered. Decision-making details documented in ED Course.  Radiology: ordered.        ED Course as of 06/03/25 1137   Tue Jun 03, 2025   1122 WBC: 8.80   1123 Hemoglobin: 13.0       Medications - No data to display    ED Risk Strat Scores                    No data recorded                            History of Present Illness   {Hyperlinks  History (Med, Surg, Fam, Social) - Current Medications - Allergies  :[id number]}    Chief Complaint   Patient presents with    Abnormal Lab     Pt had " lab work done at [facility] yesterday and was told to return to ED due to abnormal lab work. Pt unsure of what lab was abnormal       Past Medical History[1]   Past Surgical History[2]   Family History[3]   Social History[4]   E-Cigarette/Vaping    E-Cigarette Use Never User       E-Cigarette/Vaping Substances    Nicotine No     THC No     CBD No     Flavoring No     Other No     Unknown No       I have reviewed and agree with the history as documented.     Patient is a 59-year-old male with past medical history of major depressive disorder, previous opioid dependence in remission, hypertension, diabetes, GERD, hyperlipidemia who presents today for evaluation of abnormal outpatient lab.  Patient is Dominican-speaking and  was used.  Patient states that he was at a clinic on Friday and had blood work ordered and was called yesterday to inform him that his kidney function was abnormal.  At this time he complains of right upper quadrant abdominal pain and chest pain on the right side of his chest for the past 3 days.  He endorses some shortness of breath.  He denies nausea, vomiting, diarrhea, fevers, chills.          Review of Systems   Constitutional:  Negative for chills and fever.   HENT:  Negative for congestion, rhinorrhea and sore throat.    Respiratory:  Negative for shortness of breath.    Cardiovascular:  Negative for chest pain.   Gastrointestinal:  Negative for abdominal pain, diarrhea, nausea and vomiting.   Genitourinary:  Negative for dysuria, frequency and hematuria.   Musculoskeletal:  Negative for back pain.   Neurological:  Negative for dizziness, light-headedness and headaches.           Objective   {Hyperlinks  Historical Vitals - Historical Labs - Chart Review/Microbiology - Last Echo - Code Status  :[id number]}    ED Triage Vitals [06/03/25 0920]   Temperature Pulse Blood Pressure Respirations SpO2 Patient Position - Orthostatic VS   98.6 °F (37 °C) 96 (!) 184/90 20 99 % --      Temp src Heart  Rate Source BP Location FiO2 (%) Pain Score    -- -- -- -- --      Vitals      Date and Time Temp Pulse SpO2 Resp BP Pain Score FACES Pain Rating User   06/03/25 0920 98.6 °F (37 °C) 96 99 % 20 184/90 -- -- KIY            Physical Exam  Vitals and nursing note reviewed.   Constitutional:       General: He is not in acute distress.     Appearance: He is well-developed and normal weight. He is not ill-appearing.   HENT:      Head: Normocephalic and atraumatic.      Mouth/Throat:      Mouth: Mucous membranes are moist.     Eyes:      Conjunctiva/sclera: Conjunctivae normal.       Cardiovascular:      Rate and Rhythm: Normal rate and regular rhythm.      Heart sounds: Normal heart sounds.   Pulmonary:      Effort: Pulmonary effort is normal. No respiratory distress.      Breath sounds: Normal breath sounds.   Abdominal:      General: Abdomen is flat. There is no distension.      Palpations: Abdomen is soft.      Tenderness: There is abdominal tenderness (RUQ).     Musculoskeletal:      Right lower leg: No edema.      Left lower leg: No edema.     Skin:     General: Skin is warm and dry.      Capillary Refill: Capillary refill takes less than 2 seconds.     Neurological:      General: No focal deficit present.      Mental Status: He is alert and oriented to person, place, and time.     Psychiatric:         Mood and Affect: Mood normal.         Behavior: Behavior normal.         Results Reviewed       None            No orders to display       Complex Venous Access Line    Date/Time: 6/3/2025 11:08 AM    Performed by: Melissa Kelley DO  Authorized by: Melissa Kelley DO    Patient location:  ED  Consent:     Consent obtained:  Verbal    Consent given by:  Patient  Pre-procedure details:     Hand hygiene: Hand hygiene performed prior to insertion      Sterile barrier technique: All elements of maximal sterile technique followed      Skin preparation:  2% chlorhexidine    Skin preparation agent: Skin preparation  agent completely dried prior to procedure    Procedure details:     Complex Venous Access Line Type: US Guided Peripheral IV      Orientation:  Left    Location:  Arm    Catheter size:  20 gauge    Approach: percutaneous technique used      Patient position:  Flat    Ultrasound image availability:  Not saved    Sterile ultrasound techniques: Sterile gel and sterile probe covers were used      Number of attempts:  1    Successful placement: yes      Landmarks identified: yes    Anesthesia (see MAR for exact dosages):     Anesthesia method:  None  Post-procedure details:     Post-procedure:  Dressing applied    Assessment:  Blood return through all ports and free fluid flow    Patient tolerance of procedure:  Tolerated well, no immediate complications  ECG 12 Lead Documentation Only    Date/Time: 6/3/2025 11:38 AM    Performed by: Melissa Kelley DO  Authorized by: Melissa Kelley DO    Indications / Diagnosis:  Chest pain/right upper quadrant abdominal pain  ECG reviewed by me, the ED Provider: yes    Patient location:  ED  Previous ECG:     Previous ECG:  Compared to current    Similarity:  No change  Interpretation:     Interpretation: normal    Rate:     ECG rate:  59    ECG rate assessment: bradycardic    Rhythm:     Rhythm: sinus bradycardia    Ectopy:     Ectopy: none    QRS:     QRS axis:  Normal    QRS intervals:  Normal  Conduction:     Conduction: normal    ST segments:     ST segments:  Normal  T waves:     T waves: normal        ED Medication and Procedure Management   Prior to Admission Medications   Prescriptions Last Dose Informant Patient Reported? Taking?   Lidocaine 4 % PTCH   No No   Sig: Apply 1 patch topically daily As needed for back pain, remove the patch after 12 hours   Nicotine 10 MG/ML SOLN   No No   Sig: Instill 1 to 2 doses per hour in each nostril. Each dose = 2 sprays, one in each nostril.   Patient not taking: Reported on 3/24/2025   QUEtiapine (SEROquel) 100 mg tablet   No No    Sig: Take 1 tablet (100 mg total) by mouth daily at bedtime   atorvastatin (LIPITOR) 10 mg tablet   No No   Sig: TAKE ONE TABLET BY MOUTH DAILY WITH DINNER   fluticasone (FLONASE) 50 mcg/act nasal spray   No No   Si sprays into each nostril daily   Patient not taking: Reported on 2/10/2025   gabapentin (NEURONTIN) 300 mg capsule   No No   Sig: TAKE 1 CAPSULE (300 MG TOTAL) BY MOUTH THREE (THREE) TIMES A DAY   Patient not taking: Reported on 3/24/2025   hydrOXYzine HCL (ATARAX) 25 mg tablet   Yes No   hydrOXYzine pamoate (VISTARIL) 50 mg capsule   Yes No   ibuprofen (MOTRIN) 800 mg tablet   No No   Sig: Take 1 tablet (800 mg total) by mouth every 8 (eight) hours as needed for mild pain   lisinopril (ZESTRIL) 5 mg tablet   No No   Sig: TAKE ONE TABLET BY MOUTH DAILY   naloxone (NARCAN) 4 mg/0.1 mL nasal spray   Yes No   Patient not taking: Reported on 2024   nicotine (NICODERM CQ) 21 mg/24 hr TD 24 hr patch   No No   Sig: Place 1 patch on the skin over 24 hours every 24 hours   Patient not taking: Reported on 3/24/2025   pantoprazole (PROTONIX) 40 mg tablet   No No   Sig: TAKE 1 TABLET (40 MG TOTAL) BY MOUTH IN THE MORNING   sertraline (ZOLOFT) 100 mg tablet   No No   Sig: Take 1 tablet (100 mg total) by mouth daily   traZODone (DESYREL) 150 mg tablet   Yes No      Facility-Administered Medications: None     Patient's Medications   Discharge Prescriptions    No medications on file     No discharge procedures on file.  ED SEPSIS DOCUMENTATION                [1]   Past Medical History:  Diagnosis Date    Abdominal pain     Allergic rhinitis     Cancer (HCC)     Chronic hepatitis C virus infection (HCC) 04/10/2017    Formatting of this note might be different from the original.  Genotype: 1a  IL28B: Not Done  Treatment Status: Past treatment Zepatier The patient's HCV RNA remains undetectable 24 weeks following completion of therapy.  This is a sustained virologic response, which is a cure of HCV infection.    Fibrosis Assessments:  1.- F4 Cirrhosis FibroScan June 2017     Last Assessment & Plan:   Formatting o    Chronic pain     Colon polyps     Depression     Fatigue     Head injury     Homeless     Hypertension     Insomnia     Liver disease     Loss of appetite     Numbness and tingling of right arm     Palpitations     Psychiatric disorder     bipolar    PTSD (post-traumatic stress disorder)     Seizures (HCC)     Shortness of breath     Substance abuse (HCC)     Swallowing difficulty    [2]   Past Surgical History:  Procedure Laterality Date    ABDOMINAL SURGERY      COLONOSCOPY      EYE SURGERY      NECK SURGERY      ORCHIECTOMY Right 5/19/2016    Procedure: ORCHIECTOMY INGUINAL biopsy of testicular mass, ;  Surgeon: Jose Lara MD;  Location:  MAIN OR;  Service:     CO COLONOSCOPY FLX DX W/COLLJ SPEC WHEN PFRMD N/A 2/13/2017    Procedure: EGD AND COLONOSCOPY;  Surgeon: Hernesto Perez MD;  Location: [facility] GI LAB;  Service: Gastroenterology    CO ESOPHAGOGASTRODUODENOSCOPY TRANSORAL DIAGNOSTIC N/A 11/16/2016    Procedure: EGD AND COLONOSCOPY;  Surgeon: Hernesto Perez MD;  Location:  GI LAB;  Service: Gastroenterology   [3]   Family History  Problem Relation Name Age of Onset    Diabetes Mother      Hypertension Brother     [4]   Social History  Tobacco Use    Smoking status: Every Day     Current packs/day: 1.00     Average packs/day: 1 pack/day for 41.0 years (41.0 ttl pk-yrs)     Types: Cigarettes    Smokeless tobacco: Current   Vaping Use    Vaping status: Never Used   Substance Use Topics    Alcohol use: No    Drug use: Not Currently     Types: Heroin      DO Allie  Authorized by: Melissa Kelley DO    Patient location:  ED  Consent:     Consent obtained:  Verbal    Consent given by:  Patient  Pre-procedure details:     Hand hygiene: Hand hygiene performed prior to insertion      Sterile barrier technique: All elements of maximal sterile technique followed      Skin preparation:  2% chlorhexidine    Skin preparation agent: Skin preparation agent completely dried prior to procedure    Procedure details:     Complex Venous Access Line Type: US Guided Peripheral IV      Orientation:  Left    Location:  Arm    Catheter size:  20 gauge    Approach: percutaneous technique used      Patient position:  Flat    Ultrasound image availability:  Not saved    Sterile ultrasound techniques: Sterile gel and sterile probe covers were used      Number of attempts:  1    Successful placement: yes      Landmarks identified: yes    Anesthesia (see MAR for exact dosages):     Anesthesia method:  None  Post-procedure details:     Post-procedure:  Dressing applied    Assessment:  Blood return through all ports and free fluid flow    Patient tolerance of procedure:  Tolerated well, no immediate complications  ECG 12 Lead Documentation Only    Date/Time: 6/3/2025 11:38 AM    Performed by: Melissa Kelley DO  Authorized by: Melissa Kelley DO    Indications / Diagnosis:  Chest pain/right upper quadrant abdominal pain  ECG reviewed by me, the ED Provider: yes    Patient location:  ED  Previous ECG:     Previous ECG:  Compared to current    Similarity:  No change  Interpretation:     Interpretation: normal    Rate:     ECG rate:  59    ECG rate assessment: bradycardic    Rhythm:     Rhythm: sinus bradycardia    Ectopy:     Ectopy: none    QRS:     QRS axis:  Normal    QRS intervals:  Normal  Conduction:     Conduction: normal    ST segments:     ST segments:  Normal  T waves:     T waves: normal        ED Medication and Procedure Management   Prior to Admission Medications    Prescriptions Last Dose Informant Patient Reported? Taking?   Lidocaine 4 % PTCH   No No   Sig: Apply 1 patch topically daily As needed for back pain, remove the patch after 12 hours   Nicotine 10 MG/ML SOLN   No No   Sig: Instill 1 to 2 doses per hour in each nostril. Each dose = 2 sprays, one in each nostril.   Patient not taking: Reported on 3/24/2025   QUEtiapine (SEROquel) 100 mg tablet   No No   Sig: Take 1 tablet (100 mg total) by mouth daily at bedtime   atorvastatin (LIPITOR) 10 mg tablet   No No   Sig: TAKE ONE TABLET BY MOUTH DAILY WITH DINNER   fluticasone (FLONASE) 50 mcg/act nasal spray   No No   Si sprays into each nostril daily   Patient not taking: Reported on 2/10/2025   gabapentin (NEURONTIN) 300 mg capsule   No No   Sig: TAKE 1 CAPSULE (300 MG TOTAL) BY MOUTH THREE (THREE) TIMES A DAY   Patient not taking: Reported on 3/24/2025   hydrOXYzine HCL (ATARAX) 25 mg tablet   Yes No   hydrOXYzine pamoate (VISTARIL) 50 mg capsule   Yes No   ibuprofen (MOTRIN) 800 mg tablet   No No   Sig: Take 1 tablet (800 mg total) by mouth every 8 (eight) hours as needed for mild pain   lisinopril (ZESTRIL) 5 mg tablet   No No   Sig: TAKE ONE TABLET BY MOUTH DAILY   naloxone (NARCAN) 4 mg/0.1 mL nasal spray   Yes No   Patient not taking: Reported on 2024   nicotine (NICODERM CQ) 21 mg/24 hr TD 24 hr patch   No No   Sig: Place 1 patch on the skin over 24 hours every 24 hours   Patient not taking: Reported on 3/24/2025   pantoprazole (PROTONIX) 40 mg tablet   No No   Sig: TAKE 1 TABLET (40 MG TOTAL) BY MOUTH IN THE MORNING   sertraline (ZOLOFT) 100 mg tablet   No No   Sig: Take 1 tablet (100 mg total) by mouth daily   traZODone (DESYREL) 150 mg tablet   Yes No      Facility-Administered Medications: None     Discharge Medication List as of 6/3/2025  2:37 PM        CONTINUE these medications which have NOT CHANGED    Details   atorvastatin (LIPITOR) 10 mg tablet TAKE ONE TABLET BY MOUTH DAILY WITH DINNER,  Normal      fluticasone (FLONASE) 50 mcg/act nasal spray 2 sprays into each nostril daily, Starting Fri 9/13/2024, Normal      gabapentin (NEURONTIN) 300 mg capsule TAKE 1 CAPSULE (300 MG TOTAL) BY MOUTH THREE (THREE) TIMES A DAY, Normal      hydrOXYzine HCL (ATARAX) 25 mg tablet Historical Med      hydrOXYzine pamoate (VISTARIL) 50 mg capsule Historical Med      ibuprofen (MOTRIN) 800 mg tablet Take 1 tablet (800 mg total) by mouth every 8 (eight) hours as needed for mild pain, Starting Mon 3/24/2025, Normal      Lidocaine 4 % PTCH Apply 1 patch topically daily As needed for back pain, remove the patch after 12 hours, Starting Mon 2/10/2025, Normal      lisinopril (ZESTRIL) 5 mg tablet TAKE ONE TABLET BY MOUTH DAILY, Starting Fri 2/7/2025, Normal      naloxone (NARCAN) 4 mg/0.1 mL nasal spray Historical Med      nicotine (NICODERM CQ) 21 mg/24 hr TD 24 hr patch Place 1 patch on the skin over 24 hours every 24 hours, Starting Thu 8/17/2023, Normal      Nicotine 10 MG/ML SOLN Instill 1 to 2 doses per hour in each nostril. Each dose = 2 sprays, one in each nostril., Normal      pantoprazole (PROTONIX) 40 mg tablet TAKE 1 TABLET (40 MG TOTAL) BY MOUTH IN THE MORNING, Starting Fri 2/7/2025, Normal      QUEtiapine (SEROquel) 100 mg tablet Take 1 tablet (100 mg total) by mouth daily at bedtime, Starting Tue 12/27/2022, Normal      sertraline (ZOLOFT) 100 mg tablet Take 1 tablet (100 mg total) by mouth daily, Starting Tue 12/27/2022, Normal      traZODone (DESYREL) 150 mg tablet Historical Med           No discharge procedures on file.  ED SEPSIS DOCUMENTATION   Time reflects when diagnosis was documented in both MDM as applicable and the Disposition within this note       Time User Action Codes Description Comment    6/3/2025  2:54 PM Melissa Kelley Add [R89.9] Abnormal laboratory test                      [1]   Past Medical History:  Diagnosis Date    Abdominal pain     Allergic rhinitis     Cancer (HCC)     Chronic  hepatitis C virus infection (HCC) 04/10/2017    Formatting of this note might be different from the original.  Genotype: 1a  IL28B: Not Done  Treatment Status: Past treatment Zepatier The patient's HCV RNA remains undetectable 24 weeks following completion of therapy.  This is a sustained virologic response, which is a cure of HCV infection.   Fibrosis Assessments:  1.- F4 Cirrhosis FibroScan June 2017     Last Assessment & Plan:   Formatting o    Chronic pain     Colon polyps     Depression     Fatigue     Head injury     Homeless     Hypertension     Insomnia     Liver disease     Loss of appetite     Numbness and tingling of right arm     Palpitations     Psychiatric disorder     bipolar    PTSD (post-traumatic stress disorder)     Seizures (HCC)     Shortness of breath     Substance abuse (HCC)     Swallowing difficulty    [2]   Past Surgical History:  Procedure Laterality Date    ABDOMINAL SURGERY      COLONOSCOPY      EYE SURGERY      NECK SURGERY      ORCHIECTOMY Right 5/19/2016    Procedure: ORCHIECTOMY INGUINAL biopsy of testicular mass, ;  Surgeon: Jose Lara MD;  Location:  MAIN OR;  Service:     UT COLONOSCOPY FLX DX W/COLLJ SPEC WHEN PFRMD N/A 2/13/2017    Procedure: EGD AND COLONOSCOPY;  Surgeon: Hernesto Perez MD;  Location: [facility] GI LAB;  Service: Gastroenterology    UT ESOPHAGOGASTRODUODENOSCOPY TRANSORAL DIAGNOSTIC N/A 11/16/2016    Procedure: EGD AND COLONOSCOPY;  Surgeon: Hernesto Perez MD;  Location:  GI LAB;  Service: Gastroenterology   [3]   Family History  Problem Relation Name Age of Onset    Diabetes Mother      Hypertension Brother     [4]   Social History  Tobacco Use    Smoking status: Every Day     Current packs/day: 1.00     Average packs/day: 1 pack/day for 41.0 years (41.0 ttl pk-yrs)     Types: Cigarettes    Smokeless tobacco: Current   Vaping Use    Vaping status: Never Used   Substance Use Topics    Alcohol use: No    Drug use: Not Currently     Types: Heroin         Melissa Kelley,   06/09/25 9045

## 2025-06-03 NOTE — ED NOTES
Pt family came out to nursing desk giving this RN pt's PCP #, this information was relayed to attending provider. Dr. Taylor brought language Ipad to bedside and explained to pt that she already called pt PCP and they did not draw blood work on this pt. Pt requesting to leave.      Tova Romo RN  06/03/25 6078

## 2025-06-03 NOTE — ED NOTES
Nurse attempt IV x2 without success, Dr Hills aware of need more ultrasounded IV.      Blessing Palacios RN  06/03/25 1013

## 2025-06-10 ENCOUNTER — APPOINTMENT (EMERGENCY)
Dept: RADIOLOGY | Facility: HOSPITAL | Age: 60
End: 2025-06-10
Payer: COMMERCIAL

## 2025-06-10 ENCOUNTER — HOSPITAL ENCOUNTER (EMERGENCY)
Facility: HOSPITAL | Age: 60
Discharge: HOME | End: 2025-06-10
Attending: EMERGENCY MEDICINE | Admitting: EMERGENCY MEDICINE
Payer: COMMERCIAL

## 2025-06-10 VITALS
HEART RATE: 50 BPM | RESPIRATION RATE: 15 BRPM | DIASTOLIC BLOOD PRESSURE: 86 MMHG | TEMPERATURE: 97.3 F | OXYGEN SATURATION: 97 % | SYSTOLIC BLOOD PRESSURE: 178 MMHG

## 2025-06-10 DIAGNOSIS — R41.82 ALTERED MENTAL STATUS: Primary | ICD-10-CM

## 2025-06-10 LAB
ATRIAL RATE: 57 BPM
GLUCOSE SERPL-MCNC: 174 MG/DL (ref 65–140)
P AXIS: 61 DEGREES
PR INTERVAL: 156 MS
QRS AXIS: 18 DEGREES
QRSD INTERVAL: 86 MS
QT INTERVAL: 416 MS
QTC INTERVAL: 404 MS
T WAVE AXIS: 22 DEGREES
VENTRICULAR RATE: 57 BPM

## 2025-06-10 PROCEDURE — 93005 ELECTROCARDIOGRAM TRACING: CPT

## 2025-06-10 PROCEDURE — 99284 EMERGENCY DEPT VISIT MOD MDM: CPT | Performed by: EMERGENCY MEDICINE

## 2025-06-10 PROCEDURE — 99285 EMERGENCY DEPT VISIT HI MDM: CPT

## 2025-06-10 PROCEDURE — 70450 CT HEAD/BRAIN W/O DYE: CPT

## 2025-06-10 PROCEDURE — 82948 REAGENT STRIP/BLOOD GLUCOSE: CPT

## 2025-06-10 PROCEDURE — 96365 THER/PROPH/DIAG IV INF INIT: CPT

## 2025-06-10 PROCEDURE — 96375 TX/PRO/DX INJ NEW DRUG ADDON: CPT

## 2025-06-10 PROCEDURE — 93010 ELECTROCARDIOGRAM REPORT: CPT | Performed by: INTERNAL MEDICINE

## 2025-06-10 RX ORDER — SODIUM CHLORIDE, SODIUM GLUCONATE, SODIUM ACETATE, POTASSIUM CHLORIDE, MAGNESIUM CHLORIDE, SODIUM PHOSPHATE, DIBASIC, AND POTASSIUM PHOSPHATE .53; .5; .37; .037; .03; .012; .00082 G/100ML; G/100ML; G/100ML; G/100ML; G/100ML; G/100ML; G/100ML
1000 INJECTION, SOLUTION INTRAVENOUS ONCE
Status: COMPLETED | OUTPATIENT
Start: 2025-06-10 | End: 2025-06-10

## 2025-06-10 RX ORDER — NALOXONE HYDROCHLORIDE 0.4 MG/ML
0.1 INJECTION, SOLUTION INTRAMUSCULAR; INTRAVENOUS; SUBCUTANEOUS ONCE
Status: COMPLETED | OUTPATIENT
Start: 2025-06-10 | End: 2025-06-10

## 2025-06-10 RX ADMIN — SODIUM CHLORIDE, SODIUM GLUCONATE, SODIUM ACETATE, POTASSIUM CHLORIDE, MAGNESIUM CHLORIDE, SODIUM PHOSPHATE, DIBASIC, AND POTASSIUM PHOSPHATE 1000 ML: .53; .5; .37; .037; .03; .012; .00082 INJECTION, SOLUTION INTRAVENOUS at 13:00

## 2025-06-10 RX ADMIN — NALXONE HYDROCHLORIDE 0.1 MG: 0.4 INJECTION INTRAMUSCULAR; INTRAVENOUS; SUBCUTANEOUS at 12:57

## 2025-06-10 NOTE — DISCHARGE INSTRUCTIONS
You were seen in the emergency department for altered mental status after intoxication on substances.  We recommend that you avoid these substances in the future as they can lead to severe bodily harm and dangerous situations.  We got a CT scan of your head which showed no acute issues. you did get better and was able to eat or drink food.  If you would like help with your substance use disorder please ask us or return to the emergency department.  
no

## 2025-06-10 NOTE — ED ATTENDING ATTESTATION
6/10/2025  I, Alaina Bowers DO, saw and evaluated the patient. I have discussed the patient with the resident/non-physician practitioner and agree with the resident's/non-physician practitioner's findings, Plan of Care, and MDM as documented in the resident's/non-physician practitioner's note, except where noted. All available labs and Radiology studies were reviewed.  I was present for key portions of any procedure(s) performed by the resident/non-physician practitioner and I was immediately available to provide assistance.       At this point I agree with the current assessment done in the Emergency Department.  I have conducted an independent evaluation of this patient a history and physical is as follows:    Chief Complaint   Patient presents with    Altered Mental Status     Pt was found by police slumped over his bike. Denies drug use expect for marijuana. Police reported they found a syringe on patient. Pt denies alcohol use     59-year-old male presents for altered mental status.  Patient was found by police slumped over his bike.  Patient reports that he used marijuana but denies other drug use.  Per EMS the police had found a syringe on the patient.  Patient unsure if he hit his head but denies pain at this time.  On exam-no acute distress, sleepy but answers questions appropriately, arouses easily to voice.  Heart regular, no respiratory distress, no sign of head injury, small abrasion noted on right knee.  Plan-pupils were pinpoint patient was given Narcan in the ED.  Will do CT of the head, check blood sugar and observe until more sober    ED Course         Critical Care Time  Procedures
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
## 2025-06-17 ENCOUNTER — HOSPITAL ENCOUNTER (EMERGENCY)
Facility: HOSPITAL | Age: 60
Discharge: HOME | End: 2025-06-17
Attending: EMERGENCY MEDICINE | Admitting: EMERGENCY MEDICINE
Payer: COMMERCIAL

## 2025-06-17 VITALS
TEMPERATURE: 98.3 F | OXYGEN SATURATION: 94 % | DIASTOLIC BLOOD PRESSURE: 67 MMHG | SYSTOLIC BLOOD PRESSURE: 117 MMHG | HEART RATE: 65 BPM | RESPIRATION RATE: 13 BRPM

## 2025-06-17 DIAGNOSIS — Z71.1 NO PROBLEM, FEARED COMPLAINT UNFOUNDED: Primary | ICD-10-CM

## 2025-06-17 LAB
ANION GAP SERPL CALCULATED.3IONS-SCNC: 3 MMOL/L (ref 4–13)
ATRIAL RATE: 82 BPM
BUN SERPL-MCNC: 16 MG/DL (ref 5–25)
CALCIUM SERPL-MCNC: 8.6 MG/DL (ref 8.4–10.2)
CHLORIDE SERPL-SCNC: 104 MMOL/L (ref 96–108)
CO2 SERPL-SCNC: 29 MMOL/L (ref 21–32)
CREAT SERPL-MCNC: 1.06 MG/DL (ref 0.6–1.3)
GFR SERPL CREATININE-BSD FRML MDRD: 76 ML/MIN/1.73SQ M
GLUCOSE SERPL-MCNC: 120 MG/DL (ref 65–140)
P AXIS: 52 DEGREES
POTASSIUM SERPL-SCNC: 4.5 MMOL/L (ref 3.5–5.3)
PR INTERVAL: 144 MS
QRS AXIS: 88 DEGREES
QRSD INTERVAL: 74 MS
QT INTERVAL: 356 MS
QTC INTERVAL: 416 MS
SODIUM SERPL-SCNC: 136 MMOL/L (ref 135–147)
T WAVE AXIS: -7 DEGREES
VENTRICULAR RATE: 82 BPM

## 2025-06-17 PROCEDURE — 93005 ELECTROCARDIOGRAM TRACING: CPT

## 2025-06-17 PROCEDURE — 99284 EMERGENCY DEPT VISIT MOD MDM: CPT | Performed by: EMERGENCY MEDICINE

## 2025-06-17 PROCEDURE — 99285 EMERGENCY DEPT VISIT HI MDM: CPT

## 2025-06-17 PROCEDURE — 93010 ELECTROCARDIOGRAM REPORT: CPT | Performed by: INTERNAL MEDICINE

## 2025-06-17 PROCEDURE — 80048 BASIC METABOLIC PNL TOTAL CA: CPT

## 2025-06-17 PROCEDURE — 36415 COLL VENOUS BLD VENIPUNCTURE: CPT

## 2025-06-17 NOTE — ED PROVIDER NOTES
Time reflects when diagnosis was documented in both MDM as applicable and the Disposition within this note       Time User Action Codes Description Comment    6/17/2025  2:49 PM Jose Manuel Healy Add [Z71.1] No problem, feared complaint unfounded           ED Disposition       ED Disposition   Discharge    Condition   Stable    Date/Time   Tue Jun 17, 2025  2:48 PM    Comment   Barrett Teran discharge to home/self care.                   Assessment & Plan       Medical Decision Making  Patient is a 59-year-old male presenting for evaluation of abnormal lab work.  The lab work in question was from 6/2/2025, the potassium was 6.4 at that time.  Of note patient was seen in the emergency department here on 6/3/2025 and had a BMP done at that time with a normal potassium.  The patient saw someone from his PCP office or a clinic and they had access to the 6/2 blood work and were concerned that the potassium was elevated and recommended that he go to the hospital for further evaluation.  Patient is otherwise without complaint and states that he would not be here if he was not told explicitly to come to the hospital today.  He has a benign physical exam.  We will repeat a BMP here and monitor and reassess.  EKG done in triage reviewed nonspecific T wave changes but no peaked T waves normal sinus rhythm normal axis normal intervals no ST elevations or depressions no significant change from prior    The BMP is unremarkable potassium is 4.5 within normal creatinine and GFR.  Patient is hemodynamically stable and cleared for discharge outpatient follow-up.  Return precautions given    Amount and/or Complexity of Data Reviewed  Labs: ordered.             Medications - No data to display    ED Risk Strat Scores                    No data recorded        SBIRT 22yo+      Flowsheet Row Most Recent Value   Initial Alcohol Screen: US AUDIT-C     1. How often do you have a drink containing alcohol? 0 Filed at: 06/17/2025 1248   2. How  "many drinks containing alcohol do you have on a typical day you are drinking?  0 Filed at: 06/17/2025 1248   3a. Male UNDER 65: How often do you have five or more drinks on one occasion? 0 Filed at: 06/17/2025 1251   Audit-C Score 0 Filed at: 06/17/2025 1251   [place]: How many times in the past year have you...    Used an illegal drug or used a prescription medication for non-medical reasons? Never Filed at: 06/17/2025 1248                            History of Present Illness       Chief Complaint   Patient presents with    Abnormal Lab     OP bloodwork shows k 6.4 (though slightly hemolyzed). Feels \"a little weak\" and reports sore throat x3d. Hx CKD3. Pt denies h/a, numbness/tingling, CP, SOB, NV, fevers/chills       Past Medical History[1]   Past Surgical History[2]   Family History[3]   Social History[4]   E-Cigarette/Vaping    E-Cigarette Use Never User       E-Cigarette/Vaping Substances    Nicotine No     THC No     CBD No     Flavoring No     Other No     Unknown No       I have reviewed and agree with the history as documented.     See MDM          Review of Systems   Constitutional:  Negative for chills and fever.   HENT:  Negative for congestion.    Respiratory:  Negative for cough and shortness of breath.    Cardiovascular:  Negative for chest pain.   Gastrointestinal:  Negative for abdominal pain, nausea and vomiting.   Musculoskeletal:  Negative for back pain and neck pain.   Skin:  Negative for rash.   Neurological:  Negative for weakness, numbness and headaches.   All other systems reviewed and are negative.          Objective       ED Triage Vitals [06/17/25 1248]   Temperature Pulse Blood Pressure Respirations SpO2 Patient Position - Orthostatic VS   98.3 °F (36.8 °C) 84 144/86 18 99 % --      Temp Source Heart Rate Source BP Location FiO2 (%) Pain Score    Temporal Monitor -- -- No Pain      Vitals      Date and Time Temp Pulse SpO2 Resp BP Pain Score FACES Pain Rating User   06/17/25 1400 -- 65 94 " % -- 117/67 -- --    06/17/25 1330 -- 66 97 % 13 118/62 -- --    06/17/25 1248 98.3 °F (36.8 °C) 84 99 % 18 144/86 No Pain -- JT            Physical Exam  Vitals and nursing note reviewed.   Constitutional:       General: He is not in acute distress.     Appearance: He is well-developed. He is not ill-appearing.   HENT:      Head: Normocephalic and atraumatic.      Mouth/Throat:      Mouth: Mucous membranes are moist.     Eyes:      Extraocular Movements: Extraocular movements intact.      Conjunctiva/sclera: Conjunctivae normal.       Cardiovascular:      Rate and Rhythm: Normal rate and regular rhythm.      Heart sounds: No murmur heard.  Pulmonary:      Effort: Pulmonary effort is normal. No respiratory distress.      Breath sounds: Normal breath sounds.   Abdominal:      Palpations: Abdomen is soft.      Tenderness: There is no abdominal tenderness.     Musculoskeletal:         General: Normal range of motion.      Cervical back: Normal range of motion and neck supple.      Right lower leg: No edema.      Left lower leg: No edema.     Skin:     General: Skin is warm and dry.      Capillary Refill: Capillary refill takes less than 2 seconds.     Neurological:      General: No focal deficit present.      Mental Status: He is alert.         Results Reviewed       Procedure Component Value Units Date/Time    Basic metabolic panel [[id number]]  (Abnormal) Collected: 06/17/25 1359    Lab Status: Final result Specimen: Blood from Arm, Right Updated: 06/17/25 1438     Sodium 136 mmol/L      Potassium 4.5 mmol/L      Chloride 104 mmol/L      CO2 29 mmol/L      ANION GAP 3 mmol/L      BUN 16 mg/dL      Creatinine 1.06 mg/dL      Glucose 120 mg/dL      Calcium 8.6 mg/dL      eGFR 76 ml/min/1.73sq m     Narrative:      National Kid[employer] Disease Foundation guidelines for Chronic Kidney Disease (CKD):     Stage 1 with normal or high GFR (GFR > 90 mL/min/1.73 square meters)    Stage 2 Mild CKD (GFR = 60-89 mL/min/1.73 square  meters)    Stage 3A Moderate CKD (GFR = 45-59 mL/min/1.73 square meters)    Stage 3B Moderate CKD (GFR = 30-44 mL/min/1.73 square meters)    Stage 4 Severe CKD (GFR = 15-29 mL/min/1.73 square meters)    Stage 5 End Stage CKD (GFR <15 mL/min/1.73 square meters)  Note: GFR calculation is accurate only with a steady state creatinine            No orders to display       Procedures    ED Medication and Procedure Management   Prior to Admission Medications   Prescriptions Last Dose Informant Patient Reported? Taking?   Lidocaine 4 % PTCH   No No   Sig: Apply 1 patch topically daily As needed for back pain, remove the patch after 12 hours   Nicotine 10 MG/ML SOLN   No No   Sig: Instill 1 to 2 doses per hour in each nostril. Each dose = 2 sprays, one in each nostril.   Patient not taking: Reported on 3/24/2025   QUEtiapine (SEROquel) 100 mg tablet   No No   Sig: Take 1 tablet (100 mg total) by mouth daily at bedtime   atorvastatin (LIPITOR) 10 mg tablet   No No   Sig: TAKE ONE TABLET BY MOUTH DAILY WITH DINNER   fluticasone (FLONASE) 50 mcg/act nasal spray   No No   Si sprays into each nostril daily   Patient not taking: Reported on 2/10/2025   gabapentin (NEURONTIN) 300 mg capsule   No No   Sig: TAKE 1 CAPSULE (300 MG TOTAL) BY MOUTH THREE (THREE) TIMES A DAY   Patient not taking: Reported on 3/24/2025   hydrOXYzine HCL (ATARAX) 25 mg tablet   Yes No   hydrOXYzine pamoate (VISTARIL) 50 mg capsule   Yes No   ibuprofen (MOTRIN) 800 mg tablet   No No   Sig: Take 1 tablet (800 mg total) by mouth every 8 (eight) hours as needed for mild pain   lisinopril (ZESTRIL) 5 mg tablet   No No   Sig: TAKE ONE TABLET BY MOUTH DAILY   naloxone (NARCAN) 4 mg/0.1 mL nasal spray   Yes No   Patient not taking: Reported on 2024   nicotine (NICODERM CQ) 21 mg/24 hr TD 24 hr patch   No No   Sig: Place 1 patch on the skin over 24 hours every 24 hours   Patient not taking: Reported on 3/24/2025   pantoprazole (PROTONIX) 40 mg tablet   No  No   Sig: TAKE 1 TABLET (40 MG TOTAL) BY MOUTH IN THE MORNING   sertraline (ZOLOFT) 100 mg tablet   No No   Sig: Take 1 tablet (100 mg total) by mouth daily   traZODone (DESYREL) 150 mg tablet   Yes No      Facility-Administered Medications: None     Discharge Medication List as of 6/17/2025  2:49 PM        CONTINUE these medications which have NOT CHANGED    Details   atorvastatin (LIPITOR) 10 mg tablet TAKE ONE TABLET BY MOUTH DAILY WITH DINNER, Normal      fluticasone (FLONASE) 50 mcg/act nasal spray 2 sprays into each nostril daily, Starting Fri 9/13/2024, Normal      gabapentin (NEURONTIN) 300 mg capsule TAKE 1 CAPSULE (300 MG TOTAL) BY MOUTH THREE (THREE) TIMES A DAY, Normal      hydrOXYzine HCL (ATARAX) 25 mg tablet Historical Med      hydrOXYzine pamoate (VISTARIL) 50 mg capsule Historical Med      ibuprofen (MOTRIN) 800 mg tablet Take 1 tablet (800 mg total) by mouth every 8 (eight) hours as needed for mild pain, Starting Mon 3/24/2025, Normal      Lidocaine 4 % PTCH Apply 1 patch topically daily As needed for back pain, remove the patch after 12 hours, Starting Mon 2/10/2025, Normal      lisinopril (ZESTRIL) 5 mg tablet TAKE ONE TABLET BY MOUTH DAILY, Starting Fri 2/7/2025, Normal      naloxone (NARCAN) 4 mg/0.1 mL nasal spray Historical Med      nicotine (NICODERM CQ) 21 mg/24 hr TD 24 hr patch Place 1 patch on the skin over 24 hours every 24 hours, Starting Thu 8/17/2023, Normal      Nicotine 10 MG/ML SOLN Instill 1 to 2 doses per hour in each nostril. Each dose = 2 sprays, one in each nostril., Normal      pantoprazole (PROTONIX) 40 mg tablet TAKE 1 TABLET (40 MG TOTAL) BY MOUTH IN THE MORNING, Starting Fri 2/7/2025, Normal      QUEtiapine (SEROquel) 100 mg tablet Take 1 tablet (100 mg total) by mouth daily at bedtime, Starting Tue 12/27/2022, Normal      sertraline (ZOLOFT) 100 mg tablet Take 1 tablet (100 mg total) by mouth daily, Starting Tue 12/27/2022, Normal      traZODone (DESYREL) 150 mg tablet  Historical Med           No discharge procedures on file.  ED SEPSIS DOCUMENTATION   Time reflects when diagnosis was documented in both MDM as applicable and the Disposition within this note       Time User Action Codes Description Comment    6/17/2025  2:49 PM Jose Manuel Healy Add [Z71.1] No problem, feared complaint unfounded                      [1]   Past Medical History:  Diagnosis Date    Abdominal pain     Allergic rhinitis     Cancer (HCC)     Chronic hepatitis C virus infection (HCC) 04/10/2017    Formatting of this note might be different from the original.  Genotype: 1a  IL28B: Not Done  Treatment Status: Past treatment Zepatier The patient's HCV RNA remains undetectable 24 weeks following completion of therapy.  This is a sustained virologic response, which is a cure of HCV infection.   Fibrosis Assessments:  1.- F4 Cirrhosis FibroScan June 2017     Last Assessment & Plan:   Formatting o    Chronic pain     Colon polyps     Depression     Fatigue     Head injury     Homeless     Hypertension     Insomnia     Liver disease     Loss of appetite     Numbness and tingling of right arm     Palpitations     Psychiatric disorder     bipolar    PTSD (post-traumatic stress disorder)     Seizures (HCC)     Shortness of breath     Substance abuse (HCC)     Swallowing difficulty    [2]   Past Surgical History:  Procedure Laterality Date    ABDOMINAL SURGERY      COLONOSCOPY      EYE SURGERY      NECK SURGERY      ORCHIECTOMY Right 5/19/2016    Procedure: ORCHIECTOMY INGUINAL biopsy of testicular mass, ;  Surgeon: Jose Lara MD;  Location: [facility];  Service:     [place] COLONOSCOPY FLX DX W/COLLJ SPEC WHEN PFRMD N/A 2/13/2017    Procedure: EGD AND COLONOSCOPY;  Surgeon: Hernesto Perez MD;  Location: [facility];  Service: Gastroenterology    [place] ESOPHAGOGASTRODUODENOSCOPY TRANSORAL DIAGNOSTIC N/A 11/16/2016    Procedure: EGD AND COLONOSCOPY;  Surgeon: Hernesto Perez MD;  Location: [facility];  Service:  Gastroenterology   [3]   Family History  Problem Relation Name Age of Onset    Diabetes Mother      Hypertension Brother     [4]   Social History  Tobacco Use    Smoking status: Every Day     Current packs/day: 1.00     Average packs/day: 1 pack/day for 41.0 years (41.0 ttl pk-yrs)     Types: Cigarettes    Smokeless tobacco: Current   Vaping Use    Vaping status: Never Used   Substance Use Topics    Alcohol use: No    Drug use: Yes     Types: Heroin, Marijuana        Jose Manuel Healy DO  06/17/25 6305

## 2025-06-28 DIAGNOSIS — E78.5 HYPERLIPIDEMIA: ICD-10-CM

## 2025-06-28 DIAGNOSIS — E11.9 TYPE 2 DIABETES MELLITUS WITHOUT COMPLICATION, WITHOUT LONG-TERM CURRENT USE OF INSULIN (HCC): ICD-10-CM

## 2025-06-30 RX ORDER — ATORVASTATIN CALCIUM 10 MG/1
TABLET, FILM COATED ORAL
Qty: 30 TABLET | Refills: 4 | Status: SHIPPED | OUTPATIENT
Start: 2025-06-30

## 2025-08-20 DIAGNOSIS — M54.40 LOW BACK PAIN WITH SCIATICA, SCIATICA LATERALITY UNSPECIFIED, UNSPECIFIED BACK PAIN LATERALITY, UNSPECIFIED CHRONICITY: ICD-10-CM

## 2025-08-20 RX ORDER — GABAPENTIN 300 MG/1
300 CAPSULE ORAL 3 TIMES DAILY
Qty: 90 CAPSULE | Refills: 4 | Status: SHIPPED | OUTPATIENT
Start: 2025-08-20